# Patient Record
Sex: FEMALE | Race: WHITE | Employment: OTHER | URBAN - NONMETROPOLITAN AREA
[De-identification: names, ages, dates, MRNs, and addresses within clinical notes are randomized per-mention and may not be internally consistent; named-entity substitution may affect disease eponyms.]

---

## 2017-02-01 ENCOUNTER — OFFICE VISIT (OUTPATIENT)
Dept: FAMILY MEDICINE CLINIC | Age: 82
End: 2017-02-01

## 2017-02-01 VITALS
HEIGHT: 66 IN | HEART RATE: 90 BPM | OXYGEN SATURATION: 97 % | DIASTOLIC BLOOD PRESSURE: 80 MMHG | SYSTOLIC BLOOD PRESSURE: 122 MMHG

## 2017-02-01 DIAGNOSIS — N39.0 URINARY TRACT INFECTION WITHOUT HEMATURIA, SITE UNSPECIFIED: Primary | ICD-10-CM

## 2017-02-01 PROCEDURE — G8399 PT W/DXA RESULTS DOCUMENT: HCPCS | Performed by: NURSE PRACTITIONER

## 2017-02-01 PROCEDURE — 4040F PNEUMOC VAC/ADMIN/RCVD: CPT | Performed by: NURSE PRACTITIONER

## 2017-02-01 PROCEDURE — G8427 DOCREV CUR MEDS BY ELIG CLIN: HCPCS | Performed by: NURSE PRACTITIONER

## 2017-02-01 PROCEDURE — 1123F ACP DISCUSS/DSCN MKR DOCD: CPT | Performed by: NURSE PRACTITIONER

## 2017-02-01 PROCEDURE — 1090F PRES/ABSN URINE INCON ASSESS: CPT | Performed by: NURSE PRACTITIONER

## 2017-02-01 PROCEDURE — G8484 FLU IMMUNIZE NO ADMIN: HCPCS | Performed by: NURSE PRACTITIONER

## 2017-02-01 PROCEDURE — 1036F TOBACCO NON-USER: CPT | Performed by: NURSE PRACTITIONER

## 2017-02-01 PROCEDURE — 99213 OFFICE O/P EST LOW 20 MIN: CPT | Performed by: NURSE PRACTITIONER

## 2017-02-01 PROCEDURE — G8420 CALC BMI NORM PARAMETERS: HCPCS | Performed by: NURSE PRACTITIONER

## 2017-02-01 ASSESSMENT — ENCOUNTER SYMPTOMS
ALLERGIC/IMMUNOLOGIC NEGATIVE: 1
SHORTNESS OF BREATH: 0
SINUS PRESSURE: 0
CHEST TIGHTNESS: 0
RESPIRATORY NEGATIVE: 1
NAUSEA: 0
TROUBLE SWALLOWING: 0
VOMITING: 0
ABDOMINAL PAIN: 0
EYES NEGATIVE: 1
DIARRHEA: 0
CONSTIPATION: 0
GASTROINTESTINAL NEGATIVE: 1
COUGH: 0

## 2017-02-13 ENCOUNTER — HOSPITAL ENCOUNTER (OUTPATIENT)
Age: 82
Setting detail: SPECIMEN
Discharge: HOME | End: 2017-02-13
Payer: MEDICARE

## 2017-02-13 ENCOUNTER — OFFICE VISIT (OUTPATIENT)
Dept: FAMILY MEDICINE CLINIC | Age: 82
End: 2017-02-13

## 2017-02-13 VITALS
DIASTOLIC BLOOD PRESSURE: 78 MMHG | OXYGEN SATURATION: 99 % | HEART RATE: 80 BPM | WEIGHT: 179 LBS | SYSTOLIC BLOOD PRESSURE: 112 MMHG | HEIGHT: 65 IN | BODY MASS INDEX: 29.82 KG/M2

## 2017-02-13 DIAGNOSIS — F51.04 CHRONIC INSOMNIA: ICD-10-CM

## 2017-02-13 DIAGNOSIS — T82.110D PACEMAKER LEAD MALFUNCTION, SUBSEQUENT ENCOUNTER: ICD-10-CM

## 2017-02-13 DIAGNOSIS — N39.0 URINARY TRACT INFECTION WITHOUT HEMATURIA, SITE UNSPECIFIED: Primary | ICD-10-CM

## 2017-02-13 DIAGNOSIS — Z09 FOLLOW-UP EXAM, 3-6 MONTHS SINCE PREVIOUS EXAM: ICD-10-CM

## 2017-02-13 DIAGNOSIS — E03.9 ACQUIRED HYPOTHYROIDISM: Primary | ICD-10-CM

## 2017-02-13 DIAGNOSIS — Z00.00 ROUTINE HEALTH MAINTENANCE: ICD-10-CM

## 2017-02-13 DIAGNOSIS — K21.9 GASTROESOPHAGEAL REFLUX DISEASE WITHOUT ESOPHAGITIS: ICD-10-CM

## 2017-02-13 DIAGNOSIS — E03.9 UNSPECIFIED HYPOTHYROIDISM: ICD-10-CM

## 2017-02-13 DIAGNOSIS — E78.2 MIXED HYPERLIPIDEMIA: ICD-10-CM

## 2017-02-13 DIAGNOSIS — I10 ESSENTIAL HYPERTENSION: ICD-10-CM

## 2017-02-13 DIAGNOSIS — C73 THYROID CANCER (HCC): ICD-10-CM

## 2017-02-13 LAB
-: NORMAL
AMORPHOUS: NORMAL
BACTERIA: NORMAL
CASTS UA: NORMAL /LPF (ref 0–2)
CRYSTALS, UA: NORMAL /HPF
EPITHELIAL CELLS UA: NORMAL /HPF (ref 0–5)
MUCUS: NORMAL
OTHER OBSERVATIONS UA: NORMAL
RBC UA: NORMAL /HPF (ref 0–4)
RENAL EPITHELIAL, UA: NORMAL /HPF
TRICHOMONAS: NORMAL
WBC UA: NORMAL /HPF (ref 0–4)
YEAST: NORMAL

## 2017-02-13 PROCEDURE — 99213 OFFICE O/P EST LOW 20 MIN: CPT | Performed by: NURSE PRACTITIONER

## 2017-02-13 PROCEDURE — G8420 CALC BMI NORM PARAMETERS: HCPCS | Performed by: NURSE PRACTITIONER

## 2017-02-13 PROCEDURE — 4040F PNEUMOC VAC/ADMIN/RCVD: CPT | Performed by: NURSE PRACTITIONER

## 2017-02-13 PROCEDURE — 1090F PRES/ABSN URINE INCON ASSESS: CPT | Performed by: NURSE PRACTITIONER

## 2017-02-13 PROCEDURE — 1123F ACP DISCUSS/DSCN MKR DOCD: CPT | Performed by: NURSE PRACTITIONER

## 2017-02-13 PROCEDURE — 1036F TOBACCO NON-USER: CPT | Performed by: NURSE PRACTITIONER

## 2017-02-13 PROCEDURE — G8484 FLU IMMUNIZE NO ADMIN: HCPCS | Performed by: NURSE PRACTITIONER

## 2017-02-13 PROCEDURE — G8427 DOCREV CUR MEDS BY ELIG CLIN: HCPCS | Performed by: NURSE PRACTITIONER

## 2017-02-13 PROCEDURE — G8399 PT W/DXA RESULTS DOCUMENT: HCPCS | Performed by: NURSE PRACTITIONER

## 2017-02-13 RX ORDER — LEVOTHYROXINE SODIUM 0.1 MG/1
100 TABLET ORAL DAILY
Qty: 30 TABLET | Refills: 3 | Status: SHIPPED | OUTPATIENT
Start: 2017-02-13 | End: 2017-07-10 | Stop reason: SDUPTHER

## 2017-02-13 RX ORDER — ZOLPIDEM TARTRATE 12.5 MG/1
12.5 TABLET, FILM COATED, EXTENDED RELEASE ORAL NIGHTLY PRN
Qty: 30 TABLET | Refills: 0 | Status: SHIPPED | OUTPATIENT
Start: 2017-02-13 | End: 2017-07-25 | Stop reason: SDUPTHER

## 2017-02-13 RX ORDER — RANITIDINE 150 MG/1
150 TABLET ORAL 2 TIMES DAILY
Qty: 60 TABLET | Refills: 3 | Status: SHIPPED | OUTPATIENT
Start: 2017-02-13 | End: 2017-06-09 | Stop reason: SDUPTHER

## 2017-02-13 ASSESSMENT — ENCOUNTER SYMPTOMS
RESPIRATORY NEGATIVE: 1
DIARRHEA: 0
NAUSEA: 0
TROUBLE SWALLOWING: 0
CHEST TIGHTNESS: 0
SHORTNESS OF BREATH: 0
VOMITING: 0
ALLERGIC/IMMUNOLOGIC NEGATIVE: 1
EYES NEGATIVE: 1
GASTROINTESTINAL NEGATIVE: 1
ABDOMINAL PAIN: 0
SINUS PRESSURE: 0
COUGH: 0
CONSTIPATION: 0

## 2017-02-15 DIAGNOSIS — N39.0 URINARY TRACT INFECTION WITHOUT HEMATURIA, SITE UNSPECIFIED: Primary | ICD-10-CM

## 2017-02-15 LAB — VITAMIN D 1,25-DIHYDROXY: 54.8 PG/ML (ref 19.9–79.3)

## 2017-02-15 RX ORDER — SULFAMETHOXAZOLE AND TRIMETHOPRIM 800; 160 MG/1; MG/1
1 TABLET ORAL 2 TIMES DAILY
Qty: 20 TABLET | Refills: 0 | Status: SHIPPED | OUTPATIENT
Start: 2017-02-15 | End: 2017-02-23 | Stop reason: ALTCHOICE

## 2017-02-23 ENCOUNTER — OFFICE VISIT (OUTPATIENT)
Dept: ONCOLOGY | Age: 82
End: 2017-02-23

## 2017-02-23 ENCOUNTER — HOSPITAL ENCOUNTER (OUTPATIENT)
Age: 82
Setting detail: SPECIMEN
Discharge: HOME | End: 2017-02-23
Payer: MEDICARE

## 2017-02-23 VITALS
HEART RATE: 86 BPM | WEIGHT: 177 LBS | DIASTOLIC BLOOD PRESSURE: 72 MMHG | TEMPERATURE: 97.6 F | BODY MASS INDEX: 28.45 KG/M2 | SYSTOLIC BLOOD PRESSURE: 120 MMHG | HEIGHT: 66 IN

## 2017-02-23 DIAGNOSIS — M81.0 OSTEOPOROSIS: Primary | ICD-10-CM

## 2017-02-23 DIAGNOSIS — D45 POLYCYTHEMIA RUBRA VERA (HCC): Primary | ICD-10-CM

## 2017-02-23 PROCEDURE — 1123F ACP DISCUSS/DSCN MKR DOCD: CPT | Performed by: INTERNAL MEDICINE

## 2017-02-23 PROCEDURE — 4040F PNEUMOC VAC/ADMIN/RCVD: CPT | Performed by: INTERNAL MEDICINE

## 2017-02-23 PROCEDURE — G8420 CALC BMI NORM PARAMETERS: HCPCS | Performed by: INTERNAL MEDICINE

## 2017-02-23 PROCEDURE — 99214 OFFICE O/P EST MOD 30 MIN: CPT | Performed by: INTERNAL MEDICINE

## 2017-02-23 PROCEDURE — G8484 FLU IMMUNIZE NO ADMIN: HCPCS | Performed by: INTERNAL MEDICINE

## 2017-02-23 PROCEDURE — 1036F TOBACCO NON-USER: CPT | Performed by: INTERNAL MEDICINE

## 2017-02-23 PROCEDURE — G8399 PT W/DXA RESULTS DOCUMENT: HCPCS | Performed by: INTERNAL MEDICINE

## 2017-02-23 PROCEDURE — G8427 DOCREV CUR MEDS BY ELIG CLIN: HCPCS | Performed by: INTERNAL MEDICINE

## 2017-02-23 PROCEDURE — 1090F PRES/ABSN URINE INCON ASSESS: CPT | Performed by: INTERNAL MEDICINE

## 2017-02-28 LAB — V617F MUTATION, QNT: 0 %

## 2017-03-07 DIAGNOSIS — M81.0 OSTEOPOROSIS: Primary | ICD-10-CM

## 2017-04-04 DIAGNOSIS — F01.50 VASCULAR DEMENTIA WITHOUT BEHAVIORAL DISTURBANCE (HCC): Primary | ICD-10-CM

## 2017-04-04 RX ORDER — DONEPEZIL HYDROCHLORIDE 10 MG/1
TABLET, FILM COATED ORAL
Qty: 90 TABLET | Refills: 0 | Status: SHIPPED | OUTPATIENT
Start: 2017-04-04 | End: 2017-08-21 | Stop reason: SDUPTHER

## 2017-04-21 DIAGNOSIS — E78.2 MIXED HYPERLIPIDEMIA: Primary | ICD-10-CM

## 2017-04-21 RX ORDER — PRAVASTATIN SODIUM 40 MG
TABLET ORAL
Qty: 90 TABLET | Refills: 1 | Status: SHIPPED | OUTPATIENT
Start: 2017-04-21 | End: 2017-10-12 | Stop reason: SDUPTHER

## 2017-04-27 ENCOUNTER — PROCEDURE VISIT (OUTPATIENT)
Dept: CARDIOLOGY | Age: 82
End: 2017-04-27
Payer: MEDICARE

## 2017-04-27 ENCOUNTER — OFFICE VISIT (OUTPATIENT)
Dept: CARDIOLOGY | Age: 82
End: 2017-04-27
Payer: MEDICARE

## 2017-04-27 VITALS
BODY MASS INDEX: 28.12 KG/M2 | HEIGHT: 66 IN | SYSTOLIC BLOOD PRESSURE: 160 MMHG | WEIGHT: 175 LBS | HEART RATE: 86 BPM | DIASTOLIC BLOOD PRESSURE: 80 MMHG

## 2017-04-27 DIAGNOSIS — Z95.0 CARDIAC PACEMAKER IN SITU: Primary | ICD-10-CM

## 2017-04-27 DIAGNOSIS — I49.5 SSS (SICK SINUS SYNDROME) (HCC): ICD-10-CM

## 2017-04-27 DIAGNOSIS — R00.1 BRADYCARDIA: ICD-10-CM

## 2017-04-27 DIAGNOSIS — I10 ESSENTIAL HYPERTENSION: Primary | ICD-10-CM

## 2017-04-27 PROCEDURE — 99213 OFFICE O/P EST LOW 20 MIN: CPT | Performed by: INTERNAL MEDICINE

## 2017-04-27 PROCEDURE — 1090F PRES/ABSN URINE INCON ASSESS: CPT | Performed by: INTERNAL MEDICINE

## 2017-04-27 PROCEDURE — G8427 DOCREV CUR MEDS BY ELIG CLIN: HCPCS | Performed by: INTERNAL MEDICINE

## 2017-04-27 PROCEDURE — 1036F TOBACCO NON-USER: CPT | Performed by: INTERNAL MEDICINE

## 2017-04-27 PROCEDURE — G8420 CALC BMI NORM PARAMETERS: HCPCS | Performed by: INTERNAL MEDICINE

## 2017-04-27 PROCEDURE — 1123F ACP DISCUSS/DSCN MKR DOCD: CPT | Performed by: INTERNAL MEDICINE

## 2017-04-27 PROCEDURE — 4040F PNEUMOC VAC/ADMIN/RCVD: CPT | Performed by: INTERNAL MEDICINE

## 2017-04-27 PROCEDURE — 93288 INTERROG EVL PM/LDLS PM IP: CPT | Performed by: INTERNAL MEDICINE

## 2017-04-27 PROCEDURE — G8399 PT W/DXA RESULTS DOCUMENT: HCPCS | Performed by: INTERNAL MEDICINE

## 2017-04-27 RX ORDER — LOSARTAN POTASSIUM 50 MG/1
TABLET ORAL
Qty: 30 TABLET | Refills: 3 | Status: SHIPPED | OUTPATIENT
Start: 2017-04-27 | End: 2017-07-10 | Stop reason: SDUPTHER

## 2017-04-27 ASSESSMENT — ENCOUNTER SYMPTOMS
SHORTNESS OF BREATH: 1
VOMITING: 0
ABDOMINAL DISTENTION: 0
CHEST TIGHTNESS: 0
NAUSEA: 0
BACK PAIN: 0

## 2017-04-28 ENCOUNTER — TELEPHONE (OUTPATIENT)
Dept: CARDIOLOGY | Age: 82
End: 2017-04-28

## 2017-05-15 ENCOUNTER — OFFICE VISIT (OUTPATIENT)
Dept: FAMILY MEDICINE CLINIC | Age: 82
End: 2017-05-15
Payer: MEDICARE

## 2017-05-15 VITALS
SYSTOLIC BLOOD PRESSURE: 124 MMHG | HEIGHT: 66 IN | OXYGEN SATURATION: 97 % | HEART RATE: 76 BPM | BODY MASS INDEX: 28.45 KG/M2 | WEIGHT: 177 LBS | DIASTOLIC BLOOD PRESSURE: 70 MMHG

## 2017-05-15 DIAGNOSIS — I10 ESSENTIAL HYPERTENSION: ICD-10-CM

## 2017-05-15 DIAGNOSIS — E78.2 MIXED HYPERLIPIDEMIA: Primary | ICD-10-CM

## 2017-05-15 DIAGNOSIS — F51.04 CHRONIC INSOMNIA: ICD-10-CM

## 2017-05-15 DIAGNOSIS — K21.9 GASTROESOPHAGEAL REFLUX DISEASE WITHOUT ESOPHAGITIS: ICD-10-CM

## 2017-05-15 DIAGNOSIS — Z09 FOLLOW-UP EXAM, 3-6 MONTHS SINCE PREVIOUS EXAM: ICD-10-CM

## 2017-05-15 DIAGNOSIS — E03.9 UNSPECIFIED HYPOTHYROIDISM: ICD-10-CM

## 2017-05-15 PROCEDURE — 1123F ACP DISCUSS/DSCN MKR DOCD: CPT | Performed by: NURSE PRACTITIONER

## 2017-05-15 PROCEDURE — 1090F PRES/ABSN URINE INCON ASSESS: CPT | Performed by: NURSE PRACTITIONER

## 2017-05-15 PROCEDURE — 99214 OFFICE O/P EST MOD 30 MIN: CPT | Performed by: NURSE PRACTITIONER

## 2017-05-15 PROCEDURE — G8399 PT W/DXA RESULTS DOCUMENT: HCPCS | Performed by: NURSE PRACTITIONER

## 2017-05-15 PROCEDURE — 4040F PNEUMOC VAC/ADMIN/RCVD: CPT | Performed by: NURSE PRACTITIONER

## 2017-05-15 PROCEDURE — G8420 CALC BMI NORM PARAMETERS: HCPCS | Performed by: NURSE PRACTITIONER

## 2017-05-15 PROCEDURE — 1036F TOBACCO NON-USER: CPT | Performed by: NURSE PRACTITIONER

## 2017-05-15 PROCEDURE — G8427 DOCREV CUR MEDS BY ELIG CLIN: HCPCS | Performed by: NURSE PRACTITIONER

## 2017-05-15 RX ORDER — ZOLPIDEM TARTRATE 10 MG/1
10 TABLET ORAL NIGHTLY PRN
Qty: 30 TABLET | Refills: 0 | Status: SHIPPED | OUTPATIENT
Start: 2017-05-15 | End: 2017-07-10 | Stop reason: SDUPTHER

## 2017-05-15 ASSESSMENT — ENCOUNTER SYMPTOMS
RESPIRATORY NEGATIVE: 1
COUGH: 0
SINUS PRESSURE: 0
NAUSEA: 0
VOMITING: 0
ALLERGIC/IMMUNOLOGIC NEGATIVE: 1
TROUBLE SWALLOWING: 0
ABDOMINAL PAIN: 0
SHORTNESS OF BREATH: 0
EYES NEGATIVE: 1
CHEST TIGHTNESS: 0
GASTROINTESTINAL NEGATIVE: 1
DIARRHEA: 0
CONSTIPATION: 0

## 2017-06-09 DIAGNOSIS — M15.9 PRIMARY OSTEOARTHRITIS INVOLVING MULTIPLE JOINTS: ICD-10-CM

## 2017-06-09 DIAGNOSIS — K21.9 GASTROESOPHAGEAL REFLUX DISEASE WITHOUT ESOPHAGITIS: ICD-10-CM

## 2017-06-09 RX ORDER — RANITIDINE 150 MG/1
150 TABLET ORAL 2 TIMES DAILY
Qty: 60 TABLET | Refills: 3 | Status: SHIPPED | OUTPATIENT
Start: 2017-06-09 | End: 2017-10-16 | Stop reason: SDUPTHER

## 2017-06-29 ENCOUNTER — OFFICE VISIT (OUTPATIENT)
Dept: ONCOLOGY | Age: 82
End: 2017-06-29
Payer: MEDICARE

## 2017-06-29 VITALS
WEIGHT: 177.6 LBS | BODY MASS INDEX: 28.54 KG/M2 | SYSTOLIC BLOOD PRESSURE: 118 MMHG | HEART RATE: 84 BPM | TEMPERATURE: 97.7 F | DIASTOLIC BLOOD PRESSURE: 68 MMHG | HEIGHT: 66 IN

## 2017-06-29 DIAGNOSIS — D45 POLYCYTHEMIA RUBRA VERA (HCC): Primary | ICD-10-CM

## 2017-06-29 DIAGNOSIS — D75.1 POLYCYTHEMIA: Primary | ICD-10-CM

## 2017-06-29 PROCEDURE — 1036F TOBACCO NON-USER: CPT | Performed by: INTERNAL MEDICINE

## 2017-06-29 PROCEDURE — G8427 DOCREV CUR MEDS BY ELIG CLIN: HCPCS | Performed by: INTERNAL MEDICINE

## 2017-06-29 PROCEDURE — 99214 OFFICE O/P EST MOD 30 MIN: CPT | Performed by: INTERNAL MEDICINE

## 2017-06-29 PROCEDURE — G8399 PT W/DXA RESULTS DOCUMENT: HCPCS | Performed by: INTERNAL MEDICINE

## 2017-06-29 PROCEDURE — G8419 CALC BMI OUT NRM PARAM NOF/U: HCPCS | Performed by: INTERNAL MEDICINE

## 2017-06-29 PROCEDURE — 4040F PNEUMOC VAC/ADMIN/RCVD: CPT | Performed by: INTERNAL MEDICINE

## 2017-06-29 PROCEDURE — 1123F ACP DISCUSS/DSCN MKR DOCD: CPT | Performed by: INTERNAL MEDICINE

## 2017-06-29 PROCEDURE — 1090F PRES/ABSN URINE INCON ASSESS: CPT | Performed by: INTERNAL MEDICINE

## 2017-07-10 DIAGNOSIS — E03.9 ACQUIRED HYPOTHYROIDISM: ICD-10-CM

## 2017-07-10 DIAGNOSIS — F51.04 CHRONIC INSOMNIA: ICD-10-CM

## 2017-07-10 DIAGNOSIS — E03.9 UNSPECIFIED HYPOTHYROIDISM: ICD-10-CM

## 2017-07-10 RX ORDER — LEVOTHYROXINE SODIUM 0.1 MG/1
100 TABLET ORAL DAILY
Qty: 30 TABLET | Refills: 3 | Status: SHIPPED | OUTPATIENT
Start: 2017-07-10 | End: 2017-10-16 | Stop reason: SDUPTHER

## 2017-07-10 RX ORDER — LOSARTAN POTASSIUM 50 MG/1
TABLET ORAL
Qty: 30 TABLET | Refills: 3 | Status: SHIPPED | OUTPATIENT
Start: 2017-07-10 | End: 2017-10-16 | Stop reason: SDUPTHER

## 2017-07-10 RX ORDER — ZOLPIDEM TARTRATE 10 MG/1
10 TABLET ORAL NIGHTLY PRN
Qty: 30 TABLET | Refills: 0 | Status: SHIPPED | OUTPATIENT
Start: 2017-07-10 | End: 2019-01-18 | Stop reason: ALTCHOICE

## 2017-07-25 ENCOUNTER — HOSPITAL ENCOUNTER (EMERGENCY)
Age: 82
Discharge: HOME | End: 2017-07-25
Attending: EMERGENCY MEDICINE
Payer: MEDICARE

## 2017-07-25 ENCOUNTER — APPOINTMENT (OUTPATIENT)
Dept: GENERAL RADIOLOGY | Age: 82
End: 2017-07-25
Payer: MEDICARE

## 2017-07-25 ENCOUNTER — TELEPHONE (OUTPATIENT)
Dept: CARDIOLOGY | Age: 82
End: 2017-07-25

## 2017-07-25 ENCOUNTER — APPOINTMENT (OUTPATIENT)
Dept: CT IMAGING | Age: 82
End: 2017-07-25
Payer: MEDICARE

## 2017-07-25 VITALS
HEART RATE: 66 BPM | OXYGEN SATURATION: 100 % | BODY MASS INDEX: 28.61 KG/M2 | SYSTOLIC BLOOD PRESSURE: 153 MMHG | RESPIRATION RATE: 18 BRPM | WEIGHT: 178 LBS | TEMPERATURE: 97.3 F | HEIGHT: 66 IN | DIASTOLIC BLOOD PRESSURE: 68 MMHG

## 2017-07-25 DIAGNOSIS — R07.9 CHEST PAIN, UNSPECIFIED TYPE: Primary | ICD-10-CM

## 2017-07-25 DIAGNOSIS — R10.9 ABDOMINAL PAIN, UNSPECIFIED LOCATION: ICD-10-CM

## 2017-07-25 LAB
-: NORMAL
ABSOLUTE EOS #: 0.2 K/UL (ref 0–0.4)
ABSOLUTE LYMPH #: 0.8 K/UL (ref 1–4.8)
ABSOLUTE MONO #: 0.5 K/UL (ref 0.1–1.2)
ALBUMIN SERPL-MCNC: 3.8 G/DL (ref 3.5–5.2)
ALBUMIN/GLOBULIN RATIO: 1.5 (ref 1–2.5)
ALP BLD-CCNC: 73 U/L (ref 35–104)
ALT SERPL-CCNC: 9 U/L (ref 5–33)
AMORPHOUS: NORMAL
AMYLASE: 52 U/L (ref 28–100)
ANION GAP SERPL CALCULATED.3IONS-SCNC: 11 MMOL/L (ref 9–17)
AST SERPL-CCNC: 14 U/L
BACTERIA: NORMAL
BASOPHILS # BLD: 2 %
BASOPHILS ABSOLUTE: 0.1 K/UL (ref 0–0.2)
BILIRUB SERPL-MCNC: 0.41 MG/DL (ref 0.3–1.2)
BILIRUBIN DIRECT: 0.11 MG/DL
BILIRUBIN URINE: NEGATIVE
BILIRUBIN, INDIRECT: 0.3 MG/DL (ref 0–1)
BNP INTERPRETATION: NORMAL
BUN BLDV-MCNC: 15 MG/DL (ref 8–23)
BUN/CREAT BLD: 21 (ref 9–20)
CALCIUM SERPL-MCNC: 8.3 MG/DL (ref 8.6–10.4)
CASTS UA: NORMAL /LPF (ref 0–2)
CHLORIDE BLD-SCNC: 105 MMOL/L (ref 98–107)
CO2: 27 MMOL/L (ref 20–31)
COLOR: ABNORMAL
COMMENT UA: ABNORMAL
CREAT SERPL-MCNC: 0.72 MG/DL (ref 0.5–0.9)
CRYSTALS, UA: NORMAL /HPF
D DIMER: 203 NG/ML
DIFFERENTIAL TYPE: ABNORMAL
EKG ATRIAL RATE: 81 BPM
EKG P-R INTERVAL: 188 MS
EKG Q-T INTERVAL: 390 MS
EKG QRS DURATION: 76 MS
EKG QTC CALCULATION (BAZETT): 453 MS
EKG R AXIS: 0 DEGREES
EKG T AXIS: 28 DEGREES
EKG VENTRICULAR RATE: 81 BPM
EOSINOPHILS RELATIVE PERCENT: 5 %
EPITHELIAL CELLS UA: NORMAL /HPF (ref 0–5)
GFR AFRICAN AMERICAN: >60 ML/MIN
GFR NON-AFRICAN AMERICAN: >60 ML/MIN
GFR SERPL CREATININE-BSD FRML MDRD: ABNORMAL ML/MIN/{1.73_M2}
GFR SERPL CREATININE-BSD FRML MDRD: ABNORMAL ML/MIN/{1.73_M2}
GLOBULIN: 2.5 G/DL (ref 1.5–3.8)
GLUCOSE BLD-MCNC: 111 MG/DL (ref 70–99)
GLUCOSE URINE: NEGATIVE
HCT VFR BLD CALC: 39.5 % (ref 36–46)
HEMOGLOBIN: 12.8 G/DL (ref 12–16)
INR BLD: 1
KETONES, URINE: NEGATIVE
LACTIC ACID: 1.4 MMOL/L (ref 0.5–2.2)
LEUKOCYTE ESTERASE, URINE: NEGATIVE
LIPASE: 44 U/L (ref 13–60)
LYMPHOCYTES # BLD: 20 %
MCH RBC QN AUTO: 25.9 PG (ref 26–34)
MCHC RBC AUTO-ENTMCNC: 32.3 G/DL (ref 31–37)
MCV RBC AUTO: 80.2 FL (ref 80–100)
MONOCYTES # BLD: 13 %
MUCUS: NORMAL
NITRITE, URINE: NEGATIVE
OTHER OBSERVATIONS UA: NORMAL
PARTIAL THROMBOPLASTIN TIME: 25.3 SEC (ref 27–35)
PDW BLD-RTO: 15.2 % (ref 11–14.5)
PH UA: 5 (ref 5–6)
PLATELET # BLD: 205 K/UL (ref 140–450)
PLATELET ESTIMATE: ABNORMAL
PMV BLD AUTO: 8.3 FL (ref 6–12)
POTASSIUM SERPL-SCNC: 4.8 MMOL/L (ref 3.7–5.3)
PRO-BNP: 74 PG/ML
PROTEIN UA: NEGATIVE
PROTHROMBIN TIME: 10.4 SEC (ref 9.4–11.3)
RBC # BLD: 4.93 M/UL (ref 4–5.2)
RBC # BLD: ABNORMAL 10*6/UL
RBC UA: NORMAL /HPF (ref 0–4)
RENAL EPITHELIAL, UA: NORMAL /HPF
SEG NEUTROPHILS: 60 %
SEGMENTED NEUTROPHILS ABSOLUTE COUNT: 2.5 K/UL (ref 1.8–7.7)
SODIUM BLD-SCNC: 143 MMOL/L (ref 135–144)
SPECIFIC GRAVITY UA: 1 (ref 1.01–1.02)
TOTAL PROTEIN: 6.3 G/DL (ref 6.4–8.3)
TRICHOMONAS: NORMAL
TROPONIN INTERP: NORMAL
TROPONIN T: <0.03 NG/ML
TURBIDITY: ABNORMAL
URINE HGB: NEGATIVE
UROBILINOGEN, URINE: NORMAL
WBC # BLD: 4.1 K/UL (ref 3.5–11)
WBC # BLD: ABNORMAL 10*3/UL
WBC UA: NORMAL /HPF (ref 0–4)
YEAST: NORMAL

## 2017-07-25 PROCEDURE — 36415 COLL VENOUS BLD VENIPUNCTURE: CPT

## 2017-07-25 PROCEDURE — 6360000004 HC RX CONTRAST MEDICATION: Performed by: EMERGENCY MEDICINE

## 2017-07-25 PROCEDURE — 85610 PROTHROMBIN TIME: CPT

## 2017-07-25 PROCEDURE — 85730 THROMBOPLASTIN TIME PARTIAL: CPT

## 2017-07-25 PROCEDURE — 71010 XR CHEST PORTABLE: CPT

## 2017-07-25 PROCEDURE — 80048 BASIC METABOLIC PNL TOTAL CA: CPT

## 2017-07-25 PROCEDURE — 74177 CT ABD & PELVIS W/CONTRAST: CPT

## 2017-07-25 PROCEDURE — 85025 COMPLETE CBC W/AUTO DIFF WBC: CPT

## 2017-07-25 PROCEDURE — 83880 ASSAY OF NATRIURETIC PEPTIDE: CPT

## 2017-07-25 PROCEDURE — 82150 ASSAY OF AMYLASE: CPT

## 2017-07-25 PROCEDURE — 74177 CT ABD & PELVIS W/CONTRAST: CPT | Performed by: RADIOLOGY

## 2017-07-25 PROCEDURE — 99285 EMERGENCY DEPT VISIT HI MDM: CPT

## 2017-07-25 PROCEDURE — 93005 ELECTROCARDIOGRAM TRACING: CPT

## 2017-07-25 PROCEDURE — 84484 ASSAY OF TROPONIN QUANT: CPT

## 2017-07-25 PROCEDURE — 80076 HEPATIC FUNCTION PANEL: CPT

## 2017-07-25 PROCEDURE — 83690 ASSAY OF LIPASE: CPT

## 2017-07-25 PROCEDURE — 83605 ASSAY OF LACTIC ACID: CPT

## 2017-07-25 PROCEDURE — 85379 FIBRIN DEGRADATION QUANT: CPT

## 2017-07-25 PROCEDURE — 71010 XR CHEST PORTABLE: CPT | Performed by: RADIOLOGY

## 2017-07-25 PROCEDURE — 6370000000 HC RX 637 (ALT 250 FOR IP): Performed by: EMERGENCY MEDICINE

## 2017-07-25 PROCEDURE — 81001 URINALYSIS AUTO W/SCOPE: CPT

## 2017-07-25 RX ORDER — ASPIRIN 81 MG/1
324 TABLET, CHEWABLE ORAL ONCE
Status: COMPLETED | OUTPATIENT
Start: 2017-07-25 | End: 2017-07-25

## 2017-07-25 RX ADMIN — IOHEXOL 130 ML: 350 INJECTION, SOLUTION INTRAVENOUS at 09:56

## 2017-07-25 RX ADMIN — ASPIRIN 81 MG 324 MG: 81 TABLET ORAL at 08:58

## 2017-07-26 ENCOUNTER — NURSE ONLY (OUTPATIENT)
Dept: LAB | Age: 82
End: 2017-07-26
Payer: MEDICARE

## 2017-07-26 ENCOUNTER — OFFICE VISIT (OUTPATIENT)
Dept: FAMILY MEDICINE CLINIC | Age: 82
End: 2017-07-26
Payer: MEDICARE

## 2017-07-26 VITALS
TEMPERATURE: 97.1 F | BODY MASS INDEX: 28.51 KG/M2 | RESPIRATION RATE: 12 BRPM | OXYGEN SATURATION: 98 % | HEART RATE: 92 BPM | WEIGHT: 177.4 LBS | DIASTOLIC BLOOD PRESSURE: 64 MMHG | HEIGHT: 66 IN | SYSTOLIC BLOOD PRESSURE: 124 MMHG

## 2017-07-26 DIAGNOSIS — R07.9 CHEST PAIN OF UNCERTAIN ETIOLOGY: ICD-10-CM

## 2017-07-26 DIAGNOSIS — Z23 NEED FOR PNEUMOCOCCAL VACCINATION: ICD-10-CM

## 2017-07-26 DIAGNOSIS — Z23 NEED FOR PNEUMOCOCCAL VACCINATION: Primary | ICD-10-CM

## 2017-07-26 DIAGNOSIS — R10.9 ABDOMINAL PAIN, UNSPECIFIED LOCATION: ICD-10-CM

## 2017-07-26 PROCEDURE — 1090F PRES/ABSN URINE INCON ASSESS: CPT | Performed by: NURSE PRACTITIONER

## 2017-07-26 PROCEDURE — 1036F TOBACCO NON-USER: CPT | Performed by: NURSE PRACTITIONER

## 2017-07-26 PROCEDURE — 1123F ACP DISCUSS/DSCN MKR DOCD: CPT | Performed by: NURSE PRACTITIONER

## 2017-07-26 PROCEDURE — 99214 OFFICE O/P EST MOD 30 MIN: CPT | Performed by: NURSE PRACTITIONER

## 2017-07-26 PROCEDURE — G8399 PT W/DXA RESULTS DOCUMENT: HCPCS | Performed by: NURSE PRACTITIONER

## 2017-07-26 PROCEDURE — G0009 ADMIN PNEUMOCOCCAL VACCINE: HCPCS | Performed by: NURSE PRACTITIONER

## 2017-07-26 PROCEDURE — 90670 PCV13 VACCINE IM: CPT | Performed by: NURSE PRACTITIONER

## 2017-07-26 PROCEDURE — G8427 DOCREV CUR MEDS BY ELIG CLIN: HCPCS | Performed by: NURSE PRACTITIONER

## 2017-07-26 PROCEDURE — 4040F PNEUMOC VAC/ADMIN/RCVD: CPT | Performed by: NURSE PRACTITIONER

## 2017-07-26 PROCEDURE — G8419 CALC BMI OUT NRM PARAM NOF/U: HCPCS | Performed by: NURSE PRACTITIONER

## 2017-07-26 ASSESSMENT — ENCOUNTER SYMPTOMS
NAUSEA: 0
TROUBLE SWALLOWING: 0
SINUS PRESSURE: 0
ALLERGIC/IMMUNOLOGIC NEGATIVE: 1
ABDOMINAL PAIN: 0
VOMITING: 0
EYES NEGATIVE: 1
SHORTNESS OF BREATH: 0
CONSTIPATION: 0
DIARRHEA: 0
COUGH: 0
RESPIRATORY NEGATIVE: 1
CHEST TIGHTNESS: 0
GASTROINTESTINAL NEGATIVE: 1

## 2017-07-27 ENCOUNTER — TELEPHONE (OUTPATIENT)
Dept: ONCOLOGY | Age: 82
End: 2017-07-27

## 2017-08-21 DIAGNOSIS — F01.50 VASCULAR DEMENTIA WITHOUT BEHAVIORAL DISTURBANCE (HCC): ICD-10-CM

## 2017-08-21 RX ORDER — DONEPEZIL HYDROCHLORIDE 10 MG/1
TABLET, FILM COATED ORAL
Qty: 90 TABLET | Refills: 0 | Status: SHIPPED | OUTPATIENT
Start: 2017-08-21 | End: 2018-04-13 | Stop reason: SDUPTHER

## 2017-10-12 DIAGNOSIS — E78.2 MIXED HYPERLIPIDEMIA: ICD-10-CM

## 2017-10-12 RX ORDER — PRAVASTATIN SODIUM 40 MG
TABLET ORAL
Qty: 90 TABLET | Refills: 1 | Status: SHIPPED | OUTPATIENT
Start: 2017-10-12 | End: 2018-04-13 | Stop reason: SDUPTHER

## 2017-10-12 NOTE — TELEPHONE ENCOUNTER
Last Appt: 5/15/2017    Next Appt: NS last two appts to Est.   message left to reschedule-    Med verified in Epic

## 2017-10-13 ENCOUNTER — TELEPHONE (OUTPATIENT)
Dept: FAMILY MEDICINE CLINIC | Age: 82
End: 2017-10-13

## 2017-10-13 NOTE — TELEPHONE ENCOUNTER
A Huong Becker called back for patient, she states she was called for some reason for patient but does not know why. Peggy rCow continued to state she wasn't sure why she was down for an emergency contact, for her to schedule an appointment for patient would be weird because she didn't even know she was down as a contact. Please just try to call patient again.

## 2017-10-16 ENCOUNTER — OFFICE VISIT (OUTPATIENT)
Dept: FAMILY MEDICINE CLINIC | Age: 82
End: 2017-10-16
Payer: MEDICARE

## 2017-10-16 VITALS
HEART RATE: 88 BPM | DIASTOLIC BLOOD PRESSURE: 70 MMHG | SYSTOLIC BLOOD PRESSURE: 120 MMHG | BODY MASS INDEX: 28.61 KG/M2 | WEIGHT: 178 LBS | HEIGHT: 66 IN | OXYGEN SATURATION: 96 % | TEMPERATURE: 97 F

## 2017-10-16 DIAGNOSIS — E03.9 ACQUIRED HYPOTHYROIDISM: ICD-10-CM

## 2017-10-16 DIAGNOSIS — G30.8 ALZHEIMER'S DISEASE OF OTHER ONSET WITHOUT BEHAVIORAL DISTURBANCE: ICD-10-CM

## 2017-10-16 DIAGNOSIS — J30.9 ALLERGIC RHINITIS, UNSPECIFIED CHRONICITY, UNSPECIFIED SEASONALITY, UNSPECIFIED TRIGGER: ICD-10-CM

## 2017-10-16 DIAGNOSIS — Z00.00 ROUTINE MEDICAL EXAM: Primary | ICD-10-CM

## 2017-10-16 DIAGNOSIS — F51.04 CHRONIC INSOMNIA: ICD-10-CM

## 2017-10-16 DIAGNOSIS — F02.80 ALZHEIMER'S DISEASE OF OTHER ONSET WITHOUT BEHAVIORAL DISTURBANCE: ICD-10-CM

## 2017-10-16 DIAGNOSIS — K21.9 GASTROESOPHAGEAL REFLUX DISEASE WITHOUT ESOPHAGITIS: ICD-10-CM

## 2017-10-16 DIAGNOSIS — E03.9 HYPOTHYROIDISM, UNSPECIFIED TYPE: ICD-10-CM

## 2017-10-16 DIAGNOSIS — I10 ESSENTIAL HYPERTENSION: ICD-10-CM

## 2017-10-16 DIAGNOSIS — E78.2 MIXED HYPERLIPIDEMIA: ICD-10-CM

## 2017-10-16 DIAGNOSIS — J01.40 ACUTE PANSINUSITIS, RECURRENCE NOT SPECIFIED: ICD-10-CM

## 2017-10-16 PROCEDURE — 99213 OFFICE O/P EST LOW 20 MIN: CPT | Performed by: NURSE PRACTITIONER

## 2017-10-16 RX ORDER — LORATADINE 10 MG/1
10 TABLET ORAL DAILY
Qty: 30 TABLET | Refills: 11 | Status: SHIPPED | OUTPATIENT
Start: 2017-10-16

## 2017-10-16 RX ORDER — LEVOTHYROXINE SODIUM 0.1 MG/1
100 TABLET ORAL DAILY
Qty: 30 TABLET | Refills: 5 | Status: SHIPPED | OUTPATIENT
Start: 2017-10-16 | End: 2018-05-08 | Stop reason: SDUPTHER

## 2017-10-16 RX ORDER — RANITIDINE 150 MG/1
150 TABLET ORAL 2 TIMES DAILY
Qty: 60 TABLET | Refills: 5 | Status: SHIPPED | OUTPATIENT
Start: 2017-10-16 | End: 2018-07-14 | Stop reason: SDUPTHER

## 2017-10-16 RX ORDER — AZITHROMYCIN 250 MG/1
TABLET, FILM COATED ORAL
Qty: 1 PACKET | Refills: 0 | Status: SHIPPED | OUTPATIENT
Start: 2017-10-16 | End: 2017-10-26

## 2017-10-16 RX ORDER — ZOLPIDEM TARTRATE 10 MG/1
10 TABLET ORAL NIGHTLY PRN
Qty: 30 TABLET | Refills: 0 | Status: CANCELLED | OUTPATIENT
Start: 2017-10-16

## 2017-10-16 RX ORDER — LOSARTAN POTASSIUM 50 MG/1
TABLET ORAL
Qty: 30 TABLET | Refills: 5 | Status: SHIPPED | OUTPATIENT
Start: 2017-10-16 | End: 2018-05-21 | Stop reason: SDUPTHER

## 2017-10-16 ASSESSMENT — PATIENT HEALTH QUESTIONNAIRE - PHQ9
SUM OF ALL RESPONSES TO PHQ9 QUESTIONS 1 & 2: 0
SUM OF ALL RESPONSES TO PHQ QUESTIONS 1-9: 0
1. LITTLE INTEREST OR PLEASURE IN DOING THINGS: 0
2. FEELING DOWN, DEPRESSED OR HOPELESS: 0

## 2017-10-16 ASSESSMENT — ENCOUNTER SYMPTOMS
CONSTIPATION: 0
CHEST TIGHTNESS: 0
DIARRHEA: 0
TROUBLE SWALLOWING: 0
ALLERGIC/IMMUNOLOGIC NEGATIVE: 1
COUGH: 1
EYES NEGATIVE: 1
NAUSEA: 0
SINUS PRESSURE: 1
VOMITING: 0
RHINORRHEA: 1
SHORTNESS OF BREATH: 0
ABDOMINAL PAIN: 0

## 2017-10-16 NOTE — PROGRESS NOTES
(250 mg) on days 2 through 5. 1 packet 0    pravastatin (PRAVACHOL) 40 MG tablet take 1 tablet by mouth once daily 90 tablet 1    donepezil (ARICEPT) 10 MG tablet take 1 tablet by mouth once daily 90 tablet 0    zolpidem (AMBIEN) 10 MG tablet Take 1 tablet by mouth nightly as needed for Sleep 30 tablet 0    Handicap Placard MISC by Does not apply route 1 each 0    aspirin (ASPIRIN CHILDRENS) 81 MG chewable tablet Take 1 tablet by mouth daily. 30 tablet 3    zoledronic acid (RECLAST) 5 MG/100ML SOLN Infuse 5 mg intravenously once. Once a year       Current Facility-Administered Medications   Medication Dose Route Frequency Provider Last Rate Last Dose    denosumab (PROLIA) SC injection 60 mg  60 mg Subcutaneous Q6 Months Tanya Klein MD           Review of Systems   Constitutional: Negative for activity change, appetite change and fever. HENT: Positive for hearing loss, postnasal drip, rhinorrhea and sinus pressure. Negative for congestion, ear pain and trouble swallowing. Eyes: Negative. Respiratory: Positive for cough (mild). Negative for chest tightness and shortness of breath. Cardiovascular: Negative for chest pain and palpitations. Gastrointestinal: Negative for abdominal pain, constipation, diarrhea, nausea and vomiting. Endocrine: Negative. Genitourinary: Negative for difficulty urinating and dysuria. Musculoskeletal: Negative. Skin: Negative. Allergic/Immunologic: Negative. Neurological: Negative for dizziness, light-headedness and headaches. Hematological: Negative. Psychiatric/Behavioral: Negative. Objective:      /70   Pulse 88   Temp 97 °F (36.1 °C) (Tympanic)   Ht 5' 6\" (1.676 m)   Wt 178 lb (80.7 kg)   SpO2 96%   BMI 28.73 kg/m²     Physical Exam   Constitutional: She is oriented to person, place, and time. She appears well-developed and well-nourished. HENT:   Head: Normocephalic and atraumatic.    Right Ear: Hearing, tympanic membrane and external ear normal.   Left Ear: Hearing, tympanic membrane and external ear normal.   Nose: Nose normal.   Mouth/Throat: Uvula is midline, oropharynx is clear and moist and mucous membranes are normal.   Eyes: Conjunctivae and EOM are normal. Pupils are equal, round, and reactive to light. Neck: Normal range of motion. Neck supple. Cardiovascular: Normal rate, regular rhythm, normal heart sounds and intact distal pulses. Pulmonary/Chest: Effort normal and breath sounds normal.   Abdominal: Soft. Bowel sounds are normal.   Musculoskeletal: Normal range of motion. Neurological: She is alert and oriented to person, place, and time. Skin: Skin is warm and dry. Psychiatric: She has a normal mood and affect. Her behavior is normal. Judgment and thought content normal.         Assessment & Plan:      1. Routine medical exam      2. Alzheimer's disease of other onset without behavioral disturbance-Chronic stable on medication      3. Gastroesophageal reflux disease without esophagitis-Chronic stable on medication    - ranitidine (ZANTAC) 150 MG tablet; Take 1 tablet by mouth 2 times daily  Dispense: 60 tablet; Refill: 5    4. Hypothyroidism, unspecified type-Chronic stable on medication    - levothyroxine (LEVOTHROID) 100 MCG tablet; Take 1 tablet by mouth daily  Dispense: 30 tablet; Refill: 5  - T4, Free; Future  - TSH; Future    5. Acquired hypothyroidism-Chronic stable on medication    - levothyroxine (LEVOTHROID) 100 MCG tablet; Take 1 tablet by mouth daily  Dispense: 30 tablet; Refill: 5  - T4, Free; Future  - TSH; Future    6. Chronic insomnia-Chronic stable on medication      7. Mixed hyperlipidemia-Chronic stable on medication    - Lipid Panel; Future    8. Essential hypertension-Chronic stable on medication    - losartan (COZAAR) 50 MG tablet; take 1 tablet by mouth once daily  Dispense: 30 tablet; Refill: 5  - Lipid Panel; Future    9.  Acute pansinusitis, recurrence not specified    -

## 2017-11-13 ENCOUNTER — HOSPITAL ENCOUNTER (OUTPATIENT)
Dept: LAB | Age: 82
Setting detail: SPECIMEN
Discharge: HOME | End: 2017-11-13
Payer: MEDICARE

## 2017-11-13 ENCOUNTER — OFFICE VISIT (OUTPATIENT)
Dept: ONCOLOGY | Age: 82
End: 2017-11-13
Payer: MEDICARE

## 2017-11-13 VITALS
HEIGHT: 66 IN | HEART RATE: 88 BPM | TEMPERATURE: 96.9 F | SYSTOLIC BLOOD PRESSURE: 138 MMHG | DIASTOLIC BLOOD PRESSURE: 62 MMHG | WEIGHT: 170 LBS | BODY MASS INDEX: 27.32 KG/M2

## 2017-11-13 DIAGNOSIS — D75.1 POLYCYTHEMIA: ICD-10-CM

## 2017-11-13 DIAGNOSIS — D75.1 POLYCYTHEMIA: Primary | ICD-10-CM

## 2017-11-13 LAB
ABSOLUTE EOS #: 0.31 K/UL (ref 0–0.4)
ABSOLUTE IMMATURE GRANULOCYTE: ABNORMAL K/UL (ref 0–0.3)
ABSOLUTE LYMPH #: 1.39 K/UL (ref 1–4.8)
ABSOLUTE MONO #: 0.85 K/UL (ref 0.1–1.2)
BASOPHILS # BLD: 1 % (ref 0–1)
BASOPHILS ABSOLUTE: 0.08 K/UL (ref 0–0.2)
DIFFERENTIAL TYPE: ABNORMAL
EOSINOPHILS RELATIVE PERCENT: 4 % (ref 1–7)
HCT VFR BLD CALC: 44.7 % (ref 36–46)
HEMOGLOBIN: 14.6 G/DL (ref 12–16)
IMMATURE GRANULOCYTES: ABNORMAL %
LYMPHOCYTES # BLD: 18 % (ref 16–46)
MCH RBC QN AUTO: 24.6 PG (ref 26–34)
MCHC RBC AUTO-ENTMCNC: 32.7 G/DL (ref 31–37)
MCV RBC AUTO: 75.5 FL (ref 80–100)
MONOCYTES # BLD: 11 % (ref 4–11)
MORPHOLOGY: ABNORMAL
PDW BLD-RTO: 16.7 % (ref 11–14.5)
PLATELET # BLD: 222 K/UL (ref 140–450)
PLATELET ESTIMATE: ABNORMAL
PMV BLD AUTO: 8.9 FL (ref 6–12)
RBC # BLD: 5.93 M/UL (ref 4–5.2)
RBC # BLD: ABNORMAL 10*6/UL
SEG NEUTROPHILS: 66 % (ref 43–77)
SEGMENTED NEUTROPHILS ABSOLUTE COUNT: 5.07 K/UL (ref 1.8–7.7)
WBC # BLD: 7.7 K/UL (ref 3.5–11)
WBC # BLD: ABNORMAL 10*3/UL

## 2017-11-13 PROCEDURE — 1123F ACP DISCUSS/DSCN MKR DOCD: CPT | Performed by: INTERNAL MEDICINE

## 2017-11-13 PROCEDURE — G8399 PT W/DXA RESULTS DOCUMENT: HCPCS | Performed by: INTERNAL MEDICINE

## 2017-11-13 PROCEDURE — 36415 COLL VENOUS BLD VENIPUNCTURE: CPT

## 2017-11-13 PROCEDURE — G8427 DOCREV CUR MEDS BY ELIG CLIN: HCPCS | Performed by: INTERNAL MEDICINE

## 2017-11-13 PROCEDURE — 1036F TOBACCO NON-USER: CPT | Performed by: INTERNAL MEDICINE

## 2017-11-13 PROCEDURE — G8484 FLU IMMUNIZE NO ADMIN: HCPCS | Performed by: INTERNAL MEDICINE

## 2017-11-13 PROCEDURE — G8417 CALC BMI ABV UP PARAM F/U: HCPCS | Performed by: INTERNAL MEDICINE

## 2017-11-13 PROCEDURE — 1090F PRES/ABSN URINE INCON ASSESS: CPT | Performed by: INTERNAL MEDICINE

## 2017-11-13 PROCEDURE — 4040F PNEUMOC VAC/ADMIN/RCVD: CPT | Performed by: INTERNAL MEDICINE

## 2017-11-13 PROCEDURE — 85025 COMPLETE CBC W/AUTO DIFF WBC: CPT

## 2017-11-13 PROCEDURE — 99214 OFFICE O/P EST MOD 30 MIN: CPT | Performed by: INTERNAL MEDICINE

## 2017-11-13 NOTE — PROGRESS NOTES
Patient ID: Livier Rosen, 1934, [id number], 80 y.o. 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 Brandon Perez until recently for polycythemia. She was diagnosed with polycythemia in September 2010. Her GÓMEZ 2 mutation mutation was negative. She had a bone marrow biopsy done in September 2010 which showed normal cellular marrow with no erythroid hyperplasia. No evidence of malignancy or dysplasia noted. She was treated with intermittent phlebotomies since September 2010. Interval history:  Patient is returning for follow-up visit. She denied drenching night sweats, unintentional weight loss or fever chills. No headaches or dizziness or visual symptoms. During this visit patient's allergy, social, medical, surgical history and medications were reviewed and updated.     Past Medical History:   Diagnosis Date    Mcdaniel esophagus     CAD (coronary artery disease)     nonobstructive    Chronic insomnia     Dementia     Depression with anxiety     GERD (gastroesophageal reflux disease)     Hiatal hernia     Hyperlipidemia     Hypertension     Hypothyroidism s/p surgical removal of thyroid for thyroid carcinoma 7/29/2015    IBS (irritable bowel syndrome)     Impaired glucose tolerance test     Mycosis fungoides (HCC)     Osteoarthritis     Osteoporosis     Polycythemia rubra vera (HCC)     sees oncology    PUD (peptic ulcer disease)     Thyroid cancer ([facility] [address] about 5 years ago    thyroid /following with oncology    TIA (transient ischemic attack) 7/23/2014    Confusion--word searching    Vitamin D deficiency        Past Surgical History:   Procedure Laterality Date    BONE MARROW BIOPSY  September 2010    polycythemia vera    BREAST BIOPSY Right     benign    CARDIAC CATHETERIZATION  2008    nonobstructive CAD    CHOLECYSTECTOMY  1995    COLONOSCOPY  2002    COLONOSCOPY  2/29/2016    colon polyp, diverticulosis - Dr Ester Garrido    after miscarriage    ENDOSCOPY, COLON, DIAGNOSTIC  2002    normal  external hemorrhoids    EYE SURGERY Right 11/07/06    Cataract extraction with lens implant    LOBECTOMY, THYROID Left     thyroid cancer    PACEMAKER INSERTION  6/2013    [facility] SKIN BIOPSY  2003    mycosis fungoides    TAE AND BSO      TAE/BSO at age 54 with anterior repair for benign reasons.  TONSILLECTOMY AND ADENOIDECTOMY      age 27   [address] UPPER GASTROINTESTINAL ENDOSCOPY  2002    UPPER GASTROINTESTINAL ENDOSCOPY  08/28/2003    UPPER GASTROINTESTINAL ENDOSCOPY  05/12/2003    UPPER GASTROINTESTINAL ENDOSCOPY  03/20/2001    UPPER GASTROINTESTINAL ENDOSCOPY  02/29/2016    Barretts tissue, body polyp - Dr Rohith Ventura       No Known Allergies    Current Outpatient Prescriptions   Medication Sig Dispense Refill    ranitidine (ZANTAC) 150 MG tablet Take 1 tablet by mouth 2 times daily 60 tablet 5    losartan (COZAAR) 50 MG tablet take 1 tablet by mouth once daily 30 tablet 5    levothyroxine (LEVOTHROID) 100 MCG tablet Take 1 tablet by mouth daily 30 tablet 5    loratadine (CLARITIN) 10 MG tablet Take 1 tablet by mouth daily 30 tablet 11    pravastatin (PRAVACHOL) 40 MG tablet take 1 tablet by mouth once daily 90 tablet 1    donepezil (ARICEPT) 10 MG tablet take 1 tablet by mouth once daily 90 tablet 0    zolpidem (AMBIEN) 10 MG tablet Take 1 tablet by mouth nightly as needed for Sleep 30 tablet 0    Handicap Placard MISC by Does not apply route 1 each 0    aspirin (ASPIRIN CHILDRENS) 81 MG chewable tablet Take 1 tablet by mouth daily. 30 tablet 3    zoledronic acid (RECLAST) 5 MG/100ML SOLN Infuse 5 mg intravenously once.  Once a year       Current

## 2018-04-13 DIAGNOSIS — F01.50 VASCULAR DEMENTIA WITHOUT BEHAVIORAL DISTURBANCE (HCC): ICD-10-CM

## 2018-04-13 DIAGNOSIS — E78.2 MIXED HYPERLIPIDEMIA: ICD-10-CM

## 2018-04-13 RX ORDER — PRAVASTATIN SODIUM 40 MG
TABLET ORAL
Qty: 90 TABLET | Refills: 1 | Status: SHIPPED | OUTPATIENT
Start: 2018-04-13 | End: 2018-10-02 | Stop reason: SDUPTHER

## 2018-04-13 RX ORDER — DONEPEZIL HYDROCHLORIDE 10 MG/1
TABLET, FILM COATED ORAL
Qty: 90 TABLET | Refills: 0 | Status: SHIPPED | OUTPATIENT
Start: 2018-04-13 | End: 2019-01-18 | Stop reason: ALTCHOICE

## 2018-05-08 DIAGNOSIS — E03.9 ACQUIRED HYPOTHYROIDISM: ICD-10-CM

## 2018-05-08 DIAGNOSIS — E03.9 HYPOTHYROIDISM, UNSPECIFIED TYPE: ICD-10-CM

## 2018-05-10 ENCOUNTER — OFFICE VISIT (OUTPATIENT)
Dept: FAMILY MEDICINE CLINIC | Age: 83
End: 2018-05-10
Payer: MEDICARE

## 2018-05-10 VITALS
DIASTOLIC BLOOD PRESSURE: 64 MMHG | TEMPERATURE: 97.8 F | HEART RATE: 93 BPM | HEIGHT: 66 IN | WEIGHT: 163.6 LBS | BODY MASS INDEX: 26.29 KG/M2 | RESPIRATION RATE: 12 BRPM | SYSTOLIC BLOOD PRESSURE: 110 MMHG | OXYGEN SATURATION: 98 %

## 2018-05-10 DIAGNOSIS — G30.8 ALZHEIMER'S DISEASE OF OTHER ONSET WITHOUT BEHAVIORAL DISTURBANCE: Primary | ICD-10-CM

## 2018-05-10 DIAGNOSIS — I49.5 SSS (SICK SINUS SYNDROME) (HCC): ICD-10-CM

## 2018-05-10 DIAGNOSIS — D45 POLYCYTHEMIA RUBRA VERA (HCC): ICD-10-CM

## 2018-05-10 DIAGNOSIS — Z23 NEED FOR SHINGLES VACCINE: ICD-10-CM

## 2018-05-10 DIAGNOSIS — E03.9 ACQUIRED HYPOTHYROIDISM: ICD-10-CM

## 2018-05-10 DIAGNOSIS — I10 ESSENTIAL HYPERTENSION: ICD-10-CM

## 2018-05-10 DIAGNOSIS — F02.80 ALZHEIMER'S DISEASE OF OTHER ONSET WITHOUT BEHAVIORAL DISTURBANCE: Primary | ICD-10-CM

## 2018-05-10 DIAGNOSIS — Z09 FOLLOW-UP EXAM, 3-6 MONTHS SINCE PREVIOUS EXAM: ICD-10-CM

## 2018-05-10 DIAGNOSIS — E78.2 MIXED HYPERLIPIDEMIA: ICD-10-CM

## 2018-05-10 PROCEDURE — G8417 CALC BMI ABV UP PARAM F/U: HCPCS | Performed by: NURSE PRACTITIONER

## 2018-05-10 PROCEDURE — 1036F TOBACCO NON-USER: CPT | Performed by: NURSE PRACTITIONER

## 2018-05-10 PROCEDURE — 1090F PRES/ABSN URINE INCON ASSESS: CPT | Performed by: NURSE PRACTITIONER

## 2018-05-10 PROCEDURE — G8399 PT W/DXA RESULTS DOCUMENT: HCPCS | Performed by: NURSE PRACTITIONER

## 2018-05-10 PROCEDURE — 99215 OFFICE O/P EST HI 40 MIN: CPT | Performed by: NURSE PRACTITIONER

## 2018-05-10 PROCEDURE — 1123F ACP DISCUSS/DSCN MKR DOCD: CPT | Performed by: NURSE PRACTITIONER

## 2018-05-10 PROCEDURE — G8427 DOCREV CUR MEDS BY ELIG CLIN: HCPCS | Performed by: NURSE PRACTITIONER

## 2018-05-10 PROCEDURE — 4040F PNEUMOC VAC/ADMIN/RCVD: CPT | Performed by: NURSE PRACTITIONER

## 2018-05-10 ASSESSMENT — ENCOUNTER SYMPTOMS
DIARRHEA: 0
CONSTIPATION: 0
ALLERGIC/IMMUNOLOGIC NEGATIVE: 1
EYES NEGATIVE: 1
TROUBLE SWALLOWING: 0
COUGH: 0
SHORTNESS OF BREATH: 0
NAUSEA: 0
CHEST TIGHTNESS: 0
ABDOMINAL PAIN: 0
VOMITING: 0
RESPIRATORY NEGATIVE: 1
SINUS PRESSURE: 0
GASTROINTESTINAL NEGATIVE: 1

## 2018-05-14 ENCOUNTER — OFFICE VISIT (OUTPATIENT)
Dept: ONCOLOGY | Age: 83
End: 2018-05-14
Payer: MEDICARE

## 2018-05-14 VITALS
DIASTOLIC BLOOD PRESSURE: 84 MMHG | HEIGHT: 66 IN | WEIGHT: 162.4 LBS | RESPIRATION RATE: 16 BRPM | BODY MASS INDEX: 26.1 KG/M2 | SYSTOLIC BLOOD PRESSURE: 122 MMHG | TEMPERATURE: 97.4 F | HEART RATE: 76 BPM

## 2018-05-14 DIAGNOSIS — D75.1 POLYCYTHEMIA: Primary | ICD-10-CM

## 2018-05-14 PROCEDURE — 99214 OFFICE O/P EST MOD 30 MIN: CPT | Performed by: INTERNAL MEDICINE

## 2018-05-14 PROCEDURE — 1123F ACP DISCUSS/DSCN MKR DOCD: CPT | Performed by: INTERNAL MEDICINE

## 2018-05-14 PROCEDURE — 4040F PNEUMOC VAC/ADMIN/RCVD: CPT | Performed by: INTERNAL MEDICINE

## 2018-05-14 PROCEDURE — G8399 PT W/DXA RESULTS DOCUMENT: HCPCS | Performed by: INTERNAL MEDICINE

## 2018-05-14 PROCEDURE — 1036F TOBACCO NON-USER: CPT | Performed by: INTERNAL MEDICINE

## 2018-05-14 PROCEDURE — G8417 CALC BMI ABV UP PARAM F/U: HCPCS | Performed by: INTERNAL MEDICINE

## 2018-05-14 PROCEDURE — 1090F PRES/ABSN URINE INCON ASSESS: CPT | Performed by: INTERNAL MEDICINE

## 2018-05-14 PROCEDURE — G8427 DOCREV CUR MEDS BY ELIG CLIN: HCPCS | Performed by: INTERNAL MEDICINE

## 2018-05-15 ENCOUNTER — HOSPITAL ENCOUNTER (OUTPATIENT)
Dept: LAB | Age: 83
Setting detail: SPECIMEN
Discharge: HOME | End: 2018-05-15
Payer: MEDICARE

## 2018-05-15 DIAGNOSIS — E78.2 MIXED HYPERLIPIDEMIA: ICD-10-CM

## 2018-05-15 DIAGNOSIS — D75.1 POLYCYTHEMIA: ICD-10-CM

## 2018-05-15 DIAGNOSIS — E03.9 ACQUIRED HYPOTHYROIDISM: ICD-10-CM

## 2018-05-15 DIAGNOSIS — I10 ESSENTIAL HYPERTENSION: ICD-10-CM

## 2018-05-15 DIAGNOSIS — E03.9 HYPOTHYROIDISM, UNSPECIFIED TYPE: ICD-10-CM

## 2018-05-15 DIAGNOSIS — E03.9 ACQUIRED HYPOTHYROIDISM: Primary | ICD-10-CM

## 2018-05-15 LAB
ABSOLUTE EOS #: 0.2 K/UL (ref 0–0.4)
ABSOLUTE IMMATURE GRANULOCYTE: ABNORMAL K/UL (ref 0–0.3)
ABSOLUTE LYMPH #: 1.1 K/UL (ref 1–4.8)
ABSOLUTE MONO #: 0.5 K/UL (ref 0.1–1.2)
BASOPHILS # BLD: 2 % (ref 0–1)
BASOPHILS ABSOLUTE: 0.1 K/UL (ref 0–0.2)
CHOLESTEROL/HDL RATIO: 4
CHOLESTEROL: 232 MG/DL
DIFFERENTIAL TYPE: ABNORMAL
EOSINOPHILS RELATIVE PERCENT: 4 % (ref 1–7)
HCT VFR BLD CALC: 48.4 % (ref 36–46)
HDLC SERPL-MCNC: 58 MG/DL
HEMOGLOBIN: 15.7 G/DL (ref 12–16)
IMMATURE GRANULOCYTES: ABNORMAL %
LDL CHOLESTEROL: 150 MG/DL (ref 0–130)
LYMPHOCYTES # BLD: 24 % (ref 16–46)
MCH RBC QN AUTO: 26.4 PG (ref 26–34)
MCHC RBC AUTO-ENTMCNC: 32.4 G/DL (ref 31–37)
MCV RBC AUTO: 81.6 FL (ref 80–100)
MONOCYTES # BLD: 11 % (ref 4–11)
NRBC AUTOMATED: ABNORMAL PER 100 WBC
PDW BLD-RTO: 16.6 % (ref 11–14.5)
PLATELET # BLD: 221 K/UL (ref 140–450)
PLATELET ESTIMATE: ABNORMAL
PMV BLD AUTO: 8.8 FL (ref 6–12)
RBC # BLD: 5.94 M/UL (ref 4–5.2)
RBC # BLD: ABNORMAL 10*6/UL
SEG NEUTROPHILS: 59 % (ref 43–77)
SEGMENTED NEUTROPHILS ABSOLUTE COUNT: 2.7 K/UL (ref 1.8–7.7)
THYROXINE, FREE: 0.72 NG/DL (ref 0.93–1.7)
TRIGL SERPL-MCNC: 119 MG/DL
TSH SERPL DL<=0.05 MIU/L-ACNC: 45.41 MIU/L (ref 0.3–5)
VLDLC SERPL CALC-MCNC: ABNORMAL MG/DL (ref 1–30)
WBC # BLD: 4.6 K/UL (ref 3.5–11)
WBC # BLD: ABNORMAL 10*3/UL

## 2018-05-15 PROCEDURE — 36415 COLL VENOUS BLD VENIPUNCTURE: CPT

## 2018-05-15 PROCEDURE — 84443 ASSAY THYROID STIM HORMONE: CPT

## 2018-05-15 PROCEDURE — 85025 COMPLETE CBC W/AUTO DIFF WBC: CPT

## 2018-05-15 PROCEDURE — 84439 ASSAY OF FREE THYROXINE: CPT

## 2018-05-15 PROCEDURE — 80061 LIPID PANEL: CPT

## 2018-05-15 RX ORDER — LEVOTHYROXINE SODIUM 0.1 MG/1
TABLET ORAL
Qty: 30 TABLET | Refills: 5 | Status: SHIPPED | OUTPATIENT
Start: 2018-05-15 | End: 2018-07-19 | Stop reason: DRUGHIGH

## 2018-05-21 DIAGNOSIS — I10 ESSENTIAL HYPERTENSION: ICD-10-CM

## 2018-05-21 RX ORDER — LOSARTAN POTASSIUM 50 MG/1
TABLET ORAL
Qty: 30 TABLET | Refills: 5 | Status: SHIPPED | OUTPATIENT
Start: 2018-05-21

## 2018-06-05 ENCOUNTER — TELEPHONE (OUTPATIENT)
Dept: CARDIOLOGY | Age: 83
End: 2018-06-05

## 2018-06-28 ENCOUNTER — TELEPHONE (OUTPATIENT)
Dept: CARDIOLOGY | Age: 83
End: 2018-06-28

## 2018-06-28 NOTE — TELEPHONE ENCOUNTER
Attempted to call patient to rescheduled pacer appointment and Appointment with Dr Arminda Krueger.  Will try later

## 2018-07-12 DIAGNOSIS — E03.9 ACQUIRED HYPOTHYROIDISM: ICD-10-CM

## 2018-07-12 DIAGNOSIS — C73 THYROID CANCER (HCC): Primary | ICD-10-CM

## 2018-07-14 DIAGNOSIS — K21.9 GASTROESOPHAGEAL REFLUX DISEASE WITHOUT ESOPHAGITIS: ICD-10-CM

## 2018-07-16 RX ORDER — RANITIDINE 150 MG/1
TABLET ORAL
Qty: 60 TABLET | Refills: 5 | Status: ON HOLD | OUTPATIENT
Start: 2018-07-16 | End: 2019-08-21 | Stop reason: HOSPADM

## 2018-07-19 ENCOUNTER — OFFICE VISIT (OUTPATIENT)
Dept: FAMILY MEDICINE CLINIC | Age: 83
End: 2018-07-19
Payer: MEDICARE

## 2018-07-19 ENCOUNTER — HOSPITAL ENCOUNTER (OUTPATIENT)
Dept: LAB | Age: 83
Setting detail: SPECIMEN
Discharge: HOME | End: 2018-07-19
Payer: MEDICARE

## 2018-07-19 VITALS
WEIGHT: 163 LBS | RESPIRATION RATE: 16 BRPM | DIASTOLIC BLOOD PRESSURE: 70 MMHG | BODY MASS INDEX: 26.2 KG/M2 | SYSTOLIC BLOOD PRESSURE: 120 MMHG | OXYGEN SATURATION: 97 % | TEMPERATURE: 97.3 F | HEIGHT: 66 IN | HEART RATE: 80 BPM

## 2018-07-19 DIAGNOSIS — Z23 NEED FOR SHINGLES VACCINE: ICD-10-CM

## 2018-07-19 DIAGNOSIS — I10 ESSENTIAL HYPERTENSION: Primary | ICD-10-CM

## 2018-07-19 DIAGNOSIS — E03.9 ACQUIRED HYPOTHYROIDISM: ICD-10-CM

## 2018-07-19 DIAGNOSIS — E78.2 MIXED HYPERLIPIDEMIA: ICD-10-CM

## 2018-07-19 DIAGNOSIS — C73 THYROID CANCER (HCC): ICD-10-CM

## 2018-07-19 DIAGNOSIS — E03.9 ACQUIRED HYPOTHYROIDISM: Primary | ICD-10-CM

## 2018-07-19 LAB
THYROXINE, FREE: 0.86 NG/DL (ref 0.93–1.7)
TSH SERPL DL<=0.05 MIU/L-ACNC: 62.4 MIU/L (ref 0.3–5)

## 2018-07-19 PROCEDURE — 36415 COLL VENOUS BLD VENIPUNCTURE: CPT

## 2018-07-19 PROCEDURE — 1090F PRES/ABSN URINE INCON ASSESS: CPT | Performed by: NURSE PRACTITIONER

## 2018-07-19 PROCEDURE — 1123F ACP DISCUSS/DSCN MKR DOCD: CPT | Performed by: NURSE PRACTITIONER

## 2018-07-19 PROCEDURE — 99214 OFFICE O/P EST MOD 30 MIN: CPT | Performed by: NURSE PRACTITIONER

## 2018-07-19 PROCEDURE — G8510 SCR DEP NEG, NO PLAN REQD: HCPCS | Performed by: NURSE PRACTITIONER

## 2018-07-19 PROCEDURE — G8427 DOCREV CUR MEDS BY ELIG CLIN: HCPCS | Performed by: NURSE PRACTITIONER

## 2018-07-19 PROCEDURE — 1101F PT FALLS ASSESS-DOCD LE1/YR: CPT | Performed by: NURSE PRACTITIONER

## 2018-07-19 PROCEDURE — 84439 ASSAY OF FREE THYROXINE: CPT

## 2018-07-19 PROCEDURE — 84443 ASSAY THYROID STIM HORMONE: CPT

## 2018-07-19 PROCEDURE — G8417 CALC BMI ABV UP PARAM F/U: HCPCS | Performed by: NURSE PRACTITIONER

## 2018-07-19 PROCEDURE — 3288F FALL RISK ASSESSMENT DOCD: CPT | Performed by: NURSE PRACTITIONER

## 2018-07-19 PROCEDURE — 1036F TOBACCO NON-USER: CPT | Performed by: NURSE PRACTITIONER

## 2018-07-19 PROCEDURE — G8399 PT W/DXA RESULTS DOCUMENT: HCPCS | Performed by: NURSE PRACTITIONER

## 2018-07-19 PROCEDURE — 4040F PNEUMOC VAC/ADMIN/RCVD: CPT | Performed by: NURSE PRACTITIONER

## 2018-07-19 RX ORDER — LEVOTHYROXINE SODIUM 0.12 MG/1
125 TABLET ORAL DAILY
Qty: 30 TABLET | Refills: 5 | Status: SHIPPED | OUTPATIENT
Start: 2018-07-19

## 2018-07-19 ASSESSMENT — ENCOUNTER SYMPTOMS
SHORTNESS OF BREATH: 0
EYES NEGATIVE: 1
COUGH: 0
CHEST TIGHTNESS: 0
TROUBLE SWALLOWING: 0
CONSTIPATION: 0
VOMITING: 0
DIARRHEA: 0
ABDOMINAL PAIN: 0
NAUSEA: 0
ALLERGIC/IMMUNOLOGIC NEGATIVE: 1
RESPIRATORY NEGATIVE: 1
SINUS PRESSURE: 0
GASTROINTESTINAL NEGATIVE: 1

## 2018-07-19 ASSESSMENT — PATIENT HEALTH QUESTIONNAIRE - PHQ9
1. LITTLE INTEREST OR PLEASURE IN DOING THINGS: 0
2. FEELING DOWN, DEPRESSED OR HOPELESS: 0
SUM OF ALL RESPONSES TO PHQ QUESTIONS 1-9: 0
SUM OF ALL RESPONSES TO PHQ9 QUESTIONS 1 & 2: 0

## 2018-07-19 NOTE — PROGRESS NOTES
by mouth daily 30 tablet 11    zolpidem (AMBIEN) 10 MG tablet Take 1 tablet by mouth nightly as needed for Sleep 30 tablet 0    Handicap Placard MISC by Does not apply route 1 each 0    aspirin (ASPIRIN CHILDRENS) 81 MG chewable tablet Take 1 tablet by mouth daily. 30 tablet 3     Current Facility-Administered Medications   Medication Dose Route Frequency Provider Last Rate Last Dose    denosumab (PROLIA) SC injection 60 mg  60 mg Subcutaneous Q6 Months Sushant Murphy MD           Review of Systems   Constitutional: Negative for activity change, appetite change and fever. HENT: Negative. Negative for congestion, ear pain, sinus pressure and trouble swallowing. Eyes: Negative. Respiratory: Negative. Negative for cough, chest tightness and shortness of breath. Cardiovascular: Negative. Negative for chest pain and palpitations. Gastrointestinal: Negative. Negative for abdominal pain, constipation, diarrhea, nausea and vomiting. Endocrine: Negative. Genitourinary: Negative for difficulty urinating and dysuria. Musculoskeletal: Negative. Skin: Negative. Allergic/Immunologic: Negative. Neurological: Negative for dizziness, light-headedness and headaches. Hematological: Negative. Psychiatric/Behavioral: Negative. Objective:      /70 (Site: Right Arm, Position: Sitting, Cuff Size: Medium Adult)   Pulse 80   Temp 97.3 °F (36.3 °C) (Tympanic)   Resp 16   Ht 5' 5.98\" (1.676 m)   Wt 163 lb (73.9 kg)   SpO2 97%   Breastfeeding? No   BMI 26.32 kg/m²     Physical Exam   Constitutional: She is oriented to person, place, and time. She appears well-developed and well-nourished. HENT:   Head: Normocephalic and atraumatic. Right Ear: External ear normal.   Left Ear: External ear normal.   Nose: Nose normal.   Mouth/Throat: Oropharynx is clear and moist.   Eyes: Conjunctivae and EOM are normal. Pupils are equal, round, and reactive to light.    Neck: Normal range of motion. Neck supple. Cardiovascular: Normal rate, regular rhythm and normal heart sounds. Pulmonary/Chest: Effort normal and breath sounds normal.   Abdominal: Soft. Bowel sounds are normal.   Musculoskeletal: Normal range of motion. Neurological: She is alert and oriented to person, place, and time. Skin: Skin is warm and dry. Psychiatric: She has a normal mood and affect. Her behavior is normal. Judgment and thought content normal.     Joe received counseling on the following healthy behaviors: nutrition  Reviewed prior labs and health maintenance  Continue current medications, diet and exercise. Discussed use, benefit, and side effects of prescribed medications. Barriers to medication compliance addressed. Patient given educational materials - see patient instructions  Was a self-tracking handout given in paper form or via [employer]? No:     Requested Prescriptions     Signed Prescriptions Disp Refills    zoster recombinant adjuvanted vaccine (SHINGRIX) 50 MCG SUSR injection 0.5 mL 1     Sig: Inject 0.5 mLs into the muscle every 6 months for 2 doses       All patient questions answered. Patient voiced understanding. Quality Measures    Body mass index is 26.32 kg/m². Elevated. Weight control planned discussed Healthy diet and regular exercise. BP: 120/70. Blood pressure is normal. Treatment plan consists of No treatment change needed. Fall Risk 7/19/2018 10/16/2017 8/12/2016 7/29/2015 7/31/2014   2 or more falls in past year? no no no no no   Fall with injury in past year? no no no no no     The patient does not have a history of falls. I did not - not indicated , complete a risk assessment for falls.  A plan of care for falls No Treatment plan indicated    Lab Results   Component Value Date    LDLCHOLESTEROL 150 (H) 05/15/2018    (goal LDL reduction with dx if diabetes is 50% LDL reduction)    PHQ Scores 7/19/2018 10/16/2017 8/12/2016 7/29/2015 3/20/2014   PHQ2 Score 0 0 0 0 0   PHQ9 Score 0 0 0 0 0     Interpretation of Total Score Depression Severity: 1-4 = Minimal depression, 5-9 = Mild depression, 10-14 = Moderate depression, 15-19 = Moderately severe depression, 20-27 = Severe depression      Assessment & Plan:      1. Essential hypertension-chronic stable on medication      2. Mixed hyperlipidemia-chronic stable on medication      3. Need for shingles vaccine    - zoster recombinant adjuvanted vaccine (SHINGRIX) 50 MCG SUSR injection; Inject 0.5 mLs into the muscle every 6 months for 2 doses  Dispense: 0.5 mL; Refill: 1    4. Acquired hypothyroidism-unstable  will adjust medication if needed after she get her labs drawn. Follow up in 6 months for her annual medicare wellness visit.      DAISY Yap CNP  7/19/2018 8:42 AM

## 2018-10-02 DIAGNOSIS — E78.2 MIXED HYPERLIPIDEMIA: ICD-10-CM

## 2018-10-02 RX ORDER — PRAVASTATIN SODIUM 40 MG
TABLET ORAL
Qty: 90 TABLET | Refills: 1 | Status: SHIPPED | OUTPATIENT
Start: 2018-10-02

## 2018-11-26 DIAGNOSIS — F01.50 VASCULAR DEMENTIA WITHOUT BEHAVIORAL DISTURBANCE (HCC): ICD-10-CM

## 2018-11-27 RX ORDER — DONEPEZIL HYDROCHLORIDE 10 MG/1
TABLET, FILM COATED ORAL
Qty: 90 TABLET | Refills: 1 | OUTPATIENT
Start: 2018-11-27

## 2018-12-31 ENCOUNTER — TELEPHONE (OUTPATIENT)
Dept: INTERNAL MEDICINE | Age: 83
End: 2018-12-31

## 2019-01-18 ENCOUNTER — OFFICE VISIT (OUTPATIENT)
Dept: FAMILY MEDICINE CLINIC | Age: 84
End: 2019-01-18
Payer: MEDICARE

## 2019-01-18 ENCOUNTER — HOSPITAL ENCOUNTER (OUTPATIENT)
Dept: LAB | Age: 84
Discharge: HOME | End: 2019-01-18
Payer: MEDICARE

## 2019-01-18 VITALS
SYSTOLIC BLOOD PRESSURE: 110 MMHG | HEART RATE: 84 BPM | BODY MASS INDEX: 25.26 KG/M2 | WEIGHT: 157.2 LBS | DIASTOLIC BLOOD PRESSURE: 64 MMHG | HEIGHT: 66 IN | TEMPERATURE: 97.5 F | RESPIRATION RATE: 12 BRPM | OXYGEN SATURATION: 96 %

## 2019-01-18 DIAGNOSIS — I49.5 SSS (SICK SINUS SYNDROME) (HCC): ICD-10-CM

## 2019-01-18 DIAGNOSIS — E03.9 ACQUIRED HYPOTHYROIDISM: ICD-10-CM

## 2019-01-18 DIAGNOSIS — G30.8 ALZHEIMER'S DISEASE OF OTHER ONSET WITHOUT BEHAVIORAL DISTURBANCE: ICD-10-CM

## 2019-01-18 DIAGNOSIS — Z00.00 ROUTINE GENERAL MEDICAL EXAMINATION AT A HEALTH CARE FACILITY: Primary | ICD-10-CM

## 2019-01-18 DIAGNOSIS — Z23 NEED FOR SHINGLES VACCINE: ICD-10-CM

## 2019-01-18 DIAGNOSIS — Z00.00 ENCOUNTER FOR MEDICARE ANNUAL WELLNESS EXAM: ICD-10-CM

## 2019-01-18 DIAGNOSIS — R41.89 COGNITIVE IMPAIRMENT: ICD-10-CM

## 2019-01-18 DIAGNOSIS — F02.80 ALZHEIMER'S DISEASE OF OTHER ONSET WITHOUT BEHAVIORAL DISTURBANCE: ICD-10-CM

## 2019-01-18 DIAGNOSIS — F03.90 DEMENTIA WITHOUT BEHAVIORAL DISTURBANCE, UNSPECIFIED DEMENTIA TYPE: ICD-10-CM

## 2019-01-18 LAB
ABSOLUTE EOS #: 0.2 K/UL (ref 0–0.4)
ABSOLUTE IMMATURE GRANULOCYTE: ABNORMAL K/UL (ref 0–0.3)
ABSOLUTE LYMPH #: 1 K/UL (ref 1–4.8)
ABSOLUTE MONO #: 0.6 K/UL (ref 0.1–1.2)
ALBUMIN SERPL-MCNC: 4 G/DL (ref 3.5–5.2)
ALBUMIN/GLOBULIN RATIO: 1.6 (ref 1–2.5)
ALP BLD-CCNC: 149 U/L (ref 35–104)
ALT SERPL-CCNC: 13 U/L (ref 5–33)
ANION GAP SERPL CALCULATED.3IONS-SCNC: 13 MMOL/L (ref 9–17)
AST SERPL-CCNC: 15 U/L
BASOPHILS # BLD: 1 % (ref 0–1)
BASOPHILS ABSOLUTE: 0.1 K/UL (ref 0–0.2)
BILIRUB SERPL-MCNC: 0.47 MG/DL (ref 0.3–1.2)
BUN BLDV-MCNC: 12 MG/DL (ref 8–23)
BUN/CREAT BLD: 16 (ref 9–20)
CALCIUM SERPL-MCNC: 9.1 MG/DL (ref 8.6–10.4)
CHLORIDE BLD-SCNC: 103 MMOL/L (ref 98–107)
CHOLESTEROL/HDL RATIO: 2.5
CHOLESTEROL: 146 MG/DL
CO2: 28 MMOL/L (ref 20–31)
CREAT SERPL-MCNC: 0.76 MG/DL (ref 0.5–0.9)
DIFFERENTIAL TYPE: ABNORMAL
EOSINOPHILS RELATIVE PERCENT: 4 % (ref 1–7)
GFR AFRICAN AMERICAN: >60 ML/MIN
GFR NON-AFRICAN AMERICAN: >60 ML/MIN
GFR SERPL CREATININE-BSD FRML MDRD: ABNORMAL ML/MIN/{1.73_M2}
GFR SERPL CREATININE-BSD FRML MDRD: ABNORMAL ML/MIN/{1.73_M2}
GLUCOSE BLD-MCNC: 115 MG/DL (ref 70–99)
HCT VFR BLD CALC: 51 % (ref 36–46)
HDLC SERPL-MCNC: 59 MG/DL
HEMOGLOBIN: 16.5 G/DL (ref 12–16)
IMMATURE GRANULOCYTES: ABNORMAL %
LDL CHOLESTEROL: 66 MG/DL (ref 0–130)
LYMPHOCYTES # BLD: 18 % (ref 16–46)
MCH RBC QN AUTO: 27.4 PG (ref 26–34)
MCHC RBC AUTO-ENTMCNC: 32.4 G/DL (ref 31–37)
MCV RBC AUTO: 84.6 FL (ref 80–100)
MONOCYTES # BLD: 11 % (ref 4–11)
NRBC AUTOMATED: ABNORMAL PER 100 WBC
PDW BLD-RTO: 15.4 % (ref 11–14.5)
PLATELET # BLD: 221 K/UL (ref 140–450)
PLATELET ESTIMATE: ABNORMAL
PMV BLD AUTO: 8.4 FL (ref 6–12)
POTASSIUM SERPL-SCNC: 4.2 MMOL/L (ref 3.7–5.3)
RBC # BLD: 6.03 M/UL (ref 4–5.2)
RBC # BLD: ABNORMAL 10*6/UL
SEG NEUTROPHILS: 66 % (ref 43–77)
SEGMENTED NEUTROPHILS ABSOLUTE COUNT: 3.6 K/UL (ref 1.8–7.7)
SODIUM BLD-SCNC: 144 MMOL/L (ref 135–144)
THYROXINE, FREE: 1.67 NG/DL (ref 0.93–1.7)
TOTAL PROTEIN: 6.5 G/DL (ref 6.4–8.3)
TRIGL SERPL-MCNC: 103 MG/DL
TSH SERPL DL<=0.05 MIU/L-ACNC: 1.37 MIU/L (ref 0.3–5)
VLDLC SERPL CALC-MCNC: NORMAL MG/DL (ref 1–30)
WBC # BLD: 5.4 K/UL (ref 3.5–11)
WBC # BLD: ABNORMAL 10*3/UL

## 2019-01-18 PROCEDURE — 80061 LIPID PANEL: CPT

## 2019-01-18 PROCEDURE — 36415 COLL VENOUS BLD VENIPUNCTURE: CPT

## 2019-01-18 PROCEDURE — 85025 COMPLETE CBC W/AUTO DIFF WBC: CPT

## 2019-01-18 PROCEDURE — 80053 COMPREHEN METABOLIC PANEL: CPT

## 2019-01-18 PROCEDURE — 84443 ASSAY THYROID STIM HORMONE: CPT

## 2019-01-18 PROCEDURE — 4040F PNEUMOC VAC/ADMIN/RCVD: CPT | Performed by: NURSE PRACTITIONER

## 2019-01-18 PROCEDURE — G8484 FLU IMMUNIZE NO ADMIN: HCPCS | Performed by: NURSE PRACTITIONER

## 2019-01-18 PROCEDURE — G0438 PPPS, INITIAL VISIT: HCPCS | Performed by: NURSE PRACTITIONER

## 2019-01-18 PROCEDURE — 84439 ASSAY OF FREE THYROXINE: CPT

## 2019-01-18 ASSESSMENT — LIFESTYLE VARIABLES: HOW OFTEN DO YOU HAVE A DRINK CONTAINING ALCOHOL: 0

## 2019-01-18 ASSESSMENT — PATIENT HEALTH QUESTIONNAIRE - PHQ9
SUM OF ALL RESPONSES TO PHQ QUESTIONS 1-9: 0
SUM OF ALL RESPONSES TO PHQ QUESTIONS 1-9: 0

## 2019-01-18 ASSESSMENT — ANXIETY QUESTIONNAIRES: GAD7 TOTAL SCORE: 0

## 2019-01-21 ENCOUNTER — CARE COORDINATION (OUTPATIENT)
Dept: CARE COORDINATION | Age: 84
End: 2019-01-21

## 2019-01-29 ENCOUNTER — CARE COORDINATION (OUTPATIENT)
Dept: CARE COORDINATION | Age: 84
End: 2019-01-29

## 2019-08-18 ENCOUNTER — APPOINTMENT (OUTPATIENT)
Dept: GENERAL RADIOLOGY | Age: 84
DRG: 470 | End: 2019-08-18
Payer: MEDICARE

## 2019-08-18 ENCOUNTER — APPOINTMENT (OUTPATIENT)
Dept: CT IMAGING | Age: 84
DRG: 470 | End: 2019-08-18
Payer: MEDICARE

## 2019-08-18 ENCOUNTER — HOSPITAL ENCOUNTER (INPATIENT)
Age: 84
LOS: 3 days | Discharge: NURSING, REHAB OR LONG-TERM CARE | DRG: 470 | End: 2019-08-21
Attending: EMERGENCY MEDICINE | Admitting: INTERNAL MEDICINE
Payer: MEDICARE

## 2019-08-18 DIAGNOSIS — S72.001A CLOSED RIGHT HIP FRACTURE, INITIAL ENCOUNTER (HCC): Primary | ICD-10-CM

## 2019-08-18 DIAGNOSIS — S32.591A CLOSED FRACTURE OF RAMUS OF RIGHT PUBIS, INITIAL ENCOUNTER (HCC): ICD-10-CM

## 2019-08-18 LAB
ABSOLUTE BANDS #: 0.17 K/UL
ABSOLUTE EOS #: 0.13 K/UL (ref 0–0.4)
ABSOLUTE IMMATURE GRANULOCYTE: ABNORMAL K/UL (ref 0–0.3)
ABSOLUTE LYMPH #: 1.08 K/UL (ref 1–4.8)
ABSOLUTE MONO #: 0.3 K/UL (ref 0.1–1.2)
ANION GAP SERPL CALCULATED.3IONS-SCNC: 12 MMOL/L (ref 9–17)
BANDS: 4 %
BASOPHILS # BLD: 1 % (ref 0–1)
BASOPHILS ABSOLUTE: 0.04 K/UL (ref 0–0.2)
BUN BLDV-MCNC: 9 MG/DL (ref 8–23)
BUN/CREAT BLD: 8 (ref 9–20)
CALCIUM SERPL-MCNC: 9 MG/DL (ref 8.6–10.4)
CHLORIDE BLD-SCNC: 102 MMOL/L (ref 98–107)
CO2: 30 MMOL/L (ref 20–31)
CREAT SERPL-MCNC: 1.15 MG/DL (ref 0.5–0.9)
DIFFERENTIAL TYPE: ABNORMAL
EOSINOPHILS RELATIVE PERCENT: 3 % (ref 1–7)
GFR AFRICAN AMERICAN: 54 ML/MIN
GFR NON-AFRICAN AMERICAN: 45 ML/MIN
GFR SERPL CREATININE-BSD FRML MDRD: ABNORMAL ML/MIN/{1.73_M2}
GFR SERPL CREATININE-BSD FRML MDRD: ABNORMAL ML/MIN/{1.73_M2}
GLUCOSE BLD-MCNC: 206 MG/DL (ref 70–99)
HCT VFR BLD CALC: 49.2 % (ref 36–46)
HEMOGLOBIN: 16.2 G/DL (ref 12–16)
IMMATURE GRANULOCYTES: ABNORMAL %
LACTIC ACID, SEPSIS WHOLE BLOOD: ABNORMAL MMOL/L (ref 0.5–1.9)
LACTIC ACID, SEPSIS: 2.9 MMOL/L (ref 0.5–1.9)
LYMPHOCYTES # BLD: 25 % (ref 16–46)
MCH RBC QN AUTO: 28.7 PG (ref 26–34)
MCHC RBC AUTO-ENTMCNC: 32.9 G/DL (ref 31–37)
MCV RBC AUTO: 87.1 FL (ref 80–100)
MONOCYTES # BLD: 7 % (ref 4–11)
MORPHOLOGY: ABNORMAL
MORPHOLOGY: ABNORMAL
MYELOCYTES ABSOLUTE COUNT: 0.09 K/UL
MYELOCYTES: 2 %
NRBC AUTOMATED: ABNORMAL PER 100 WBC
PDW BLD-RTO: 17.2 % (ref 11–14.5)
PLATELET # BLD: 172 K/UL (ref 140–450)
PLATELET ESTIMATE: ABNORMAL
PMV BLD AUTO: 8.9 FL (ref 6–12)
POTASSIUM SERPL-SCNC: 4.1 MMOL/L (ref 3.7–5.3)
RBC # BLD: 5.65 M/UL (ref 4–5.2)
RBC # BLD: ABNORMAL 10*6/UL
SEG NEUTROPHILS: 58 % (ref 43–77)
SEGMENTED NEUTROPHILS ABSOLUTE COUNT: 2.49 K/UL (ref 1.8–7.7)
SODIUM BLD-SCNC: 144 MMOL/L (ref 135–144)
TSH SERPL DL<=0.05 MIU/L-ACNC: 187.4 MIU/L (ref 0.3–5)
WBC # BLD: 4.3 K/UL (ref 3.5–11)
WBC # BLD: ABNORMAL 10*3/UL

## 2019-08-18 PROCEDURE — 96375 TX/PRO/DX INJ NEW DRUG ADDON: CPT

## 2019-08-18 PROCEDURE — 73700 CT LOWER EXTREMITY W/O DYE: CPT

## 2019-08-18 PROCEDURE — 83605 ASSAY OF LACTIC ACID: CPT

## 2019-08-18 PROCEDURE — 94761 N-INVAS EAR/PLS OXIMETRY MLT: CPT

## 2019-08-18 PROCEDURE — 73502 X-RAY EXAM HIP UNI 2-3 VIEWS: CPT

## 2019-08-18 PROCEDURE — 80048 BASIC METABOLIC PNL TOTAL CA: CPT

## 2019-08-18 PROCEDURE — 2060000000 HC ICU INTERMEDIATE R&B

## 2019-08-18 PROCEDURE — 99222 1ST HOSP IP/OBS MODERATE 55: CPT | Performed by: INTERNAL MEDICINE

## 2019-08-18 PROCEDURE — 6360000002 HC RX W HCPCS: Performed by: EMERGENCY MEDICINE

## 2019-08-18 PROCEDURE — 99285 EMERGENCY DEPT VISIT HI MDM: CPT

## 2019-08-18 PROCEDURE — 87040 BLOOD CULTURE FOR BACTERIA: CPT

## 2019-08-18 PROCEDURE — 96376 TX/PRO/DX INJ SAME DRUG ADON: CPT

## 2019-08-18 PROCEDURE — 2580000003 HC RX 258: Performed by: NURSE PRACTITIONER

## 2019-08-18 PROCEDURE — 93005 ELECTROCARDIOGRAM TRACING: CPT | Performed by: INTERNAL MEDICINE

## 2019-08-18 PROCEDURE — 85025 COMPLETE CBC W/AUTO DIFF WBC: CPT

## 2019-08-18 PROCEDURE — 2700000000 HC OXYGEN THERAPY PER DAY

## 2019-08-18 PROCEDURE — 6370000000 HC RX 637 (ALT 250 FOR IP): Performed by: INTERNAL MEDICINE

## 2019-08-18 PROCEDURE — 84443 ASSAY THYROID STIM HORMONE: CPT

## 2019-08-18 PROCEDURE — 96374 THER/PROPH/DIAG INJ IV PUSH: CPT

## 2019-08-18 PROCEDURE — 2580000003 HC RX 258: Performed by: INTERNAL MEDICINE

## 2019-08-18 PROCEDURE — 36415 COLL VENOUS BLD VENIPUNCTURE: CPT

## 2019-08-18 PROCEDURE — 6370000000 HC RX 637 (ALT 250 FOR IP): Performed by: NURSE PRACTITIONER

## 2019-08-18 PROCEDURE — 71045 X-RAY EXAM CHEST 1 VIEW: CPT

## 2019-08-18 PROCEDURE — 6360000002 HC RX W HCPCS: Performed by: INTERNAL MEDICINE

## 2019-08-18 PROCEDURE — 6370000000 HC RX 637 (ALT 250 FOR IP)

## 2019-08-18 RX ORDER — SODIUM CHLORIDE 0.9 % (FLUSH) 0.9 %
10 SYRINGE (ML) INJECTION EVERY 12 HOURS SCHEDULED
Status: DISCONTINUED | OUTPATIENT
Start: 2019-08-18 | End: 2019-08-21 | Stop reason: HOSPADM

## 2019-08-18 RX ORDER — LOSARTAN POTASSIUM 50 MG/1
50 TABLET ORAL DAILY
Status: DISCONTINUED | OUTPATIENT
Start: 2019-08-18 | End: 2019-08-21 | Stop reason: HOSPADM

## 2019-08-18 RX ORDER — ONDANSETRON 2 MG/ML
4 INJECTION INTRAMUSCULAR; INTRAVENOUS ONCE
Status: COMPLETED | OUTPATIENT
Start: 2019-08-18 | End: 2019-08-18

## 2019-08-18 RX ORDER — OXYCODONE HYDROCHLORIDE AND ACETAMINOPHEN 5; 325 MG/1; MG/1
2 TABLET ORAL EVERY 4 HOURS PRN
Status: DISCONTINUED | OUTPATIENT
Start: 2019-08-18 | End: 2019-08-21 | Stop reason: HOSPADM

## 2019-08-18 RX ORDER — 0.9 % SODIUM CHLORIDE 0.9 %
500 INTRAVENOUS SOLUTION INTRAVENOUS ONCE
Status: COMPLETED | OUTPATIENT
Start: 2019-08-18 | End: 2019-08-19

## 2019-08-18 RX ORDER — OXYCODONE HYDROCHLORIDE AND ACETAMINOPHEN 5; 325 MG/1; MG/1
1 TABLET ORAL EVERY 4 HOURS PRN
Status: DISCONTINUED | OUTPATIENT
Start: 2019-08-18 | End: 2019-08-21 | Stop reason: HOSPADM

## 2019-08-18 RX ORDER — SODIUM CHLORIDE 9 MG/ML
INJECTION, SOLUTION INTRAVENOUS CONTINUOUS
Status: DISCONTINUED | OUTPATIENT
Start: 2019-08-18 | End: 2019-08-20 | Stop reason: CLARIF

## 2019-08-18 RX ORDER — SODIUM CHLORIDE 0.9 % (FLUSH) 0.9 %
10 SYRINGE (ML) INJECTION PRN
Status: DISCONTINUED | OUTPATIENT
Start: 2019-08-18 | End: 2019-08-21 | Stop reason: HOSPADM

## 2019-08-18 RX ORDER — FAMOTIDINE 20 MG/1
40 TABLET, FILM COATED ORAL DAILY
Status: DISCONTINUED | OUTPATIENT
Start: 2019-08-18 | End: 2019-08-21 | Stop reason: HOSPADM

## 2019-08-18 RX ORDER — ACETAMINOPHEN 325 MG/1
650 TABLET ORAL EVERY 4 HOURS PRN
Status: DISCONTINUED | OUTPATIENT
Start: 2019-08-18 | End: 2019-08-21 | Stop reason: HOSPADM

## 2019-08-18 RX ORDER — METAXALONE 800 MG/1
800 TABLET ORAL 3 TIMES DAILY PRN
Status: DISCONTINUED | OUTPATIENT
Start: 2019-08-18 | End: 2019-08-21 | Stop reason: HOSPADM

## 2019-08-18 RX ORDER — PRAVASTATIN SODIUM 20 MG
40 TABLET ORAL NIGHTLY
Status: DISCONTINUED | OUTPATIENT
Start: 2019-08-18 | End: 2019-08-21 | Stop reason: HOSPADM

## 2019-08-18 RX ORDER — ONDANSETRON 2 MG/ML
4 INJECTION INTRAMUSCULAR; INTRAVENOUS EVERY 6 HOURS PRN
Status: DISCONTINUED | OUTPATIENT
Start: 2019-08-18 | End: 2019-08-21 | Stop reason: HOSPADM

## 2019-08-18 RX ORDER — CETIRIZINE HYDROCHLORIDE 5 MG/1
5 TABLET ORAL DAILY
Status: DISCONTINUED | OUTPATIENT
Start: 2019-08-18 | End: 2019-08-21 | Stop reason: HOSPADM

## 2019-08-18 RX ORDER — HYDROCODONE BITARTRATE AND ACETAMINOPHEN 5; 325 MG/1; MG/1
TABLET ORAL
Status: COMPLETED
Start: 2019-08-18 | End: 2019-08-18

## 2019-08-18 RX ADMIN — ENOXAPARIN SODIUM 40 MG: 40 INJECTION SUBCUTANEOUS at 15:25

## 2019-08-18 RX ADMIN — OXYCODONE HYDROCHLORIDE AND ACETAMINOPHEN 2 TABLET: 5; 325 TABLET ORAL at 22:00

## 2019-08-18 RX ADMIN — HYDROMORPHONE HYDROCHLORIDE 0.5 MG: 1 INJECTION, SOLUTION INTRAMUSCULAR; INTRAVENOUS; SUBCUTANEOUS at 11:31

## 2019-08-18 RX ADMIN — HYDROCODONE BITARTRATE AND ACETAMINOPHEN 1 TABLET: 5; 325 TABLET ORAL at 13:17

## 2019-08-18 RX ADMIN — PRAVASTATIN SODIUM 40 MG: 20 TABLET ORAL at 20:02

## 2019-08-18 RX ADMIN — SODIUM CHLORIDE, PRESERVATIVE FREE 10 ML: 5 INJECTION INTRAVENOUS at 21:45

## 2019-08-18 RX ADMIN — LEVOTHYROXINE SODIUM 125 MCG: 25 TABLET ORAL at 15:05

## 2019-08-18 RX ADMIN — Medication 10 ML: at 20:03

## 2019-08-18 RX ADMIN — METAXALONE 800 MG: 800 TABLET ORAL at 15:25

## 2019-08-18 RX ADMIN — HYDROMORPHONE HYDROCHLORIDE 0.5 MG: 1 INJECTION, SOLUTION INTRAMUSCULAR; INTRAVENOUS; SUBCUTANEOUS at 10:33

## 2019-08-18 RX ADMIN — ONDANSETRON 4 MG: 2 INJECTION INTRAMUSCULAR; INTRAVENOUS at 10:33

## 2019-08-18 RX ADMIN — SODIUM CHLORIDE 500 ML: 9 INJECTION, SOLUTION INTRAVENOUS at 22:40

## 2019-08-18 ASSESSMENT — PAIN SCALES - GENERAL
PAINLEVEL_OUTOF10: 0
PAINLEVEL_OUTOF10: 7
PAINLEVEL_OUTOF10: 8
PAINLEVEL_OUTOF10: 8
PAINLEVEL_OUTOF10: 5
PAINLEVEL_OUTOF10: 0
PAINLEVEL_OUTOF10: 1
PAINLEVEL_OUTOF10: 8
PAINLEVEL_OUTOF10: 8
PAINLEVEL_OUTOF10: 3

## 2019-08-18 ASSESSMENT — PAIN DESCRIPTION - FREQUENCY
FREQUENCY: CONTINUOUS

## 2019-08-18 ASSESSMENT — PAIN DESCRIPTION - PAIN TYPE
TYPE: ACUTE PAIN

## 2019-08-18 ASSESSMENT — PAIN DESCRIPTION - ORIENTATION
ORIENTATION: RIGHT

## 2019-08-18 ASSESSMENT — PAIN DESCRIPTION - LOCATION
LOCATION: HIP

## 2019-08-18 NOTE — ED PROVIDER NOTES
[facility] ED  [address]  [place] [address] [place]  Phone: [phone]    Pt Name: Felipe Montana  MRN: [id number]  [place] 1934  Date of evaluation: 8/18/2019      CHIEF COMPLAINT       Chief Complaint   Patient presents with    Fall     R hip pain         HISTORY OF PRESENT ILLNESS     Felipe Montana is a 80 y.o. female who presents fell just prior to admission and has right hip pain. Brought in by ambulance. Her pain is an 8 out of a 10 upon admission. His systolic blood pressure is 165 but other vital signs are normal.  She has no chest or abdomen complaints. There is no head or neck trauma. She has no other extremity injury. Relatives are here with her. REVIEW OF SYSTEMS         REVIEW OF SYSTEMS    Constitutional:  Denies fever, chills, or weakness   Eyes:  Denies vision changes  HEENT:  Denies sore throat or nasal congestion  Respiratory:  Denies cough or shortness of breath   Cardiovascular:  Denies chest pain  GI:  Denies abdominal pain, nausea, vomiting, or diarrhea   Musculoskeletal: As above  Skin:  No rash on exposed surfaces   Neurologic:  Normal baseline mentation. No new deficits. Lymphatic:   No nodes or infection  Psychiatric:  No psychosis. Alert and interacting normally. Other ROS negative except as noted above. PAST MEDICAL HISTORY    has a past medical history of Mcdaniel esophagus, CAD (coronary artery disease), Chronic insomnia, Dementia, Depression with anxiety, GERD (gastroesophageal reflux disease), Hiatal hernia, Hyperlipidemia, Hypertension, Hypothyroidism s/p surgical removal of thyroid for thyroid carcinoma, IBS (irritable bowel syndrome), Impaired glucose tolerance test, Mycosis fungoides ([address] Osteoarthritis, Osteoporosis, Polycythemia rubra vera ([address] PUD (peptic ulcer disease), Thyroid cancer ([address] TIA (transient ischemic attack), and Vitamin D deficiency.     SURGICAL HISTORY      has a past surgical history that includes Tubal ligation; portions of this note were completed with a voice recognition program.  Efforts were made to edit the dictations but occasionally words are mis-transcribed.)    Carrie Guzman,, DO  Attending Emergency Physician       [address] [place] [address], DO  08/18/19 [facility],   08/18/19 [facility]

## 2019-08-19 LAB
ABO/RH: NORMAL
ALBUMIN SERPL-MCNC: 3.5 G/DL (ref 3.5–5.2)
ANION GAP SERPL CALCULATED.3IONS-SCNC: 12 MMOL/L (ref 9–17)
ANTIBODY SCREEN: NEGATIVE
ARM BAND NUMBER: NORMAL
BUN BLDV-MCNC: 11 MG/DL (ref 8–23)
BUN/CREAT BLD: 10 (ref 9–20)
CALCIUM SERPL-MCNC: 7.8 MG/DL (ref 8.6–10.4)
CHLORIDE BLD-SCNC: 101 MMOL/L (ref 98–107)
CO2: 26 MMOL/L (ref 20–31)
CREAT SERPL-MCNC: 1.12 MG/DL (ref 0.5–0.9)
EKG ATRIAL RATE: 67 BPM
EKG P AXIS: 53 DEGREES
EKG P-R INTERVAL: 182 MS
EKG Q-T INTERVAL: 466 MS
EKG QRS DURATION: 88 MS
EKG QTC CALCULATION (BAZETT): 492 MS
EKG R AXIS: 0 DEGREES
EKG T AXIS: -133 DEGREES
EKG VENTRICULAR RATE: 67 BPM
EXPIRATION DATE: NORMAL
GFR AFRICAN AMERICAN: 56 ML/MIN
GFR NON-AFRICAN AMERICAN: 46 ML/MIN
GFR SERPL CREATININE-BSD FRML MDRD: ABNORMAL ML/MIN/{1.73_M2}
GFR SERPL CREATININE-BSD FRML MDRD: ABNORMAL ML/MIN/{1.73_M2}
GLUCOSE BLD-MCNC: 107 MG/DL (ref 70–99)
HCT VFR BLD CALC: 42.2 % (ref 36–46)
HEMOGLOBIN: 13.9 G/DL (ref 12–16)
LACTIC ACID, SEPSIS WHOLE BLOOD: ABNORMAL MMOL/L (ref 0.5–1.9)
LACTIC ACID, SEPSIS WHOLE BLOOD: ABNORMAL MMOL/L (ref 0.5–1.9)
LACTIC ACID, SEPSIS WHOLE BLOOD: NORMAL MMOL/L (ref 0.5–1.9)
LACTIC ACID, SEPSIS: 1.7 MMOL/L (ref 0.5–1.9)
LACTIC ACID, SEPSIS: 2 MMOL/L (ref 0.5–1.9)
LACTIC ACID, SEPSIS: 2.7 MMOL/L (ref 0.5–1.9)
LV EF: 60 %
LVEF MODALITY: NORMAL
MCH RBC QN AUTO: 28.6 PG (ref 26–34)
MCHC RBC AUTO-ENTMCNC: 33 G/DL (ref 31–37)
MCV RBC AUTO: 86.5 FL (ref 80–100)
NRBC AUTOMATED: ABNORMAL PER 100 WBC
PDW BLD-RTO: 16.8 % (ref 11–14.5)
PLATELET # BLD: 155 K/UL (ref 140–450)
PMV BLD AUTO: 8.6 FL (ref 6–12)
POTASSIUM SERPL-SCNC: 3.7 MMOL/L (ref 3.7–5.3)
RBC # BLD: 4.88 M/UL (ref 4–5.2)
SODIUM BLD-SCNC: 139 MMOL/L (ref 135–144)
WBC # BLD: 9 K/UL (ref 3.5–11)

## 2019-08-19 PROCEDURE — 51798 US URINE CAPACITY MEASURE: CPT

## 2019-08-19 PROCEDURE — 85027 COMPLETE CBC AUTOMATED: CPT

## 2019-08-19 PROCEDURE — 83605 ASSAY OF LACTIC ACID: CPT

## 2019-08-19 PROCEDURE — 86900 BLOOD TYPING SEROLOGIC ABO: CPT

## 2019-08-19 PROCEDURE — 2700000000 HC OXYGEN THERAPY PER DAY

## 2019-08-19 PROCEDURE — 93306 TTE W/DOPPLER COMPLETE: CPT

## 2019-08-19 PROCEDURE — 6360000002 HC RX W HCPCS

## 2019-08-19 PROCEDURE — 99222 1ST HOSP IP/OBS MODERATE 55: CPT | Performed by: INTERNAL MEDICINE

## 2019-08-19 PROCEDURE — 6360000002 HC RX W HCPCS: Performed by: INTERNAL MEDICINE

## 2019-08-19 PROCEDURE — 2580000003 HC RX 258: Performed by: INTERNAL MEDICINE

## 2019-08-19 PROCEDURE — 80048 BASIC METABOLIC PNL TOTAL CA: CPT

## 2019-08-19 PROCEDURE — 86850 RBC ANTIBODY SCREEN: CPT

## 2019-08-19 PROCEDURE — 6370000000 HC RX 637 (ALT 250 FOR IP): Performed by: NURSE PRACTITIONER

## 2019-08-19 PROCEDURE — 82040 ASSAY OF SERUM ALBUMIN: CPT

## 2019-08-19 PROCEDURE — 99232 SBSQ HOSP IP/OBS MODERATE 35: CPT | Performed by: INTERNAL MEDICINE

## 2019-08-19 PROCEDURE — 2060000000 HC ICU INTERMEDIATE R&B

## 2019-08-19 PROCEDURE — 94761 N-INVAS EAR/PLS OXIMETRY MLT: CPT

## 2019-08-19 PROCEDURE — 6370000000 HC RX 637 (ALT 250 FOR IP): Performed by: INTERNAL MEDICINE

## 2019-08-19 PROCEDURE — 2500000003 HC RX 250 WO HCPCS: Performed by: INTERNAL MEDICINE

## 2019-08-19 PROCEDURE — 86901 BLOOD TYPING SEROLOGIC RH(D): CPT

## 2019-08-19 PROCEDURE — 94760 N-INVAS EAR/PLS OXIMETRY 1: CPT

## 2019-08-19 PROCEDURE — 2580000003 HC RX 258: Performed by: NURSE PRACTITIONER

## 2019-08-19 PROCEDURE — 6360000002 HC RX W HCPCS: Performed by: NURSE PRACTITIONER

## 2019-08-19 PROCEDURE — 36415 COLL VENOUS BLD VENIPUNCTURE: CPT

## 2019-08-19 RX ORDER — 0.9 % SODIUM CHLORIDE 0.9 %
1000 INTRAVENOUS SOLUTION INTRAVENOUS ONCE
Status: COMPLETED | OUTPATIENT
Start: 2019-08-19 | End: 2019-08-19

## 2019-08-19 RX ORDER — 0.9 % SODIUM CHLORIDE 0.9 %
500 INTRAVENOUS SOLUTION INTRAVENOUS ONCE
Status: COMPLETED | OUTPATIENT
Start: 2019-08-19 | End: 2019-08-19

## 2019-08-19 RX ORDER — MORPHINE SULFATE 2 MG/ML
1 INJECTION, SOLUTION INTRAMUSCULAR; INTRAVENOUS ONCE
Status: COMPLETED | OUTPATIENT
Start: 2019-08-19 | End: 2019-08-19

## 2019-08-19 RX ORDER — LANOLIN ALCOHOL/MO/W.PET/CERES
3 CREAM (GRAM) TOPICAL NIGHTLY PRN
Status: DISCONTINUED | OUTPATIENT
Start: 2019-08-19 | End: 2019-08-21 | Stop reason: HOSPADM

## 2019-08-19 RX ADMIN — METAXALONE 800 MG: 800 TABLET ORAL at 13:45

## 2019-08-19 RX ADMIN — SODIUM CHLORIDE: 9 INJECTION, SOLUTION INTRAVENOUS at 16:00

## 2019-08-19 RX ADMIN — SODIUM CHLORIDE 500 ML: 9 INJECTION, SOLUTION INTRAVENOUS at 10:56

## 2019-08-19 RX ADMIN — PRAVASTATIN SODIUM 40 MG: 20 TABLET ORAL at 20:34

## 2019-08-19 RX ADMIN — SODIUM CHLORIDE 500 ML: 0.9 INJECTION, SOLUTION INTRAVENOUS at 01:16

## 2019-08-19 RX ADMIN — HYDROMORPHONE HYDROCHLORIDE 1 MG: 1 INJECTION, SOLUTION INTRAMUSCULAR; INTRAVENOUS; SUBCUTANEOUS at 10:53

## 2019-08-19 RX ADMIN — CALCIUM CHLORIDE 1 G: 100 INJECTION INTRAVENOUS; INTRAVENTRICULAR at 17:31

## 2019-08-19 RX ADMIN — SODIUM CHLORIDE, PRESERVATIVE FREE 10 ML: 5 INJECTION INTRAVENOUS at 20:38

## 2019-08-19 RX ADMIN — SODIUM CHLORIDE 1000 ML: 9 INJECTION, SOLUTION INTRAVENOUS at 18:17

## 2019-08-19 RX ADMIN — OXYCODONE HYDROCHLORIDE AND ACETAMINOPHEN 2 TABLET: 5; 325 TABLET ORAL at 08:25

## 2019-08-19 RX ADMIN — SODIUM CHLORIDE: 9 INJECTION, SOLUTION INTRAVENOUS at 01:16

## 2019-08-19 RX ADMIN — OXYCODONE HYDROCHLORIDE AND ACETAMINOPHEN 2 TABLET: 5; 325 TABLET ORAL at 13:44

## 2019-08-19 RX ADMIN — METAXALONE 800 MG: 800 TABLET ORAL at 05:30

## 2019-08-19 RX ADMIN — ENOXAPARIN SODIUM 40 MG: 40 INJECTION SUBCUTANEOUS at 09:44

## 2019-08-19 RX ADMIN — OXYCODONE HYDROCHLORIDE AND ACETAMINOPHEN 2 TABLET: 5; 325 TABLET ORAL at 20:33

## 2019-08-19 RX ADMIN — Medication 1 MG: at 10:53

## 2019-08-19 RX ADMIN — MORPHINE SULFATE 1 MG: 2 INJECTION, SOLUTION INTRAMUSCULAR; INTRAVENOUS at 05:51

## 2019-08-19 RX ADMIN — SODIUM CHLORIDE 1000 ML: 9 INJECTION, SOLUTION INTRAVENOUS at 15:00

## 2019-08-19 ASSESSMENT — PAIN SCALES - GENERAL
PAINLEVEL_OUTOF10: 0
PAINLEVEL_OUTOF10: 4
PAINLEVEL_OUTOF10: 0
PAINLEVEL_OUTOF10: 9
PAINLEVEL_OUTOF10: 0
PAINLEVEL_OUTOF10: 0
PAINLEVEL_OUTOF10: 8
PAINLEVEL_OUTOF10: 4

## 2019-08-19 NOTE — FLOWSHEET NOTE
Assessment: Patient is well supported by family. Daughter is present. She is connected to her Anabaptist and her  has visited and will continue to give spiritual care. Intervention: Engaged in conversation. Patient expressed appreciation for visit and 's offer of continued prayer. Plan: Chaplains remain available for spiritual and emotional support.      08/19/19 0948   Encounter Summary   Services provided to: Patient and family together   Referral/Consult From: [facility] Family members   Place of [address] Completed   Continue Visiting   (8/19/19)   Complexity of Encounter Low   Length of Encounter 15 minutes   Routine   Type Initial   Assessment Approachable   Intervention Sustaining presence/ Ministry of presence   Outcome Expressed gratitude;Engaged in conversation

## 2019-08-19 NOTE — H&P
[address]                 [address] [place] [id number]                              HISTORY AND PHYSICAL    PATIENT NAME: Ike Herrera                        :        1934  MED REC NO:   [id number]                             ROOM:       0201  ACCOUNT NO:   [de-identified]                           ADMIT DATE: 2019  PROVIDER:     Rogeiro Humphries    ADMISSION DIAGNOSIS:  Right hip fracture. HISTORY OF PRESENT ILLNESS:  This is a patient of [facility]  _____ [facility]. She has been doing well. She lives alone. She  has some mild cognitive impairment. She had a pelvic fracture a year or  so ago. She was on her way to [employer] with friend who is here with her  today during my interview and on the way in, she misstepped on some  stairs, fell and broke the right hip. She actually is doing well. I  had the privilege of knowing the patient in a year's past.  I saw her   for a number of years before he  of lung cancer about 15  years ago and I saw her son before he was placed in a skilled nursing  facility permanently some years ago and she seems to be doing very well. She states she has friends who come and check on her in the home and she  feels she is quite independent, but she does not do a lot of exercise. She normally would not walk more than a few steps and normally would not  walk a couple of blocks. She really does not get a lot of exercise, but  she is not having chest pain or shortness of breath and she is not  having dizziness or palpitations. She feels that she is reasonably  steady when she walks actually. Her friend takes her to [employer] every  week and usually she does really well, but because of the rain, they  changed their routine a little bit and they think that might have been  what happened with this fall.   The patient is not having abdominal pain  or nausea and vomiting or change in urination or bowel

## 2019-08-19 NOTE — PROGRESS NOTES
superior-inferior pubic rami---osteopenia                         diffusely--mild-to-moderate sigmoid colonic diverticulosis--                         atherosclerotic calcification AA--small midline fat-containing                         periumbilical hernia     ASCVD             Pacemaker interrogation---8.20.2019---pending              2D ECHO---8.19.2019---mild LVH--NLVSF--NRVSF--JACI but opens                            well--RVSP ~ 49 mm Hg--mild pulmonary hypertension---                           Grade II moderate DD---LVEF ~ 60%              2D ECHO---7.24.2014--mild LVH--NLVSF--MAC--JACI--mild AR-                            TR--RVSP ~ 40 mm Hg--Grade I DD---LVEF ~ 68             MI ruled out---2014              Cardiac catheterization---2008---non-obstructive disease   SSS---sick sinus syndrome              PACEMAKER---revision--2014             PACEMAKER---2013  PVD              SPK--QL---4603---4-35% ANAY--LICA  Hypertension  Hyperlipidemia  Impaired glucose tolerance  Hypothyroidism   GERD--HH    Polycythemia rubra vera  Cerebrovascular disease           TIA--214  Dementia  Depression----anxiety---chronic insomnia   Hypocalcemia   PMH:  Mcdaniels esophagus, mycosis fungoides, OA, osteoporosis, peptic ulcer              disease, thyroid carcinoma, irritable bowel syndrome, dizziness--chronic,              fatigue   PSH:   bone marrow biopsy--2000, EGD--2003 x 2---2002--2001, colonoscopy--             2002, cholecystectomy--1995, D&C--uterus---1960, right breast biopsy,              left thyroid lobectomy, TAE-BSO, T&A, BTL--tubal ligation     Allergies:  NKDA         Plan:  1. Orthopedics  2. Cardiology--consultation for clearance  3.  2D ECHO  4.  Planned repair right hip fracture----8.20.2019  5.   See orders     Electronically signed by Celestine Curry on 8/19/2019 at 3:51 PM    Hospitalist

## 2019-08-20 ENCOUNTER — ANESTHESIA EVENT (OUTPATIENT)
Dept: OPERATING ROOM | Age: 84
DRG: 470 | End: 2019-08-20
Payer: MEDICARE

## 2019-08-20 ENCOUNTER — ANESTHESIA (OUTPATIENT)
Dept: OPERATING ROOM | Age: 84
DRG: 470 | End: 2019-08-20
Payer: MEDICARE

## 2019-08-20 VITALS
RESPIRATION RATE: 13 BRPM | TEMPERATURE: 93.2 F | DIASTOLIC BLOOD PRESSURE: 53 MMHG | SYSTOLIC BLOOD PRESSURE: 111 MMHG | OXYGEN SATURATION: 89 %

## 2019-08-20 LAB
ABSOLUTE EOS #: 0.21 K/UL (ref 0–0.4)
ABSOLUTE IMMATURE GRANULOCYTE: ABNORMAL K/UL (ref 0–0.3)
ABSOLUTE LYMPH #: 0.78 K/UL (ref 1–4.8)
ABSOLUTE MONO #: 0.47 K/UL (ref 0.1–1.2)
ANION GAP SERPL CALCULATED.3IONS-SCNC: 9 MMOL/L (ref 9–17)
BASOPHILS # BLD: 1 % (ref 0–1)
BASOPHILS ABSOLUTE: 0.05 K/UL (ref 0–0.2)
BUN BLDV-MCNC: 10 MG/DL (ref 8–23)
BUN/CREAT BLD: 12 (ref 9–20)
CALCIUM SERPL-MCNC: 7.8 MG/DL (ref 8.6–10.4)
CHLORIDE BLD-SCNC: 108 MMOL/L (ref 98–107)
CO2: 24 MMOL/L (ref 20–31)
CREAT SERPL-MCNC: 0.81 MG/DL (ref 0.5–0.9)
DIFFERENTIAL TYPE: ABNORMAL
EOSINOPHILS RELATIVE PERCENT: 4 % (ref 1–7)
GFR AFRICAN AMERICAN: >60 ML/MIN
GFR NON-AFRICAN AMERICAN: >60 ML/MIN
GFR SERPL CREATININE-BSD FRML MDRD: ABNORMAL ML/MIN/{1.73_M2}
GFR SERPL CREATININE-BSD FRML MDRD: ABNORMAL ML/MIN/{1.73_M2}
GLUCOSE BLD-MCNC: 81 MG/DL (ref 70–99)
HCT VFR BLD CALC: 39 % (ref 36–46)
HEMOGLOBIN: 13 G/DL (ref 12–16)
IMMATURE GRANULOCYTES: ABNORMAL %
LYMPHOCYTES # BLD: 15 % (ref 16–46)
MCH RBC QN AUTO: 29.1 PG (ref 26–34)
MCHC RBC AUTO-ENTMCNC: 33.2 G/DL (ref 31–37)
MCV RBC AUTO: 87.5 FL (ref 80–100)
MONOCYTES # BLD: 9 % (ref 4–11)
MORPHOLOGY: ABNORMAL
MORPHOLOGY: ABNORMAL
NRBC AUTOMATED: ABNORMAL PER 100 WBC
PDW BLD-RTO: 17 % (ref 11–14.5)
PLATELET # BLD: 126 K/UL (ref 140–450)
PLATELET ESTIMATE: ABNORMAL
PMV BLD AUTO: 8.7 FL (ref 6–12)
POTASSIUM SERPL-SCNC: 3.5 MMOL/L (ref 3.7–5.3)
RBC # BLD: 4.46 M/UL (ref 4–5.2)
RBC # BLD: ABNORMAL 10*6/UL
SEG NEUTROPHILS: 71 % (ref 43–77)
SEGMENTED NEUTROPHILS ABSOLUTE COUNT: 3.69 K/UL (ref 1.8–7.7)
SODIUM BLD-SCNC: 141 MMOL/L (ref 135–144)
WBC # BLD: 5.2 K/UL (ref 3.5–11)
WBC # BLD: ABNORMAL 10*3/UL

## 2019-08-20 PROCEDURE — 2580000003 HC RX 258: Performed by: NURSE ANESTHETIST, CERTIFIED REGISTERED

## 2019-08-20 PROCEDURE — 0SR90J9 REPLACEMENT OF RIGHT HIP JOINT WITH SYNTHETIC SUBSTITUTE, CEMENTED, OPEN APPROACH: ICD-10-PCS | Performed by: ORTHOPAEDIC SURGERY

## 2019-08-20 PROCEDURE — 3700000001 HC ADD 15 MINUTES (ANESTHESIA): Performed by: ORTHOPAEDIC SURGERY

## 2019-08-20 PROCEDURE — 3700000000 HC ANESTHESIA ATTENDED CARE: Performed by: ORTHOPAEDIC SURGERY

## 2019-08-20 PROCEDURE — 94761 N-INVAS EAR/PLS OXIMETRY MLT: CPT

## 2019-08-20 PROCEDURE — 99232 SBSQ HOSP IP/OBS MODERATE 35: CPT | Performed by: INTERNAL MEDICINE

## 2019-08-20 PROCEDURE — 2580000003 HC RX 258: Performed by: INTERNAL MEDICINE

## 2019-08-20 PROCEDURE — 6360000002 HC RX W HCPCS: Performed by: ORTHOPAEDIC SURGERY

## 2019-08-20 PROCEDURE — 6370000000 HC RX 637 (ALT 250 FOR IP): Performed by: NURSE PRACTITIONER

## 2019-08-20 PROCEDURE — 7100000001 HC PACU RECOVERY - ADDTL 15 MIN: Performed by: ORTHOPAEDIC SURGERY

## 2019-08-20 PROCEDURE — 6360000002 HC RX W HCPCS: Performed by: PHYSICIAN ASSISTANT

## 2019-08-20 PROCEDURE — 94664 DEMO&/EVAL PT USE INHALER: CPT

## 2019-08-20 PROCEDURE — 6370000000 HC RX 637 (ALT 250 FOR IP): Performed by: INTERNAL MEDICINE

## 2019-08-20 PROCEDURE — 2580000003 HC RX 258: Performed by: NURSE PRACTITIONER

## 2019-08-20 PROCEDURE — 99222 1ST HOSP IP/OBS MODERATE 55: CPT | Performed by: PHYSICIAN ASSISTANT

## 2019-08-20 PROCEDURE — 3600000014 HC SURGERY LEVEL 4 ADDTL 15MIN: Performed by: ORTHOPAEDIC SURGERY

## 2019-08-20 PROCEDURE — 36415 COLL VENOUS BLD VENIPUNCTURE: CPT

## 2019-08-20 PROCEDURE — 2580000003 HC RX 258: Performed by: ORTHOPAEDIC SURGERY

## 2019-08-20 PROCEDURE — 27236 TREAT THIGH FRACTURE: CPT | Performed by: ORTHOPAEDIC SURGERY

## 2019-08-20 PROCEDURE — 27236 TREAT THIGH FRACTURE: CPT | Performed by: PHYSICIAN ASSISTANT

## 2019-08-20 PROCEDURE — 2500000003 HC RX 250 WO HCPCS: Performed by: NURSE ANESTHETIST, CERTIFIED REGISTERED

## 2019-08-20 PROCEDURE — 7100000000 HC PACU RECOVERY - FIRST 15 MIN: Performed by: ORTHOPAEDIC SURGERY

## 2019-08-20 PROCEDURE — C1776 JOINT DEVICE (IMPLANTABLE): HCPCS | Performed by: ORTHOPAEDIC SURGERY

## 2019-08-20 PROCEDURE — 85025 COMPLETE CBC W/AUTO DIFF WBC: CPT

## 2019-08-20 PROCEDURE — 2060000000 HC ICU INTERMEDIATE R&B

## 2019-08-20 PROCEDURE — 2700000000 HC OXYGEN THERAPY PER DAY

## 2019-08-20 PROCEDURE — 2500000003 HC RX 250 WO HCPCS: Performed by: ORTHOPAEDIC SURGERY

## 2019-08-20 PROCEDURE — 6360000002 HC RX W HCPCS: Performed by: NURSE PRACTITIONER

## 2019-08-20 PROCEDURE — 2709999900 HC NON-CHARGEABLE SUPPLY: Performed by: ORTHOPAEDIC SURGERY

## 2019-08-20 PROCEDURE — 3600000004 HC SURGERY LEVEL 4 BASE: Performed by: ORTHOPAEDIC SURGERY

## 2019-08-20 PROCEDURE — 94150 VITAL CAPACITY TEST: CPT

## 2019-08-20 PROCEDURE — 6360000002 HC RX W HCPCS: Performed by: INTERNAL MEDICINE

## 2019-08-20 PROCEDURE — 80048 BASIC METABOLIC PNL TOTAL CA: CPT

## 2019-08-20 PROCEDURE — 2500000003 HC RX 250 WO HCPCS: Performed by: INTERNAL MEDICINE

## 2019-08-20 PROCEDURE — 6360000002 HC RX W HCPCS: Performed by: NURSE ANESTHETIST, CERTIFIED REGISTERED

## 2019-08-20 DEVICE — HEAD UPLR DIA45MM HIP BALL MOD CATHCART SELF CNTR: Type: IMPLANTABLE DEVICE | Site: HIP | Status: FUNCTIONAL

## 2019-08-20 DEVICE — SPACER FEM +0MM OFFSET 12/14 TAPR HIP MOD CATHCART UPLR: Type: IMPLANTABLE DEVICE | Site: HIP | Status: FUNCTIONAL

## 2019-08-20 DEVICE — COMPONENT TOT HIP CAPPED BPLR MTL HD SELF CNTR: Type: IMPLANTABLE DEVICE | Site: HIP | Status: FUNCTIONAL

## 2019-08-20 DEVICE — STEM FEM SZ 6 L150MM 130DEG STD OFFSET CALCAR HIP BILAT TI: Type: IMPLANTABLE DEVICE | Site: HIP | Status: FUNCTIONAL

## 2019-08-20 RX ORDER — TRANEXAMIC ACID 100 MG/ML
INJECTION, SOLUTION INTRAVENOUS PRN
Status: DISCONTINUED | OUTPATIENT
Start: 2019-08-20 | End: 2019-08-20 | Stop reason: SDUPTHER

## 2019-08-20 RX ORDER — MORPHINE SULFATE 2 MG/ML
2 INJECTION, SOLUTION INTRAMUSCULAR; INTRAVENOUS
Status: DISCONTINUED | OUTPATIENT
Start: 2019-08-20 | End: 2019-08-21 | Stop reason: HOSPADM

## 2019-08-20 RX ORDER — GLYCOPYRROLATE 1 MG/5 ML
SYRINGE (ML) INTRAVENOUS PRN
Status: DISCONTINUED | OUTPATIENT
Start: 2019-08-20 | End: 2019-08-20 | Stop reason: SDUPTHER

## 2019-08-20 RX ORDER — MORPHINE SULFATE 4 MG/ML
4 INJECTION, SOLUTION INTRAMUSCULAR; INTRAVENOUS
Status: DISCONTINUED | OUTPATIENT
Start: 2019-08-20 | End: 2019-08-21 | Stop reason: HOSPADM

## 2019-08-20 RX ORDER — ALBUTEROL SULFATE 2.5 MG/3ML
2.5 SOLUTION RESPIRATORY (INHALATION)
Status: DISCONTINUED | OUTPATIENT
Start: 2019-08-20 | End: 2019-08-21 | Stop reason: HOSPADM

## 2019-08-20 RX ORDER — SODIUM CHLORIDE FOR INHALATION 0.9 %
3 VIAL, NEBULIZER (ML) INHALATION
Status: DISCONTINUED | OUTPATIENT
Start: 2019-08-20 | End: 2019-08-21 | Stop reason: HOSPADM

## 2019-08-20 RX ORDER — SODIUM CHLORIDE, SODIUM LACTATE, POTASSIUM CHLORIDE, CALCIUM CHLORIDE 600; 310; 30; 20 MG/100ML; MG/100ML; MG/100ML; MG/100ML
INJECTION, SOLUTION INTRAVENOUS CONTINUOUS PRN
Status: DISCONTINUED | OUTPATIENT
Start: 2019-08-20 | End: 2019-08-20 | Stop reason: SDUPTHER

## 2019-08-20 RX ORDER — ROCURONIUM BROMIDE 10 MG/ML
INJECTION, SOLUTION INTRAVENOUS PRN
Status: DISCONTINUED | OUTPATIENT
Start: 2019-08-20 | End: 2019-08-20 | Stop reason: SDUPTHER

## 2019-08-20 RX ORDER — KETOROLAC TROMETHAMINE 30 MG/ML
15 INJECTION, SOLUTION INTRAMUSCULAR; INTRAVENOUS EVERY 6 HOURS
Status: DISCONTINUED | OUTPATIENT
Start: 2019-08-20 | End: 2019-08-21 | Stop reason: HOSPADM

## 2019-08-20 RX ORDER — NEOSTIGMINE METHYLSULFATE 1 MG/ML
INJECTION, SOLUTION INTRAVENOUS PRN
Status: DISCONTINUED | OUTPATIENT
Start: 2019-08-20 | End: 2019-08-20 | Stop reason: SDUPTHER

## 2019-08-20 RX ORDER — DOCUSATE SODIUM 100 MG/1
100 CAPSULE, LIQUID FILLED ORAL DAILY
Status: DISCONTINUED | OUTPATIENT
Start: 2019-08-20 | End: 2019-08-21 | Stop reason: HOSPADM

## 2019-08-20 RX ORDER — LIDOCAINE HYDROCHLORIDE 20 MG/ML
INJECTION, SOLUTION EPIDURAL; INFILTRATION; INTRACAUDAL; PERINEURAL PRN
Status: DISCONTINUED | OUTPATIENT
Start: 2019-08-20 | End: 2019-08-20 | Stop reason: SDUPTHER

## 2019-08-20 RX ORDER — HYDROCODONE BITARTRATE AND ACETAMINOPHEN 5; 325 MG/1; MG/1
1-2 TABLET ORAL
Qty: 50 TABLET | Refills: 0 | Status: SHIPPED | OUTPATIENT
Start: 2019-08-20 | End: 2019-08-27

## 2019-08-20 RX ORDER — DEXAMETHASONE SODIUM PHOSPHATE 10 MG/ML
INJECTION INTRAMUSCULAR; INTRAVENOUS PRN
Status: DISCONTINUED | OUTPATIENT
Start: 2019-08-20 | End: 2019-08-20 | Stop reason: SDUPTHER

## 2019-08-20 RX ORDER — ALBUTEROL SULFATE 2.5 MG/3ML
2.5 SOLUTION RESPIRATORY (INHALATION)
Status: DISCONTINUED | OUTPATIENT
Start: 2019-08-20 | End: 2019-08-20

## 2019-08-20 RX ORDER — FENTANYL CITRATE 50 UG/ML
INJECTION, SOLUTION INTRAMUSCULAR; INTRAVENOUS PRN
Status: DISCONTINUED | OUTPATIENT
Start: 2019-08-20 | End: 2019-08-20 | Stop reason: SDUPTHER

## 2019-08-20 RX ORDER — ONDANSETRON 2 MG/ML
INJECTION INTRAMUSCULAR; INTRAVENOUS PRN
Status: DISCONTINUED | OUTPATIENT
Start: 2019-08-20 | End: 2019-08-20 | Stop reason: SDUPTHER

## 2019-08-20 RX ORDER — SODIUM CHLORIDE AND POTASSIUM CHLORIDE .9; .15 G/100ML; G/100ML
SOLUTION INTRAVENOUS CONTINUOUS
Status: DISCONTINUED | OUTPATIENT
Start: 2019-08-20 | End: 2019-08-21 | Stop reason: HOSPADM

## 2019-08-20 RX ORDER — ACETAMINOPHEN 10 MG/ML
INJECTION, SOLUTION INTRAVENOUS PRN
Status: DISCONTINUED | OUTPATIENT
Start: 2019-08-20 | End: 2019-08-20 | Stop reason: SDUPTHER

## 2019-08-20 RX ORDER — PROPOFOL 10 MG/ML
INJECTION, EMULSION INTRAVENOUS PRN
Status: DISCONTINUED | OUTPATIENT
Start: 2019-08-20 | End: 2019-08-20 | Stop reason: SDUPTHER

## 2019-08-20 RX ORDER — TRANEXAMIC ACID 100 MG/ML
INJECTION, SOLUTION INTRAVENOUS PRN
Status: DISCONTINUED | OUTPATIENT
Start: 2019-08-20 | End: 2019-08-20 | Stop reason: ALTCHOICE

## 2019-08-20 RX ORDER — LACTULOSE 10 G/15ML
20 SOLUTION ORAL 3 TIMES DAILY
Status: DISCONTINUED | OUTPATIENT
Start: 2019-08-20 | End: 2019-08-21 | Stop reason: HOSPADM

## 2019-08-20 RX ADMIN — HYDROMORPHONE HYDROCHLORIDE 0.5 MG: 1 INJECTION, SOLUTION INTRAMUSCULAR; INTRAVENOUS; SUBCUTANEOUS at 07:46

## 2019-08-20 RX ADMIN — PHENYLEPHRINE HYDROCHLORIDE 300 MCG: 10 INJECTION INTRAVENOUS at 13:20

## 2019-08-20 RX ADMIN — DOCUSATE SODIUM 100 MG: 100 CAPSULE, LIQUID FILLED ORAL at 20:23

## 2019-08-20 RX ADMIN — PRAVASTATIN SODIUM 40 MG: 20 TABLET ORAL at 20:07

## 2019-08-20 RX ADMIN — LOSARTAN POTASSIUM 50 MG: 50 TABLET ORAL at 07:43

## 2019-08-20 RX ADMIN — TRANEXAMIC ACID 1000 MG: 1 INJECTION, SOLUTION INTRAVENOUS at 13:04

## 2019-08-20 RX ADMIN — POTASSIUM CHLORIDE AND SODIUM CHLORIDE: 900; 150 INJECTION, SOLUTION INTRAVENOUS at 08:45

## 2019-08-20 RX ADMIN — OXYCODONE HYDROCHLORIDE AND ACETAMINOPHEN 1 TABLET: 5; 325 TABLET ORAL at 00:31

## 2019-08-20 RX ADMIN — KETOROLAC TROMETHAMINE 15 MG: 30 INJECTION, SOLUTION INTRAMUSCULAR at 20:07

## 2019-08-20 RX ADMIN — PROPOFOL 100 MG: 10 INJECTION, EMULSION INTRAVENOUS at 12:39

## 2019-08-20 RX ADMIN — SODIUM CHLORIDE, PRESERVATIVE FREE 10 ML: 5 INJECTION INTRAVENOUS at 20:22

## 2019-08-20 RX ADMIN — LACTULOSE 20 G: 10 SOLUTION ORAL at 16:47

## 2019-08-20 RX ADMIN — Medication 2 G: at 20:21

## 2019-08-20 RX ADMIN — CALCIUM CHLORIDE 1 G: 100 INJECTION INTRAVENOUS; INTRAVENTRICULAR at 08:45

## 2019-08-20 RX ADMIN — Medication 3 MG: at 13:22

## 2019-08-20 RX ADMIN — POTASSIUM CHLORIDE AND SODIUM CHLORIDE: 900; 150 INJECTION, SOLUTION INTRAVENOUS at 22:49

## 2019-08-20 RX ADMIN — SODIUM CHLORIDE: 9 INJECTION, SOLUTION INTRAVENOUS at 06:23

## 2019-08-20 RX ADMIN — Medication 0.6 MG: at 13:22

## 2019-08-20 RX ADMIN — OXYCODONE HYDROCHLORIDE AND ACETAMINOPHEN 2 TABLET: 5; 325 TABLET ORAL at 08:46

## 2019-08-20 RX ADMIN — SODIUM CHLORIDE, POTASSIUM CHLORIDE, SODIUM LACTATE AND CALCIUM CHLORIDE: 600; 310; 30; 20 INJECTION, SOLUTION INTRAVENOUS at 12:39

## 2019-08-20 RX ADMIN — DEXAMETHASONE SODIUM PHOSPHATE 10 MG: 10 INJECTION INTRAMUSCULAR; INTRAVENOUS at 12:46

## 2019-08-20 RX ADMIN — FENTANYL CITRATE 50 MCG: 50 INJECTION, SOLUTION INTRAMUSCULAR; INTRAVENOUS at 12:39

## 2019-08-20 RX ADMIN — Medication 2 G: at 12:57

## 2019-08-20 RX ADMIN — ONDANSETRON 4 MG: 2 INJECTION INTRAMUSCULAR; INTRAVENOUS at 13:14

## 2019-08-20 RX ADMIN — LACTULOSE 20 G: 10 SOLUTION ORAL at 20:07

## 2019-08-20 RX ADMIN — Medication 0.4 MG: at 13:38

## 2019-08-20 RX ADMIN — ROCURONIUM BROMIDE 30 MG: 10 INJECTION, SOLUTION INTRAVENOUS at 12:39

## 2019-08-20 RX ADMIN — LIDOCAINE HYDROCHLORIDE 80 MG: 20 INJECTION, SOLUTION EPIDURAL; INFILTRATION; INTRACAUDAL; PERINEURAL at 12:39

## 2019-08-20 RX ADMIN — MELATONIN 3 MG: 3 TAB ORAL at 00:06

## 2019-08-20 RX ADMIN — Medication 2 MG: at 13:38

## 2019-08-20 RX ADMIN — PHENYLEPHRINE HYDROCHLORIDE 200 MCG: 10 INJECTION INTRAVENOUS at 13:17

## 2019-08-20 RX ADMIN — ACETAMINOPHEN 1000 MG: 10 INJECTION, SOLUTION INTRAVENOUS at 13:02

## 2019-08-20 RX ADMIN — SODIUM CHLORIDE, POTASSIUM CHLORIDE, SODIUM LACTATE AND CALCIUM CHLORIDE: 600; 310; 30; 20 INJECTION, SOLUTION INTRAVENOUS at 13:24

## 2019-08-20 RX ADMIN — FENTANYL CITRATE 50 MCG: 50 INJECTION, SOLUTION INTRAMUSCULAR; INTRAVENOUS at 12:41

## 2019-08-20 ASSESSMENT — PULMONARY FUNCTION TESTS
PIF_VALUE: 33
PIF_VALUE: 2
PIF_VALUE: 1
PIF_VALUE: 23
PIF_VALUE: 25
PIF_VALUE: 8
PIF_VALUE: 6
PIF_VALUE: 25
PIF_VALUE: 22
PIF_VALUE: 13
PIF_VALUE: 1
PIF_VALUE: 21
PIF_VALUE: 8
PIF_VALUE: 25
PIF_VALUE: 23
PIF_VALUE: 24
PIF_VALUE: 26
PIF_VALUE: 25
PIF_VALUE: 22
PIF_VALUE: 22
PIF_VALUE: 25
PIF_VALUE: 5
PIF_VALUE: 26
PIF_VALUE: 6
PIF_VALUE: 20
PIF_VALUE: 23
PIF_VALUE: 23
PIF_VALUE: 8
PIF_VALUE: 28
PIF_VALUE: 25
PIF_VALUE: 2
PIF_VALUE: 24
PIF_VALUE: 19
PIF_VALUE: 15
PIF_VALUE: 23
PIF_VALUE: 26
PIF_VALUE: 6
PIF_VALUE: 6
PIF_VALUE: 27
PIF_VALUE: 28
PIF_VALUE: 24
PIF_VALUE: 25
PIF_VALUE: 8
PIF_VALUE: 20
PIF_VALUE: 25
PIF_VALUE: 24
PIF_VALUE: 4
PIF_VALUE: 24
PIF_VALUE: 15
PIF_VALUE: 22
PIF_VALUE: 26
PIF_VALUE: 25
PIF_VALUE: 24
PIF_VALUE: 21
PIF_VALUE: 30
PIF_VALUE: 21
PIF_VALUE: 3
PIF_VALUE: 16
PIF_VALUE: 25
PIF_VALUE: 11
PIF_VALUE: 8
PIF_VALUE: 1
PIF_VALUE: 6
PIF_VALUE: 26
PIF_VALUE: 1
PIF_VALUE: 25
PIF_VALUE: 24
PIF_VALUE: 24

## 2019-08-20 ASSESSMENT — PAIN SCALES - GENERAL
PAINLEVEL_OUTOF10: 8
PAINLEVEL_OUTOF10: 1
PAINLEVEL_OUTOF10: 0
PAINLEVEL_OUTOF10: 9
PAINLEVEL_OUTOF10: 2
PAINLEVEL_OUTOF10: 0
PAINLEVEL_OUTOF10: 4

## 2019-08-20 ASSESSMENT — PAIN SCALES - WONG BAKER
WONGBAKER_NUMERICALRESPONSE: 2
WONGBAKER_NUMERICALRESPONSE: 2
WONGBAKER_NUMERICALRESPONSE: 0

## 2019-08-20 ASSESSMENT — PAIN DESCRIPTION - ORIENTATION: ORIENTATION: RIGHT

## 2019-08-20 ASSESSMENT — PAIN DESCRIPTION - FREQUENCY: FREQUENCY: INTERMITTENT

## 2019-08-20 ASSESSMENT — PAIN DESCRIPTION - DESCRIPTORS: DESCRIPTORS: SHARP

## 2019-08-20 ASSESSMENT — PAIN DESCRIPTION - LOCATION: LOCATION: HIP

## 2019-08-20 ASSESSMENT — PAIN DESCRIPTION - PAIN TYPE: TYPE: ACUTE PAIN

## 2019-08-20 NOTE — OP NOTE
stable throughout all range of motion. Limb lengths appear to be appropriate. Wounds were thoroughly irrigated. Repaired the abductor and capsule back with #5 Ethibond through bone tunnels. The capsule with injected with Duromorph, Toradol, marcaine with epi, and tranexamic acid. The iliotibial band was repaired with #2 Nellie Cellar. Skin was repaired with 0 Quill, 2-0 Monoderm, Prineo and dry dressings. The patient was then awakened and returned to the recovery room in fair condition.     POSTOPERATIVE PLAN: Weightbearing as tolerated, total hip arthroplasty protocol. First postop visit will be a clinical exam, no x-rays in 8 weeks.     The physician assistants assisted throughout the procedure with positioning, draping, retraction, wound closure, dressing and splint application.     Gissell Emery MD

## 2019-08-20 NOTE — PROGRESS NOTES
level  1  · Hyperinflation assessment at level 1  · Secretion Management assessment at level  1  ·   · [x]    Bronchodilator Assessment  BRONCHODILATOR ASSESSMENT SCORE  Score 0 1 2 3 4 5   Breath Sounds   []  Patient Baseline [x]  No Wheeze good aeration []  Faint, scattered wheezing, good aeration []  Expiratory Wheezing and or moderately diminished []  Insp/Exp wheeze and/or very diminished []  Insp/Exp and/ or marked distress   Respiratory Rate   []  Patient Baseline [x]  Less than 20 []  Less than 20 []  20-25 []  Greater than 25 []  Greater than 25   Peak flow % of Pred or PB []  NA   []  Greater than 90%  []  81-90% []  71-80% [x]  Less than or equal to 70%  or unable to perform []  Unable due to Respiratory Distress   Dyspnea re []  Patient Baseline [x]  No SOB []  No SOB []  SOB on exertion []  SOB min activity []  At rest/acute   e FEV% Predicted       []  NA []  Above 69%  []  Unable []  Above 60-69%  []  Unable []  Above 50-59%  []  Unable [x]  Above 35-49%  []  Unable []  Less than 35%  []  Unable                 [x]  Hyperinflation Assessment  Score 1 2 3   CXR and Breath Sounds   [x]  Clear []  No atelectasis  Basilar aeration []  Atelectasis or absent basilar breath sounds   Incentive Spirometry Volume  (Per IBW)   []  Greater than or equal to 15ml/Kg [x]  less than 15ml/Kg []  less than 15ml/Kg   Surgery within last 2 weeks [x]  None or general   []  Abdominal or thoracic surgery  []  Abdominal or thoracic   Chronic Pulmonary Historyre [x]  No []  Yes []  Yes     []  Secretion Management Assessment  Score 1 2 3   Bilateral Breath Sounds   [x]  Occasional Rhonchi []  Scattered Rhonchi []  Course Rhonchi and/or poor aeration   Sputum    [x]  Small amount of thin secretions []  Moderate amount of viscous secretions []  Copius, Viscious Yellow/ Secretions   CXR as reported by physician []  clear  [x]  Unavailable []  Infiltrates and/or consolidation  []  Unavailable []  Mucus Plugging and or lobar

## 2019-08-20 NOTE — CONSULTS
[facility]  In Patient Cardiology Consult             Date:   8/19/2019  Patient name: Todd Lantigua  Date of admission:  8/18/2019 10:05 AM  MRN:   [id number]  YOB: 1934    Reason for Admission:  Fall    CHIEF COMPLAINT:  Fall     History Obtained From:  Patient and chart    HISTORY OF PRESENT ILLNESS:    This is an 80year old female who presented to the ER after a fall. She had misstepped on way to [employer]. Came in to ER, found to have hip fracture. We have been consulted for Pre op risk. Denies any cp, sob, orthopnea, pnd, le edema. Has history SSS s/p PPM.   Has not been seen in the office for few years now. Past Medical History:   has a past medical history of Mcdaniel esophagus, CAD (coronary artery disease), Chronic insomnia, Dementia, Depression with anxiety, GERD (gastroesophageal reflux disease), Hiatal hernia, Hyperlipidemia, Hypertension, Hypothyroidism s/p surgical removal of thyroid for thyroid carcinoma, IBS (irritable bowel syndrome), Impaired glucose tolerance test, Mycosis fungoides ([facility] [address] Osteoarthritis, Osteoporosis, Polycythemia rubra vera ([facility] [address] PUD (peptic ulcer disease), Thyroid cancer ([facility] [address] TIA (transient ischemic attack), and Vitamin D deficiency. Past Surgical History:   has a past surgical history that includes Tubal ligation; Lobectomy, Thyroid (Left); babita and bso (cervix removed); Tonsillectomy and adenoidectomy; Cholecystectomy (1995); Breast biopsy (Right); Dilation and curettage of uterus (1960); Upper gastrointestinal endoscopy (2002); skin biopsy (2003); Cardiac catheterization (2008); bone marrow biopsy (September 2010); Pacemaker insertion (6/2013); Endoscopy, colon, diagnostic (2002); Upper gastrointestinal endoscopy (08/28/2003); Upper gastrointestinal endoscopy (05/12/2003); Upper gastrointestinal endoscopy (03/20/2001); eye surgery (Right, 11/07/06); Upper gastrointestinal endoscopy (02/29/2016);  Colonoscopy (2002); and
performed without the administration of intravenous contrast.  Multiplanar reformatted images are provided for review. Dose modulation, iterative reconstruction, and/or weight based adjustment of the mA/kV was utilized to reduce the radiation dose to as low as reasonably achievable. COMPARISON: Plain radiographs of the right hip from 08/18/2019 HISTORY ORDERING SYSTEM PROVIDED HISTORY: Right hip fracture rule out underlying lesion Patient is an 59-year-old female with right hip fracture; rule out underlying lesion. FINDINGS: Bones: There is an acute impacted angulated fracture involving the right femoral neck with mild superolateral displacement of the proximal right femoral diaphyseal fracture component in relation to the femoral head. Femoral head remains properly located within the right acetabulum. There is severe anterior apex angulation on image 72, series 2 and image 48, series 301. Axial images demonstrate mild bone resorption without discrete underlying lesion. Lucency on the plain film likely accounted for by the anterior apex angulation on image 72, series 2. Acute slightly displaced fractures involving the junction of the right superior pubic ramus, right inferior pubic ramus and right pubic body. Mild degenerative changes in the lower lumbar/lumbosacral spine. Underlying diffuse osteopenia. Soft Tissue: Atherosclerotic calcification of the abdominal aorta and iliac branch vasculature. Mild-to-moderate sigmoid diverticulosis. Mild bruising in the subcutaneous fat along the lateral aspect of the right hip/thigh on image 72, series 502. Pelvic phleboliths. Urinary bladder is collapsed. Small midline fat containing periumbilical hernia on image 7, series 4. Joint:  Mild narrowing and osteophyte spurring of the right hip joint space. Right femoral head remains within the right acetabulum.      1. Acute impacted angulated fracture of the right femoral neck with mild superolateral displacement of the

## 2019-08-20 NOTE — PROGRESS NOTES
Hospitalist Progress Note    Patient:  Omid Kwok     YOB: 1934    MRN: [id number]   Admit date: 8/18/2019     Acct: [de-identified]     PCP: DAISY Obando - CNP    CC--Interval History:   POD 0 open treatment right  femoral neck fracture--cemented prosthesis---8.20.2019---Janes    Pacemaker interrogation---functional pacemaker    K = 3.5---potassium replacement    Calcium = 8.2 corrected ---> calcium replacement    See note below      All other ROS negative except noted in HPI    Diet:  Diet NPO, After Midnight    Medications:  Scheduled Meds:   [MAR Hold] ceFAZolin  2 g Intravenous On Call to [address] [place] [address] [MAR Hold] HYDROmorphone  1 mg Intravenous Once    [MAR Hold] famotidine  40 mg Oral Daily    [MAR Hold] levothyroxine  125 mcg Oral Daily    [MAR Hold] cetirizine  5 mg Oral Daily    [MAR Hold] losartan  50 mg Oral Daily    [MAR Hold] pravastatin  40 mg Oral Nightly    [MAR Hold] sodium chloride flush  10 mL Intravenous 2 times per day    [MAR Hold] enoxaparin  40 mg Subcutaneous Daily    [MAR Hold] sodium chloride flush  10 mL Intravenous 2 times per day     Continuous Infusions:   [MAR Hold] 0.9% NaCl with KCl 20 mEq 100 mL/hr at 08/20/19 0845    [MAR Hold] sodium chloride 100 mL/hr at 08/20/19 0623     PRN Meds:[MAR Hold] HYDROmorphone, [MAR Hold] melatonin, [MAR Hold] sodium chloride flush, [MAR Hold] magnesium hydroxide, [MAR Hold] ondansetron, [MAR Hold] metaxalone, [MAR Hold] sodium chloride flush, [MAR Hold] oxyCODONE-acetaminophen **OR** [MAR Hold] oxyCODONE-acetaminophen, [MAR Hold] acetaminophen    Objective:  Labs:  CBC with Differential:    Lab Results   Component Value Date    WBC 5.2 08/20/2019    RBC 4.46 08/20/2019    HGB 13.0 08/20/2019    HCT 39.0 08/20/2019     08/20/2019    MCV 87.5 08/20/2019    MCH 29.1 08/20/2019    MCHC 33.2 08/20/2019    RDW 17.0 08/20/2019    LYMPHOPCT 15 08/20/2019    MONOPCT 9 08/20/2019    MYELOPCT 2 08/18/2019    BASOPCT 1 hip-pelvis---8.19.2019--acute impacted angulated fracture                         right femoral neck--mild superolateral displacement proximal                         right femoral diaphyseal fracture component--severe anterior                         apex angulation---slight displaced fracture junction of the right                         pubic body and right superior-inferior pubic rami---osteopenia                         diffusely--mild-to-moderate sigmoid colonic diverticulosis--                         atherosclerotic calcification AA--small midline fat-containing                         periumbilical hernia     ASCVD             Pacemaker interrogation---8.20.2019---pending              2D ECHO---8.19.2019---mild LVH--NLVSF--NRVSF--JACI but opens                            well--RVSP ~ 49 mm Hg--mild pulmonary hypertension---                           Grade II moderate DD---LVEF ~ 60%              2D ECHO---7.24.2014--mild LVH--NLVSF--MAC--JACI--mild AR-                            TR--RVSP ~ 40 mm Hg--Grade I DD---LVEF ~ 68             MI ruled out---2014              Cardiac catheterization---2008---non-obstructive disease   SSS---sick sinus syndrome              PACEMAKER---revision--2014             PACEMAKER---2013  PVD              XVN--BK---6295---4-30% ANAY--LICA  Hypertension  Hyperlipidemia  Impaired glucose tolerance  Hypothyroidism   GERD--HH    Polycythemia rubra vera  Cerebrovascular disease           TIA--214  Dementia  Depression----anxiety---chronic insomnia   Hypocalcemia   PMH:  Mcdaniels esophagus, mycosis fungoides, OA, osteoporosis, peptic ulcer              disease, thyroid carcinoma, irritable bowel syndrome, dizziness--chronic,              fatigue   PSH:   bone marrow biopsy--2000, EGD--2003 x 2---2002--2001, colonoscopy--             2002, cholecystectomy--1995, D&C--uterus---1960, right breast biopsy,              left thyroid lobectomy, TAE-BSO, T&A, BTL--tubal ligation

## 2019-08-20 NOTE — ANESTHESIA PRE PROCEDURE
Department of Anesthesiology  Preprocedure Note       Name:  Brooke Oneill   Age:  80 y.o.  :  1934                                          MRN:  [id number]         Date:  2019      Surgeon: Celso Cockayne):  David Doherty MD    Procedure: Right Total Hip ARTHROPLASTY (Right )    Medications prior to admission:   Prior to Admission medications    Medication Sig Start Date End Date Taking? Authorizing Provider   pravastatin (PRAVACHOL) 40 MG tablet take 1 tablet by mouth once daily 10/2/18  Yes DAISY Correa CNP   levothyroxine (SYNTHROID) 125 MCG tablet Take 1 tablet by mouth Daily 18  Yes DAISY Correa CNP   losartan (COZAAR) 50 MG tablet take 1 tablet by mouth once daily 18  Yes DAISY Correa CNP   aspirin (ASPIRIN CHILDRENS) 81 MG chewable tablet Take 1 tablet by mouth daily.  14  Yes Ashley Lavelle   zoster recombinant adjuvanted vaccine [facility]) 50 MCG SUSR injection Inject 0.5 mLs into the muscle every 6 months for 2 doses 18   DAISY Correa CNP   ranitidine (ZANTAC) 150 MG tablet take 1 tablet by mouth twice a day 18   DAISY Lund CNP   loratadine (CLARITIN) 10 MG tablet Take 1 tablet by mouth daily 10/16/17   DAISY Lund CNP   Handicap Placard MISC by Does not apply route 16   DAISY Lund CNP       Current medications:    Current Facility-Administered Medications   Medication Dose Route Frequency Provider Last Rate Last Dose    HYDROmorphone (DILAUDID) injection 1 mg  1 mg Intravenous Once DAISY Heaton CNP        HYDROmorphone (DILAUDID) injection 0.5 mg  0.5 mg Intravenous Q1H PRN DAISY Heaton CNP        melatonin tablet 3 mg  3 mg Oral Nightly PRN DAISY Qiu CNP   3 mg at 19 0006    famotidine (PEPCID) tablet 40 mg  40 mg Oral Daily Dimitri Chance MD        levothyroxine (SYNTHROID) tablet 125 mcg  125 mcg Oral Daily Anastasia Mallory MD   125 mcg at 08/18/19 1505    cetirizine (ZYRTEC) tablet 5 mg  5 mg Oral Daily Anastasia Mallory MD        losartan (COZAAR) tablet 50 mg  50 mg Oral Daily Anastasia Mallory MD        pravastatin (PRAVACHOL) tablet 40 mg  40 mg Oral Nightly Anastasia Mallory MD   40 mg at 08/19/19 2034    sodium chloride flush 0.9 % injection 10 mL  10 mL Intravenous 2 times per day Anastasia Mallory MD   10 mL at 08/18/19 2003    sodium chloride flush 0.9 % injection 10 mL  10 mL Intravenous PRN Anastasia Mallory MD        magnesium hydroxide (MILK OF MAGNESIA) 400 MG/5ML suspension 30 mL  30 mL Oral Daily PRN Anastasia Mallory MD        [facility]dansetron [facility]) injection 4 mg  4 mg Intravenous Q6H PRN Anastasia Mallory MD        enoxaparin (LOVENOX) injection 40 mg  40 mg Subcutaneous Daily Anastasia Mallory MD   40 mg at 08/19/19 0944    metaxalone (SKELAXIN) tablet 800 mg  800 mg Oral TID PRN Anastasia Mallory MD   800 mg at 08/19/19 1345    sodium chloride flush 0.9 % injection 10 mL  10 mL Intravenous 2 times per day Ellwood Juanjose, APRN - CNP   10 mL at 08/19/19 2038    sodium chloride flush 0.9 % injection 10 mL  10 mL Intravenous PRN Trisha Juanjose, APRN - CNP        oxyCODONE-acetaminophen (PERCOCET) 5-325 MG per tablet 1 tablet  1 tablet Oral Q4H PRN Ellwood Juanjose, APRN - CNP   1 tablet at 08/20/19 0031    Or    oxyCODONE-acetaminophen (PERCOCET) 5-325 MG per tablet 2 tablet  2 tablet Oral Q4H PRN Ellwood Juanjose, APRN - CNP   2 tablet at 08/19/19 2033    acetaminophen (TYLENOL) tablet 650 mg  650 mg Oral Q4H PRN Ellwood Juanjose, APRN - CNP        0.9 % sodium chloride infusion   Intravenous Continuous Ellwood Juanjose, APRN -  mL/hr at 08/20/19 5931         Allergies:  No Known Allergies    Problem List:    Patient Active Problem List   Diagnosis Code    Osteoarthritis M19.90    Hyperlipidemia E78.5    Hypertension I10    Mcdaniel esophagus K22.70    GERD  EYE SURGERY Right 11/07/06    Cataract extraction with lens implant    LOBECTOMY, THYROID Left     thyroid cancer    PACEMAKER INSERTION  6/2013    [facility], THE SKIN BIOPSY  2003    mycosis fungoides    TAE AND BSO      TAE/BSO at age 54 with anterior repair for benign reasons.  TONSILLECTOMY AND ADENOIDECTOMY      age 27    TUBAL LIGATION      UPPER GASTROINTESTINAL ENDOSCOPY  2002    UPPER GASTROINTESTINAL ENDOSCOPY  08/28/2003    UPPER GASTROINTESTINAL ENDOSCOPY  05/12/2003    UPPER GASTROINTESTINAL ENDOSCOPY  03/20/2001    UPPER GASTROINTESTINAL ENDOSCOPY  02/29/2016    Barretts tissue, body polyp - Dr Samantha Winters       Social History:    Social History     Tobacco Use    Smoking status: Never Smoker    Smokeless tobacco: Never Used   Substance Use Topics    Alcohol use: No     Alcohol/week: 0.0 standard drinks                                Counseling given: Not Answered      Vital Signs (Current):   Vitals:    08/20/19 0306 08/20/19 0400 08/20/19 0600 08/20/19 0624   BP: 125/60   137/62   Pulse: 67   60   Resp:    14   Temp: 36.6 °C (97.9 °F)   36.5 °C (97.7 °F)   TempSrc: Oral   Tympanic   SpO2: 95% 97%  95%   Weight:  180 lb (81.6 kg) 180 lb (81.6 kg)    Height:                                                  BP Readings from Last 3 Encounters:   08/20/19 137/62   01/18/19 110/64   07/19/18 120/70       NPO Status: Time of last liquid consumption: 0031                                                 Date of last liquid consumption: 08/20/19                        Date of last solid food consumption: 08/19/19    BMI:   Wt Readings from Last 3 Encounters:   08/20/19 180 lb (81.6 kg)   01/18/19 157 lb 3.2 oz (71.3 kg)   07/19/18 163 lb (73.9 kg)     Body mass index is 29.05 kg/m².     CBC:   Lab Results   Component Value Date    WBC 5.2 08/20/2019    RBC 4.46 08/20/2019    HGB 13.0 08/20/2019    HCT 39.0 08/20/2019    MCV 87.5 08/20/2019    RDW 17.0 08/20/2019     08/20/2019

## 2019-08-21 ENCOUNTER — TELEPHONE (OUTPATIENT)
Dept: INTERNAL MEDICINE | Age: 84
End: 2019-08-21

## 2019-08-21 VITALS
BODY MASS INDEX: 28.93 KG/M2 | SYSTOLIC BLOOD PRESSURE: 167 MMHG | OXYGEN SATURATION: 95 % | HEIGHT: 66 IN | RESPIRATION RATE: 102 BRPM | TEMPERATURE: 98.6 F | DIASTOLIC BLOOD PRESSURE: 79 MMHG | WEIGHT: 180 LBS | HEART RATE: 62 BPM

## 2019-08-21 LAB
ABSOLUTE EOS #: 0 K/UL (ref 0–0.4)
ABSOLUTE IMMATURE GRANULOCYTE: ABNORMAL K/UL (ref 0–0.3)
ABSOLUTE LYMPH #: 0.7 K/UL (ref 1–4.8)
ABSOLUTE MONO #: 0.9 K/UL (ref 0.1–1.2)
ANION GAP SERPL CALCULATED.3IONS-SCNC: 11 MMOL/L (ref 9–17)
BASOPHILS # BLD: 0 % (ref 0–1)
BASOPHILS ABSOLUTE: 0 K/UL (ref 0–0.2)
BUN BLDV-MCNC: 11 MG/DL (ref 8–23)
BUN/CREAT BLD: 13 (ref 9–20)
CALCIUM SERPL-MCNC: 7.8 MG/DL (ref 8.6–10.4)
CHLORIDE BLD-SCNC: 107 MMOL/L (ref 98–107)
CO2: 20 MMOL/L (ref 20–31)
CREAT SERPL-MCNC: 0.86 MG/DL (ref 0.5–0.9)
DIFFERENTIAL TYPE: ABNORMAL
EOSINOPHILS RELATIVE PERCENT: 0 % (ref 1–7)
GFR AFRICAN AMERICAN: >60 ML/MIN
GFR NON-AFRICAN AMERICAN: >60 ML/MIN
GFR SERPL CREATININE-BSD FRML MDRD: ABNORMAL ML/MIN/{1.73_M2}
GFR SERPL CREATININE-BSD FRML MDRD: ABNORMAL ML/MIN/{1.73_M2}
GLUCOSE BLD-MCNC: 127 MG/DL (ref 70–99)
HCT VFR BLD CALC: 39.3 % (ref 36–46)
HEMOGLOBIN: 12.7 G/DL (ref 12–16)
IMMATURE GRANULOCYTES: ABNORMAL %
LYMPHOCYTES # BLD: 7 % (ref 16–46)
MCH RBC QN AUTO: 28.5 PG (ref 26–34)
MCHC RBC AUTO-ENTMCNC: 32.2 G/DL (ref 31–37)
MCV RBC AUTO: 88.6 FL (ref 80–100)
MONOCYTES # BLD: 9 % (ref 4–11)
NRBC AUTOMATED: ABNORMAL PER 100 WBC
PDW BLD-RTO: 17.7 % (ref 11–14.5)
PLATELET # BLD: 150 K/UL (ref 140–450)
PLATELET ESTIMATE: ABNORMAL
PMV BLD AUTO: 9.1 FL (ref 6–12)
POTASSIUM SERPL-SCNC: 4.6 MMOL/L (ref 3.7–5.3)
RBC # BLD: 4.44 M/UL (ref 4–5.2)
RBC # BLD: ABNORMAL 10*6/UL
SEG NEUTROPHILS: 84 % (ref 43–77)
SEGMENTED NEUTROPHILS ABSOLUTE COUNT: 8.3 K/UL (ref 1.8–7.7)
SODIUM BLD-SCNC: 138 MMOL/L (ref 135–144)
WBC # BLD: 9.9 K/UL (ref 3.5–11)
WBC # BLD: ABNORMAL 10*3/UL

## 2019-08-21 PROCEDURE — 99232 SBSQ HOSP IP/OBS MODERATE 35: CPT | Performed by: INTERNAL MEDICINE

## 2019-08-21 PROCEDURE — 6370000000 HC RX 637 (ALT 250 FOR IP): Performed by: INTERNAL MEDICINE

## 2019-08-21 PROCEDURE — 97161 PT EVAL LOW COMPLEX 20 MIN: CPT

## 2019-08-21 PROCEDURE — 2580000003 HC RX 258: Performed by: NURSE PRACTITIONER

## 2019-08-21 PROCEDURE — 36415 COLL VENOUS BLD VENIPUNCTURE: CPT

## 2019-08-21 PROCEDURE — 2700000000 HC OXYGEN THERAPY PER DAY

## 2019-08-21 PROCEDURE — 6370000000 HC RX 637 (ALT 250 FOR IP): Performed by: NURSE PRACTITIONER

## 2019-08-21 PROCEDURE — 6360000002 HC RX W HCPCS: Performed by: NURSE PRACTITIONER

## 2019-08-21 PROCEDURE — 85025 COMPLETE CBC W/AUTO DIFF WBC: CPT

## 2019-08-21 PROCEDURE — 80048 BASIC METABOLIC PNL TOTAL CA: CPT

## 2019-08-21 PROCEDURE — 97165 OT EVAL LOW COMPLEX 30 MIN: CPT | Performed by: OCCUPATIONAL THERAPIST

## 2019-08-21 PROCEDURE — 2500000003 HC RX 250 WO HCPCS: Performed by: INTERNAL MEDICINE

## 2019-08-21 PROCEDURE — 2580000003 HC RX 258: Performed by: INTERNAL MEDICINE

## 2019-08-21 PROCEDURE — 94760 N-INVAS EAR/PLS OXIMETRY 1: CPT

## 2019-08-21 RX ORDER — PSEUDOEPHEDRINE HCL 30 MG
100 TABLET ORAL DAILY
Qty: 30 CAPSULE | Refills: 0
Start: 2019-08-22

## 2019-08-21 RX ORDER — LACTULOSE 10 G/15ML
20 SOLUTION ORAL 3 TIMES DAILY
Qty: 1 BOTTLE | Refills: 0
Start: 2019-08-21 | End: 2019-09-06 | Stop reason: ALTCHOICE

## 2019-08-21 RX ADMIN — LACTULOSE 20 G: 10 SOLUTION ORAL at 09:10

## 2019-08-21 RX ADMIN — CALCIUM CHLORIDE 1 G: 100 INJECTION INTRAVENOUS; INTRAVENTRICULAR at 09:09

## 2019-08-21 RX ADMIN — FAMOTIDINE 40 MG: 20 TABLET ORAL at 08:06

## 2019-08-21 RX ADMIN — LOSARTAN POTASSIUM 50 MG: 50 TABLET ORAL at 08:06

## 2019-08-21 RX ADMIN — POTASSIUM CHLORIDE AND SODIUM CHLORIDE: 900; 150 INJECTION, SOLUTION INTRAVENOUS at 09:11

## 2019-08-21 RX ADMIN — OXYCODONE HYDROCHLORIDE AND ACETAMINOPHEN 2 TABLET: 5; 325 TABLET ORAL at 08:08

## 2019-08-21 RX ADMIN — ENOXAPARIN SODIUM 40 MG: 40 INJECTION SUBCUTANEOUS at 08:06

## 2019-08-21 RX ADMIN — OXYCODONE HYDROCHLORIDE AND ACETAMINOPHEN 2 TABLET: 5; 325 TABLET ORAL at 11:40

## 2019-08-21 RX ADMIN — DOCUSATE SODIUM 100 MG: 100 CAPSULE, LIQUID FILLED ORAL at 08:06

## 2019-08-21 RX ADMIN — LEVOTHYROXINE SODIUM 125 MCG: 25 TABLET ORAL at 06:43

## 2019-08-21 RX ADMIN — SODIUM CHLORIDE, PRESERVATIVE FREE 10 ML: 5 INJECTION INTRAVENOUS at 09:10

## 2019-08-21 RX ADMIN — KETOROLAC TROMETHAMINE 15 MG: 30 INJECTION, SOLUTION INTRAMUSCULAR at 08:08

## 2019-08-21 RX ADMIN — Medication 2 G: at 05:07

## 2019-08-21 RX ADMIN — KETOROLAC TROMETHAMINE 15 MG: 30 INJECTION, SOLUTION INTRAMUSCULAR at 02:02

## 2019-08-21 RX ADMIN — CETIRIZINE HYDROCHLORIDE 5 MG: 5 TABLET ORAL at 08:06

## 2019-08-21 ASSESSMENT — PAIN SCALES - WONG BAKER
WONGBAKER_NUMERICALRESPONSE: 0

## 2019-08-21 ASSESSMENT — PAIN SCALES - GENERAL
PAINLEVEL_OUTOF10: 0
PAINLEVEL_OUTOF10: 2
PAINLEVEL_OUTOF10: 9
PAINLEVEL_OUTOF10: 2
PAINLEVEL_OUTOF10: 8

## 2019-08-21 NOTE — FLOWSHEET NOTE
Patient is transferring to long term care today.      08/21/19 1218   Encounter Summary   Services provided to: Patient   Referral/Consult From: Rounding   Continue Visiting   (8/21/19)   Complexity of Encounter Low   Length of Encounter 15 minutes   Routine   Type Follow up   Assessment Approachable   Intervention Sustaining presence/ Ministry of presence   Outcome Expressed gratitude;Engaged in conversation

## 2019-08-21 NOTE — PROGRESS NOTES
Orthopaedic Progress Note      SUBJECTIVE   Ms. Betina De Guzman is post op day # 1     Patient notes no new concerns  Would like to go to [facility]      OBJECTIVE      Physical    VITALS:  BP (!) 167/79   Pulse 62   Temp 98.6 °F (37 °C) (Tympanic)   Resp (!) 102   Ht 5' 6\" (1.676 m)   Wt 180 lb (81.6 kg)   SpO2 96%   BMI 29.05 kg/m²   I/O last 3 completed shifts: In: 1906 [P.O.:150;  I.V.:1756]  Out: 1050 [Urine:750; Blood:300]      Right hip with a dry dressing noted, n/v intact to the right foot      Data  CBC:   Lab Results   Component Value Date    WBC 9.9 08/21/2019    HGB 12.7 08/21/2019     08/21/2019     BMP:    Lab Results   Component Value Date     08/21/2019    K 4.6 08/21/2019     08/21/2019    CO2 20 08/21/2019    BUN 11 08/21/2019    CREATININE 0.86 08/21/2019    CALCIUM 7.8 08/21/2019    GLUCOSE 127 08/21/2019     Uric Acid:  No components found for: URIC  PT/INR:    Lab Results   Component Value Date    PROTIME 10.4 07/25/2017    INR 1.0 07/25/2017     Troponin:    Lab Results   Component Value Date    TROPONINI NOT REPORTED 07/24/2014     Urine Culture:  No components found for: CURINE      Current Inpatient Medications    Current Facility-Administered Medications: calcium chloride 1 g in sodium chloride 0.9 % 100 mL IVPB, 1 g, Intravenous, Once  0.9% NaCl with KCl 20 mEq infusion, , Intravenous, Continuous  ketorolac (TORADOL) injection 15 mg, 15 mg, Intravenous, Q6H  morphine (PF) injection 2 mg, 2 mg, Intravenous, Q2H PRN **OR** morphine injection 4 mg, 4 mg, Intravenous, Q2H PRN  docusate sodium (COLACE) capsule 100 mg, 100 mg, Oral, Daily  magnesium hydroxide (MILK OF MAGNESIA) 400 MG/5ML suspension 30 mL, 30 mL, Oral, Daily PRN  albuterol (PROVENTIL) nebulizer solution 2.5 mg, 2.5 mg, Nebulization, As Directed RT PRN  sodium chloride nebulizer 0.9 % solution 3 mL, 3 mL, Nebulization, As Directed RT PRN  lactulose (CHRONULAC) 10 GM/15ML solution 20 g, 20 g, Oral, TID  melatonin tablet

## 2019-08-21 NOTE — PROGRESS NOTES
gastrointestinal endoscopy (05/12/2003); Upper gastrointestinal endoscopy (03/20/2001); eye surgery (Right, 11/07/06); Upper gastrointestinal endoscopy (02/29/2016); Colonoscopy (2002); and Colonoscopy (2/29/2016).     Treatment Diagnosis: closed R hip fracture      Restrictions  Restrictions/Precautions  Restrictions/Precautions: Weight Bearing  Lower Extremity Weight Bearing Restrictions  Right Lower Extremity Weight Bearing: Weight Bearing As Tolerated  Position Activity Restriction  Hip Precautions: No hip flexion > 90 degrees, No hip internal rotation, No hip external rotation, No ABduction    Subjective   General  Chart Reviewed: Yes  Patient assessed for rehabilitation services?: Yes  Family / Caregiver Present: Yes  Referring Practitioner: Mary Jane Krishnamurthy  Diagnosis: closed R hip fracture  Subjective  Subjective: Patient rec'd in chair, pleasant and cooperative 80 yr old female with recent fall  Patient Currently in Pain: Denies  Pain Assessment  Pain Level: 2  Omer-Baker Pain Rating: No hurt  Vital Signs  Patient Currently in Pain: Denies  Oxygen Therapy  SpO2: 95 %  O2 Device: None (Room air)  Social/Functional History  Social/Functional History  Lives With: Alone  Type of Home: House  Home Layout: Multi-level, Able to Live on Main level with bedroom/bathroom  Home Access: Stairs to enter with rails  Entrance Stairs - Number of Steps: 7  Bathroom Shower/Tub: Tub/Shower unit  Bathroom Toilet: Bedside commode  Bathroom Equipment: Grab bars in shower  Home Equipment: 4 wheeled walker  Receives Help From: Friend(s)  ADL Assistance: Independent  Homemaking Assistance: Needs assistance  Homemaking Responsibilities: Yes(cleaning person for housekeeping and laundry, friend assists with grocery shopping)  Meal Prep Responsibility: Secondary  Laundry Responsibility: No  Cleaning Responsibility: No  Shopping Responsibility: Secondary  Ambulation Assistance: Independent  Transfer Assistance: Independent  Active :

## 2019-08-22 NOTE — DISCHARGE SUMMARY
Hyperlipidemia. 8.  Impaired fasting glucose tolerance. 9.  Hypothyroidism. 10.  GERD. 11.  Polycythemia rubra vera, controlled. 12.  Cerebrovascular disease, TIA, 2014, dementia. 13.  Depression, anxiety, chronic insomnia. 14.  Hypocalcemia for which replacement calcium was given. Other medical problems set forth in the progress note of 08/21/2019,  incorporated for reference herein. HISTORY OF PRESENT ILLNESS AND HOSPITAL COURSE:  The patient is an  80-year-old white female. The patient fell, no loss of consciousness,  fracture of right femoral neck. The patient underwent surgery, open  treatment, cemented prosthesis, 08/20/2019, Dr. Marzena Chang. The patient  tolerated the procedure well, no known complications. The patient was  able to be discharged on 08/21/2019, admitted at [address] [place] [address], for continued physical and occupational therapies. The patient was seen by Cardiology for clearance. Pacemaker  interrogation demonstrated normal-functioning pacemaker. She had  previously for sick sinus syndrome. This is her second pacemaker,  having been revised in 2014. Hypertension, blood pressure 167/79. Impaired fasting glucose, blood glucose level was 127, adequate range  following surgery. The patient has had calcium at 8.2, with additional  calcium chloride was given this hospitalization including the date of  discharge. Other laboratory data around the time of discharge, white cell count  9.9, hemoglobin 12.7, hematocrit 39.3 and platelets 553,521. Sodium  138, potassium 4.6, chloride 107, CO2 of 20, BUN 11, creatinine 0.86,  glucose 127, calcium was 8.2 and her GFR greater than 60. DISCHARGE INSTRUCTIONS:  FOLLOWUP:  Discharged to [address] [place] [address] on 08/21/2019. DIET:  Regular. ACTIVITY:  Physical and occupational therapies continued from the  hospital setting, with restrictions per Dr. Marzena Chang, Orthopedics.     CONDITION ON DISCHARGE:

## 2019-08-25 LAB
CULTURE: NORMAL
CULTURE: NORMAL
Lab: NORMAL
Lab: NORMAL
SPECIMEN DESCRIPTION: NORMAL
SPECIMEN DESCRIPTION: NORMAL

## 2019-08-28 ENCOUNTER — OFFICE VISIT (OUTPATIENT)
Dept: CARDIOLOGY | Age: 84
End: 2019-08-28
Payer: MEDICARE

## 2019-08-28 ENCOUNTER — TELEPHONE (OUTPATIENT)
Dept: INTERNAL MEDICINE | Age: 84
End: 2019-08-28

## 2019-08-28 VITALS
HEIGHT: 66 IN | WEIGHT: 180 LBS | HEART RATE: 88 BPM | SYSTOLIC BLOOD PRESSURE: 120 MMHG | BODY MASS INDEX: 28.93 KG/M2 | DIASTOLIC BLOOD PRESSURE: 60 MMHG

## 2019-08-28 DIAGNOSIS — I49.5 SSS (SICK SINUS SYNDROME) (HCC): ICD-10-CM

## 2019-08-28 DIAGNOSIS — I10 ESSENTIAL HYPERTENSION: ICD-10-CM

## 2019-08-28 DIAGNOSIS — E78.5 HYPERLIPIDEMIA, UNSPECIFIED HYPERLIPIDEMIA TYPE: ICD-10-CM

## 2019-08-28 DIAGNOSIS — I25.10 CORONARY ARTERY DISEASE INVOLVING NATIVE CORONARY ARTERY OF NATIVE HEART WITHOUT ANGINA PECTORIS: Primary | ICD-10-CM

## 2019-08-28 PROCEDURE — G8419 CALC BMI OUT NRM PARAM NOF/U: HCPCS | Performed by: INTERNAL MEDICINE

## 2019-08-28 PROCEDURE — 1036F TOBACCO NON-USER: CPT | Performed by: INTERNAL MEDICINE

## 2019-08-28 PROCEDURE — G8598 ASA/ANTIPLAT THER USED: HCPCS | Performed by: INTERNAL MEDICINE

## 2019-08-28 PROCEDURE — G8427 DOCREV CUR MEDS BY ELIG CLIN: HCPCS | Performed by: INTERNAL MEDICINE

## 2019-08-28 PROCEDURE — 1111F DSCHRG MED/CURRENT MED MERGE: CPT | Performed by: INTERNAL MEDICINE

## 2019-08-28 PROCEDURE — 1123F ACP DISCUSS/DSCN MKR DOCD: CPT | Performed by: INTERNAL MEDICINE

## 2019-08-28 PROCEDURE — G8399 PT W/DXA RESULTS DOCUMENT: HCPCS | Performed by: INTERNAL MEDICINE

## 2019-08-28 PROCEDURE — 99214 OFFICE O/P EST MOD 30 MIN: CPT | Performed by: INTERNAL MEDICINE

## 2019-08-28 PROCEDURE — 4040F PNEUMOC VAC/ADMIN/RCVD: CPT | Performed by: INTERNAL MEDICINE

## 2019-08-28 PROCEDURE — 1090F PRES/ABSN URINE INCON ASSESS: CPT | Performed by: INTERNAL MEDICINE

## 2019-08-28 NOTE — PROGRESS NOTES
Today's Date: 8/28/2019  Patient's Name: Sahka Davis  Patient's age: 80 y.o., 1934    Subjective:  Shaka Davis  is here for follow up. She had a mechanical fall causing right hip fracture. Cardiology saw her in the hospital on 8/18/19 for pre op clearance. She is s/p right hip surgery. No chest pain, no dyspnea, no PND, no syncope or pre-syncope, no orthopnea. She is at nursing home for rehab. Past Medical History:   has a past medical history of Mcdaniel esophagus, CAD (coronary artery disease), Chronic insomnia, Dementia, Depression with anxiety, GERD (gastroesophageal reflux disease), Hiatal hernia, Hyperlipidemia, Hypertension, Hypothyroidism s/p surgical removal of thyroid for thyroid carcinoma, IBS (irritable bowel syndrome), Impaired glucose tolerance test, Mycosis fungoides ([facility] Osteoarthritis, Osteoporosis, Polycythemia rubra vera ([facility] PUD (peptic ulcer disease), Thyroid cancer ([facility] TIA (transient ischemic attack), and Vitamin D deficiency. Past Surgical History:   has a past surgical history that includes Tubal ligation; Lobectomy, Thyroid (Left); babita and bso (cervix removed); Tonsillectomy and adenoidectomy; Cholecystectomy (1995); Breast biopsy (Right); Dilation and curettage of uterus (1960); Upper gastrointestinal endoscopy (2002); skin biopsy (2003); Cardiac catheterization (2008); bone marrow biopsy (September 2010); Pacemaker insertion (6/2013); Endoscopy, colon, diagnostic (2002); Upper gastrointestinal endoscopy (08/28/2003); Upper gastrointestinal endoscopy (05/12/2003); Upper gastrointestinal endoscopy (03/20/2001); eye surgery (Right, 11/07/06); Upper gastrointestinal endoscopy (02/29/2016); Colonoscopy (2002); Colonoscopy (2/29/2016); and HEMIARTHROPLASTY HIP (Right, 8/20/2019). Home Medications:  Prior to Admission medications    Medication Sig Start Date End Date Taking?  Authorizing Provider   docusate sodium (COLACE, DULCOLAX) 100 MG CAPS Take 100 mg by mouth daily 8/22/19   Adrian Nicholson   lactulose [facility]) 10 GM/15ML solution Take 30 mLs by mouth 3 times daily Take till pt has BM 8/21/19   Adrian Nicholson   enoxaparin (LOVENOX) 40 MG/0.4ML injection Inject 0.4 mLs into the skin daily 8/20/19   DAISY Eason CNP   pravastatin (PRAVACHOL) 40 MG tablet take 1 tablet by mouth once daily 10/2/18   DAISY Kimbrough CNP   zoster recombinant adjuvanted vaccine [facility]) 50 MCG SUSR injection Inject 0.5 mLs into the muscle every 6 months for 2 doses 7/19/18   DAISY Correa CNP   levothyroxine (SYNTHROID) 125 MCG tablet Take 1 tablet by mouth Daily 7/19/18   DAISY Correa CNP   losartan (COZAAR) 50 MG tablet take 1 tablet by mouth once daily 5/21/18   DAISY Correa CNP   loratadine (CLARITIN) 10 MG tablet Take 1 tablet by mouth daily 10/16/17   DAISY Kimbrough CNP   Handicap Placard MISC by Does not apply route 9/2/16   DAISY Correa CNP   aspirin (ASPIRIN CHILDRENS) 81 MG chewable tablet Take 1 tablet by mouth daily. 7/24/14   Adrian Nicholson       Allergies:  Patient has no known allergies. Social History:   reports that she has never smoked. She has never used smokeless tobacco. She reports that she does not drink alcohol or use drugs. Family History: family history includes Cancer (age of onset: 47) in her mother; Depression in her son; Drug Abuse in her son; Emphysema in her sister; Mental Illness in her son; Other in her brother and son; Stroke (age of onset: 76) in her father; Sudden Death (age of onset: 78) in her sister. No h/o sudden cardiac death. No for premature CAD    REVIEW OF SYSTEMS:    · Constitutional: there has been no unanticipated weight loss. There's been No change in energy level, No change in activity level. · Eyes: No visual changes or diplopia. No scleral icterus. · ENT: No Headaches, hearing loss or vertigo.  No mouth sores or sore

## 2019-09-06 ENCOUNTER — OFFICE VISIT (OUTPATIENT)
Dept: INTERNAL MEDICINE | Age: 84
End: 2019-09-06
Payer: MEDICARE

## 2019-09-06 VITALS — SYSTOLIC BLOOD PRESSURE: 110 MMHG | RESPIRATION RATE: 16 BRPM | DIASTOLIC BLOOD PRESSURE: 60 MMHG | HEART RATE: 78 BPM

## 2019-09-06 DIAGNOSIS — Z95.0 CARDIAC PACEMAKER IN SITU: ICD-10-CM

## 2019-09-06 DIAGNOSIS — R73.01 ELEVATED FASTING GLUCOSE: ICD-10-CM

## 2019-09-06 DIAGNOSIS — K58.9 IRRITABLE BOWEL SYNDROME, UNSPECIFIED TYPE: ICD-10-CM

## 2019-09-06 DIAGNOSIS — F02.818 EARLY ONSET ALZHEIMER'S DISEASE WITH BEHAVIORAL DISTURBANCE (HCC): ICD-10-CM

## 2019-09-06 DIAGNOSIS — F41.9 ANXIETY: ICD-10-CM

## 2019-09-06 DIAGNOSIS — C73 THYROID CANCER (HCC): ICD-10-CM

## 2019-09-06 DIAGNOSIS — G30.0 EARLY ONSET ALZHEIMER'S DISEASE WITH BEHAVIORAL DISTURBANCE (HCC): ICD-10-CM

## 2019-09-06 DIAGNOSIS — K44.9 HIATAL HERNIA: ICD-10-CM

## 2019-09-06 DIAGNOSIS — K22.70 BARRETT'S ESOPHAGUS WITHOUT DYSPLASIA: ICD-10-CM

## 2019-09-06 DIAGNOSIS — M80.00XD AGE-RELATED OSTEOPOROSIS WITH CURRENT PATHOLOGICAL FRACTURE WITH ROUTINE HEALING: ICD-10-CM

## 2019-09-06 DIAGNOSIS — I49.5 SSS (SICK SINUS SYNDROME) (HCC): ICD-10-CM

## 2019-09-06 DIAGNOSIS — E66.3 OVERWEIGHT: ICD-10-CM

## 2019-09-06 DIAGNOSIS — I25.10 CORONARY ARTERY DISEASE INVOLVING NATIVE CORONARY ARTERY OF NATIVE HEART, ANGINA PRESENCE UNSPECIFIED: ICD-10-CM

## 2019-09-06 DIAGNOSIS — I10 ESSENTIAL HYPERTENSION: ICD-10-CM

## 2019-09-06 DIAGNOSIS — Z91.81 AT HIGH RISK FOR FALLS: ICD-10-CM

## 2019-09-06 DIAGNOSIS — E78.2 MIXED HYPERLIPIDEMIA: ICD-10-CM

## 2019-09-06 DIAGNOSIS — S72.001D CLOSED RIGHT HIP FRACTURE, WITH ROUTINE HEALING, SUBSEQUENT ENCOUNTER: Primary | ICD-10-CM

## 2019-09-06 DIAGNOSIS — E89.0 POSTOPERATIVE HYPOTHYROIDISM: ICD-10-CM

## 2019-09-06 DIAGNOSIS — D45 POLYCYTHEMIA RUBRA VERA (HCC): ICD-10-CM

## 2019-09-06 DIAGNOSIS — F32.5 MAJOR DEPRESSIVE DISORDER WITH SINGLE EPISODE, IN REMISSION (HCC): ICD-10-CM

## 2019-09-06 DIAGNOSIS — K21.9 GASTROESOPHAGEAL REFLUX DISEASE WITHOUT ESOPHAGITIS: ICD-10-CM

## 2019-09-06 PROCEDURE — 1036F TOBACCO NON-USER: CPT | Performed by: INTERNAL MEDICINE

## 2019-09-06 PROCEDURE — G8598 ASA/ANTIPLAT THER USED: HCPCS | Performed by: INTERNAL MEDICINE

## 2019-09-06 PROCEDURE — 1111F DSCHRG MED/CURRENT MED MERGE: CPT | Performed by: INTERNAL MEDICINE

## 2019-09-06 PROCEDURE — G8427 DOCREV CUR MEDS BY ELIG CLIN: HCPCS | Performed by: INTERNAL MEDICINE

## 2019-09-06 PROCEDURE — G8399 PT W/DXA RESULTS DOCUMENT: HCPCS | Performed by: INTERNAL MEDICINE

## 2019-09-06 PROCEDURE — 1123F ACP DISCUSS/DSCN MKR DOCD: CPT | Performed by: INTERNAL MEDICINE

## 2019-09-06 PROCEDURE — 1090F PRES/ABSN URINE INCON ASSESS: CPT | Performed by: INTERNAL MEDICINE

## 2019-09-06 PROCEDURE — 99214 OFFICE O/P EST MOD 30 MIN: CPT | Performed by: INTERNAL MEDICINE

## 2019-09-06 PROCEDURE — 4040F PNEUMOC VAC/ADMIN/RCVD: CPT | Performed by: INTERNAL MEDICINE

## 2019-09-06 PROCEDURE — G8419 CALC BMI OUT NRM PARAM NOF/U: HCPCS | Performed by: INTERNAL MEDICINE

## 2019-09-06 RX ORDER — ACETAMINOPHEN 500 MG
500 TABLET ORAL EVERY 6 HOURS PRN
COMMUNITY

## 2019-09-06 RX ORDER — HYDROCODONE BITARTRATE AND ACETAMINOPHEN 5; 325 MG/1; MG/1
1 TABLET ORAL EVERY 4 HOURS PRN
Status: ON HOLD | COMMUNITY
End: 2020-08-16 | Stop reason: HOSPADM

## 2019-09-06 RX ORDER — M-VIT,TX,IRON,MINS/CALC/FOLIC 27MG-0.4MG
1 TABLET ORAL DAILY
COMMUNITY

## 2019-09-06 NOTE — PROGRESS NOTES
with the date, the season, and the day of the week, was unable to spell \"WORLD\" backwards, was unable to remember 3 words, could not repeat the phrase, \"no ifs, ands, or buts,\" and could not properly copy the intersecting pentagons. - She could not properly draw a clock or place the hands correctly. - Based on statements of her niece and the condition of her house she exhibits abnormal thought processes, poor judgment, and poor comprehension/concentration.  - We will fill out the guardianship papers    3. Elevated fasting glucose, stable  - She will continue to watch her diet and exercise to keep her elevated fasting glucose under control.   - We will continue to monitor for the development of diabetes. 4. Essential hypertension, controlled  - She will continue to take her antihypertensive medication     5. Mixed hyperlipidemia, controlled  - She will continue to take pravatstatin    6. Thyroid cancer ([facility] stable  7. Postoperative hypothyroidism, stable  - She will continue to take Synthroid    8. Coronary artery disease involving native coronary artery of native heart, angina presence unspecified, stable  9. SSS (sick sinus syndrome) ([facility] stable  10. Cardiac pacemaker in situ  - She will continue to see the cardiologists here    11. Gastroesophageal reflux disease without esophagitis, stable  12. Hiatal hernia, stable  13. 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 Irritable bowel syndrome, unspecified type, stable  - We will continue to monitor     15. Polycythemia rubra vera ([facility] stable  - We will get her back in with Dr. Leandra Warren    16. Age-related osteoporosis with current pathological fracture with routine healing, worse  - She is dealing with the hip fracture    17. Major depressive disorder with single episode, in remission ([facility] stable  18. Anxiety, stable  - We will continue to monitor     19.  Overweight, stable  - We discussed weight loss  - She will continue to watch her diet and exercise     20. At high risk for falls  - On the basis of positive falls risk screening, assessment and plan is as follows: has been admitted to skilled nursing facility for permanent stay secondary to Alzheimer's disease and hip fracture. She is seeing orthopedics as well. .       Orders were placed on the order sheet from [address]      Requested Prescriptions      No prescriptions requested or ordered in this encounter       Patient's hospital record reviewed and medication list reconciled. Patient's electronic medical record reviewed and updated with hospital information. We reviewed all hospital progress notes, radiology reports, and laboratory reports. All questions answered. Total time spent with patient was 60 minutes, more than 50% of which was spent educating her on her disease processes. We will be following her out at the nursing home.         Electronically signed by Arlette Villar DO on 9/6/2019 at 2:54 PM  Internal Medicine

## 2019-09-17 PROCEDURE — 99305 1ST NF CARE MODERATE MDM 35: CPT | Performed by: INTERNAL MEDICINE

## 2019-09-23 ENCOUNTER — OFFICE VISIT (OUTPATIENT)
Dept: ONCOLOGY | Age: 84
End: 2019-09-23
Payer: MEDICARE

## 2019-09-23 VITALS
BODY MASS INDEX: 28.93 KG/M2 | DIASTOLIC BLOOD PRESSURE: 70 MMHG | SYSTOLIC BLOOD PRESSURE: 120 MMHG | OXYGEN SATURATION: 97 % | HEART RATE: 68 BPM | WEIGHT: 180 LBS | RESPIRATION RATE: 16 BRPM | HEIGHT: 66 IN

## 2019-09-23 DIAGNOSIS — D45 POLYCYTHEMIA RUBRA VERA (HCC): Primary | ICD-10-CM

## 2019-09-23 PROCEDURE — G8419 CALC BMI OUT NRM PARAM NOF/U: HCPCS | Performed by: INTERNAL MEDICINE

## 2019-09-23 PROCEDURE — G8598 ASA/ANTIPLAT THER USED: HCPCS | Performed by: INTERNAL MEDICINE

## 2019-09-23 PROCEDURE — G8399 PT W/DXA RESULTS DOCUMENT: HCPCS | Performed by: INTERNAL MEDICINE

## 2019-09-23 PROCEDURE — G8427 DOCREV CUR MEDS BY ELIG CLIN: HCPCS | Performed by: INTERNAL MEDICINE

## 2019-09-23 PROCEDURE — 1123F ACP DISCUSS/DSCN MKR DOCD: CPT | Performed by: INTERNAL MEDICINE

## 2019-09-23 PROCEDURE — 1036F TOBACCO NON-USER: CPT | Performed by: INTERNAL MEDICINE

## 2019-09-23 PROCEDURE — 4040F PNEUMOC VAC/ADMIN/RCVD: CPT | Performed by: INTERNAL MEDICINE

## 2019-09-23 PROCEDURE — 1090F PRES/ABSN URINE INCON ASSESS: CPT | Performed by: INTERNAL MEDICINE

## 2019-09-23 PROCEDURE — 99214 OFFICE O/P EST MOD 30 MIN: CPT | Performed by: INTERNAL MEDICINE

## 2019-09-23 RX ORDER — DONEPEZIL HYDROCHLORIDE 5 MG/1
5 TABLET, FILM COATED ORAL NIGHTLY
COMMUNITY

## 2019-09-23 RX ORDER — PHENOL 1.4 %
10 AEROSOL, SPRAY (ML) MUCOUS MEMBRANE NIGHTLY PRN
COMMUNITY
End: 2021-01-28

## 2019-09-23 NOTE — PROGRESS NOTES
hepatosplenomegaly   Chest - clear to auscultation, no wheezes, rales or rhonchi, symmetric air entry   Heart - normal rate, regular rhythm, normal S1, S2, no murmurs, rubs, clicks or gallops   Abdomen - soft, nontender, nondistended, no masses or organomegaly   Neurological - alert, oriented, normal speech, no focal findings or movement disorder noted   Musculoskeletal - no joint tenderness, deformity or swelling   Extremities - peripheral pulses normal, no pedal edema, no clubbing or cyanosis   Skin - normal coloration and turgor, no rashes, no suspicious skin lesions noted ,      LABORATORY DATA:     Lab Results   Component Value Date    WBC 9.9 08/21/2019    HGB 12.7 08/21/2019    HCT 39.3 08/21/2019    MCV 88.6 08/21/2019     08/21/2019    LYMPHOPCT 7 (L) 08/21/2019    RBC 4.44 08/21/2019    MCH 28.5 08/21/2019    MCHC 32.2 08/21/2019    RDW 17.7 (H) 08/21/2019    MONOPCT 9 08/21/2019    BASOPCT 0 08/21/2019    NEUTROABS 8.30 (H) 08/21/2019    LYMPHSABS 0.70 (L) 08/21/2019    MONOSABS 0.90 08/21/2019    EOSABS 0.00 08/21/2019    BASOSABS 0.00 08/21/2019         Chemistry        Component Value Date/Time     08/21/2019 0501    K 4.6 08/21/2019 0501     08/21/2019 0501    CO2 20 08/21/2019 0501    BUN 11 08/21/2019 0501    CREATININE 0.86 08/21/2019 0501        Component Value Date/Time    CALCIUM 7.8 (L) 08/21/2019 0501    ALKPHOS 149 (H) 01/18/2019 1034    AST 15 01/18/2019 1034    ALT 13 01/18/2019 1034    BILITOT 0.47 01/18/2019 1034        PATHOLOGY DATA:   V617F Mutation, Qnt   JAK2 Gene Mutation Quant   Collected: 02/23/17 0933   Resulting lab: [facility]   Reference range: %   Value: 0.0   Comment: (NOTE)   There is no evidence of the JAK2 V617F point mutation by real-time   PCR analysis. IMAGING DATA:    REviewed  ASSESSMENT:    This is a 70-year-old female with history of secondary polycythemia, GÓMEZ 2 mutation negative and negative bone marrow biopsy.   Patient has been treated with

## 2019-09-24 DIAGNOSIS — M79.661 RIGHT CALF PAIN: Primary | ICD-10-CM

## 2019-09-24 DIAGNOSIS — M79.89 RIGHT LEG SWELLING: ICD-10-CM

## 2019-09-25 ENCOUNTER — OUTSIDE SERVICES (OUTPATIENT)
Dept: INTERNAL MEDICINE | Age: 84
End: 2019-09-25
Payer: MEDICARE

## 2019-09-25 DIAGNOSIS — F41.9 ANXIETY: ICD-10-CM

## 2019-09-25 DIAGNOSIS — K58.9 IRRITABLE BOWEL SYNDROME, UNSPECIFIED TYPE: ICD-10-CM

## 2019-09-25 DIAGNOSIS — K22.70 BARRETT'S ESOPHAGUS WITHOUT DYSPLASIA: ICD-10-CM

## 2019-09-25 DIAGNOSIS — R73.01 ELEVATED FASTING GLUCOSE: ICD-10-CM

## 2019-09-25 DIAGNOSIS — F32.5 MAJOR DEPRESSIVE DISORDER WITH SINGLE EPISODE, IN REMISSION (HCC): ICD-10-CM

## 2019-09-25 DIAGNOSIS — F02.818 EARLY ONSET ALZHEIMER'S DISEASE WITH BEHAVIORAL DISTURBANCE (HCC): ICD-10-CM

## 2019-09-25 DIAGNOSIS — M80.00XD AGE-RELATED OSTEOPOROSIS WITH CURRENT PATHOLOGICAL FRACTURE WITH ROUTINE HEALING: ICD-10-CM

## 2019-09-25 DIAGNOSIS — I49.5 SSS (SICK SINUS SYNDROME) (HCC): ICD-10-CM

## 2019-09-25 DIAGNOSIS — K21.9 GASTROESOPHAGEAL REFLUX DISEASE WITHOUT ESOPHAGITIS: ICD-10-CM

## 2019-09-25 DIAGNOSIS — Z95.0 CARDIAC PACEMAKER IN SITU: ICD-10-CM

## 2019-09-25 DIAGNOSIS — C73 THYROID CANCER (HCC): ICD-10-CM

## 2019-09-25 DIAGNOSIS — D45 POLYCYTHEMIA RUBRA VERA (HCC): ICD-10-CM

## 2019-09-25 DIAGNOSIS — K44.9 HIATAL HERNIA: ICD-10-CM

## 2019-09-25 DIAGNOSIS — G30.0 EARLY ONSET ALZHEIMER'S DISEASE WITH BEHAVIORAL DISTURBANCE (HCC): ICD-10-CM

## 2019-09-25 DIAGNOSIS — E66.3 OVERWEIGHT: ICD-10-CM

## 2019-09-25 DIAGNOSIS — E78.2 MIXED HYPERLIPIDEMIA: ICD-10-CM

## 2019-09-25 DIAGNOSIS — I10 ESSENTIAL HYPERTENSION: ICD-10-CM

## 2019-09-25 DIAGNOSIS — E89.0 POSTOPERATIVE HYPOTHYROIDISM: ICD-10-CM

## 2019-09-25 DIAGNOSIS — I25.10 CORONARY ARTERY DISEASE INVOLVING NATIVE CORONARY ARTERY OF NATIVE HEART, ANGINA PRESENCE UNSPECIFIED: ICD-10-CM

## 2019-09-25 DIAGNOSIS — S72.001D CLOSED RIGHT HIP FRACTURE, WITH ROUTINE HEALING, SUBSEQUENT ENCOUNTER: Primary | ICD-10-CM

## 2019-09-26 ENCOUNTER — PROCEDURE VISIT (OUTPATIENT)
Dept: CARDIOLOGY | Age: 84
End: 2019-09-26
Payer: MEDICARE

## 2019-09-26 DIAGNOSIS — Z95.0 CARDIAC PACEMAKER: ICD-10-CM

## 2019-09-26 DIAGNOSIS — I49.5 SSS (SICK SINUS SYNDROME) (HCC): Primary | ICD-10-CM

## 2019-09-26 PROCEDURE — 93288 INTERROG EVL PM/LDLS PM IP: CPT | Performed by: INTERNAL MEDICINE

## 2019-09-27 ENCOUNTER — TELEPHONE (OUTPATIENT)
Dept: INTERNAL MEDICINE | Age: 84
End: 2019-09-27

## 2019-10-03 ENCOUNTER — TELEPHONE (OUTPATIENT)
Dept: INTERNAL MEDICINE | Age: 84
End: 2019-10-03

## 2019-10-09 ENCOUNTER — TELEPHONE (OUTPATIENT)
Dept: INTERNAL MEDICINE | Age: 84
End: 2019-10-09

## 2019-10-15 ENCOUNTER — OFFICE VISIT (OUTPATIENT)
Dept: ORTHOPEDIC SURGERY | Age: 84
End: 2019-10-15

## 2019-10-15 VITALS — BODY MASS INDEX: 28.93 KG/M2 | WEIGHT: 180 LBS | HEIGHT: 66 IN

## 2019-10-15 DIAGNOSIS — S72.001D CLOSED FRACTURE OF NECK OF RIGHT FEMUR WITH ROUTINE HEALING, SUBSEQUENT ENCOUNTER: Primary | ICD-10-CM

## 2019-10-15 PROCEDURE — 99024 POSTOP FOLLOW-UP VISIT: CPT | Performed by: NURSE PRACTITIONER

## 2019-10-20 PROCEDURE — 99308 SBSQ NF CARE LOW MDM 20: CPT | Performed by: INTERNAL MEDICINE

## 2019-10-24 ENCOUNTER — OUTSIDE SERVICES (OUTPATIENT)
Dept: INTERNAL MEDICINE | Age: 84
End: 2019-10-24
Payer: MEDICARE

## 2019-10-24 DIAGNOSIS — J40 BRONCHITIS: Primary | ICD-10-CM

## 2019-10-24 PROCEDURE — 99308 SBSQ NF CARE LOW MDM 20: CPT | Performed by: NURSE PRACTITIONER

## 2019-10-24 ASSESSMENT — ENCOUNTER SYMPTOMS
WHEEZING: 1
SHORTNESS OF BREATH: 1
COUGH: 1

## 2019-10-27 ENCOUNTER — OUTSIDE SERVICES (OUTPATIENT)
Dept: INTERNAL MEDICINE | Age: 84
End: 2019-10-27
Payer: MEDICARE

## 2019-10-27 DIAGNOSIS — M79.674 GREAT TOE PAIN, RIGHT: Primary | ICD-10-CM

## 2019-10-27 PROCEDURE — 99307 SBSQ NF CARE SF MDM 10: CPT | Performed by: NURSE PRACTITIONER

## 2019-11-04 ENCOUNTER — TELEPHONE (OUTPATIENT)
Dept: INTERNAL MEDICINE | Age: 84
End: 2019-11-04

## 2019-11-11 ENCOUNTER — CARE COORDINATION (OUTPATIENT)
Dept: CASE MANAGEMENT | Age: 84
End: 2019-11-11

## 2019-11-11 ENCOUNTER — TELEPHONE (OUTPATIENT)
Dept: INTERNAL MEDICINE | Age: 84
End: 2019-11-11

## 2019-11-14 ENCOUNTER — TELEPHONE (OUTPATIENT)
Dept: INTERNAL MEDICINE | Age: 84
End: 2019-11-14

## 2019-11-17 ENCOUNTER — OUTSIDE SERVICES (OUTPATIENT)
Dept: INTERNAL MEDICINE | Age: 84
End: 2019-11-17
Payer: MEDICARE

## 2019-11-17 DIAGNOSIS — R73.01 ELEVATED FASTING GLUCOSE: ICD-10-CM

## 2019-11-17 DIAGNOSIS — C73 THYROID CANCER (HCC): ICD-10-CM

## 2019-11-17 DIAGNOSIS — G30.0 EARLY ONSET ALZHEIMER'S DISEASE WITH BEHAVIORAL DISTURBANCE (HCC): ICD-10-CM

## 2019-11-17 DIAGNOSIS — F02.818 EARLY ONSET ALZHEIMER'S DISEASE WITH BEHAVIORAL DISTURBANCE (HCC): ICD-10-CM

## 2019-11-17 DIAGNOSIS — K21.9 GASTROESOPHAGEAL REFLUX DISEASE WITHOUT ESOPHAGITIS: ICD-10-CM

## 2019-11-17 DIAGNOSIS — Z95.0 CARDIAC PACEMAKER IN SITU: ICD-10-CM

## 2019-11-17 DIAGNOSIS — I10 ESSENTIAL HYPERTENSION: ICD-10-CM

## 2019-11-17 DIAGNOSIS — I49.5 SSS (SICK SINUS SYNDROME) (HCC): ICD-10-CM

## 2019-11-17 DIAGNOSIS — E66.3 OVERWEIGHT: ICD-10-CM

## 2019-11-17 DIAGNOSIS — S72.001D CLOSED RIGHT HIP FRACTURE, WITH ROUTINE HEALING, SUBSEQUENT ENCOUNTER: ICD-10-CM

## 2019-11-17 DIAGNOSIS — E89.0 POSTOPERATIVE HYPOTHYROIDISM: ICD-10-CM

## 2019-11-17 DIAGNOSIS — M80.00XD AGE-RELATED OSTEOPOROSIS WITH CURRENT PATHOLOGICAL FRACTURE WITH ROUTINE HEALING: ICD-10-CM

## 2019-11-17 DIAGNOSIS — I25.10 CORONARY ARTERY DISEASE INVOLVING NATIVE CORONARY ARTERY OF NATIVE HEART, ANGINA PRESENCE UNSPECIFIED: ICD-10-CM

## 2019-11-17 DIAGNOSIS — D45 POLYCYTHEMIA RUBRA VERA (HCC): ICD-10-CM

## 2019-11-17 DIAGNOSIS — E78.2 MIXED HYPERLIPIDEMIA: ICD-10-CM

## 2019-11-17 DIAGNOSIS — K22.70 BARRETT'S ESOPHAGUS WITHOUT DYSPLASIA: ICD-10-CM

## 2019-11-17 DIAGNOSIS — K58.9 IRRITABLE BOWEL SYNDROME, UNSPECIFIED TYPE: ICD-10-CM

## 2019-11-17 DIAGNOSIS — F32.5 MAJOR DEPRESSIVE DISORDER WITH SINGLE EPISODE, IN REMISSION (HCC): ICD-10-CM

## 2019-11-17 DIAGNOSIS — K44.9 HIATAL HERNIA: ICD-10-CM

## 2019-11-17 DIAGNOSIS — F51.01 PRIMARY INSOMNIA: Primary | ICD-10-CM

## 2019-11-17 DIAGNOSIS — F41.9 ANXIETY: ICD-10-CM

## 2019-11-19 PROCEDURE — 99307 SBSQ NF CARE SF MDM 10: CPT | Performed by: INTERNAL MEDICINE

## 2019-11-25 ENCOUNTER — OUTSIDE SERVICES (OUTPATIENT)
Dept: INTERNAL MEDICINE | Age: 84
End: 2019-11-25
Payer: MEDICARE

## 2019-11-25 DIAGNOSIS — C73 THYROID CANCER (HCC): ICD-10-CM

## 2019-11-25 DIAGNOSIS — F32.5 MAJOR DEPRESSIVE DISORDER WITH SINGLE EPISODE, IN REMISSION (HCC): ICD-10-CM

## 2019-11-25 DIAGNOSIS — S72.001D CLOSED RIGHT HIP FRACTURE, WITH ROUTINE HEALING, SUBSEQUENT ENCOUNTER: Primary | ICD-10-CM

## 2019-11-25 DIAGNOSIS — F41.9 ANXIETY: ICD-10-CM

## 2019-11-25 DIAGNOSIS — D45 POLYCYTHEMIA RUBRA VERA (HCC): ICD-10-CM

## 2019-11-25 DIAGNOSIS — K58.9 IRRITABLE BOWEL SYNDROME, UNSPECIFIED TYPE: ICD-10-CM

## 2019-11-25 DIAGNOSIS — M80.00XD AGE-RELATED OSTEOPOROSIS WITH CURRENT PATHOLOGICAL FRACTURE WITH ROUTINE HEALING: ICD-10-CM

## 2019-11-25 DIAGNOSIS — K44.9 HIATAL HERNIA: ICD-10-CM

## 2019-11-25 DIAGNOSIS — I10 ESSENTIAL HYPERTENSION: ICD-10-CM

## 2019-11-25 DIAGNOSIS — E89.0 POSTOPERATIVE HYPOTHYROIDISM: ICD-10-CM

## 2019-11-25 DIAGNOSIS — K21.9 GASTROESOPHAGEAL REFLUX DISEASE WITHOUT ESOPHAGITIS: ICD-10-CM

## 2019-11-25 DIAGNOSIS — R73.01 ELEVATED FASTING GLUCOSE: ICD-10-CM

## 2019-11-25 DIAGNOSIS — I25.10 CORONARY ARTERY DISEASE INVOLVING NATIVE CORONARY ARTERY OF NATIVE HEART, ANGINA PRESENCE UNSPECIFIED: ICD-10-CM

## 2019-11-25 DIAGNOSIS — Z95.0 CARDIAC PACEMAKER IN SITU: ICD-10-CM

## 2019-11-25 DIAGNOSIS — I49.5 SSS (SICK SINUS SYNDROME) (HCC): ICD-10-CM

## 2019-11-25 DIAGNOSIS — E66.3 OVERWEIGHT: ICD-10-CM

## 2019-11-25 DIAGNOSIS — E78.2 MIXED HYPERLIPIDEMIA: ICD-10-CM

## 2019-11-25 DIAGNOSIS — F02.818 EARLY ONSET ALZHEIMER'S DISEASE WITH BEHAVIORAL DISTURBANCE (HCC): ICD-10-CM

## 2019-11-25 DIAGNOSIS — G30.0 EARLY ONSET ALZHEIMER'S DISEASE WITH BEHAVIORAL DISTURBANCE (HCC): ICD-10-CM

## 2019-11-25 DIAGNOSIS — K22.70 BARRETT'S ESOPHAGUS WITHOUT DYSPLASIA: ICD-10-CM

## 2019-12-05 ENCOUNTER — TELEPHONE (OUTPATIENT)
Dept: INTERNAL MEDICINE | Age: 84
End: 2019-12-05

## 2019-12-17 PROCEDURE — 99307 SBSQ NF CARE SF MDM 10: CPT | Performed by: INTERNAL MEDICINE

## 2019-12-28 ENCOUNTER — OUTSIDE SERVICES (OUTPATIENT)
Dept: INTERNAL MEDICINE | Age: 84
End: 2019-12-28
Payer: MEDICARE

## 2019-12-28 DIAGNOSIS — K21.9 GASTROESOPHAGEAL REFLUX DISEASE WITHOUT ESOPHAGITIS: ICD-10-CM

## 2019-12-28 DIAGNOSIS — E89.0 POSTOPERATIVE HYPOTHYROIDISM: ICD-10-CM

## 2019-12-28 DIAGNOSIS — S72.001D CLOSED RIGHT HIP FRACTURE, WITH ROUTINE HEALING, SUBSEQUENT ENCOUNTER: Primary | ICD-10-CM

## 2019-12-28 DIAGNOSIS — E66.3 OVERWEIGHT: ICD-10-CM

## 2019-12-28 DIAGNOSIS — F02.818 EARLY ONSET ALZHEIMER'S DISEASE WITH BEHAVIORAL DISTURBANCE (HCC): ICD-10-CM

## 2019-12-28 DIAGNOSIS — F32.5 MAJOR DEPRESSIVE DISORDER WITH SINGLE EPISODE, IN REMISSION (HCC): ICD-10-CM

## 2019-12-28 DIAGNOSIS — E78.2 MIXED HYPERLIPIDEMIA: ICD-10-CM

## 2019-12-28 DIAGNOSIS — R73.01 ELEVATED FASTING GLUCOSE: ICD-10-CM

## 2019-12-28 DIAGNOSIS — I10 ESSENTIAL HYPERTENSION: ICD-10-CM

## 2019-12-28 DIAGNOSIS — I25.10 CORONARY ARTERY DISEASE INVOLVING NATIVE CORONARY ARTERY OF NATIVE HEART, ANGINA PRESENCE UNSPECIFIED: ICD-10-CM

## 2019-12-28 DIAGNOSIS — D45 POLYCYTHEMIA RUBRA VERA (HCC): ICD-10-CM

## 2019-12-28 DIAGNOSIS — M80.00XD AGE-RELATED OSTEOPOROSIS WITH CURRENT PATHOLOGICAL FRACTURE WITH ROUTINE HEALING: ICD-10-CM

## 2019-12-28 DIAGNOSIS — F41.9 ANXIETY: ICD-10-CM

## 2019-12-28 DIAGNOSIS — K22.70 BARRETT'S ESOPHAGUS WITHOUT DYSPLASIA: ICD-10-CM

## 2019-12-28 DIAGNOSIS — G30.0 EARLY ONSET ALZHEIMER'S DISEASE WITH BEHAVIORAL DISTURBANCE (HCC): ICD-10-CM

## 2019-12-28 DIAGNOSIS — K58.9 IRRITABLE BOWEL SYNDROME, UNSPECIFIED TYPE: ICD-10-CM

## 2019-12-28 DIAGNOSIS — C73 THYROID CANCER (HCC): ICD-10-CM

## 2019-12-28 DIAGNOSIS — I49.5 SSS (SICK SINUS SYNDROME) (HCC): ICD-10-CM

## 2019-12-28 DIAGNOSIS — Z95.0 CARDIAC PACEMAKER IN SITU: ICD-10-CM

## 2019-12-28 DIAGNOSIS — K44.9 HIATAL HERNIA: ICD-10-CM

## 2020-01-21 PROCEDURE — 99307 SBSQ NF CARE SF MDM 10: CPT | Performed by: INTERNAL MEDICINE

## 2020-01-28 ENCOUNTER — OUTSIDE SERVICES (OUTPATIENT)
Dept: INTERNAL MEDICINE | Age: 85
End: 2020-01-28
Payer: MEDICARE

## 2020-01-28 NOTE — PROGRESS NOTES
Confusion--word searching    Vitamin D deficiency        PAST SURGICAL HISTORY:   Past Surgical History:   Procedure Laterality Date    BONE MARROW BIOPSY  September 2010    polycythemia vera    BREAST BIOPSY Right     benign    CARDIAC CATHETERIZATION  2008    nonobstructive CAD    CHOLECYSTECTOMY  1995    COLONOSCOPY  2002    COLONOSCOPY  2/29/2016    colon polyp, diverticulosis - Dr Jennie Carlin    after miscarriage    ENDOSCOPY, COLON, DIAGNOSTIC  2002    normal  external hemorrhoids    EYE SURGERY Right 11/07/06    Cataract extraction with lens implant    HEMIARTHROPLASTY HIP Right 8/20/2019    RIGHT HIP HEMIARTHROPLASTY performed by Al Marrero MD at [address], THYROID Left     thyroid cancer    PACEMAKER INSERTION  6/2013    [facility] SKIN BIOPSY  2003    mycosis fungoides    TAE AND BSO      TAE/BSO at age 54 with anterior repair for benign reasons.  TONSILLECTOMY AND ADENOIDECTOMY      age 27   [facility] TUBAL LIGATION      UPPER GASTROINTESTINAL ENDOSCOPY  2002    UPPER GASTROINTESTINAL ENDOSCOPY  08/28/2003    UPPER GASTROINTESTINAL ENDOSCOPY  05/12/2003    UPPER GASTROINTESTINAL ENDOSCOPY  03/20/2001    UPPER GASTROINTESTINAL ENDOSCOPY  02/29/2016    Barretts tissue, body polyp - Dr Debra Chaney       SOCIAL HISTORY:     Tobacco:   Social History     Tobacco Use   Smoking Status Never Smoker   Smokeless Tobacco Never Used   Tobacco Comment    never smoker     Alcohol:   Social History     Substance and Sexual Activity   Alcohol Use No    Alcohol/week: 0.0 standard drinks     Drugs:   Social History     Substance and Sexual Activity   Drug Use No       FAMILY HISTORY: family history includes Cancer (age of onset: 47) in her mother; Depression in her son; Drug Abuse in her son; Emphysema in her sister; Mental Illness in her son;  Other in her brother and son; Stroke (age of onset: 76) in her father; Sudden Death (age of onset: 78) in her sister. REVIEW OF SYSTEMS:     Please see history of chief complaint above; otherwise no new problems with respect to General, HEENT, Cardiovascular, Respiratory, Gastrointestinal, Genitourinary, Endocrinologic, Musculoskeletal, or Neuropsychiatric complaints. PHYSICAL EXAMINATION:    Vitals: Temp: 98.4 deg F. Pulse: 64. Resp: 16. BP: 146/74. General: A 80 y.o.  female. Alert and oriented to person and place and questionably oriented to time. She does not appear to be in any acute distress. Skin: Skin color, texture, turgor normal. No rashes or lesions. HEENT/Neck: Essentially unremarkable  Lungs: Normal - CTA without rales, rhonchi, or wheezing. Heart: regular rate and rhythm, S1, S2 normal, no murmur, click, rub or gallop No S3 or S4. Abdomen: Non-obese soft, non-distended, non-tender, normal active bowel sounds, no masses palpated and no hepatosplenomegaly  Extremities: No clubbing, cyanosis, or edema in any of the extremities. Neurologic: cranial nerves II-XII are grossly intact    ASSESSMENT:     Diagnosis Orders   1. Early onset Alzheimer's disease with behavioral disturbance (HCC)     2. Elevated fasting glucose     3. Essential hypertension     4. Mixed hyperlipidemia     5. Thyroid cancer ([address]     6. Postoperative hypothyroidism     7. Coronary artery disease involving native coronary artery of native heart, angina presence unspecified     8. SSS (sick sinus syndrome) (HCC)     9. Cardiac pacemaker in situ     10. Gastroesophageal reflux disease without esophagitis     11. Hiatal hernia     12. 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 Irritable bowel syndrome, unspecified type     14. Polycythemia rubra vera ([address]     15. Age-related osteoporosis with current pathological fracture with routine healing     16. Major depressive disorder with single episode, in remission ([address]     17. Anxiety     18. Overweight           PLAN:    1. Continue current treatment  2.  Nursing home record reviewed and updates summarized and entered into electronic record  3. See nursing home orders and MAR.       Electronically signed by Kenny Ziegler DO on 1/28/2020 at 2:15 AM  Internal Medicine

## 2020-02-14 ENCOUNTER — TELEPHONE (OUTPATIENT)
Dept: INTERNAL MEDICINE | Age: 85
End: 2020-02-14

## 2020-02-14 NOTE — TELEPHONE ENCOUNTER
Received call from everett Fraire)- requesting a script for Handicap Placard #1. Mail to: Yolanda Fraire              [address] [facility] [address]

## 2020-02-18 PROCEDURE — 99308 SBSQ NF CARE LOW MDM 20: CPT | Performed by: INTERNAL MEDICINE

## 2020-02-24 ENCOUNTER — OUTSIDE SERVICES (OUTPATIENT)
Dept: INTERNAL MEDICINE | Age: 85
End: 2020-02-24
Payer: MEDICARE

## 2020-02-24 NOTE — PROGRESS NOTES
DR. Mitali Lundberg - [facility] VISIT    DATE OF SERVICE: 2/18/20    NURSING HOME: [facility]    CHIEF COMPLAINT/HISTORY OF CHIEF COMPLAINT: This patient is being seen for ongoing evaluation and management of her Alzheimer's disease, elevated fasting glucose, hypertension, hyperlipidemia, history of thyroid cancer with postoperative hypothyroidism, coronary artery disease, sick sinus syndrome, gastroesophageal reflux disease, Mcdaniel's esophagus, irritable bowel syndrome, polycythemia, depression, and being overweight. Several residents in the facility were diagnosed with influenza today. There are no other complaints. ALLERGIES: No Known Allergies    MEDICATIONS: As noted on the [facility] MAR, referenced and incorporated herein. PAST MEDICAL HISTORY:   Past Medical History:   Diagnosis Date    Age-related osteoporosis with current pathological fracture with routine healing     Anxiety 9/6/2019    Mcdaniel esophagus     CAD (coronary artery disease)     nonobstructive    Cardiac pacemaker in situ 4/24/2014    Put in for severe bradycardia.  Dual chamber     Chronic insomnia     Closed right hip fracture, with routine healing, subsequent encounter 8/18/2019    Early onset Alzheimer's disease with behavioral disturbance ([address] 9/6/2019    Elevated fasting glucose 9/6/2019    GERD (gastroesophageal reflux disease)     Hiatal hernia     Hyperlipidemia     Hypertension     Hypothyroidism s/p surgical removal of thyroid for thyroid carcinoma 7/29/2015    IBS (irritable bowel syndrome)     Major depressive disorder with single episode, in remission ([address] 9/6/2019    Mycosis fungoides ([address]     Osteoarthritis     Osteoporosis     Polycythemia rubra vera ([address]     sees oncology    PUD (peptic ulcer disease)     RV Pacemaker lead malfunction - Revision 3/24/14-Dr. Cris Herrmann 3/24/2014    SSS (sick sinus syndrome) ([address] 4/24/2014    Thyroid cancer ([address] about 5 years ago    thyroid /following onset: 76) in her father; Sudden Death (age of onset: 78) in her sister. REVIEW OF SYSTEMS:     Please see history of chief complaint above; otherwise no new problems with respect to General, HEENT, Cardiovascular, Respiratory, Gastrointestinal, Genitourinary, Endocrinologic, Musculoskeletal, or Neuropsychiatric complaints. PHYSICAL EXAMINATION:    Vitals: Temp: 98.4 deg F. Pulse: 64. Resp: 16. BP: 146/74. (same as last time). General: A 80 y.o.  female. Alert and oriented to person and place and questionably oriented to time. She does not appear to be in any acute distress. Skin: Skin color, texture, turgor normal. No rashes or lesions. HEENT/Neck: Essentially unremarkable  Lungs: Normal - CTA without rales, rhonchi, or wheezing. Heart: regular rate and rhythm, S1, S2 normal, no murmur, click, rub or gallop No S3 or S4. Abdomen: Non-obese soft, non-distended, non-tender, normal active bowel sounds, no masses palpated and no hepatosplenomegaly  Extremities: No clubbing, cyanosis, or edema in any of the extremities. Neurologic: cranial nerves II-XII are grossly intact    ASSESSMENT:     Diagnosis Orders   1. Early onset Alzheimer's disease with behavioral disturbance (HCC)     2. Elevated fasting glucose     3. Essential hypertension     4. Mixed hyperlipidemia     5. Thyroid cancer ([address]     6. Postoperative hypothyroidism     7. Coronary artery disease involving native coronary artery of native heart, angina presence unspecified     8. SSS (sick sinus syndrome) (HCC)     9. Cardiac pacemaker in situ     10. Gastroesophageal reflux disease without esophagitis     11. Hiatal hernia     12. 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 Irritable bowel syndrome, unspecified type     14. Polycythemia rubra vera ([address]     15. Age-related osteoporosis with current pathological fracture with routine healing     16. Major depressive disorder with single episode, in remission ([address]     17.  Anxiety [No Acute Distress] : no acute distress [Well Nourished] : well nourished [Well Developed] : well developed [Well-Appearing] : well-appearing [Normal Sclera/Conjunctiva] : normal sclera/conjunctiva [PERRL] : pupils equal round and reactive to light [EOMI] : extraocular movements intact [Normal Outer Ear/Nose] : the outer ears and nose were normal in appearance [Normal Oropharynx] : the oropharynx was normal [Normal TMs] : both tympanic membranes were normal [Normal Nasal Mucosa] : the nasal mucosa was normal [No JVD] : no jugular venous distention [Supple] : supple [No Lymphadenopathy] : no lymphadenopathy [Thyroid Normal, No Nodules] : the thyroid was normal and there were no nodules present [No Respiratory Distress] : no respiratory distress  [Clear to Auscultation] : lungs were clear to auscultation bilaterally [No Accessory Muscle Use] : no accessory muscle use [Normal Rate] : normal rate  [Regular Rhythm] : with a regular rhythm [Normal S1, S2] : normal S1 and S2 [No Murmur] : no murmur heard [No Carotid Bruits] : no carotid bruits [No Abdominal Bruit] : a ~M bruit was not heard ~T in the abdomen [No Varicosities] : no varicosities [Pedal Pulses Present] : the pedal pulses are present [No Edema] : there was no peripheral edema [No Extremity Clubbing/Cyanosis] : no extremity clubbing/cyanosis [No Palpable Aorta] : no palpable aorta [Soft] : abdomen soft [Non Tender] : non-tender [Non-distended] : non-distended [No Masses] : no abdominal mass palpated [No HSM] : no HSM [Normal Bowel Sounds] : normal bowel sounds [No CVA Tenderness] : no CVA  tenderness [No Spinal Tenderness] : no spinal tenderness [No Joint Swelling] : no joint swelling [Grossly Normal Strength/Tone] : grossly normal strength/tone [No Rash] : no rash [No Skin Lesions] : no skin lesions [Normal Gait] : normal gait [Coordination Grossly Intact] : coordination grossly intact [No Focal Deficits] : no focal deficits [Deep Tendon Reflexes (DTR)] : deep tendon reflexes were 2+ and symmetric [Speech Grossly Normal] : speech grossly normal [Memory Grossly Normal] : memory grossly normal [Normal Affect] : the affect was normal [Alert and Oriented x3] : oriented to person, place, and time [Normal Mood] : the mood was normal [Normal Insight/Judgement] : insight and judgment were intact [de-identified] : mild congestion noted [de-identified] : defer to GYN [FreeTextEntry1] : defer to GYN [de-identified] : defer to GYN

## 2020-03-17 PROCEDURE — 99307 SBSQ NF CARE SF MDM 10: CPT | Performed by: INTERNAL MEDICINE

## 2020-04-13 ENCOUNTER — OUTSIDE SERVICES (OUTPATIENT)
Dept: INTERNAL MEDICINE | Age: 85
End: 2020-04-13
Payer: MEDICARE

## 2020-04-21 PROCEDURE — 99308 SBSQ NF CARE LOW MDM 20: CPT | Performed by: INTERNAL MEDICINE

## 2020-04-25 ENCOUNTER — OUTSIDE SERVICES (OUTPATIENT)
Dept: INTERNAL MEDICINE | Age: 85
End: 2020-04-25
Payer: MEDICARE

## 2020-04-25 NOTE — PROGRESS NOTES
DR. Bridget Caldwell - [facility] VISIT    DATE OF SERVICE: 4/21/20    NURSING HOME: [facility]    CHIEF COMPLAINT/HISTORY OF CHIEF COMPLAINT: This patient is being seen for ongoing evaluation and management of her Alzheimer's disease, elevated fasting glucose, hypertension, hyperlipidemia, history of thyroid cancer with postoperative hypothyroidism, coronary artery disease, sick sinus syndrome, gastroesophageal reflux disease, Mcdaniel's esophagus, irritable bowel syndrome, polycythemia, depression, and being overweight. The pharmacy would like us to review her medications to see if there are any that are not necessary. There are no other complaints. ALLERGIES: No Known Allergies    MEDICATIONS: As noted on the [facility] MAR, referenced and incorporated herein. PAST MEDICAL HISTORY:   Past Medical History:   Diagnosis Date    Age-related osteoporosis with current pathological fracture with routine healing     Anxiety 9/6/2019    Mcdaniel esophagus     CAD (coronary artery disease)     nonobstructive    Cardiac pacemaker in situ 4/24/2014    Put in for severe bradycardia.  Dual chamber     Chronic insomnia     Closed right hip fracture, with routine healing, subsequent encounter 8/18/2019    Early onset Alzheimer's disease with behavioral disturbance ([address] 9/6/2019    Elevated fasting glucose 9/6/2019    GERD (gastroesophageal reflux disease)     Hiatal hernia     Hyperlipidemia     Hypertension     Hypothyroidism s/p surgical removal of thyroid for thyroid carcinoma 7/29/2015    IBS (irritable bowel syndrome)     Major depressive disorder with single episode, in remission ([address] 9/6/2019    Mycosis fungoides ([address]     Osteoarthritis     Osteoporosis     Polycythemia rubra vera ([address]     sees oncology    PUD (peptic ulcer disease)     RV Pacemaker lead malfunction - Revision 3/24/14-Dr. Nato Jin 3/24/2014    SSS (sick sinus syndrome) ([address] 4/24/2014    Thyroid cancer ([address] about 5

## 2020-05-19 PROCEDURE — 99307 SBSQ NF CARE SF MDM 10: CPT | Performed by: INTERNAL MEDICINE

## 2020-05-25 ENCOUNTER — OUTSIDE SERVICES (OUTPATIENT)
Dept: INTERNAL MEDICINE | Age: 85
End: 2020-05-25
Payer: MEDICARE

## 2020-06-01 LAB
ALBUMIN SERPL-MCNC: 3.3 G/DL
ALP BLD-CCNC: 71 U/L
ALT SERPL-CCNC: 13 U/L
ANION GAP SERPL CALCULATED.3IONS-SCNC: 1.6 MMOL/L
AST SERPL-CCNC: 15 U/L
BILIRUB SERPL-MCNC: 0.4 MG/DL (ref 0.1–1.4)
BUN BLDV-MCNC: 16 MG/DL
CALCIUM SERPL-MCNC: 8.5 MG/DL
CHLORIDE BLD-SCNC: 107 MMOL/L
CHOLESTEROL, TOTAL: 157 MG/DL
CHOLESTEROL/HDL RATIO: 1.4
CO2: 27 MMOL/L
CREAT SERPL-MCNC: 0.7 MG/DL
GFR CALCULATED: 80
GLUCOSE BLD-MCNC: 63 MG/DL
HDLC SERPL-MCNC: 54 MG/DL (ref 35–70)
LDL CHOLESTEROL CALCULATED: 76 MG/DL (ref 0–160)
POTASSIUM SERPL-SCNC: 4 MMOL/L
SODIUM BLD-SCNC: 146 MMOL/L
T4 FREE: 1.01
TOTAL PROTEIN: 5.4
TRIGL SERPL-MCNC: 134 MG/DL
VLDLC SERPL CALC-MCNC: 27 MG/DL

## 2020-06-16 PROCEDURE — 99307 SBSQ NF CARE SF MDM 10: CPT | Performed by: INTERNAL MEDICINE

## 2020-06-23 ENCOUNTER — OUTSIDE SERVICES (OUTPATIENT)
Dept: INTERNAL MEDICINE | Age: 85
End: 2020-06-23
Payer: MEDICARE

## 2020-06-25 ENCOUNTER — OFFICE VISIT (OUTPATIENT)
Dept: CARDIOLOGY | Age: 85
End: 2020-06-25
Payer: MEDICARE

## 2020-06-25 ENCOUNTER — PROCEDURE VISIT (OUTPATIENT)
Dept: CARDIOLOGY | Age: 85
End: 2020-06-25
Payer: MEDICARE

## 2020-06-25 VITALS
DIASTOLIC BLOOD PRESSURE: 67 MMHG | SYSTOLIC BLOOD PRESSURE: 134 MMHG | HEART RATE: 64 BPM | HEIGHT: 66 IN | BODY MASS INDEX: 28.93 KG/M2 | WEIGHT: 180 LBS

## 2020-06-25 PROCEDURE — 99214 OFFICE O/P EST MOD 30 MIN: CPT | Performed by: INTERNAL MEDICINE

## 2020-06-25 PROCEDURE — G8399 PT W/DXA RESULTS DOCUMENT: HCPCS | Performed by: INTERNAL MEDICINE

## 2020-06-25 PROCEDURE — 4040F PNEUMOC VAC/ADMIN/RCVD: CPT | Performed by: INTERNAL MEDICINE

## 2020-06-25 PROCEDURE — 1090F PRES/ABSN URINE INCON ASSESS: CPT | Performed by: INTERNAL MEDICINE

## 2020-06-25 PROCEDURE — 93010 ELECTROCARDIOGRAM REPORT: CPT | Performed by: INTERNAL MEDICINE

## 2020-06-25 PROCEDURE — 1123F ACP DISCUSS/DSCN MKR DOCD: CPT | Performed by: INTERNAL MEDICINE

## 2020-06-25 PROCEDURE — G8427 DOCREV CUR MEDS BY ELIG CLIN: HCPCS | Performed by: INTERNAL MEDICINE

## 2020-06-25 PROCEDURE — 93005 ELECTROCARDIOGRAM TRACING: CPT | Performed by: INTERNAL MEDICINE

## 2020-06-25 PROCEDURE — G8417 CALC BMI ABV UP PARAM F/U: HCPCS | Performed by: INTERNAL MEDICINE

## 2020-06-25 PROCEDURE — 1036F TOBACCO NON-USER: CPT | Performed by: INTERNAL MEDICINE

## 2020-06-25 PROCEDURE — 93288 INTERROG EVL PM/LDLS PM IP: CPT | Performed by: INTERNAL MEDICINE

## 2020-06-25 RX ORDER — MIRTAZAPINE 7.5 MG/1
7.5 TABLET, FILM COATED ORAL NIGHTLY
COMMUNITY
Start: 2020-04-27 | End: 2021-06-25

## 2020-06-25 NOTE — PROGRESS NOTES
onset: 76) in her father; Sudden Death (age of onset: 78) in her sister. No h/o sudden cardiac death. No for premature CAD    REVIEW OF SYSTEMS:    · Constitutional: there has been no unanticipated weight loss. There's been No change in energy level, No change in activity level. · Eyes: No visual changes or diplopia. No scleral icterus. · ENT: No Headaches, hearing loss or vertigo. No mouth sores or sore throat. · Cardiovascular: see above  · Respiratory: see above  · Gastrointestinal: No abdominal pain, appetite loss, blood in stools. · Genitourinary: No dysuria, trouble voiding, or hematuria. · Musculoskeletal:  See above  · Integumentary: No rash or pruritis. · Neurological: No headache or diplopia. No tingling  · Psychiatric: No anxiety, or depression. · Endocrine: No temperature intolerance. · Hematologic/Lymphatic: No abnormal bruising or bleeding, blood clots or swollen lymph nodes. · Allergic/Immunologic: No nasal congestion or hives. PHYSICAL EXAM:      Vitals:    06/25/20 1109   BP: 134/67   Pulse: 64       Constitutional and General Appearance: alert, cooperative, no distress and appears stated age  HEENT: PERRL, no cervical lymphadenopathy. No masses palpable. Normal oral mucosa  Respiratory:  · Normal excursion and expansion without use of accessory muscles  · Resp Auscultation: Good respiratory effort. No for increased work of breathing. On auscultation: clear to auscultation bilaterally  Cardiovascular:  · Heart tones are crisp and normal. regular S1 and S2.  · Jugular venous pulsation Normal  · The carotid upstroke is normal in amplitude and contour without delay or bruit   Abdomen:   · soft  · Bowel sounds present  Extremities:  ·  No edema  Neurological:  · Alert and oriented.       Labs:   Lab Results   Component Value Date    CHOL 157 06/01/2020    TRIG 134 06/01/2020    HDL 54 06/01/2020    LDLCHOLESTEROL 66 01/18/2019    LDLCALC 76 06/01/2020    VLDL 27 06/01/2020 CHOLHDLRATIO 1.4 06/01/2020       Lab Results   Component Value Date     06/01/2020    K 4.0 06/01/2020     06/01/2020    CO2 27 06/01/2020    BUN 16 06/01/2020    CREATININE 0.7 06/01/2020    GLUCOSE 63 06/01/2020    CALCIUM 8.5 06/01/2020    PROT 6.5 01/18/2019    LABALBU 3.3 06/01/2020    BILITOT 0.4 06/01/2020    ALKPHOS 71 06/01/2020    AST 15 06/01/2020    ALT 13 06/01/2020    LABGLOM 80 06/01/2020    GFRAA >60 08/21/2019    GLOB 2.5 07/25/2017     ECG:A paced, V sensed. TTE 8/18/19  Summary  Normal left ventricular diameter. Mild left ventricular hypertrophy. Left ventricular systolic function is normal. Left ventricular ejection  fraction 60 %. Grade II (moderate) left ventricular diastolic dysfunction. Left atrium was not well visualized due to extracardiac structure overlying the chamber. Aortic valve is sclerotic but opens well. Mild aortic insufficiency. Trivial tricuspid regurgitation. Estimated right ventricular systolic pressure is 49 mmHg. Mild pulmonary Hypertension. Assessment and plan:    1. SSS s/p PM insertion. Checked in office today 6/25/20- normal function. Had some atrial high rates- < 0.1% of time. Will monitor. Continue pacer checks every 6 months. .  2. CAD. Coronary angiography in 2008 showed mild CAD. Stress test in 2011 showed no ischemia. Echo on 8/18/19, EF 60%, mild LVH, grade II DD, no significant valvular disease seen. Continue current medications. 3. HTN. Continue current medications. 4. HPL. On Pravachol. LDL 76 on 6/20  5. Right hip surgery for fracture after mechanical fall 8/20/19. The patient is to continue heart healthy diet, weight loss and exercise as tolerated. Patient's medications and side effects were discussed. Medication refills were provided if needed. Follow up appointment timing was discussed. All questions and concerns were addressed to patient's satisfaction. The patient is to follow up in 6 months or sooner if necessary.

## 2020-07-15 ENCOUNTER — APPOINTMENT (OUTPATIENT)
Dept: GENERAL RADIOLOGY | Age: 85
End: 2020-07-15
Payer: MEDICARE

## 2020-07-15 ENCOUNTER — HOSPITAL ENCOUNTER (EMERGENCY)
Age: 85
Discharge: HOME | End: 2020-07-15
Attending: EMERGENCY MEDICINE
Payer: MEDICARE

## 2020-07-15 VITALS
OXYGEN SATURATION: 97 % | DIASTOLIC BLOOD PRESSURE: 78 MMHG | TEMPERATURE: 99 F | RESPIRATION RATE: 14 BRPM | SYSTOLIC BLOOD PRESSURE: 152 MMHG | HEART RATE: 72 BPM

## 2020-07-15 LAB
-: ABNORMAL
ABSOLUTE EOS #: 0.27 K/UL (ref 0–0.44)
ABSOLUTE IMMATURE GRANULOCYTE: 0.03 K/UL (ref 0–0.3)
ABSOLUTE LYMPH #: 1.28 K/UL (ref 1.1–3.7)
ABSOLUTE MONO #: 0.98 K/UL (ref 0.1–1.2)
ALBUMIN SERPL-MCNC: 3.4 G/DL (ref 3.5–5.2)
ALBUMIN/GLOBULIN RATIO: 1.4 (ref 1–2.5)
ALP BLD-CCNC: 92 U/L (ref 35–104)
ALT SERPL-CCNC: 12 U/L (ref 5–33)
AMORPHOUS: ABNORMAL
ANION GAP SERPL CALCULATED.3IONS-SCNC: 12 MMOL/L (ref 9–17)
AST SERPL-CCNC: 17 U/L
BACTERIA: ABNORMAL
BASOPHILS # BLD: 1 % (ref 0–2)
BASOPHILS ABSOLUTE: 0.08 K/UL (ref 0–0.2)
BILIRUB SERPL-MCNC: 0.4 MG/DL (ref 0.3–1.2)
BILIRUBIN URINE: NEGATIVE
BNP INTERPRETATION: NORMAL
BUN BLDV-MCNC: 18 MG/DL (ref 8–23)
BUN/CREAT BLD: 23 (ref 9–20)
CALCIUM SERPL-MCNC: 8.9 MG/DL (ref 8.6–10.4)
CASTS UA: ABNORMAL /LPF (ref 0–2)
CHLORIDE BLD-SCNC: 102 MMOL/L (ref 98–107)
CO2: 24 MMOL/L (ref 20–31)
COLOR: ABNORMAL
COMMENT UA: ABNORMAL
CREAT SERPL-MCNC: 0.78 MG/DL (ref 0.5–0.9)
CRYSTALS, UA: ABNORMAL /HPF
DIFFERENTIAL TYPE: ABNORMAL
EOSINOPHILS RELATIVE PERCENT: 3 % (ref 1–4)
EPITHELIAL CELLS UA: ABNORMAL /HPF (ref 0–5)
GFR AFRICAN AMERICAN: >60 ML/MIN
GFR NON-AFRICAN AMERICAN: >60 ML/MIN
GFR SERPL CREATININE-BSD FRML MDRD: ABNORMAL ML/MIN/{1.73_M2}
GFR SERPL CREATININE-BSD FRML MDRD: ABNORMAL ML/MIN/{1.73_M2}
GLUCOSE BLD-MCNC: 114 MG/DL (ref 70–99)
GLUCOSE URINE: NEGATIVE
HCT VFR BLD CALC: 45.4 % (ref 36.3–47.1)
HEMOGLOBIN: 15 G/DL (ref 11.9–15.1)
IMMATURE GRANULOCYTES: 0 %
KETONES, URINE: NEGATIVE
LACTIC ACID: 1.2 MMOL/L (ref 0.5–2.2)
LEUKOCYTE ESTERASE, URINE: ABNORMAL
LYMPHOCYTES # BLD: 16 % (ref 24–43)
MCH RBC QN AUTO: 27.9 PG (ref 25.2–33.5)
MCHC RBC AUTO-ENTMCNC: 33 G/DL (ref 25.2–33.5)
MCV RBC AUTO: 84.4 FL (ref 82.6–102.9)
MONOCYTES # BLD: 12 % (ref 3–12)
MUCUS: ABNORMAL
NITRITE, URINE: POSITIVE
NRBC AUTOMATED: 0 PER 100 WBC
OTHER OBSERVATIONS UA: ABNORMAL
PDW BLD-RTO: 14 % (ref 11.8–14.4)
PH UA: 5 (ref 5–6)
PLATELET # BLD: 158 K/UL (ref 138–453)
PLATELET ESTIMATE: ABNORMAL
PMV BLD AUTO: 10 FL (ref 8.1–13.5)
POTASSIUM SERPL-SCNC: 4.5 MMOL/L (ref 3.7–5.3)
PRO-BNP: 162 PG/ML
PROTEIN UA: NEGATIVE
RBC # BLD: 5.38 M/UL (ref 3.95–5.11)
RBC # BLD: ABNORMAL 10*6/UL
RBC UA: ABNORMAL /HPF (ref 0–4)
RENAL EPITHELIAL, UA: ABNORMAL /HPF
SARS-COV-2, PCR: NORMAL
SARS-COV-2, RAPID: NOT DETECTED
SARS-COV-2: NORMAL
SEG NEUTROPHILS: 68 % (ref 36–65)
SEGMENTED NEUTROPHILS ABSOLUTE COUNT: 5.47 K/UL (ref 1.5–8.1)
SODIUM BLD-SCNC: 138 MMOL/L (ref 135–144)
SOURCE: NORMAL
SPECIFIC GRAVITY UA: 1.03 (ref 1.01–1.02)
TOTAL PROTEIN: 5.8 G/DL (ref 6.4–8.3)
TRICHOMONAS: ABNORMAL
TROPONIN INTERP: NORMAL
TROPONIN T: NORMAL NG/ML
TROPONIN, HIGH SENSITIVITY: 9 NG/L (ref 0–14)
TURBIDITY: ABNORMAL
URINE HGB: NEGATIVE
UROBILINOGEN, URINE: NORMAL
WBC # BLD: 8.1 K/UL (ref 3.5–11.3)
WBC # BLD: ABNORMAL 10*3/UL
WBC UA: ABNORMAL /HPF (ref 0–4)
YEAST: ABNORMAL

## 2020-07-15 PROCEDURE — 83880 ASSAY OF NATRIURETIC PEPTIDE: CPT

## 2020-07-15 PROCEDURE — 87186 SC STD MICRODIL/AGAR DIL: CPT

## 2020-07-15 PROCEDURE — U0002 COVID-19 LAB TEST NON-CDC: HCPCS

## 2020-07-15 PROCEDURE — 71045 X-RAY EXAM CHEST 1 VIEW: CPT

## 2020-07-15 PROCEDURE — 99284 EMERGENCY DEPT VISIT MOD MDM: CPT

## 2020-07-15 PROCEDURE — 81001 URINALYSIS AUTO W/SCOPE: CPT

## 2020-07-15 PROCEDURE — 87077 CULTURE AEROBIC IDENTIFY: CPT

## 2020-07-15 PROCEDURE — 6370000000 HC RX 637 (ALT 250 FOR IP): Performed by: EMERGENCY MEDICINE

## 2020-07-15 PROCEDURE — 80053 COMPREHEN METABOLIC PANEL: CPT

## 2020-07-15 PROCEDURE — 36415 COLL VENOUS BLD VENIPUNCTURE: CPT

## 2020-07-15 PROCEDURE — 83605 ASSAY OF LACTIC ACID: CPT

## 2020-07-15 PROCEDURE — 84484 ASSAY OF TROPONIN QUANT: CPT

## 2020-07-15 PROCEDURE — 85025 COMPLETE CBC W/AUTO DIFF WBC: CPT

## 2020-07-15 PROCEDURE — 87086 URINE CULTURE/COLONY COUNT: CPT

## 2020-07-15 RX ORDER — CEPHALEXIN 500 MG/1
500 CAPSULE ORAL 2 TIMES DAILY
Qty: 14 CAPSULE | Refills: 0 | Status: SHIPPED | OUTPATIENT
Start: 2020-07-15 | End: 2020-07-22

## 2020-07-15 RX ORDER — CEPHALEXIN 250 MG/1
500 CAPSULE ORAL ONCE
Status: DISCONTINUED | OUTPATIENT
Start: 2020-07-15 | End: 2020-07-16 | Stop reason: HOSPADM

## 2020-07-15 RX ORDER — ACETAMINOPHEN 500 MG
1000 TABLET ORAL ONCE
Status: COMPLETED | OUTPATIENT
Start: 2020-07-15 | End: 2020-07-15

## 2020-07-15 RX ADMIN — ACETAMINOPHEN 1000 MG: 500 TABLET, FILM COATED ORAL at 20:07

## 2020-07-15 ASSESSMENT — PAIN SCALES - GENERAL: PAINLEVEL_OUTOF10: 0

## 2020-07-16 ASSESSMENT — ENCOUNTER SYMPTOMS
NAUSEA: 0
ABDOMINAL PAIN: 0
DIARRHEA: 1
SHORTNESS OF BREATH: 0
VOMITING: 0
COUGH: 1

## 2020-07-17 LAB
CULTURE: ABNORMAL
Lab: ABNORMAL
SPECIMEN DESCRIPTION: ABNORMAL

## 2020-07-19 LAB
EKG ATRIAL RATE: 64 BPM
EKG P AXIS: 49 DEGREES
EKG P-R INTERVAL: 172 MS
EKG Q-T INTERVAL: 540 MS
EKG QRS DURATION: 78 MS
EKG QTC CALCULATION (BAZETT): 557 MS
EKG R AXIS: -2 DEGREES
EKG T AXIS: 66 DEGREES
EKG VENTRICULAR RATE: 64 BPM

## 2020-07-21 PROCEDURE — 99308 SBSQ NF CARE LOW MDM 20: CPT | Performed by: INTERNAL MEDICINE

## 2020-07-29 ENCOUNTER — OUTSIDE SERVICES (OUTPATIENT)
Dept: INTERNAL MEDICINE | Age: 85
End: 2020-07-29
Payer: MEDICARE

## 2020-07-29 NOTE — PROGRESS NOTES
DR. Compa Maya - TELEHEALTH NURSING HOME VISIT     DATE OF SERVICE: 7/21/20    NURSING HOME: [facility]    CHIEF COMPLAINT/HISTORY OF CHIEF COMPLAINT: This patient is being seen via telehealth for ongoing evaluation and management of her Alzheimer's disease, elevated fasting glucose, hypertension, hyperlipidemia, history of thyroid cancer with postoperative hypothyroidism, coronary artery disease, sick sinus syndrome, gastroesophageal reflux disease, Mcdaniel's esophagus, irritable bowel syndrome, polycythemia, depression, and being overweight. She was recently seen in the emergency room for a urinary tract infection and started on Keflex for it. There are no other complaints. ALLERGIES: No Known Allergies    MEDICATIONS: As noted on the [facility] MAR, referenced and incorporated herein. PAST MEDICAL HISTORY:   Past Medical History:   Diagnosis Date    Age-related osteoporosis with current pathological fracture with routine healing     Anxiety 9/6/2019    Mcdaniel esophagus     CAD (coronary artery disease)     nonobstructive    Cardiac pacemaker in situ 4/24/2014    Put in for severe bradycardia.  Dual chamber     Chronic insomnia     Closed right hip fracture, with routine healing, subsequent encounter 8/18/2019    Early onset Alzheimer's disease with behavioral disturbance ([address] 9/6/2019    Elevated fasting glucose 9/6/2019    GERD (gastroesophageal reflux disease)     Hiatal hernia     Hyperlipidemia     Hypertension     Hypothyroidism s/p surgical removal of thyroid for thyroid carcinoma 7/29/2015    IBS (irritable bowel syndrome)     Major depressive disorder with single episode, in remission ([address] 9/6/2019    Mycosis fungoides ([address]     Osteoarthritis     Osteoporosis     Polycythemia rubra vera ([address]     sees oncology    PUD (peptic ulcer disease)     RV Pacemaker lead malfunction - Revision 3/24/14-Dr. ray 3/24/2014    SSS (sick sinus syndrome) ([address] 4/24/2014    Thyroid cancer [facility] about 5 years ago    thyroid /following with oncology    TIA (transient ischemic attack) 7/23/2014    Confusion--word searching    Vitamin D deficiency        PAST SURGICAL HISTORY:   Past Surgical History:   Procedure Laterality Date    BONE MARROW BIOPSY  September 2010    polycythemia vera    BREAST BIOPSY Right     benign    CARDIAC CATHETERIZATION  2008    nonobstructive CAD    CHOLECYSTECTOMY  1995    COLONOSCOPY  2002    COLONOSCOPY  2/29/2016    colon polyp, diverticulosis - Dr Aiden Alvarado    after miscarriage    ENDOSCOPY, COLON, DIAGNOSTIC  2002    normal  external hemorrhoids    EYE SURGERY Right 11/07/06    Cataract extraction with lens implant    HEMIARTHROPLASTY HIP Right 8/20/2019    RIGHT HIP HEMIARTHROPLASTY performed by Cruzito Guerrier MD at [address], THYROID Left     thyroid cancer    PACEMAKER INSERTION  6/2013    [facility] SKIN BIOPSY  2003    mycosis fungoides    TAE AND BSO      TAE/BSO at age 54 with anterior repair for benign reasons.     TONSILLECTOMY AND ADENOIDECTOMY      age 27   Zadie Marietta TUBAL LIGATION      UPPER GASTROINTESTINAL ENDOSCOPY  2002    UPPER GASTROINTESTINAL ENDOSCOPY  08/28/2003    UPPER GASTROINTESTINAL ENDOSCOPY  05/12/2003    UPPER GASTROINTESTINAL ENDOSCOPY  03/20/2001    UPPER GASTROINTESTINAL ENDOSCOPY  02/29/2016    Barretts tissue, body polyp - Dr Mari Kyle       SOCIAL HISTORY:     Tobacco:   Social History     Tobacco Use   Smoking Status Never Smoker   Smokeless Tobacco Never Used   Tobacco Comment    never smoker     Alcohol:   Social History     Substance and Sexual Activity   Alcohol Use No    Alcohol/week: 0.0 standard drinks     Drugs:   Social History     Substance and Sexual Activity   Drug Use No       FAMILY HISTORY: family history includes Cancer (age of onset: 47) in her mother; Depression in her son; Drug Abuse in her son; Emphysema in her sister; Mental Illness in her son; Other in her brother and son; Stroke (age of onset: 76) in her father; Sudden Death (age of onset: 78) in her sister. REVIEW OF SYSTEMS:     Please see history of chief complaint above; otherwise no new problems with respect to General, HEENT, Cardiovascular, Respiratory, Gastrointestinal, Genitourinary, Endocrinologic, Musculoskeletal, or Neuropsychiatric complaints. PHYSICAL EXAMINATION:    Due to this being a telehealth visit, the physical examination was performed by the nurse at the facility. Vitals: Temp: 97.8 deg F. Pulse: 64. Resp: 18. BP: 131/54. General: A 80 y.o.  female. Alert and oriented to person and place and questionably oriented to time. She does not appear to be in any acute distress. Skin: Skin color, texture, turgor normal. No rashes or lesions. HEENT/Neck: Essentially unremarkable  Lungs: Normal - CTA without rales, rhonchi, or wheezing. Heart: regular rate and rhythm, S1, S2 normal, no murmur, click, rub or gallop No S3 or S4. Abdomen: Non-obese soft, non-distended, non-tender, normal active bowel sounds, no masses palpated and no hepatosplenomegaly  Extremities: No clubbing, cyanosis, or edema in any of the extremities. Neurologic: cranial nerves II-XII are grossly intact    ASSESSMENT:     Diagnosis Orders   1. Acute cystitis without hematuria     2. Early onset Alzheimer's disease with behavioral disturbance (HCC)     3. Elevated fasting glucose     4. Essential hypertension     5. Mixed hyperlipidemia     6. Thyroid cancer ([facility]     7. Postoperative hypothyroidism     8. Coronary artery disease involving native coronary artery of native heart, angina presence unspecified     9. SSS (sick sinus syndrome) ([address]     10. Cardiac pacemaker in situ     11. Gastroesophageal reflux disease without esophagitis     12. Hiatal hernia     13. 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 Irritable bowel syndrome, unspecified type     15.  Polycythemia rubra vera ([facility]     16. Age-related osteoporosis with current pathological fracture with routine healing     17. Major depressive disorder with single episode, in remission ([facility]     18. Anxiety     19. Overweight           PLAN:    1. Continue current treatment  2. Nursing home record reviewed and updates summarized and entered into electronic record  3. Reviewed emergency room chart  4. See nursing home orders and MAR. Pursuant to the emergency declaration under the [address] [place] [address], Critical access hospital waiver authority and the [employer] Act, this TelehHealth visit was conducted, with patient's consent, to reduce the patient's risk of exposure to COVID-19 and provide continuity of care for an established patient. Services were provided through a video synchronous discussion virtually (using [email or web address]. me) to substitute for in-person clinic visit. The originating site was the nursing home and the distant site was the provider's home. Patient's identity was verified via name and date of birth.         Electronically signed by Tiki Ambrocio DO on 7/29/2020 at 2:53 AM  Internal Medicine

## 2020-08-05 ENCOUNTER — OUTSIDE SERVICES (OUTPATIENT)
Dept: INTERNAL MEDICINE | Age: 85
End: 2020-08-05
Payer: MEDICARE

## 2020-08-05 PROCEDURE — 99308 SBSQ NF CARE LOW MDM 20: CPT | Performed by: NURSE PRACTITIONER

## 2020-08-05 NOTE — PROGRESS NOTES
08/05/20  Joe Smith  1934    Patient Resident of [facility]     Chief Complaint:    1. Bilateral impacted cerumen    2. Early onset Alzheimer's disease with behavioral disturbance (HCC)    3. Elevated fasting glucose    4. Essential hypertension        HPI:  80-year-old patient with history of Alzheimer's, elevated fasting glucose and hypertension being seen for complaints of bilateral ear pain left greater than right. States she just cannot hear out of her left ear. Has been putting her fingernail into her ear trying to clear it out causing some abraded areas. No drainage. No fevers. No chills. Mood has been fairly stable except she is agitated she cannot hear out of the left ear. Vitals stable. No Known Allergies    Past Medical History:   Diagnosis Date    Age-related osteoporosis with current pathological fracture with routine healing     Anxiety 9/6/2019    Mcdaniel esophagus     CAD (coronary artery disease)     nonobstructive    Cardiac pacemaker in situ 4/24/2014    Put in for severe bradycardia.  Dual chamber     Chronic insomnia     Closed right hip fracture, with routine healing, subsequent encounter 8/18/2019    Early onset Alzheimer's disease with behavioral disturbance ([address] 9/6/2019    Elevated fasting glucose 9/6/2019    GERD (gastroesophageal reflux disease)     Hiatal hernia     Hyperlipidemia     Hypertension     Hypothyroidism s/p surgical removal of thyroid for thyroid carcinoma 7/29/2015    IBS (irritable bowel syndrome)     Major depressive disorder with single episode, in remission ([address] 9/6/2019    Mycosis fungoides ([address]     Osteoarthritis     Osteoporosis     Polycythemia rubra vera ([address]     sees oncology    PUD (peptic ulcer disease)     RV Pacemaker lead malfunction - Revision 3/24/14-Dr. Tanesha Mcnally 3/24/2014    SSS (sick sinus syndrome) ([address] 4/24/2014    Thyroid cancer ([address] about 5 years ago    thyroid /following with oncology    TIA (transient ischemic attack) 7/23/2014    Confusion--word searching    Vitamin D deficiency        Past Surgical History:   Procedure Laterality Date    BONE MARROW BIOPSY  September 2010    polycythemia vera    BREAST BIOPSY Right     benign    CARDIAC CATHETERIZATION  2008    nonobstructive CAD    CHOLECYSTECTOMY  1995    COLONOSCOPY  2002    COLONOSCOPY  2/29/2016    colon polyp, diverticulosis - Dr Jeff Meeks    after miscarriage    ENDOSCOPY, COLON, DIAGNOSTIC  2002    normal  external hemorrhoids    EYE SURGERY Right 11/07/06    Cataract extraction with lens implant    HEMIARTHROPLASTY HIP Right 8/20/2019    RIGHT HIP HEMIARTHROPLASTY performed by Victorina Jauregui MD at [address], THYROID Left     thyroid cancer    PACEMAKER INSERTION  6/2013    [facility] SKIN BIOPSY  2003    mycosis fungoides    TAE AND BSO      TAE/BSO at age 54 with anterior repair for benign reasons.  TONSILLECTOMY AND ADENOIDECTOMY      age 27   [place] TUBAL LIGATION      UPPER GASTROINTESTINAL ENDOSCOPY  2002    UPPER GASTROINTESTINAL ENDOSCOPY  08/28/2003    UPPER GASTROINTESTINAL ENDOSCOPY  05/12/2003    UPPER GASTROINTESTINAL ENDOSCOPY  03/20/2001    UPPER GASTROINTESTINAL ENDOSCOPY  02/29/2016    Barretts tissue, body polyp - Dr Kylah Ricardo       Medications as per [facility] Chart [facility]     Social History     Socioeconomic History    Marital status:       Spouse name: Not on file    Number of children: Not on file    Years of education: Not on file    Highest education level: Not on file   Occupational History    Occupation: retired     Employer: [employer] resource strain: Not on file    Food insecurity     Worry: Not on file     Inability: Not on file   [employer] needs     Medical: Not on file     Non-medical: Not on file   Tobacco Use    Smoking status: Never Smoker    Smokeless tobacco: Never Used   [place] Tobacco comment: never smoker   Substance and Sexual Activity    Alcohol use: No     Alcohol/week: 0.0 standard drinks    Drug use: No    Sexual activity: Not Currently   Lifestyle    Physical activity     Days per week: Not on file     Minutes per session: Not on file    Stress: Not on file   Relationships    Social connections     Talks on phone: Not on file     Gets together: Not on file     Attends Sikh service: Not on file     Active member of club or organization: Not on file     Attends meetings of clubs or organizations: Not on file     Relationship status: Not on file    Intimate partner violence     Fear of current or ex partner: Not on file     Emotionally abused: Not on file     Physically abused: Not on file     Forced sexual activity: Not on file   Other Topics Concern    Not on file   Social History Narrative    Not on file       Review of Systems   HENT:        Decreased hearing bilateral with ear fullness/aching to left ear   All other systems reviewed and are negative. Physical Exam  Vitals signs and nursing note reviewed. Constitutional:       General: She is not in acute distress. Appearance: Normal appearance. She is well-developed. She is not diaphoretic. HENT:      Head: Normocephalic and atraumatic. Right Ear: External ear normal. There is impacted cerumen. Left Ear: External ear normal. There is impacted cerumen. Ears:      Comments: Bilateral ear canals with large amount of gaines cerumen. No erythremia. Left ear canal with abraded areas  Eyes:      General:         Right eye: No discharge. Left eye: No discharge. Neck:      Trachea: No tracheal deviation. Cardiovascular:      Rate and Rhythm: Normal rate and regular rhythm. Pulses: Normal pulses. Heart sounds: Normal heart sounds. No murmur. No friction rub. No gallop. Pulmonary:      Effort: Pulmonary effort is normal. No respiratory distress.       Breath sounds: Normal breath sounds. No stridor. No wheezing, rhonchi or rales. Chest:      Chest wall: No tenderness. Abdominal:      General: Bowel sounds are normal. There is no distension. Palpations: Abdomen is soft. Tenderness: There is no abdominal tenderness. Musculoskeletal:         General: No swelling. Skin:     General: Skin is warm and dry. Capillary Refill: Capillary refill takes less than 2 seconds. Coloration: Skin is not pale. Findings: No rash. Neurological:      General: No focal deficit present. Mental Status: She is alert. Mental status is at baseline. Cranial Nerves: No cranial nerve deficit. Sensory: No sensory deficit. Coordination: Coordination normal.   Psychiatric:         Mood and Affect: Mood normal.         Behavior: Behavior normal.         Vital Signs: Temperature 98.1 °F, blood pressure 139/67, pulse 72, respirations 16, SPO2 97% on room air    Assessment:  1. Bilateral impacted cerumen  Debrox 4 drops twice daily then flushed on the fifth day. 2. Early onset Alzheimer's disease with behavioral disturbance ([address]  Stable    3. Elevated fasting glucose  Stable, continue to work on diet serial lab follow-up    4. Essential hypertension  Mildly elevated today remainders stable. Continue to monitor      Plan:  As noted above. Follow up for routine visit. Call sooner with concerns prior.     Electronically signed by DAISY See CNP on 8/5/2020 at 3:21 PM

## 2020-08-10 ENCOUNTER — OUTSIDE SERVICES (OUTPATIENT)
Dept: INTERNAL MEDICINE | Age: 85
End: 2020-08-10
Payer: MEDICARE

## 2020-08-10 PROCEDURE — 99308 SBSQ NF CARE LOW MDM 20: CPT | Performed by: NURSE PRACTITIONER

## 2020-08-11 ENCOUNTER — HOSPITAL ENCOUNTER (INPATIENT)
Age: 85
LOS: 5 days | Discharge: NURSING, REHAB OR LONG-TERM CARE | DRG: 177 | End: 2020-08-16
Attending: HOSPITALIST | Admitting: HOSPITALIST
Payer: MEDICARE

## 2020-08-11 ENCOUNTER — APPOINTMENT (OUTPATIENT)
Dept: CT IMAGING | Age: 85
End: 2020-08-11
Payer: MEDICARE

## 2020-08-11 ENCOUNTER — HOSPITAL ENCOUNTER (EMERGENCY)
Age: 85
Discharge: ANOTHER HOSPITAL | End: 2020-08-11
Attending: EMERGENCY MEDICINE
Payer: MEDICARE

## 2020-08-11 VITALS
TEMPERATURE: 102 F | HEART RATE: 60 BPM | HEIGHT: 66 IN | DIASTOLIC BLOOD PRESSURE: 53 MMHG | OXYGEN SATURATION: 95 % | SYSTOLIC BLOOD PRESSURE: 111 MMHG | WEIGHT: 170 LBS | RESPIRATION RATE: 24 BRPM | BODY MASS INDEX: 27.32 KG/M2

## 2020-08-11 PROBLEM — U07.1 COVID-19: Status: ACTIVE | Noted: 2020-08-11

## 2020-08-11 LAB
ABSOLUTE EOS #: <0.03 K/UL (ref 0–0.44)
ABSOLUTE IMMATURE GRANULOCYTE: <0.03 K/UL (ref 0–0.3)
ABSOLUTE LYMPH #: 0.6 K/UL (ref 1.1–3.7)
ABSOLUTE MONO #: 0.31 K/UL (ref 0.1–1.2)
ANION GAP SERPL CALCULATED.3IONS-SCNC: 13 MMOL/L (ref 9–17)
BASOPHILS # BLD: 0 % (ref 0–2)
BASOPHILS ABSOLUTE: <0.03 K/UL (ref 0–0.2)
BUN BLDV-MCNC: 21 MG/DL (ref 8–23)
BUN/CREAT BLD: 26 (ref 9–20)
CALCIUM SERPL-MCNC: 7.9 MG/DL (ref 8.6–10.4)
CHLORIDE BLD-SCNC: 105 MMOL/L (ref 98–107)
CO2: 23 MMOL/L (ref 20–31)
CREAT SERPL-MCNC: 0.81 MG/DL (ref 0.5–0.9)
D-DIMER QUANTITATIVE: 1.13 MG/L FEU (ref 0–0.59)
DIFFERENTIAL TYPE: ABNORMAL
EOSINOPHILS RELATIVE PERCENT: 0 % (ref 1–4)
GFR AFRICAN AMERICAN: >60 ML/MIN
GFR NON-AFRICAN AMERICAN: >60 ML/MIN
GFR SERPL CREATININE-BSD FRML MDRD: ABNORMAL ML/MIN/{1.73_M2}
GFR SERPL CREATININE-BSD FRML MDRD: ABNORMAL ML/MIN/{1.73_M2}
GLUCOSE BLD-MCNC: 136 MG/DL (ref 70–99)
HCT VFR BLD CALC: 44.8 % (ref 36.3–47.1)
HEMOGLOBIN: 15.1 G/DL (ref 11.9–15.1)
IMMATURE GRANULOCYTES: 0 %
LACTIC ACID: 1.6 MMOL/L (ref 0.5–2.2)
LYMPHOCYTES # BLD: 15 % (ref 24–43)
MCH RBC QN AUTO: 27.5 PG (ref 25.2–33.5)
MCHC RBC AUTO-ENTMCNC: 33.7 G/DL (ref 25.2–33.5)
MCV RBC AUTO: 81.6 FL (ref 82.6–102.9)
MONOCYTES # BLD: 8 % (ref 3–12)
NRBC AUTOMATED: 0 PER 100 WBC
PDW BLD-RTO: 13.9 % (ref 11.8–14.4)
PLATELET # BLD: 130 K/UL (ref 138–453)
PLATELET ESTIMATE: ABNORMAL
PMV BLD AUTO: 10.6 FL (ref 8.1–13.5)
POTASSIUM SERPL-SCNC: 4.2 MMOL/L (ref 3.7–5.3)
RBC # BLD: 5.49 M/UL (ref 3.95–5.11)
RBC # BLD: ABNORMAL 10*6/UL
SARS-COV-2, PCR: ABNORMAL
SARS-COV-2, RAPID: DETECTED
SARS-COV-2: ABNORMAL
SEG NEUTROPHILS: 77 % (ref 36–65)
SEGMENTED NEUTROPHILS ABSOLUTE COUNT: 3.16 K/UL (ref 1.5–8.1)
SODIUM BLD-SCNC: 141 MMOL/L (ref 135–144)
SOURCE: ABNORMAL
TROPONIN INTERP: ABNORMAL
TROPONIN INTERP: ABNORMAL
TROPONIN T: ABNORMAL NG/ML
TROPONIN T: ABNORMAL NG/ML
TROPONIN, HIGH SENSITIVITY: 16 NG/L (ref 0–14)
TROPONIN, HIGH SENSITIVITY: 18 NG/L (ref 0–14)
WBC # BLD: 4.1 K/UL (ref 3.5–11.3)
WBC # BLD: ABNORMAL 10*3/UL

## 2020-08-11 PROCEDURE — 6360000004 HC RX CONTRAST MEDICATION: Performed by: EMERGENCY MEDICINE

## 2020-08-11 PROCEDURE — 2580000003 HC RX 258: Performed by: EMERGENCY MEDICINE

## 2020-08-11 PROCEDURE — 80048 BASIC METABOLIC PNL TOTAL CA: CPT

## 2020-08-11 PROCEDURE — 99285 EMERGENCY DEPT VISIT HI MDM: CPT

## 2020-08-11 PROCEDURE — 2580000003 HC RX 258: Performed by: NURSE PRACTITIONER

## 2020-08-11 PROCEDURE — 93005 ELECTROCARDIOGRAM TRACING: CPT | Performed by: EMERGENCY MEDICINE

## 2020-08-11 PROCEDURE — 6360000002 HC RX W HCPCS: Performed by: EMERGENCY MEDICINE

## 2020-08-11 PROCEDURE — 36415 COLL VENOUS BLD VENIPUNCTURE: CPT

## 2020-08-11 PROCEDURE — U0002 COVID-19 LAB TEST NON-CDC: HCPCS

## 2020-08-11 PROCEDURE — 87040 BLOOD CULTURE FOR BACTERIA: CPT

## 2020-08-11 PROCEDURE — 85379 FIBRIN DEGRADATION QUANT: CPT

## 2020-08-11 PROCEDURE — 83605 ASSAY OF LACTIC ACID: CPT

## 2020-08-11 PROCEDURE — 1200000000 HC SEMI PRIVATE

## 2020-08-11 PROCEDURE — 96365 THER/PROPH/DIAG IV INF INIT: CPT

## 2020-08-11 PROCEDURE — 71260 CT THORAX DX C+: CPT

## 2020-08-11 PROCEDURE — 84484 ASSAY OF TROPONIN QUANT: CPT

## 2020-08-11 PROCEDURE — 85025 COMPLETE CBC W/AUTO DIFF WBC: CPT

## 2020-08-11 RX ORDER — 0.9 % SODIUM CHLORIDE 0.9 %
1000 INTRAVENOUS SOLUTION INTRAVENOUS ONCE
Status: COMPLETED | OUTPATIENT
Start: 2020-08-11 | End: 2020-08-11

## 2020-08-11 RX ORDER — PRAVASTATIN SODIUM 20 MG
40 TABLET ORAL NIGHTLY
Status: DISCONTINUED | OUTPATIENT
Start: 2020-08-11 | End: 2020-08-16 | Stop reason: HOSPADM

## 2020-08-11 RX ORDER — M-VIT,TX,IRON,MINS/CALC/FOLIC 27MG-0.4MG
1 TABLET ORAL DAILY
Status: DISCONTINUED | OUTPATIENT
Start: 2020-08-12 | End: 2020-08-16 | Stop reason: HOSPADM

## 2020-08-11 RX ORDER — SODIUM CHLORIDE 0.9 % (FLUSH) 0.9 %
10 SYRINGE (ML) INJECTION PRN
Status: DISCONTINUED | OUTPATIENT
Start: 2020-08-11 | End: 2020-08-16 | Stop reason: HOSPADM

## 2020-08-11 RX ORDER — POTASSIUM CHLORIDE 7.45 MG/ML
10 INJECTION INTRAVENOUS PRN
Status: DISCONTINUED | OUTPATIENT
Start: 2020-08-11 | End: 2020-08-16 | Stop reason: HOSPADM

## 2020-08-11 RX ORDER — LOSARTAN POTASSIUM 50 MG/1
50 TABLET ORAL DAILY
Status: DISCONTINUED | OUTPATIENT
Start: 2020-08-12 | End: 2020-08-16 | Stop reason: HOSPADM

## 2020-08-11 RX ORDER — DONEPEZIL HYDROCHLORIDE 10 MG/1
10 TABLET, FILM COATED ORAL NIGHTLY
Status: DISCONTINUED | OUTPATIENT
Start: 2020-08-11 | End: 2020-08-16 | Stop reason: HOSPADM

## 2020-08-11 RX ORDER — ACETAMINOPHEN 650 MG/1
650 SUPPOSITORY RECTAL EVERY 6 HOURS PRN
Status: DISCONTINUED | OUTPATIENT
Start: 2020-08-11 | End: 2020-08-16 | Stop reason: HOSPADM

## 2020-08-11 RX ORDER — DOCUSATE SODIUM 100 MG/1
100 CAPSULE, LIQUID FILLED ORAL DAILY
Status: DISCONTINUED | OUTPATIENT
Start: 2020-08-12 | End: 2020-08-16 | Stop reason: HOSPADM

## 2020-08-11 RX ORDER — MAGNESIUM SULFATE 1 G/100ML
1 INJECTION INTRAVENOUS PRN
Status: DISCONTINUED | OUTPATIENT
Start: 2020-08-11 | End: 2020-08-16 | Stop reason: HOSPADM

## 2020-08-11 RX ORDER — MIRTAZAPINE 15 MG/1
7.5 TABLET, FILM COATED ORAL NIGHTLY
Status: DISCONTINUED | OUTPATIENT
Start: 2020-08-11 | End: 2020-08-16 | Stop reason: HOSPADM

## 2020-08-11 RX ORDER — ACETAMINOPHEN 325 MG/1
650 TABLET ORAL EVERY 6 HOURS PRN
Status: DISCONTINUED | OUTPATIENT
Start: 2020-08-11 | End: 2020-08-16 | Stop reason: HOSPADM

## 2020-08-11 RX ORDER — LEVOTHYROXINE SODIUM 0.12 MG/1
125 TABLET ORAL DAILY
Status: DISCONTINUED | OUTPATIENT
Start: 2020-08-12 | End: 2020-08-16 | Stop reason: HOSPADM

## 2020-08-11 RX ORDER — GUAIFENESIN 100 MG/5ML
200 SYRUP ORAL EVERY 6 HOURS PRN
COMMUNITY

## 2020-08-11 RX ORDER — POTASSIUM CHLORIDE 20 MEQ/1
40 TABLET, EXTENDED RELEASE ORAL PRN
Status: DISCONTINUED | OUTPATIENT
Start: 2020-08-11 | End: 2020-08-16 | Stop reason: HOSPADM

## 2020-08-11 RX ORDER — CETIRIZINE HYDROCHLORIDE 10 MG/1
10 TABLET ORAL DAILY
Status: DISCONTINUED | OUTPATIENT
Start: 2020-08-12 | End: 2020-08-16 | Stop reason: HOSPADM

## 2020-08-11 RX ORDER — SODIUM CHLORIDE 9 MG/ML
INJECTION, SOLUTION INTRAVENOUS CONTINUOUS
Status: DISCONTINUED | OUTPATIENT
Start: 2020-08-11 | End: 2020-08-16

## 2020-08-11 RX ORDER — UREA 10 %
10 LOTION (ML) TOPICAL NIGHTLY PRN
Status: DISCONTINUED | OUTPATIENT
Start: 2020-08-11 | End: 2020-08-16 | Stop reason: HOSPADM

## 2020-08-11 RX ORDER — SODIUM CHLORIDE 0.9 % (FLUSH) 0.9 %
10 SYRINGE (ML) INJECTION EVERY 12 HOURS SCHEDULED
Status: DISCONTINUED | OUTPATIENT
Start: 2020-08-11 | End: 2020-08-16 | Stop reason: HOSPADM

## 2020-08-11 RX ADMIN — SODIUM CHLORIDE, PRESERVATIVE FREE 10 ML: 5 INJECTION INTRAVENOUS at 23:39

## 2020-08-11 RX ADMIN — SODIUM CHLORIDE 1000 ML: 9 INJECTION, SOLUTION INTRAVENOUS at 17:32

## 2020-08-11 RX ADMIN — IOPAMIDOL 100 ML: 755 INJECTION, SOLUTION INTRAVENOUS at 18:12

## 2020-08-11 RX ADMIN — SODIUM CHLORIDE: 9 INJECTION, SOLUTION INTRAVENOUS at 23:45

## 2020-08-11 RX ADMIN — AZITHROMYCIN MONOHYDRATE 500 MG: 500 INJECTION, POWDER, LYOPHILIZED, FOR SOLUTION INTRAVENOUS at 18:47

## 2020-08-11 ASSESSMENT — ENCOUNTER SYMPTOMS: COUGH: 1

## 2020-08-11 ASSESSMENT — PAIN SCALES - GENERAL
PAINLEVEL_OUTOF10: 0

## 2020-08-11 NOTE — ACP (ADVANCE CARE PLANNING)
Advance Care Planning     Advance Care Planning Activator (Inpatient)  Conversation Note      Date of ACP Conversation: 8/11/2020    Conversation Conducted with: Patient with Decision Making Capacity    ACP Activator: Guillermina [address] makes decisions on behalf of the incapacitated patient: Decision Maker is asked to consider and make decisions based on patient values, known preferences, or best interests. Health Care Decision Maker:     Current Designated Health Care Decision Maker:   (If there is a valid [address] named in the [address] [place] [address] Makers\" box in the ACP activity, but it is not visible above, be sure to open that field and then select the health care decision maker relationship (ie \"primary\") in the blank space to the right of the name.) Validate  this information as still accurate & up-to-date; edit [address] field as needed.)    Note: Assess and validate information in current ACP documents, as indicated. If no Decision Maker listed above or available through scanned documents, then:    If no Authorized Decision Maker has previously been identified, then patient chooses [address]:  \"Who would you like to name as your primary health care decision-maker? \"               Name: Osiel Sin       Relationship: niece          Phone number: [phone]  \"Can this person be reached easily? \" Yes  \"Who would you like to name as your back-up decision maker? \"   Name: Doris Waller        Relationship: brother          Phone number: [phone]  \"Can this person be reached easily? \" Yes    Note: If the relationship of these Decision-Makers to the patient does NOT follow your state's Next of Kin hierarchy, recommend that patient complete ACP document that meets state-specific requirements to allow them to act on the patient's behalf when appropriate. Care Preferences    Ventilation:   \"If you were in your present state of health and suddenly became very ill and were unable to breathe on your own, what would your preference be about the use of a ventilator (breathing machine) if it were available to you? \"      Would the patient desire the use of ventilator (breathing machine)?: yes    \"If your health worsens and it becomes clear that your chance of recovery is unlikely, what would your preference be about the use of a ventilator (breathing machine) if it were available to you? \"     Would the patient desire the use of ventilator (breathing machine)?: Yes      Resuscitation  \"CPR works best to restart the heart when there is a sudden event, like a heart attack, in someone who is otherwise healthy. Unfortunately, CPR does not typically restart the heart for people who have serious health conditions or who are very sick. \"    \"In the event your heart stopped as a result of an underlying serious health condition, would you want attempts to be made to restart your heart (answer \"yes\" for attempt to resuscitate) or would you prefer a natural death (answer \"no\" for do not attempt to resuscitate)? \" yes      NOTE: If the patient has a valid advance directive AND now provides care preference(s) that are inconsistent with that prior directive, advise the patient to consider either: creating a new advance directive that complies with state-specific requirements; or, if that is not possible, orally revoking that prior directive in accordance with state-specific requirements, which must be documented in the EHR. [] Yes   [] No   Educated Patient / Melchor Montoya regarding differences between Advance Directives and portable DNR orders.     Length of ACP Conversation in minutes:      Conversation Outcomes:  [] ACP discussion completed  [] Existing advance directive reviewed with patient; no changes to patient's previously recorded wishes  [] New Advance Directive completed  [] Portable Do Not Rescitate prepared for Provider review and signature  []

## 2020-08-11 NOTE — ED PROVIDER NOTES
[facility] ED  [address]  [place] [address]  Phone: [phone]        Pt Name: Danilo Bond  MRN: [id number]  [place] 1934  Date of evaluation: 8/11/20      CHIEF COMPLAINT     Chief Complaint   Patient presents with    Fever    Cough         HISTORY OF PRESENT ILLNESS  (Location/Symptom, Timing/Onset, Context/Setting, Quality, Duration, Modifying Factors, Severity.)    Danilo Bond is a 80 y.o. female who presents with a fever and cough. This 26-year-old female sent from the nursing home to the ER for evaluation of fever and cough the patient has had a cough for the last several days recently started developing a fever that had a T-max of 102 degrees she was given some Tylenol just prior to arrival with partial relief states the cough is occasionally productive of sputum denies any chest pain states she may occasionally have some mild shortness of breath otherwise no abdominal pain no vomiting she has had some occasional loose stools no unusual swelling of the arms or legs nothing she does makes her symptoms better or worse other than the Tylenol      REVIEW OF SYSTEMS    (2-9 systems for level 4, 10 or more for level 5)     Review of Systems   Constitutional: Positive for fever. Respiratory: Positive for cough. All other systems reviewed and are negative.       PAST MEDICAL HISTORY    has a past medical history of Age-related osteoporosis with current pathological fracture with routine healing, Anxiety, Mcdaniel esophagus, CAD (coronary artery disease), Cardiac pacemaker in situ, Chronic insomnia, Closed right hip fracture, with routine healing, subsequent encounter, Early onset Alzheimer's disease with behavioral disturbance ([facility] [address] Elevated fasting glucose, GERD (gastroesophageal reflux disease), Hiatal hernia, Hyperlipidemia, Hypertension, Hypothyroidism s/p surgical removal of thyroid for thyroid carcinoma, IBS (irritable bowel syndrome), Major depressive disorder with single episode, in remission ([facility] [address] Mycosis fungoides ([facility] [address] Osteoarthritis, Osteoporosis, Polycythemia rubra vera ([facility] [address] PUD (peptic ulcer disease), RV Pacemaker lead malfunction - Revision 3/24/14-Dr. ray, SSS (sick sinus syndrome) ([facility] [address] [facility] TIA (transient ischemic attack), and Vitamin D deficiency. SURGICAL HISTORY      has a past surgical history that includes Tubal ligation; Lobectomy, Thyroid (Left); babita and bso (cervix removed); Tonsillectomy and adenoidectomy; Cholecystectomy (1995); Breast biopsy (Right); Dilation and curettage of uterus (1960); Upper gastrointestinal endoscopy (2002); skin biopsy (2003); Cardiac catheterization (2008); bone marrow biopsy (September 2010); Pacemaker insertion (6/2013); Endoscopy, colon, diagnostic (2002); Upper gastrointestinal endoscopy (08/28/2003); Upper gastrointestinal endoscopy (05/12/2003); Upper gastrointestinal endoscopy (03/20/2001); eye surgery (Right, 11/07/06); Upper gastrointestinal endoscopy (02/29/2016); Colonoscopy (2002); Colonoscopy (2/29/2016); and HEMIARTHROPLASTY HIP (Right, 8/20/2019). CURRENTMEDICATIONS       Previous Medications    ACETAMINOPHEN (TYLENOL) 500 MG TABLET    Take 500 mg by mouth every 6 hours as needed for Pain    DOCUSATE SODIUM (COLACE, DULCOLAX) 100 MG CAPS    Take 100 mg by mouth daily    DONEPEZIL (ARICEPT) 5 MG TABLET    Take 10 mg by mouth nightly     GUAIFENESIN (ROBITUSSIN) 100 MG/5ML SYRUP    Take 200 mg by mouth every 6 hours as needed for Cough    HANDICAP PLACARD MISC    by Does not apply route Diagnosis: sick sinus syndrome. Expires: 2/28/2023    HYDROCODONE-ACETAMINOPHEN (NORCO) 5-325 MG PER TABLET    Take 1 tablet by mouth every 4 hours as needed for Pain.     LEVOTHYROXINE (SYNTHROID) 125 MCG TABLET    Take 1 tablet by mouth Daily    LORATADINE (CLARITIN) 10 MG TABLET    Take 1 tablet by mouth daily    LOSARTAN (COZAAR) 50 MG TABLET    take 1 tablet by mouth once daily    MELATONIN 10 MG TABS    Take 10 mg by mouth nightly as needed     MIRTAZAPINE (REMERON) 7.5 MG TABLET    Take 7.5 mg by mouth nightly     MULTIPLE VITAMINS-MINERALS (THERAPEUTIC MULTIVITAMIN-MINERALS) TABLET    Take 1 tablet by mouth daily    PRAVASTATIN (PRAVACHOL) 40 MG TABLET    take 1 tablet by mouth once daily       ALLERGIES     has No Known Allergies. FAMILY HISTORY     She indicated that her mother is . She indicated that her father is . She indicated that two of her three sisters are . She indicated that only one of her two brothers is alive. She indicated that only one of her two sons is alive. family history includes Cancer (age of onset: 47) in her mother; Depression in her son; Drug Abuse in her son; Emphysema in her sister; Mental Illness in her son; Other in her brother and son; Stroke (age of onset: 76) in her father; Sudden Death (age of onset: 78) in her sister. SOCIAL HISTORY      reports that she has never smoked. She has never used smokeless tobacco. She reports that she does not drink alcohol or use drugs. PHYSICAL EXAM    (up to 7 for level 4, 8 or more for level 5)   INITIAL VITALS:  height is 5' 6\" (1.676 m) and weight is 170 lb (77.1 kg). Her temporal temperature is 102 °F (38.9 °C). Her blood pressure is 117/53 (abnormal) and her pulse is 62. Her respiration is 25 and oxygen saturation is 96%. Physical Exam  Vitals signs and nursing note reviewed. Constitutional:       Appearance: Normal appearance. HENT:      Head: Normocephalic and atraumatic. Eyes:      Conjunctiva/sclera: Conjunctivae normal.   Neck:      Musculoskeletal: Normal range of motion and neck supple. No neck rigidity or muscular tenderness. Cardiovascular:      Rate and Rhythm: Normal rate and regular rhythm. Pulses: Normal pulses. Heart sounds: Normal heart sounds.    Pulmonary:      Comments: Diminished breath sounds without focal rales rhonchi or wheezing  Abdominal: administration of intravenous   contrast.  Multiplanar reformatted images are provided for review.  MIP   images are provided for review. Dose modulation, iterative reconstruction,   and/or weight based adjustment of the mA/kV was utilized to reduce the   radiation dose to as low as reasonably achievable. COMPARISON:   None. HISTORY:   ORDERING SYSTEM PROVIDED HISTORY: elevated d-dimer, cough   TECHNOLOGIST PROVIDED HISTORY:   elevated d-dimer, cough   Reason for Exam:  Cough, fever; elevated d-dimer, r/o pe   Acuity: Acute   Type of Exam: Initial     FINDINGS:   Pulmonary Arteries: Pulmonary arteries are adequately opacified for   evaluation.  No evidence of intraluminal filling defect to suggest pulmonary   embolism.  Main pulmonary artery is normal in caliber. Mediastinum: There is a large hiatal hernia.  The heart size is normal.   There is mild mediastinal adenopathy with a left paratracheal lymph node   measuring up to 1.5 cm in short axis. Lungs/pleura: There are peripheral ground-glass infiltrates within both   lungs.  No effusion is identified.  A calcified granuloma is present within   the lingula.  There is no effusion. Upper Abdomen: Limited images of the upper abdomen are unremarkable.      Soft Tissues/Bones: No acute bone or soft tissue abnormality.                        LABS:  Results for orders placed or performed during the hospital encounter of 56/29/63   Basic Metabolic Panel   Result Value Ref Range    Glucose 136 (H) 70 - 99 mg/dL    BUN 21 8 - 23 mg/dL    CREATININE 0.81 0.50 - 0.90 mg/dL    Bun/Cre Ratio 26 (H) 9 - 20    Calcium 7.9 (L) 8.6 - 10.4 mg/dL    Sodium 141 135 - 144 mmol/L    Potassium 4.2 3.7 - 5.3 mmol/L    Chloride 105 98 - 107 mmol/L    CO2 23 20 - 31 mmol/L    Anion Gap 13 9 - 17 mmol/L    GFR Non-African American >60 >60 mL/min    GFR African American >60 >60 mL/min    GFR Comment          GFR Staging NOT REPORTED    CBC Auto Differential   Result Value Ref Range    WBC 4.1 3.5 - 11.3 k/uL    RBC 5.49 (H) 3.95 - 5.11 m/uL    Hemoglobin 15.1 11.9 - 15.1 g/dL    Hematocrit 44.8 36.3 - 47.1 %    MCV 81.6 (L) 82.6 - 102.9 fL    MCH 27.5 25.2 - 33.5 pg    MCHC 33.7 (H) 25.2 - 33.5 g/dL    RDW 13.9 11.8 - 14.4 %    Platelets 774 (L) 066 - 453 k/uL    MPV 10.6 8.1 - 13.5 fL    NRBC Automated 0.0 0.0 per 100 WBC    Differential Type NOT REPORTED     Seg Neutrophils 77 (H) 36 - 65 %    Lymphocytes 15 (L) 24 - 43 %    Monocytes 8 3 - 12 %    Eosinophils % 0 (L) 1 - 4 %    Basophils 0 0 - 2 %    Immature Granulocytes 0 0 %    Segs Absolute 3.16 1.50 - 8.10 k/uL    Absolute Lymph # 0.60 (L) 1.10 - 3.70 k/uL    Absolute Mono # 0.31 0.10 - 1.20 k/uL    Absolute Eos # <0.03 0.00 - 0.44 k/uL    Basophils Absolute <0.03 0.00 - 0.20 k/uL    Absolute Immature Granulocyte <0.03 0.00 - 0.30 k/uL    WBC Morphology NOT REPORTED     RBC Morphology MICROCYTOSIS PRESENT     Platelet Estimate NOT REPORTED    Lactic Acid   Result Value Ref Range    Lactic Acid 1.6 0.5 - 2.2 mmol/L   Troponin   Result Value Ref Range    Troponin, High Sensitivity 18 (H) 0 - 14 ng/L    Troponin T NOT REPORTED <0.03 ng/mL    Troponin Interp NOT REPORTED    D-Dimer, Quantitative   Result Value Ref Range    D-Dimer, Quant 1.13 (H) 0.00 - 0.59 mg/L FEU   COVID-19    Specimen: Other   Result Value Ref Range    SARS-CoV-2          SARS-CoV-2, Rapid DETECTED (A) Not Detected    Source . NASOPHARYNGEAL SWAB     SARS-CoV-2, PCR         EKG 12 Lead   Result Value Ref Range    Ventricular Rate 67 BPM    Atrial Rate 67 BPM    P-R Interval 150 ms    QRS Duration 80 ms    Q-T Interval 450 ms    QTc Calculation (Bazett) 475 ms    P Axis 1 degrees    R Axis -9 degrees    T Axis 13 degrees           EMERGENCY DEPARTMENT COURSE:   Vitals:    Vitals:    08/11/20 1658 08/11/20 1700 08/11/20 1825   BP:  (!) 118/57 (!) 117/53   Pulse: 80 75 62   Resp: 18 18 25   Temp:  102 °F (38.9 °C)    TempSrc: Tympanic Temporal    SpO2: 95% 96%

## 2020-08-11 NOTE — PROGRESS NOTES
thyroid /following with oncology    TIA (transient ischemic attack) 7/23/2014    Confusion--word searching    Vitamin D deficiency        Past Surgical History:   Procedure Laterality Date    BONE MARROW BIOPSY  September 2010    polycythemia vera    BREAST BIOPSY Right     benign    CARDIAC CATHETERIZATION  2008    nonobstructive CAD    CHOLECYSTECTOMY  1995    COLONOSCOPY  2002    COLONOSCOPY  2/29/2016    colon polyp, diverticulosis - Dr Lara Asa    after miscarriage    ENDOSCOPY, COLON, DIAGNOSTIC  2002    normal  external hemorrhoids    EYE SURGERY Right 11/07/06    Cataract extraction with lens implant    HEMIARTHROPLASTY HIP Right 8/20/2019    RIGHT HIP HEMIARTHROPLASTY performed by Aldo Inman MD at [address], THYROID Left     thyroid cancer    PACEMAKER INSERTION  6/2013    [facility] SKIN BIOPSY  2003    mycosis fungoides    TAE AND BSO      TAE/BSO at age 54 with anterior repair for benign reasons.  TONSILLECTOMY AND ADENOIDECTOMY      age 27   [facility] TUBAL LIGATION      UPPER GASTROINTESTINAL ENDOSCOPY  2002    UPPER GASTROINTESTINAL ENDOSCOPY  08/28/2003    UPPER GASTROINTESTINAL ENDOSCOPY  05/12/2003    UPPER GASTROINTESTINAL ENDOSCOPY  03/20/2001    UPPER GASTROINTESTINAL ENDOSCOPY  02/29/2016    Barretts tissue, body polyp - Dr Munoz Guess       Medications as per [facility] Care Chart [facility]     Social History     Socioeconomic History    Marital status:       Spouse name: Not on file    Number of children: Not on file    Years of education: Not on file    Highest education level: Not on file   Occupational History    Occupation: retired     Employer: [address] [place] [address] Financial resource strain: Not on file    Food insecurity     Worry: Not on file     Inability: Not on file    Transportation needs     Medical: Not on file     Non-medical: Not on file   Tobacco Use    Smoking status: Never Smoker    Smokeless tobacco: Never Used    Tobacco comment: never smoker   Substance and Sexual Activity    Alcohol use: No     Alcohol/week: 0.0 standard drinks    Drug use: No    Sexual activity: Not Currently   Lifestyle    Physical activity     Days per week: Not on file     Minutes per session: Not on file    Stress: Not on file   Relationships    Social connections     Talks on phone: Not on file     Gets together: Not on file     Attends [employer] service: Not on file     Active member of club or organization: Not on file     Attends meetings of clubs or organizations: Not on file     Relationship status: Not on file    Intimate partner violence     Fear of current or ex partner: Not on file     Emotionally abused: Not on file     Physically abused: Not on file     Forced sexual activity: Not on file   Other Topics Concern    Not on file   Social History Narrative    Not on file       Review of Systems   Unable to perform ROS: Dementia       Physical Exam  Vitals signs and nursing note reviewed. Constitutional:       General: She is not in acute distress. Appearance: Normal appearance. She is well-developed. She is not diaphoretic. HENT:      Head: Normocephalic and atraumatic. Right Ear: External ear normal.      Left Ear: External ear normal.   Eyes:      General:         Right eye: No discharge. Left eye: No discharge. Neck:      Trachea: No tracheal deviation. Cardiovascular:      Rate and Rhythm: Normal rate and regular rhythm. Pulses: Normal pulses. Heart sounds: Normal heart sounds. No murmur. No friction rub. No gallop. Pulmonary:      Effort: Pulmonary effort is normal. No respiratory distress. Breath sounds: Normal breath sounds. No stridor. No wheezing, rhonchi (To left lower lobe that clears with a cough) or rales. Chest:      Chest wall: No tenderness. Abdominal:      General: Bowel sounds are normal. There is no distension. Palpations: Abdomen is soft. Tenderness: There is no abdominal tenderness. There is no right CVA tenderness or left CVA tenderness. Musculoskeletal:         General: No swelling. Skin:     General: Skin is warm and dry. Capillary Refill: Capillary refill takes less than 2 seconds. Coloration: Skin is not pale. Findings: No rash. Neurological:      General: No focal deficit present. Mental Status: She is alert. Mental status is at baseline. Cranial Nerves: No cranial nerve deficit. Sensory: No sensory deficit. Coordination: Coordination normal.      Gait: Gait normal.      Comments: Pleasantly confused, cooperative with exam   Psychiatric:         Mood and Affect: Mood normal.         Behavior: Behavior normal.         Vital Signs: Temperature 99.3 °F, blood pressure 143/72, pulse 70, respirations 20, SPO2 98% on room air    Assessment:  1. Urinary frequency  2. History of UTI  We will obtain a UA, BMP, CBC. If a recurrent UTI we might need to look into urinary retention    3. Cough  4. Cough with exposure to COVID-19 virus  Stat chest x-ray, COVID test.  Isolate patient with droplet isolate until COVID test resulted. 5. Early onset Alzheimer's disease with behavioral disturbance (HCC)  Stable at present mild increase in behaviors      Plan:  As noted above. Follow up for routine visit. Call sooner with concerns prior.     Electronically signed by Dedrick Landau, APRN - CNP on 8/10/2020 at 10:20 PM

## 2020-08-12 PROBLEM — J12.82 PNEUMONIA DUE TO COVID-19 VIRUS: Status: ACTIVE | Noted: 2020-08-12

## 2020-08-12 PROBLEM — U07.1 PNEUMONIA DUE TO COVID-19 VIRUS: Status: ACTIVE | Noted: 2020-08-12

## 2020-08-12 LAB
EKG ATRIAL RATE: 67 BPM
EKG ATRIAL RATE: 70 BPM
EKG P AXIS: 1 DEGREES
EKG P AXIS: 54 DEGREES
EKG P-R INTERVAL: 150 MS
EKG P-R INTERVAL: 188 MS
EKG Q-T INTERVAL: 446 MS
EKG Q-T INTERVAL: 450 MS
EKG QRS DURATION: 78 MS
EKG QRS DURATION: 80 MS
EKG QTC CALCULATION (BAZETT): 475 MS
EKG QTC CALCULATION (BAZETT): 481 MS
EKG R AXIS: -3 DEGREES
EKG R AXIS: -9 DEGREES
EKG T AXIS: 13 DEGREES
EKG T AXIS: 5 DEGREES
EKG VENTRICULAR RATE: 67 BPM
EKG VENTRICULAR RATE: 70 BPM
TROPONIN INTERP: ABNORMAL
TROPONIN T: ABNORMAL NG/ML
TROPONIN, HIGH SENSITIVITY: 16 NG/L (ref 0–14)

## 2020-08-12 PROCEDURE — 99223 1ST HOSP IP/OBS HIGH 75: CPT | Performed by: INTERNAL MEDICINE

## 2020-08-12 PROCEDURE — 93005 ELECTROCARDIOGRAM TRACING: CPT | Performed by: NURSE PRACTITIONER

## 2020-08-12 PROCEDURE — 97162 PT EVAL MOD COMPLEX 30 MIN: CPT

## 2020-08-12 PROCEDURE — 2580000003 HC RX 258: Performed by: INTERNAL MEDICINE

## 2020-08-12 PROCEDURE — 1200000000 HC SEMI PRIVATE

## 2020-08-12 PROCEDURE — 6370000000 HC RX 637 (ALT 250 FOR IP): Performed by: NURSE PRACTITIONER

## 2020-08-12 PROCEDURE — 97530 THERAPEUTIC ACTIVITIES: CPT

## 2020-08-12 PROCEDURE — 97166 OT EVAL MOD COMPLEX 45 MIN: CPT

## 2020-08-12 PROCEDURE — 6360000002 HC RX W HCPCS: Performed by: INTERNAL MEDICINE

## 2020-08-12 PROCEDURE — 99222 1ST HOSP IP/OBS MODERATE 55: CPT | Performed by: INTERNAL MEDICINE

## 2020-08-12 PROCEDURE — 6360000002 HC RX W HCPCS: Performed by: HOSPITALIST

## 2020-08-12 PROCEDURE — 97535 SELF CARE MNGMENT TRAINING: CPT

## 2020-08-12 PROCEDURE — 2580000003 HC RX 258: Performed by: NURSE PRACTITIONER

## 2020-08-12 PROCEDURE — 84484 ASSAY OF TROPONIN QUANT: CPT

## 2020-08-12 PROCEDURE — 6360000002 HC RX W HCPCS: Performed by: NURSE PRACTITIONER

## 2020-08-12 RX ORDER — BENZONATATE 100 MG/1
100 CAPSULE ORAL 3 TIMES DAILY PRN
Status: DISCONTINUED | OUTPATIENT
Start: 2020-08-12 | End: 2020-08-16 | Stop reason: HOSPADM

## 2020-08-12 RX ORDER — GUAIFENESIN DEXTROMETHORPHAN HYDROBROMIDE ORAL SOLUTION 10; 100 MG/5ML; MG/5ML
10 SOLUTION ORAL EVERY 4 HOURS PRN
Status: DISCONTINUED | OUTPATIENT
Start: 2020-08-12 | End: 2020-08-16 | Stop reason: HOSPADM

## 2020-08-12 RX ORDER — 0.9 % SODIUM CHLORIDE 0.9 %
30 INTRAVENOUS SOLUTION INTRAVENOUS PRN
Status: DISCONTINUED | OUTPATIENT
Start: 2020-08-12 | End: 2020-08-16 | Stop reason: HOSPADM

## 2020-08-12 RX ORDER — KETOROLAC TROMETHAMINE 15 MG/ML
15 INJECTION, SOLUTION INTRAMUSCULAR; INTRAVENOUS EVERY 6 HOURS PRN
Status: DISCONTINUED | OUTPATIENT
Start: 2020-08-12 | End: 2020-08-16 | Stop reason: HOSPADM

## 2020-08-12 RX ADMIN — SODIUM CHLORIDE, PRESERVATIVE FREE 10 ML: 5 INJECTION INTRAVENOUS at 20:18

## 2020-08-12 RX ADMIN — PRAVASTATIN SODIUM 40 MG: 20 TABLET ORAL at 00:44

## 2020-08-12 RX ADMIN — MULTIPLE VITAMINS W/ MINERALS TAB 1 TABLET: TAB at 08:56

## 2020-08-12 RX ADMIN — MIRTAZAPINE 7.5 MG: 15 TABLET, FILM COATED ORAL at 20:18

## 2020-08-12 RX ADMIN — SODIUM CHLORIDE 200 MG: 9 INJECTION, SOLUTION INTRAVENOUS at 20:18

## 2020-08-12 RX ADMIN — MIRTAZAPINE 7.5 MG: 15 TABLET, FILM COATED ORAL at 00:45

## 2020-08-12 RX ADMIN — ACETAMINOPHEN 650 MG: 325 TABLET ORAL at 15:15

## 2020-08-12 RX ADMIN — ENOXAPARIN SODIUM 40 MG: 40 INJECTION SUBCUTANEOUS at 08:56

## 2020-08-12 RX ADMIN — LEVOTHYROXINE SODIUM 125 MCG: 125 TABLET ORAL at 06:28

## 2020-08-12 RX ADMIN — DONEPEZIL HYDROCHLORIDE 10 MG: 10 TABLET, FILM COATED ORAL at 20:18

## 2020-08-12 RX ADMIN — SODIUM CHLORIDE, PRESERVATIVE FREE 10 ML: 5 INJECTION INTRAVENOUS at 08:56

## 2020-08-12 RX ADMIN — KETOROLAC TROMETHAMINE 15 MG: 15 INJECTION, SOLUTION INTRAMUSCULAR; INTRAVENOUS at 17:03

## 2020-08-12 RX ADMIN — AZITHROMYCIN MONOHYDRATE 250 MG: 500 INJECTION, POWDER, LYOPHILIZED, FOR SOLUTION INTRAVENOUS at 18:00

## 2020-08-12 RX ADMIN — LOSARTAN POTASSIUM 50 MG: 50 TABLET, FILM COATED ORAL at 08:56

## 2020-08-12 RX ADMIN — Medication 10 MG: at 00:45

## 2020-08-12 RX ADMIN — CETIRIZINE HYDROCHLORIDE 10 MG: 10 TABLET ORAL at 08:56

## 2020-08-12 RX ADMIN — DONEPEZIL HYDROCHLORIDE 10 MG: 10 TABLET, FILM COATED ORAL at 00:44

## 2020-08-12 RX ADMIN — PRAVASTATIN SODIUM 40 MG: 20 TABLET ORAL at 20:18

## 2020-08-12 ASSESSMENT — PAIN SCALES - GENERAL
PAINLEVEL_OUTOF10: 9
PAINLEVEL_OUTOF10: 5
PAINLEVEL_OUTOF10: 8
PAINLEVEL_OUTOF10: 8

## 2020-08-12 ASSESSMENT — PAIN DESCRIPTION - LOCATION
LOCATION: LEG
LOCATION: HIP
LOCATION: LEG

## 2020-08-12 ASSESSMENT — PAIN DESCRIPTION - DESCRIPTORS
DESCRIPTORS: ACHING;DISCOMFORT;SORE
DESCRIPTORS: ACHING

## 2020-08-12 ASSESSMENT — PAIN DESCRIPTION - PAIN TYPE
TYPE: CHRONIC PAIN;ACUTE PAIN
TYPE: ACUTE PAIN

## 2020-08-12 ASSESSMENT — PAIN DESCRIPTION - ORIENTATION
ORIENTATION: RIGHT

## 2020-08-12 ASSESSMENT — PAIN - FUNCTIONAL ASSESSMENT: PAIN_FUNCTIONAL_ASSESSMENT: PREVENTS OR INTERFERES SOME ACTIVE ACTIVITIES AND ADLS

## 2020-08-12 ASSESSMENT — PAIN DESCRIPTION - FREQUENCY: FREQUENCY: CONTINUOUS

## 2020-08-12 ASSESSMENT — PAIN DESCRIPTION - PROGRESSION: CLINICAL_PROGRESSION: NOT CHANGED

## 2020-08-12 ASSESSMENT — PAIN DESCRIPTION - ONSET: ONSET: ON-GOING

## 2020-08-12 NOTE — CARE COORDINATION
Case Management Initial Discharge Plan  Radha Stuart,             Talked with:everett Ramesh-legal guardian on the phone to discuss discharge plans. Information verified: address, contacts, phone number, , insurance Yes    Emergency Contact/Next of Kin name & number: [phone]    PCP: Ondina Lynn DO      Insurance Provider: Medicare/BCBS    Discharge Planning    Living Arrangements:  Other (Comment)   Support Systems:  Family Members, Other (Comment)    [id number] home    Patient able to perform ADL's:Assisted    Current Services (outpatient & in home) none  DME equipment: walker  DME provider:     Receiving oral anticoagulation therapy? No    If indicated:   Physician managing anticoagulation treatment:   Where does patient obtain lab work for ATC treatment? Potential Assistance Needed:  [facility]    Patient agreeable to home care: No  [place] of choice provided:  n/a    Prior SNF/Rehab Placement and Facility: [facility]  Agreeable to SNF/Rehab: Yes  [place] of choice provided: yes     Evaluation: n/a    Expected Discharge date:  08/15/20    Patient expects to be discharged to:  [facility]  Follow Up Appointment: Best Day/ Time:      Transportation provider: ambulance  Transportation arrangements needed for discharge: Yes    Readmission Risk              Risk of Unplanned Readmission:        18             Does patient have a readmission risk score greater than 14?: Yes  If yes, follow-up appointment must be made within 7 days of discharge.      Goals of Care: breathe easier      Discharge Plan: [facility]-call from [place]-patient can return          Electronically signed by Yamini Cordova RN on 20 at 9:24 AM EDT

## 2020-08-12 NOTE — FLOWSHEET NOTE
Patient has been coughing more since ambulating to and from bathroom. Reviewed coughing with Alex Dumont MD, covering for Dr. Shara Giron, requesting PRN orders for cough syrup and Tessalon Pearls. Orders received and acknowledged.

## 2020-08-12 NOTE — CONSULTS
database accordingly. Past Medical History:     Past Medical History:   Diagnosis Date    Age-related osteoporosis with current pathological fracture with routine healing     Anxiety 9/6/2019    Mcdaniel esophagus     CAD (coronary artery disease)     nonobstructive    Cardiac pacemaker in situ 4/24/2014    Put in for severe bradycardia.  Dual chamber     Chronic insomnia     Closed right hip fracture, with routine healing, subsequent encounter 8/18/2019    Early onset Alzheimer's disease with behavioral disturbance ([address] 9/6/2019    Elevated fasting glucose 9/6/2019    GERD (gastroesophageal reflux disease)     Hiatal hernia     Hyperlipidemia     Hypertension     Hypothyroidism s/p surgical removal of thyroid for thyroid carcinoma 7/29/2015    IBS (irritable bowel syndrome)     Major depressive disorder with single episode, in remission ([address] 9/6/2019    Mycosis fungoides ([address]     Osteoarthritis     Osteoporosis     Polycythemia rubra vera ([address]     sees oncology    PUD (peptic ulcer disease)     RV Pacemaker lead malfunction - Revision 3/24/14-Dr. Sushant Cao 3/24/2014    SSS (sick sinus syndrome) ([address] 4/24/2014    Thyroid cancer ([address] about 5 years ago    thyroid /following with oncology    TIA (transient ischemic attack) 7/23/2014    Confusion--word searching    Vitamin D deficiency      Past Surgical  History:     Past Surgical History:   Procedure Laterality Date    BONE MARROW BIOPSY  September 2010    polycythemia vera    BREAST BIOPSY Right     benign    CARDIAC CATHETERIZATION  2008    nonobstructive CAD    CHOLECYSTECTOMY  1995    COLONOSCOPY  2002    COLONOSCOPY  2/29/2016    colon polyp, diverticulosis - Dr Fernando Peterson    after miscarriage    ENDOSCOPY, COLON, DIAGNOSTIC  2002    normal  external hemorrhoids    EYE SURGERY Right 11/07/06    Cataract extraction with lens implant    HEMIARTHROPLASTY HIP Right 8/20/2019    RIGHT HIP HEMIARTHROPLASTY performed by Slade Kelly MD at [address], THYROID Left     thyroid cancer    PACEMAKER INSERTION  6/2013    [facility] SKIN BIOPSY  2003    mycosis fungoides    TAE AND BSO      TAE/BSO at age 54 with anterior repair for benign reasons.  TONSILLECTOMY AND ADENOIDECTOMY      age [id number]   [address] UPPER GASTROINTESTINAL ENDOSCOPY  2002    UPPER GASTROINTESTINAL ENDOSCOPY  08/28/2003    UPPER GASTROINTESTINAL ENDOSCOPY  05/12/2003    UPPER GASTROINTESTINAL ENDOSCOPY  03/20/2001    UPPER GASTROINTESTINAL ENDOSCOPY  02/29/2016    Barretts tissue, body polyp - Dr Akshat Anguiano     Medications:      [START ON 8/13/2020] cefTRIAXone (ROCEPHIN) IV  1 g Intravenous Q24H    azithromycin  250 mg Intravenous Q24H    docusate sodium  100 mg Oral Daily    donepezil  10 mg Oral Nightly    levothyroxine  125 mcg Oral Daily    cetirizine  10 mg Oral Daily    losartan  50 mg Oral Daily    mirtazapine  7.5 mg Oral Nightly    therapeutic multivitamin-minerals  1 tablet Oral Daily    pravastatin  40 mg Oral Nightly    sodium chloride flush  10 mL Intravenous 2 times per day    enoxaparin  40 mg Subcutaneous Daily     Social History:     Social History     Socioeconomic History    Marital status:       Spouse name: Not on file    Number of children: Not on file    Years of education: Not on file    Highest education level: Not on file   Occupational History    Occupation: retired     Employer: [employer] resource strain: Not on file    Food insecurity     Worry: Not on file     Inability: Not on file   [employer] needs     Medical: Not on file     Non-medical: Not on file   Tobacco Use    Smoking status: Never Smoker    Smokeless tobacco: Never Used    Tobacco comment: never smoker   Substance and Sexual Activity    Alcohol use: No     Alcohol/week: 0.0 standard drinks    Drug use: No    Sexual activity: Not Currently   Lifestyle    Physical activity     Days per week: Not on file     Minutes per session: Not on file    Stress: Not on file   Relationships    Social connections     Talks on phone: Not on file     Gets together: Not on file     Attends Jainism service: Not on file     Active member of club or organization: Not on file     Attends meetings of clubs or organizations: Not on file     Relationship status: Not on file    Intimate partner violence     Fear of current or ex partner: Not on file     Emotionally abused: Not on file     Physically abused: Not on file     Forced sexual activity: Not on file   Other Topics Concern    Not on file   Social History Narrative    Not on file     Family History:     Family History   Problem Relation Age of Onset    Cancer Mother 47        throat    Stroke Father 76         of CVA    Emphysema Sister     Other Brother          in train accident    Drug Abuse Son     Depression Son     Other Son         suicide    Mental Illness Son     Sudden Death Sister 78        smoker      Allergies:   Patient has no known allergies. Review of Systems:   General: No fevers or chills. Eyes: No double vision or blurry vision. ENT: No sore throat or runny nose. Cardiovascular: No chest pain or palpitations. Lung: No shortness of breath or cough. Abdomen: No nausea, vomiting, diarrhea, or abdominal pain. Genitourinary: No increased urinary frequency, or dysuria. Musculoskeletal: No muscle aches or pains. Hematologic: No bleeding or bruising. Neurologic: No headache, weakness, numbness, or tingling.     Physical Examination :   BP (!) 118/51   Pulse 63   Temp 98.4 °F (36.9 °C) (Oral)   Resp 26   Ht 5' 6\" (1.676 m)   Wt 172 lb 3.2 oz (78.1 kg)   SpO2 99%   BMI 27.79 kg/m²     Temperature Range: Temp: 98.4 °F (36.9 °C) Temp  Av °F (37.2 °C)  Min: 98.4 °F (36.9 °C)  Max: 99.7 °F (37.6 °C)  General Appearance: Awake, alert, and in no apparent distress  Head: Normocephalic, without obvious abnormality, atraumatic  Eyes: Pupils equal, round, reactive, to light and accommodation; extraocular movements intact; sclera anicteric; conjunctivae pink  ENT: Oropharynx clear, without erythema, exudate, or thrush. Neck: Supple, without lymphadenopathy. Pulmonary/Chest: rhonchi present-bilaterally  Cardiovascular: Regular rate and rhythm without murmurs, rubs, or gallops. Abdomen: Soft, nontender, nondistended. Extremities: No cyanosis, clubbing, edema, or effusions. Neurologic: No gross sensory or motor deficits. Skin: Warm and dry with no rash. Medical Decision Making:   I have independently reviewed/ordered the following labs:  CBC with Differential:   Recent Labs     08/11/20  1721   WBC 4.1   HGB 15.1   HCT 44.8   *   LYMPHOPCT 15*   MONOPCT 8     BMP:   Recent Labs     08/11/20  1721      K 4.2      CO2 23   BUN 21   CREATININE 0.81     Hepatic Function Panel: No results for input(s): PROT, LABALBU, BILIDIR, IBILI, BILITOT, ALKPHOS, ALT, AST in the last 72 hours. No results found for: CRP  No results found for: SEDRATE    No results for input(s): PROCAL in the last 72 hours. Troponin, High Sensitivity  16High    0 - 14 ng/L  Final  08/12/2020 12:01 AM  [address]      Troponin, High Sensitivity  16High    0 - 14 ng/L  Final  08/11/2020  7:10 PM  [facility]        SARS-CoV-2         Final  08/11/2020  5:38 PM  [facility]    SARS-CoV-2, Rapid  DETECTEDAbnormal    Not Detected  Final  08/11/2020  5:38 PM  [facility]      D-Dimer, Quant  1. 13High    0.00 - 0.59 mg/L FEU  Final  08/11/2020  5:21 PM  [facility]      Lactic Acid  1.6  0.5 - 2.2 mmol/L  Final  08/11/2020  5:20 PM  [facility]        Imaging Studies:   CTA OF THE CHEST 8/11/2020 5:51 pm    FINDINGS:   No PE visualized. Peripheral ground-glass airspace opacities within both lungs.  Commonly reported imaging features of COVID-19 pneumonia are present. Other processes such as influenza pneumonia and organizing pneumonia, as can be seen with drug toxicity and connective tissue disease, can cause a similar imaging pattern. PneTyp Large hiatal hernia. Cultures:   None      Thank you for allowing us to participate in the care of this patient. Please call with questions. Electronically signed by Kenji Garza MD on 8/12/2020 at 5:31 PM      [facility]  Kenji Garza MD  [employer] messaging  OFFICE: [phone]      This note is created with the assistance of a speech recognition program.  While intending to generate a document that actually reflects the content of the visit, the document can still have some errors including those of syntax and sound a like substitutions which may escape proof reading. In such instances, actual meaning can be extrapolated by contextual diversion.

## 2020-08-12 NOTE — CONSULTS
Attestation signed by      Attending Physician Statement:    I have discussed the care of  Frandy Murcia , including pertinent history and exam findings, with the Cardiology fellow/resident. I have seen and examined the patient and the key elements of all parts of the encounter have been performed by me. I agree with the assessment, plan and orders as documented by the fellow/resident, after I modified exam findings and plan of treatments, and the final version is my approved version of the assessment. Additional Comments: For careful stewardship of limited PPE during COVID-19 pandemic my physical exam was deferred. For physical exam, please see today's physical from primary team or ICU team.     Minimally elevated troponin, not NSTEMI (18>16>16)- no trend to suggest ischemia  COVID 19 +  H/o SSS s/p PPM  - no further cardiac workup, tx her COVID, can follow as outpatient. Discussed with nursing. Thank you for allowing me to participate in the care of this patient, please do not hesitate to call if you have any questions. Darian Gates DO, [place] [address] [facility]  [employer]  [phone]       [facility]   Consultation Note               Today's Date: 8/12/2020  Patient Name: Frandy Murcia  Date of admission: 8/11/2020 11:19 PM  Patient's age: 80 y. o., 1934  Admission Dx: KILLR-[id number] [U07.1]    Reason for Consult:  Elevated troponin    Requesting Physician: Mago Wall DO    CHIEF COMPLAINT:  No chief complaint on file. History Obtained From:  patient, electronic medical record    HISTORY OF PRESENT ILLNESS:      The patient is a 80 y.o.  female who was admitted to the hospital for symptoms of cough and wheezing from a nursing home. Patient did not have any active symptoms of chest pain. Troponin (18 and 16) and EKG unremarkable. CTPE was negative for PE but showed lung consolidation. Patient tested positive for COVID.  Currently only on 2 [facility]. Past Medical History:   has a past medical history of Age-related osteoporosis with current pathological fracture with routine healing, Anxiety, Mcdaniel esophagus, CAD (coronary artery disease), Cardiac pacemaker in situ, Chronic insomnia, Closed right hip fracture, with routine healing, subsequent encounter, Early onset Alzheimer's disease with behavioral disturbance ([facility] Elevated fasting glucose, GERD (gastroesophageal reflux disease), Hiatal hernia, Hyperlipidemia, Hypertension, Hypothyroidism s/p surgical removal of thyroid for thyroid carcinoma, IBS (irritable bowel syndrome), Major depressive disorder with single episode, in remission ([facility] Mycosis fungoides ([facility] Osteoarthritis, Osteoporosis, Polycythemia rubra vera ([facility] PUD (peptic ulcer disease), RV Pacemaker lead malfunction - Revision 3/24/14-Dr. ray, SSS (sick sinus syndrome) ([facility] Thyroid cancer ([facility] TIA (transient ischemic attack), and Vitamin D deficiency. Past Surgical History:   has a past surgical history that includes Tubal ligation; Lobectomy, Thyroid (Left); babita and bso (cervix removed); Tonsillectomy and adenoidectomy; Cholecystectomy (1995); Breast biopsy (Right); Dilation and curettage of uterus (1960); Upper gastrointestinal endoscopy (2002); skin biopsy (2003); Cardiac catheterization (2008); bone marrow biopsy (September 2010); Pacemaker insertion (6/2013); Endoscopy, colon, diagnostic (2002); Upper gastrointestinal endoscopy (08/28/2003); Upper gastrointestinal endoscopy (05/12/2003); Upper gastrointestinal endoscopy (03/20/2001); eye surgery (Right, 11/07/06); Upper gastrointestinal endoscopy (02/29/2016); Colonoscopy (2002); Colonoscopy (2/29/2016); and HEMIARTHROPLASTY HIP (Right, 8/20/2019). Home Medications:    Prior to Admission medications    Medication Sig Start Date End Date Taking?  Authorizing Provider   guaiFENesin (ROBITUSSIN) 100 MG/5ML syrup Take 200 mg by mouth every 6 hours as needed for Cough    Historical Provider, MD   mirtazapine (REMERON) 7.5 MG tablet Take 7.5 mg by mouth nightly  4/27/20   Historical Provider, MD   Handicap Placard MISC by Does not apply route Diagnosis: sick sinus syndrome. Expires: 2/28/2023 2/17/20   Rahul Krishna,    donepezil (ARICEPT) 5 MG tablet Take 10 mg by mouth nightly     Historical Provider, MD   Melatonin 10 MG TABS Take 10 mg by mouth nightly as needed     Historical Provider, MD   Multiple Vitamins-Minerals (THERAPEUTIC MULTIVITAMIN-MINERALS) tablet Take 1 tablet by mouth daily    Historical Provider, MD   acetaminophen (TYLENOL) 500 MG tablet Take 500 mg by mouth every 6 hours as needed for Pain    Historical Provider, MD   HYDROcodone-acetaminophen (NORCO) 5-325 MG per tablet Take 1 tablet by mouth every 4 hours as needed for Pain.     Historical Provider, MD   docusate sodium (COLACE, DULCOLAX) 100 MG CAPS Take 100 mg by mouth daily 8/22/19   Ling Hanks   pravastatin (PRAVACHOL) 40 MG tablet take 1 tablet by mouth once daily 10/2/18   DAISY Chatterjee CNP   levothyroxine (SYNTHROID) 125 MCG tablet Take 1 tablet by mouth Daily 7/19/18   DAISY Chatterjee CNP   losartan (COZAAR) 50 MG tablet take 1 tablet by mouth once daily 5/21/18   DAISY Correa CNP   loratadine (CLARITIN) 10 MG tablet Take 1 tablet by mouth daily 10/16/17   DAISY Chatterjee CNP      Current Facility-Administered Medications: [START ON 8/13/2020] cefTRIAXone (ROCEPHIN) 1 g IVPB in 50 mL D5W minibag, 1 g, Intravenous, Q24H  azithromycin (ZITHROMAX) 250 mg in dextrose 5 % 250 mL IVPB, 250 mg, Intravenous, Q24H  docusate sodium (COLACE) capsule 100 mg, 100 mg, Oral, Daily  donepezil (ARICEPT) tablet 10 mg, 10 mg, Oral, Nightly  levothyroxine (SYNTHROID) tablet 125 mcg, 125 mcg, Oral, Daily  cetirizine (ZYRTEC) tablet 10 mg, 10 mg, Oral, Daily  losartan (COZAAR) tablet 50 mg, 50 mg, Oral, Daily  melatonin tablet 10 mg, 10 mg, Oral, Nightly PRN  mirtazapine (REMERON) tablet 7.5 mg, 7.5 mg, Oral, Nightly  therapeutic multivitamin-minerals 1 tablet, 1 tablet, Oral, Daily  pravastatin (PRAVACHOL) tablet 40 mg, 40 mg, Oral, Nightly  sodium chloride flush 0.9 % injection 10 mL, 10 mL, Intravenous, 2 times per day  sodium chloride flush 0.9 % injection 10 mL, 10 mL, Intravenous, PRN  potassium chloride (KLOR-CON M) extended release tablet 40 mEq, 40 mEq, Oral, PRN **OR** potassium bicarb-citric acid (EFFER-K) effervescent tablet 40 mEq, 40 mEq, Oral, PRN **OR** potassium chloride 10 mEq/100 mL IVPB (Peripheral Line), 10 mEq, Intravenous, PRN  magnesium sulfate 1 g in dextrose 5% 100 mL IVPB, 1 g, Intravenous, PRN  acetaminophen (TYLENOL) tablet 650 mg, 650 mg, Oral, Q6H PRN **OR** acetaminophen (TYLENOL) suppository 650 mg, 650 mg, Rectal, Q6H PRN  enoxaparin (LOVENOX) injection 40 mg, 40 mg, Subcutaneous, Daily  0.9 % sodium chloride infusion, , Intravenous, Continuous      Allergies:  Patient has no known allergies. Social History:   reports that she has never smoked. She has never used smokeless tobacco. She reports that she does not drink alcohol or use drugs. Family History: family history includes Cancer (age of onset: 47) in her mother; Depression in her son; Drug Abuse in her son; Emphysema in her sister; Mental Illness in her son; Other in her brother and son; Stroke (age of onset: 76) in her father; Sudden Death (age of onset: 78) in her sister. No h/o sudden cardiac death. No for premature CAD      REVIEW OF SYSTEMS:    · Deferred due to COVID positive. PHYSICAL EXAM:      BP (!) 112/93   Pulse 63   Temp 99.7 °F (37.6 °C) (Oral)   Resp 27   Ht 5' 6\" (1.676 m)   Wt 172 lb 3.2 oz (78.1 kg)   SpO2 94%   BMI 27.79 kg/m²    · Deferred due to active covid infection. Please refer to primary teams notes.       DATA:    Diagnostics:    Labs:     CBC:   Recent Labs     08/11/20  1721   WBC 4.1   HGB 15.1   HCT 44.8 *     BMP:   Recent Labs     08/11/20  1721      K 4.2   CO2 23   BUN 21   CREATININE 0.81   LABGLOM >60   GLUCOSE 136*     BNP: No results for input(s): BNP in the last 72 hours. PT/INR: No results for input(s): PROTIME, INR in the last 72 hours. APTT:No results for input(s): APTT in the last 72 hours. CARDIAC ENZYMES:  Recent Labs     08/11/20  1721 08/11/20 1910 08/12/20  0001   TROPHS 18* 16* 16*     No results for input(s): CKTOTAL, CKMB, CKMBINDEX, TROPONINI in the last 72 hours. Invalid input(s):  [address]  Recent Labs     08/11/20  1721 08/11/20 1910 08/12/20  0001   TROPONINT NOT REPORTED NOT REPORTED NOT REPORTED     FASTING LIPID PANEL:  Lab Results   Component Value Date    HDL 54 06/01/2020    LDLCALC 76 06/01/2020    TRIG 134 06/01/2020     LIVER PROFILE:No results for input(s): AST, ALT, LABALBU in the last 72 hours. EKG: a paced, non specific st changes    ECHO 8/19/19: EF 60%, grade II DD, mild AI, trivial TR, RVSP is 49 mmHg, mild PHTN.      PPM LEAD REVISION 3/24/14: Done by Dr. Stephen Singh for RV lead malfunction.      CATH 9/25/08: Minimal nonobstructive CAD. EF 55%.      STRESS 1/4/08: No ischemia/infarct. EF 52%      IMPRESSION:    1. Cough  2. COVID-19 pneumonia  3. Troponin 18>16>16  4. SSS s/p PPM  5. CAD - non obstructive  6. HTN  7. HLD  8.  Hx of right hip surgery s/p fall 8/2019    Patient Active Problem List   Diagnosis    Osteoarthritis    Hyperlipidemia    Hypertension    Mcdaniel esophagus    GERD (gastroesophageal reflux disease)    PUD (peptic ulcer disease)    Hiatal hernia    Chronic insomnia    IBS (irritable bowel syndrome)    Age-related osteoporosis with current pathological fracture with routine healing    Vitamin D deficiency    Thyroid cancer ([address]    Polycythemia rubra vera ([address]    SSS (sick sinus syndrome) ([address]    Cardiac pacemaker in situ    TIA (transient ischemic attack)    Hypothyroidism    Closed right hip fracture, with routine healing, subsequent encounter    Early onset Alzheimer's disease with behavioral disturbance ([facility]    Major depressive disorder with single episode, in remission ([facility]    Anxiety    Elevated fasting glucose    CAD (coronary artery disease)    COVID-19    Pneumonia due to COVID-19 virus         RECOMMENDATIONS:  1. Essentially negative troponin in setting of covid pneumonia  2. ACS ruled out. Negative EKG. No pulm embolism. 3. Not on AP at home. Continue statin. 4. COVID management per primary team  5. No invasive intervention planned from cardiology stand point. 6. Call with questions. Thank you for allowing us to participate in [facility]'s care. Will follow with you.       Electronically signed on 08/12/20 at 10:25 AM by:    Bakari Llamas MD   Fellow, [address]

## 2020-08-12 NOTE — PROGRESS NOTES
Occupational Therapy   Occupational Therapy Initial Assessment  Date: 2020   Patient Name: Sonali Carson  MRN: [id number]     : 1934    Date of Service: 2020    Discharge Recommendations:  Patient would benefit from continued therapy after discharge     Assessment   Performance deficits / Impairments: Decreased functional mobility ; Decreased cognition;Decreased high-level IADLs;Decreased ADL status; Decreased endurance;Decreased balance;Decreased safe awareness;Decreased strength  Assessment: Patient demonstrates increased pain in the hip and decreased cognition affecting ability to problem solve and follow instructions for mobility tasks. Pt demonstrates decreased standing tolerance and balance, requiring Mod A x2 for bed mobility and Max A x2 for sit to stand transfer. OT services are warranted to address functional deficits and maximize independence with ADL tasks. Prognosis: Good  Decision Making: Medium Complexity  Patient Education: OT role, OT POC, purpose of evaluation, importance of OOB activity, hand placement for transfers, bed mobility sequencing, importance of participation with therapy - fair return  REQUIRES OT FOLLOW UP: Yes  Activity Tolerance  Activity Tolerance: Patient limited by pain  Activity Tolerance: Tx limited by hip pain; RN aware post session  Safety Devices  Safety Devices in place: Yes  Type of devices: All fall risk precautions in place; Left in bed;Call light within reach;Nurse notified;Gait belt;Bed alarm in place  Restraints  Initially in place: No         Patient Diagnosis(es): There were no encounter diagnoses.      has a past medical history of Age-related osteoporosis with current pathological fracture with routine healing, Anxiety, Mcdaniel esophagus, CAD (coronary artery disease), Cardiac pacemaker in situ, Chronic insomnia, Closed right hip fracture, with routine healing, subsequent encounter, Early onset Alzheimer's disease with behavioral disturbance ([address] Aching;Discomfort; Sore  Non-Pharmaceutical Pain Intervention(s): Repositioned; Ambulation/Increased Activity  Vital Signs  Patient Currently in Pain: Yes    Social/Functional History  Social/Functional History  Type of Home: Facility  Home Layout: One level  Home Access: Level entry  Bathroom Shower/Tub: Walk-in shower  Bathroom Toilet: Standard  Bathroom Equipment: Grab bars in shower, Shower chair  Home Equipment: Rolling walker, [employer]  Receives Help From: (Pt reports participating in PT and OT)  ADL Assistance: Independent  Homemaking Assistance: Needs assistance  Homemaking Responsibilities: No  Ambulation Assistance: Independent(with RW)  Transfer Assistance: Independent  Active : No  Occupation: Retired  Type of occupation: [employer]  Leisure & Hobbies: glasses  Additional Comments: Information provided by pt, however pt questionable historian,     Objective   Vision: Within Functional Limits  Hearing: Exceptions to [facility]  Hearing Exceptions: Hard of hearing/hearing concerns    Orientation  Overall Orientation Status: Impaired  Orientation Level: Disoriented to situation;Disoriented to place;Oriented to time;Oriented to person     Balance  Sitting Balance: Contact guard assistance(~8-10 min EOB)  Standing Balance: Maximum assistance(x2)  Standing Balance  Time: ~1-2 min  Activity: EOB  Comment: reporting increased hip pain limiting further tx and participation  ADL  Feeding: Modified independent ;Setup(OT facilitated self-feeding completing with fingers to  fruit and bring to mouth and beverage mgmt)  Grooming: Stand by assistance;Verbal cueing;Setup  UE Bathing: Increased time to complete;Setup;Contact guard assistance;Verbal cueing  LE Bathing: Moderate assistance; Increased time to complete;Setup;Verbal cueing  UE Dressing: Minimal assistance; Increased time to complete;Setup;Verbal cueing  LE Dressing: Maximum assistance;Verbal cueing; Increased time to complete;Setup  Toileting:  Moderate 35.96 (08/12/20 1630)  ADL Inpatient CMS 0-100% Score: 53.32 (08/12/20 1630)  ADL Inpatient CMS G-Code Modifier : CK (08/12/20 1630)    Goals  Short term goals  Time Frame for Short term goals: Patient will, by discharge  Short term goal 1: demo UB ADLs at Mod I <1 cue with set up  Short term goal 2: demo LB ADLs at [facility] using AE PRN <1 cue  Short term goal 3: demo functional transfers/mobility at [facility] using LRD to engage in ADLs safely  Short term goal 4: demo ~8 min of dynamic standing tolerance at [facility] to engage in ADLs safely  Short term goal 5: demo ~15 min of dynamic sitting balance at [facility] to engage in ADLs EOB  Patient Goals   Patient goals : return to [place]       Therapy Time   Individual Concurrent Group Co-treatment   Time In 1532         Time Out 1557         Minutes 25         Timed Code Treatment Minutes: [address], OTR/L

## 2020-08-12 NOTE — PROGRESS NOTES
Physical Therapy    Facility/Department: [facility] 2  Initial Assessment    NAME: Vilma Chaudhary  : 1934  MRN: [id number]    Date of Service: 2020    Discharge Recommendations: Further therapy recommended at discharge. PT Equipment Recommendations  Equipment Needed: No    Assessment   Body structures, Functions, Activity limitations: Decreased functional mobility ; Decreased balance;Decreased strength;Decreased endurance  Assessment: The pt required Mod A x2 for bed mobility and Max A x2 for sit to stand transfers. Per nursing, pt ambulated to restroom with two assist this AM, however with PT she was unable to ambulate despite Max A x2 due to R hip pain. Recommend continued therapy to progress mobility and address deficits. Prognosis: Good  Decision Making: Medium Complexity  PT Education: Goals;PT Role;Plan of Care; Functional Mobility Training;Transfer Training  REQUIRES PT FOLLOW UP: Yes  Activity Tolerance  Activity Tolerance: Patient limited by pain; Patient limited by endurance       Patient Diagnosis(es): There were no encounter diagnoses.      has a past medical history of Age-related osteoporosis with current pathological fracture with routine healing, Anxiety, Mcdaniel esophagus, CAD (coronary artery disease), Cardiac pacemaker in situ, Chronic insomnia, Closed right hip fracture, with routine healing, subsequent encounter, Early onset Alzheimer's disease with behavioral disturbance ([address] Elevated fasting glucose, GERD (gastroesophageal reflux disease), Hiatal hernia, Hyperlipidemia, Hypertension, Hypothyroidism s/p surgical removal of thyroid for thyroid carcinoma, IBS (irritable bowel syndrome), Major depressive disorder with single episode, in remission ([address] Mycosis fungoides ([address] Osteoarthritis, Osteoporosis, Polycythemia rubra vera ([address] PUD (peptic ulcer disease), RV Pacemaker lead malfunction - Revision 3/24/14-Dr. ray, SSS (sick sinus syndrome) ([address] Thyroid cancer ([address] TIA (transient ischemic attack), and Vitamin D deficiency. has a past surgical history that includes Tubal ligation; Lobectomy, Thyroid (Left); babita and bso (cervix removed); Tonsillectomy and adenoidectomy; Cholecystectomy (1995); Breast biopsy (Right); Dilation and curettage of uterus (1960); Upper gastrointestinal endoscopy (2002); skin biopsy (2003); Cardiac catheterization (2008); bone marrow biopsy (September 2010); Pacemaker insertion (6/2013); Endoscopy, colon, diagnostic (2002); Upper gastrointestinal endoscopy (08/28/2003); Upper gastrointestinal endoscopy (05/12/2003); Upper gastrointestinal endoscopy (03/20/2001); eye surgery (Right, 11/07/06); Upper gastrointestinal endoscopy (02/29/2016); Colonoscopy (2002); Colonoscopy (2/29/2016); and HEMIARTHROPLASTY HIP (Right, 8/20/2019). Restrictions  Restrictions/Precautions  Restrictions/Precautions: Fall Risk, Isolation(+COVID)  Required Braces or Orthoses?: No  Position Activity Restriction  Other position/activity restrictions: up with assist  Vision/Hearing  Vision: Within Functional Limits  Hearing: Exceptions to [facility]  Hearing Exceptions: Hard of hearing/hearing concerns     Subjective  General  Patient assessed for rehabilitation services?: Yes  Response To Previous Treatment: Not applicable  Family / Caregiver Present: No  Follows Commands: Within Functional Limits  Subjective  Subjective: RN and pt agreeable to PT. Pt supine in bed upon arrival, pleasant and cooperative throughout. Pain Screening  Patient Currently in Pain: Yes  Pain Assessment  Pain Assessment: 0-10  Pain Level: 8  Pain Type: Chronic pain;Acute pain  Pain Location: Leg  Pain Orientation: Right  Pain Descriptors: Aching;Discomfort; Sore  Non-Pharmaceutical Pain Intervention(s): Repositioned; Ambulation/Increased Activity  Vital Signs  Patient Currently in Pain: Yes       Orientation  Orientation  Overall Orientation Status: Impaired  Orientation Level: Disoriented to place;Oriented to person;Disoriented to time;Disoriented to situation(\"September 2020\")  Social/Functional History  Social/Functional History  Type of Home: Facility  Home Layout: One level  Home Access: Level entry  Bathroom Shower/Tub: Walk-in shower  Bathroom Toilet: Standard  Bathroom Equipment: Grab bars in shower, Shower chair  Home Equipment: Rolling walker, [employer]  Receives Help From: (Pt reports participating in PT and OT)  ADL Assistance: Independent  Homemaking Assistance: Needs assistance  Homemaking Responsibilities: No  Ambulation Assistance: Independent(with RW)  Transfer Assistance: Independent  Active : No  Occupation: Retired  Type of occupation: [employer]  Leisure & Hobbies: glasses  Additional Comments: Information provided by pt, however pt questionable historian,  Cognition   Cognition  Overall Cognitive Status: Exceptions  Arousal/Alertness: Appropriate responses to stimuli  Following Commands: Follows one step commands consistently; Follows multistep commands with repitition; Follows multistep commands with increased time  Attention Span: Difficulty attending to directions  Safety Judgement: Decreased awareness of need for assistance  Problem Solving: Assistance required to generate solutions  Insights: Decreased awareness of deficits  Initiation: Requires cues for some  Sequencing: Requires cues for some    Objective          Joint Mobility  Spine: WFL  ROM RLE: WFL- pain limits hip flexion to ~90 degrees  ROM LLE: WFL  ROM RUE: WFL  ROM LUE: WFL  Strength RLE  Comment: CAREN due to significant pain with ROM- pt reports baseline from prior injury  Strength LLE  Strength LLE: WFL  Strength RUE  Strength RUE: WFL  Strength LUE  Strength LUE: WFL  Tone RLE  RLE Tone: Normotonic  Tone LLE  LLE Tone: Normotonic  Motor Control  Gross Motor?: WFL  Sensation  Overall Sensation Status: WFL  Bed mobility  Supine to Sit: Moderate assistance;2 Person assistance  Sit to Supine:  Moderate assistance;2 Person Treatment Minutes: 8 Minutes       Christiane Cason, PT

## 2020-08-12 NOTE — H&P
[facility]    HISTORY AND PHYSICAL EXAMINATION            Date:   8/12/2020  Patient name:  Radha Stuart  Date of admission:  8/11/2020 11:19 PM  MRN:   [id number]  Account:  [de-identified]  YOB: 1934  PCP:    Ondina Lynn DO  Room:   2026/2026-01  Code Status:    Full Code    Chief Complaint:     Cough and fever    History Obtained From:     patient    History of Present Illness:     Radha Stuart is a 80 y.o. presented with cough and fever from NH. She has PMH hx of Dementia, hypothyroidism, CAD, HLD. Few months back she fractured rt hip and had it repaired and now in NH. Overall was doing well until this cough started few days back and got worse in last 48 hrs. Also had high grade fever in ER. CTA chest for PE is neg, positive for PNA, Mild trop bump which is trending down could be due to Type 2 MI due to underlying condition. At this time she very is feeling better , other than hip pain she doing ok          Past Medical History:     Past Medical History:   Diagnosis Date    Age-related osteoporosis with current pathological fracture with routine healing     Anxiety 9/6/2019    Mcdaniel esophagus     CAD (coronary artery disease)     nonobstructive    Cardiac pacemaker in situ 4/24/2014    Put in for severe bradycardia.  Dual chamber     Chronic insomnia     Closed right hip fracture, with routine healing, subsequent encounter 8/18/2019    Early onset Alzheimer's disease with behavioral disturbance ([address] 9/6/2019    Elevated fasting glucose 9/6/2019    GERD (gastroesophageal reflux disease)     Hiatal hernia     Hyperlipidemia     Hypertension     Hypothyroidism s/p surgical removal of thyroid for thyroid carcinoma 7/29/2015    IBS (irritable bowel syndrome)     Major depressive disorder with single episode, in remission ([address] 9/6/2019    Mycosis fungoides ([address]     Osteoarthritis     Osteoporosis     Polycythemia rubra vera ([facility]     sees oncology    PUD (peptic ulcer disease)     RV Pacemaker lead malfunction - Revision 3/24/14-Dr. Juan Haywood 3/24/2014    SSS (sick sinus syndrome) ([facility] 4/24/2014    Thyroid cancer ([facility] about 5 years ago    thyroid /following with oncology    TIA (transient ischemic attack) 7/23/2014    Confusion--word searching    Vitamin D deficiency         Past Surgical History:     Past Surgical History:   Procedure Laterality Date    BONE MARROW BIOPSY  September 2010    polycythemia vera    BREAST BIOPSY Right     benign    CARDIAC CATHETERIZATION  2008    nonobstructive CAD    CHOLECYSTECTOMY  1995    COLONOSCOPY  2002    COLONOSCOPY  2/29/2016    colon polyp, diverticulosis - Dr Borden Simple    after miscarriage    ENDOSCOPY, COLON, DIAGNOSTIC  2002    normal  external hemorrhoids    EYE SURGERY Right 11/07/06    Cataract extraction with lens implant    HEMIARTHROPLASTY HIP Right 8/20/2019    RIGHT HIP HEMIARTHROPLASTY performed by Abdon Suárez MD at [address], THYROID Left     thyroid cancer    PACEMAKER INSERTION  6/2013    [facility] SKIN BIOPSY  2003    mycosis fungoides    TAE AND BSO      TAE/BSO at age 54 with anterior repair for benign reasons.  TONSILLECTOMY AND ADENOIDECTOMY      age 27   Vladimir Gondola TUBAL LIGATION      UPPER GASTROINTESTINAL ENDOSCOPY  2002    UPPER GASTROINTESTINAL ENDOSCOPY  08/28/2003    UPPER GASTROINTESTINAL ENDOSCOPY  05/12/2003    UPPER GASTROINTESTINAL ENDOSCOPY  03/20/2001    UPPER GASTROINTESTINAL ENDOSCOPY  02/29/2016    Barretts tissue, body polyp - Dr Tenorio Slice        Medications Prior to Admission:     Prior to Admission medications    Medication Sig Start Date End Date Taking?  Authorizing Provider   guaiFENesin (ROBITUSSIN) 100 MG/5ML syrup Take 200 mg by mouth every 6 hours as needed for Cough    Historical Provider, MD   mirtazapine (REMERON) 7.5 MG tablet Take 7.5 mg by mouth nightly  20   Historical Provider, MD   Handicap [facility]acard [facility] by Does not apply route Diagnosis: sick sinus syndrome. Expires: 2023   Rahul Krishna DO   donepezil (ARICEPT) 5 MG tablet Take 10 mg by mouth nightly     Historical Provider, MD   Melatonin 10 MG TABS Take 10 mg by mouth nightly as needed     Historical Provider, MD   Multiple Vitamins-Minerals (THERAPEUTIC MULTIVITAMIN-MINERALS) tablet Take 1 tablet by mouth daily    Historical Provider, MD   acetaminophen (TYLENOL) 500 MG tablet Take 500 mg by mouth every 6 hours as needed for Pain    Historical Provider, MD   HYDROcodone-acetaminophen (NORCO) 5-325 MG per tablet Take 1 tablet by mouth every 4 hours as needed for Pain. Historical Provider, MD   docusate sodium (COLACE, DULCOLAX) 100 MG CAPS Take 100 mg by mouth daily 19   Vicky Ledezma   pravastatin (PRAVACHOL) 40 MG tablet take 1 tablet by mouth once daily 10/2/18   DAISY Barrett CNP   levothyroxine (SYNTHROID) 125 MCG tablet Take 1 tablet by mouth Daily 18   DAISY Correa CNP   losartan (COZAAR) 50 MG tablet take 1 tablet by mouth once daily 18   DAISY Correa CNP   loratadine (CLARITIN) 10 MG tablet Take 1 tablet by mouth daily 10/16/17   DAISY Barrett CNP        Allergies:     Patient has no known allergies. Social History:     Tobacco:    reports that she has never smoked. She has never used smokeless tobacco.  Alcohol:      reports no history of alcohol use. Drug Use:  reports no history of drug use.     Family History:     Family History   Problem Relation Age of Onset    Cancer Mother 47        throat    Stroke Father 76         of CVA    Emphysema Sister     Other Brother          in train accident    Drug Abuse Son     Depression Son     Other Son         suicide    Mental Illness Son     Sudden Death Sister 78        smoker       Review of Systems:     Positive and new focal motor or sensory deficits, normal muscle tone and bulk, no abnormal sensation, normal speech, cranial nerves II through XII grossly intact  Skin: No gross lesions, rashes, bruising or bleeding on exposed skin area  Extremities: peripheral pulses palpable, no pedal edema or calf pain with palpation  Psych: normal affect    Investigations:      Laboratory Testing:  Recent Results (from the past 24 hour(s))   Lactic Acid    Collection Time: 08/11/20  5:20 PM   Result Value Ref Range    Lactic Acid 1.6 0.5 - 2.2 mmol/L   Basic Metabolic Panel    Collection Time: 08/11/20  5:21 PM   Result Value Ref Range    Glucose 136 (H) 70 - 99 mg/dL    BUN 21 8 - 23 mg/dL    CREATININE 0.81 0.50 - 0.90 mg/dL    Bun/Cre Ratio 26 (H) 9 - 20    Calcium 7.9 (L) 8.6 - 10.4 mg/dL    Sodium 141 135 - 144 mmol/L    Potassium 4.2 3.7 - 5.3 mmol/L    Chloride 105 98 - 107 mmol/L    CO2 23 20 - 31 mmol/L    Anion Gap 13 9 - 17 mmol/L    GFR Non-African American >60 >60 mL/min    GFR African American >60 >60 mL/min    GFR Comment          GFR Staging NOT REPORTED    CBC Auto Differential    Collection Time: 08/11/20  5:21 PM   Result Value Ref Range    WBC 4.1 3.5 - 11.3 k/uL    RBC 5.49 (H) 3.95 - 5.11 m/uL    Hemoglobin 15.1 11.9 - 15.1 g/dL    Hematocrit 44.8 36.3 - 47.1 %    MCV 81.6 (L) 82.6 - 102.9 fL    MCH 27.5 25.2 - 33.5 pg    MCHC 33.7 (H) 25.2 - 33.5 g/dL    RDW 13.9 11.8 - 14.4 %    Platelets 148 (L) 564 - 453 k/uL    MPV 10.6 8.1 - 13.5 fL    NRBC Automated 0.0 0.0 per 100 WBC    Differential Type NOT REPORTED     Seg Neutrophils 77 (H) 36 - 65 %    Lymphocytes 15 (L) 24 - 43 %    Monocytes 8 3 - 12 %    Eosinophils % 0 (L) 1 - 4 %    Basophils 0 0 - 2 %    Immature Granulocytes 0 0 %    Segs Absolute 3.16 1.50 - 8.10 k/uL    Absolute Lymph # 0.60 (L) 1.10 - 3.70 k/uL    Absolute Mono # 0.31 0.10 - 1.20 k/uL    Absolute Eos # <0.03 0.00 - 0.44 k/uL    Basophils Absolute <0.03 0.00 - 0.20 k/uL    Absolute Immature Granulocyte <0.03 0.00 - 0.30 k/uL    WBC Morphology NOT REPORTED     RBC Morphology MICROCYTOSIS PRESENT     Platelet Estimate NOT REPORTED    Troponin    Collection Time: 08/11/20  5:21 PM   Result Value Ref Range    Troponin, High Sensitivity 18 (H) 0 - 14 ng/L    Troponin T NOT REPORTED <0.03 ng/mL    Troponin Interp NOT REPORTED    D-Dimer, Quantitative    Collection Time: 08/11/20  5:21 PM   Result Value Ref Range    D-Dimer, Quant 1.13 (H) 0.00 - 0.59 mg/L FEU   EKG 12 Lead    Collection Time: 08/11/20  5:26 PM   Result Value Ref Range    Ventricular Rate 67 BPM    Atrial Rate 67 BPM    P-R Interval 150 ms    QRS Duration 80 ms    Q-T Interval 450 ms    QTc Calculation (Bazett) 475 ms    P Axis 1 degrees    R Axis -9 degrees    T Axis 13 degrees   COVID-19    Collection Time: 08/11/20  5:38 PM    Specimen: Other   Result Value Ref Range    SARS-CoV-2          SARS-CoV-2, Rapid DETECTED (A) Not Detected    Source . NASOPHARYNGEAL SWAB     SARS-CoV-2, PCR         Troponin    Collection Time: 08/11/20  7:10 PM   Result Value Ref Range    Troponin, High Sensitivity 16 (H) 0 - 14 ng/L    Troponin T NOT REPORTED <0.03 ng/mL    Troponin Interp NOT REPORTED    Troponin    Collection Time: 08/12/20 12:01 AM   Result Value Ref Range    Troponin, High Sensitivity 16 (H) 0 - 14 ng/L    Troponin T NOT REPORTED <0.03 ng/mL    Troponin Interp NOT REPORTED        Imaging/Diagnostics:  Ct Chest Pulmonary Embolism W Contrast    Result Date: 8/11/2020  No PE visualized. Peripheral ground-glass airspace opacities within both lungs. Commonly reported imaging features of COVID-19 pneumonia are present. Other processes such as influenza pneumonia and organizing pneumonia, as can be seen with drug toxicity and connective tissue disease, can cause a similar imaging pattern. PneTyp Large hiatal hernia.        Assessment :      Hospital Problems           Last Modified POA    * (Principal) Pneumonia due to COVID-19 virus 8/12/2020 Yes Osteoarthritis 8/12/2020 Yes    Hyperlipidemia 8/12/2020 Yes    Hypertension 8/12/2020 Yes    Mcdaniel esophagus 8/12/2020 Yes    GERD (gastroesophageal reflux disease) 8/12/2020 Yes    Chronic insomnia 8/12/2020 Yes    IBS (irritable bowel syndrome) 8/12/2020 Yes    Age-related osteoporosis with current pathological fracture with routine healing 8/12/2020 Yes    Polycythemia rubra vera ([address] 8/12/2020 Yes    Overview Signed 3/16/2014 12:54 PM by Voncile Merlin, DO     sees oncology         SSS (sick sinus syndrome) ([address] 8/12/2020 Yes    Cardiac pacemaker in situ 8/12/2020 Yes    Overview Addendum 9/22/2014  9:49 AM by Voncile Merlin, DO     Put in for severe bradycardia. Dual chamber         Closed right hip fracture, with routine healing, subsequent encounter 8/12/2020 Yes    Early onset Alzheimer's disease with behavioral disturbance ([address] 8/12/2020 Yes    Major [facility] with single episode[facility] 8/12/2020 Yes    CAD (coronary artery disease) 8/12/2020 Yes    Overview Signed 9/6/2019  2:32 PM by Srinivasan Hilliard DO     nonobstructive         COVID-19 8/11/2020 Yes          Plan:     Patient status inpatient in the Progressive Unit/Step down    1. Oxygen, if sats drops, right now sats are around 95 on RA , breathing treatments   2. Mild trop bump, trending down, no chest pain , possible type 2 MI, demand ischemia,   3. Hip pain, pain control   4. PT/OT consulted   5. DVT ppx with lovenox   6. Monitor labs   7. Home meds   8. Full code status       Consultations:   IP CONSULT TO CARDIOLOGY  IP CONSULT TO INFECTIOUS DISEASES     Patient is admitted as inpatient status because of co-morbidities listed above, severity of signs and symptoms as outlined, requirement for current medical therapies and most importantly because of direct risk to patient if care not provided in a hospital setting. Expected length of stay > 48 hours.     Yane Leiva MD  8/12/2020  12:47 AM    Copy sent to Dr. Samantha Motta, DO

## 2020-08-13 LAB
ABO/RH: NORMAL
ANION GAP SERPL CALCULATED.3IONS-SCNC: 14 MMOL/L (ref 9–17)
BUN BLDV-MCNC: 18 MG/DL (ref 8–23)
BUN/CREAT BLD: ABNORMAL (ref 9–20)
C-REACTIVE PROTEIN: 70.8 MG/L (ref 0–5)
CALCIUM SERPL-MCNC: 7.2 MG/DL (ref 8.6–10.4)
CHLORIDE BLD-SCNC: 108 MMOL/L (ref 98–107)
CO2: 21 MMOL/L (ref 20–31)
CREAT SERPL-MCNC: 0.74 MG/DL (ref 0.5–0.9)
D-DIMER QUANTITATIVE: 0.63 MG/L FEU
EKG ATRIAL RATE: 61 BPM
EKG P-R INTERVAL: 168 MS
EKG Q-T INTERVAL: 444 MS
EKG QRS DURATION: 78 MS
EKG QTC CALCULATION (BAZETT): 446 MS
EKG R AXIS: -23 DEGREES
EKG T AXIS: -30 DEGREES
EKG VENTRICULAR RATE: 61 BPM
FERRITIN: 436 UG/L (ref 13–150)
FIBRINOGEN: 536 MG/DL (ref 140–420)
GFR AFRICAN AMERICAN: >60 ML/MIN
GFR NON-AFRICAN AMERICAN: >60 ML/MIN
GFR SERPL CREATININE-BSD FRML MDRD: ABNORMAL ML/MIN/{1.73_M2}
GFR SERPL CREATININE-BSD FRML MDRD: ABNORMAL ML/MIN/{1.73_M2}
GLUCOSE BLD-MCNC: 92 MG/DL (ref 70–99)
HCT VFR BLD CALC: 43 % (ref 36.3–47.1)
HEMOGLOBIN: 13.9 G/DL (ref 11.9–15.1)
LACTATE DEHYDROGENASE: 389 U/L (ref 135–214)
MCH RBC QN AUTO: 27.5 PG (ref 25.2–33.5)
MCHC RBC AUTO-ENTMCNC: 32.3 G/DL (ref 28.4–34.8)
MCV RBC AUTO: 85.1 FL (ref 82.6–102.9)
NRBC AUTOMATED: 0 PER 100 WBC
PDW BLD-RTO: 14.4 % (ref 11.8–14.4)
PLATELET # BLD: 130 K/UL (ref 138–453)
PMV BLD AUTO: 10.7 FL (ref 8.1–13.5)
POTASSIUM SERPL-SCNC: 4.5 MMOL/L (ref 3.7–5.3)
RBC # BLD: 5.05 M/UL (ref 3.95–5.11)
SODIUM BLD-SCNC: 143 MMOL/L (ref 135–144)
TROPONIN INTERP: ABNORMAL
TROPONIN T: ABNORMAL NG/ML
TROPONIN, HIGH SENSITIVITY: 16 NG/L (ref 0–14)
WBC # BLD: 4.1 K/UL (ref 3.5–11.3)

## 2020-08-13 PROCEDURE — 85379 FIBRIN DEGRADATION QUANT: CPT

## 2020-08-13 PROCEDURE — 1200000000 HC SEMI PRIVATE

## 2020-08-13 PROCEDURE — 80048 BASIC METABOLIC PNL TOTAL CA: CPT

## 2020-08-13 PROCEDURE — 6370000000 HC RX 637 (ALT 250 FOR IP): Performed by: NURSE PRACTITIONER

## 2020-08-13 PROCEDURE — 84484 ASSAY OF TROPONIN QUANT: CPT

## 2020-08-13 PROCEDURE — 86140 C-REACTIVE PROTEIN: CPT

## 2020-08-13 PROCEDURE — 99232 SBSQ HOSP IP/OBS MODERATE 35: CPT | Performed by: HOSPITALIST

## 2020-08-13 PROCEDURE — 85027 COMPLETE CBC AUTOMATED: CPT

## 2020-08-13 PROCEDURE — 6370000000 HC RX 637 (ALT 250 FOR IP): Performed by: HOSPITALIST

## 2020-08-13 PROCEDURE — 99232 SBSQ HOSP IP/OBS MODERATE 35: CPT | Performed by: INTERNAL MEDICINE

## 2020-08-13 PROCEDURE — 2580000003 HC RX 258: Performed by: INTERNAL MEDICINE

## 2020-08-13 PROCEDURE — 86900 BLOOD TYPING SEROLOGIC ABO: CPT

## 2020-08-13 PROCEDURE — 6360000002 HC RX W HCPCS: Performed by: NURSE PRACTITIONER

## 2020-08-13 PROCEDURE — P9017 PLASMA 1 DONOR FRZ W/IN 8 HR: HCPCS

## 2020-08-13 PROCEDURE — 85384 FIBRINOGEN ACTIVITY: CPT

## 2020-08-13 PROCEDURE — 83615 LACTATE (LD) (LDH) ENZYME: CPT

## 2020-08-13 PROCEDURE — 2580000003 HC RX 258: Performed by: NURSE PRACTITIONER

## 2020-08-13 PROCEDURE — 86901 BLOOD TYPING SEROLOGIC RH(D): CPT

## 2020-08-13 PROCEDURE — 36430 TRANSFUSION BLD/BLD COMPNT: CPT

## 2020-08-13 PROCEDURE — 97530 THERAPEUTIC ACTIVITIES: CPT

## 2020-08-13 PROCEDURE — 82728 ASSAY OF FERRITIN: CPT

## 2020-08-13 PROCEDURE — 6360000002 HC RX W HCPCS: Performed by: HOSPITALIST

## 2020-08-13 PROCEDURE — 97110 THERAPEUTIC EXERCISES: CPT

## 2020-08-13 RX ORDER — LOPERAMIDE HYDROCHLORIDE 2 MG/1
2 CAPSULE ORAL 4 TIMES DAILY PRN
Status: DISCONTINUED | OUTPATIENT
Start: 2020-08-13 | End: 2020-08-16 | Stop reason: HOSPADM

## 2020-08-13 RX ORDER — ALBUTEROL SULFATE 90 UG/1
2 AEROSOL, METERED RESPIRATORY (INHALATION) EVERY 6 HOURS PRN
Status: DISCONTINUED | OUTPATIENT
Start: 2020-08-13 | End: 2020-08-16 | Stop reason: HOSPADM

## 2020-08-13 RX ORDER — 0.9 % SODIUM CHLORIDE 0.9 %
20 INTRAVENOUS SOLUTION INTRAVENOUS ONCE
Status: COMPLETED | OUTPATIENT
Start: 2020-08-13 | End: 2020-08-14

## 2020-08-13 RX ADMIN — Medication 10 ML: at 15:52

## 2020-08-13 RX ADMIN — DONEPEZIL HYDROCHLORIDE 10 MG: 10 TABLET, FILM COATED ORAL at 21:09

## 2020-08-13 RX ADMIN — MIRTAZAPINE 7.5 MG: 15 TABLET, FILM COATED ORAL at 21:09

## 2020-08-13 RX ADMIN — ACETAMINOPHEN 650 MG: 325 TABLET ORAL at 19:21

## 2020-08-13 RX ADMIN — SODIUM CHLORIDE: 9 INJECTION, SOLUTION INTRAVENOUS at 15:51

## 2020-08-13 RX ADMIN — SODIUM CHLORIDE 100 MG: 9 INJECTION, SOLUTION INTRAVENOUS at 18:39

## 2020-08-13 RX ADMIN — LEVOTHYROXINE SODIUM 125 MCG: 125 TABLET ORAL at 06:21

## 2020-08-13 RX ADMIN — DOCUSATE SODIUM 100 MG: 100 CAPSULE, LIQUID FILLED ORAL at 08:47

## 2020-08-13 RX ADMIN — PRAVASTATIN SODIUM 40 MG: 20 TABLET ORAL at 21:09

## 2020-08-13 RX ADMIN — LOSARTAN POTASSIUM 50 MG: 50 TABLET, FILM COATED ORAL at 08:47

## 2020-08-13 RX ADMIN — ENOXAPARIN SODIUM 40 MG: 40 INJECTION SUBCUTANEOUS at 08:47

## 2020-08-13 RX ADMIN — ACETAMINOPHEN 650 MG: 325 TABLET ORAL at 05:13

## 2020-08-13 RX ADMIN — Medication 10 ML: at 21:09

## 2020-08-13 RX ADMIN — Medication 10 MG: at 21:09

## 2020-08-13 RX ADMIN — Medication 10 ML: at 08:56

## 2020-08-13 RX ADMIN — SODIUM CHLORIDE: 9 INJECTION, SOLUTION INTRAVENOUS at 02:32

## 2020-08-13 RX ADMIN — Medication 10 ML: at 00:05

## 2020-08-13 RX ADMIN — BENZONATATE 100 MG: 100 CAPSULE ORAL at 08:56

## 2020-08-13 RX ADMIN — BENZONATATE 100 MG: 100 CAPSULE ORAL at 15:52

## 2020-08-13 RX ADMIN — KETOROLAC TROMETHAMINE 15 MG: 15 INJECTION, SOLUTION INTRAMUSCULAR; INTRAVENOUS at 00:23

## 2020-08-13 RX ADMIN — CETIRIZINE HYDROCHLORIDE 10 MG: 10 TABLET ORAL at 08:47

## 2020-08-13 RX ADMIN — MULTIPLE VITAMINS W/ MINERALS TAB 1 TABLET: TAB at 08:47

## 2020-08-13 RX ADMIN — SODIUM CHLORIDE 20 ML: 0.9 INJECTION, SOLUTION INTRAVENOUS at 22:40

## 2020-08-13 ASSESSMENT — PAIN DESCRIPTION - ORIENTATION: ORIENTATION: RIGHT

## 2020-08-13 ASSESSMENT — PAIN SCALES - GENERAL
PAINLEVEL_OUTOF10: 0
PAINLEVEL_OUTOF10: 6
PAINLEVEL_OUTOF10: 6

## 2020-08-13 ASSESSMENT — PAIN DESCRIPTION - LOCATION: LOCATION: HIP

## 2020-08-13 ASSESSMENT — PAIN DESCRIPTION - PROGRESSION: CLINICAL_PROGRESSION: NOT CHANGED

## 2020-08-13 ASSESSMENT — PAIN DESCRIPTION - PAIN TYPE: TYPE: ACUTE PAIN

## 2020-08-13 ASSESSMENT — ENCOUNTER SYMPTOMS
GASTROINTESTINAL NEGATIVE: 1
RESPIRATORY NEGATIVE: 1

## 2020-08-13 ASSESSMENT — PAIN DESCRIPTION - DESCRIPTORS: DESCRIPTORS: ACHING

## 2020-08-13 ASSESSMENT — PAIN DESCRIPTION - FREQUENCY: FREQUENCY: CONTINUOUS

## 2020-08-13 ASSESSMENT — PAIN - FUNCTIONAL ASSESSMENT: PAIN_FUNCTIONAL_ASSESSMENT: PREVENTS OR INTERFERES SOME ACTIVE ACTIVITIES AND ADLS

## 2020-08-13 ASSESSMENT — PAIN DESCRIPTION - ONSET: ONSET: ON-GOING

## 2020-08-13 NOTE — CARE COORDINATION
8/13/20 nHpredict tool uploaded to chart and can be viewed in the media tab     nH Predict Outcome report for [facility], [id number] . Predict is recommending SNF with expected length of stay of 16.1 days, and projected CG hours of 4.25   post stay. She would benefit from daily skilled nursing and therapy to increase strength and functional independence. Its noted she will be returning to [facility].           Nithin Pauilno, Bennie Daniel RN  Centralized Care Coordinator  M: [phone]        [employer] Way  News, insights and analysis from the experts transforming health care  [facility] [employer]

## 2020-08-13 NOTE — PROGRESS NOTES
Anh Beauchamp [facility]    Progress Note    8/13/2020    10:55 AM    Name:   Romayne Quill  MRN:     [id number]     Acct:      [de-identified]   Room:   2026/2026-01  IP Day:  2  Admit Date:  8/11/2020 11:19 PM    PCP:   Geetha Larson DO  Code Status:  Full Code    Subjective:     C/C: No chief complaint on file. Patient Active Problem List   Diagnosis    Osteoarthritis    Hyperlipidemia    Hypertension    Mcdaniel esophagus    GERD (gastroesophageal reflux disease)    PUD (peptic ulcer disease)    Hiatal hernia    Chronic insomnia    IBS (irritable bowel syndrome)    Age-related osteoporosis with current pathological fracture with routine healing    Vitamin D deficiency    [facility]    Polycythemia rubra vera ([facility]    SSS (sick sinus syndrome) ([facility]    Cardiac pacemaker in situ    TIA (transient ischemic attack)    Hypothyroidism    Closed right hip fracture, with routine healing, subsequent encounter    Early onset Alzheimer's disease with behavioral disturbance ([facility]    Major depressive disorder with single episode, [facility]    Anxiety    Elevated fasting glucose    CAD (coronary artery disease)    COVID-19    Pneumonia due to COVID-19 virus     Interval History Status: not changed. Patient was seen and examined at bedside. Remdesivir started by ID. Patient is currently on 2 L nasal cannula. Otherwise, patient is grossly asymptomatic. Denies chest pain or palpitations. No abdominal issues. Patient had diarrhea last night. Brief History:     Per Record:    Romayne Quill is a 80 y.o. presented with cough and fever from NH. She has PMH hx of Dementia, hypothyroidism, CAD, HLD. Few months back she fractured rt hip and had it repaired and now in NH. Overall was doing well until this cough started few days back and got worse in last 48 hrs. Also had high grade fever in ER.  CTA chest for PE is neg, Hyperlipidemia, Hypertension, Hypothyroidism s/p surgical removal of thyroid for thyroid carcinoma, IBS (irritable bowel syndrome), Major depressive disorder with single episode, in remission ([facility] Mycosis fungoides ([facility] Osteoarthritis, Osteoporosis, Polycythemia rubra vera ([facility] PUD (peptic ulcer disease), RV Pacemaker lead malfunction - Revision 3/24/14-Dr. ray, SSS (sick sinus syndrome) ([facility] Thyroid cancer ([facility] TIA (transient ischemic attack), and Vitamin D deficiency. Social History:   reports that she has never smoked. She has never used smokeless tobacco. She reports that she does not drink alcohol or use drugs. Family History:   Family History   Problem Relation Age of Onset    Cancer Mother 47        throat    Stroke Father 76         of CVA    Emphysema Sister     Other Brother          in train accident    Drug Abuse Son     Depression Son     Other Son         suicide    Mental Illness Son     Sudden Death Sister 78        smoker       Vitals:  /72   Pulse 64   Temp 97.3 °F (36.3 °C) (Oral)   Resp 22   Ht 5' 6\" (1.676 m)   Wt 178 lb (80.7 kg)   SpO2 90%   BMI 28.73 kg/m²   Temp (24hrs), Av.3 °F (36.8 °C), Min:97.3 °F (36.3 °C), Max:100 °F (37.8 °C)    No results for input(s): POCGLU in the last 72 hours. I/O (24Hr):     Intake/Output Summary (Last 24 hours) at 2020 1055  Last data filed at 2020 1800  Gross per 24 hour   Intake 1402.77 ml   Output --   Net 1402.77 ml       Labs:  Hematology:  Recent Labs     20  1721 20  0531   WBC 4.1 4.1   RBC 5.49* 5.05   HGB 15.1 13.9   HCT 44.8 43.0   MCV 81.6* 85.1   MCH 27.5 27.5   MCHC 33.7* 32.3   RDW 13.9 14.4   * 130*   MPV 10.6 10.7   CRP  --  70.8*   DDIMER 1.13* 0.63     Chemistry:  Recent Labs     20  1721 20  1910 20  0001 20  0531     --   --  143   K 4.2  --   --  4.5     --   --  108*   CO2 23  --   --  21   GLUCOSE 136*  --   --  92 BUN 21  --   --  18   CREATININE 0.81  --   --  0.74   ANIONGAP 13  --   --  14   LABGLOM >60  --   --  >60   GFRAA >60  --   --  >60   CALCIUM 7.9*  --   --  7.2*   TROPHS 18* 16* 16* 16*     Recent Labs     08/13/20  0531   *     ABG:No results found for: POCPH, PHART, PH, POCPCO2, HWB3QZD, PCO2, POCPO2, PO2ART, PO2, POCHCO3, GAZ7RDY, HCO3, NBEA, PBEA, BEART, BE, THGBART, THB, UCZ2PUN, ZTHT5VBO, A1WULOMP, O2SAT, FIO2  Lab Results   Component Value Date/Time    SPECIAL NOT REPORTED 08/11/2020 05:36 PM     Lab Results   Component Value Date/Time    CULTURE NO GROWTH 15 HOURS 08/11/2020 05:36 PM       Radiology:  Ct Chest Pulmonary Embolism W Contrast    Result Date: 8/11/2020  No PE visualized. Peripheral ground-glass airspace opacities within both lungs. Commonly reported imaging features of COVID-19 pneumonia are present. Other processes such as influenza pneumonia and organizing pneumonia, as can be seen with drug toxicity and connective tissue disease, can cause a similar imaging pattern. PneTyp Large hiatal hernia.        Physical Examination:        General appearance:  alert, cooperative and no distress  Mental Status:  oriented to person, place and time and normal affect  Lungs:  clear to auscultation bilaterally, normal effort  Heart:  regular rate and rhythm, no murmur  Abdomen:  soft, nontender, nondistended, normal bowel sounds, no masses, hepatomegaly, splenomegaly  Extremities:  no edema, redness, tenderness in the calves  Skin:  no gross lesions, rashes, induration    Assessment:        Hospital Problems           Last Modified POA    * (Principal) Pneumonia due to COVID-19 virus 8/12/2020 Yes    Osteoarthritis 8/12/2020 Yes    Hyperlipidemia 8/12/2020 Yes    Hypertension 8/12/2020 Yes    Mcdaniel esophagus 8/12/2020 Yes    GERD (gastroesophageal reflux disease) 8/12/2020 Yes    Chronic insomnia 8/12/2020 Yes    IBS (irritable bowel syndrome) 8/12/2020 Yes    Age-related osteoporosis with current pathological fracture with routine healing 8/12/2020 Yes    Polycythemia rubra vera ([facility] 8/12/2020 Yes    Overview Signed 3/16/2014 12:54 PM by Olivia Lebron DO     sees oncology         SSS (sick sinus syndrome) ([facility] 8/12/2020 Yes    Cardiac pacemaker in situ 8/12/2020 Yes    Overview Addendum 9/22/2014  9:49 AM by Olivia Lebron DO     Put in for severe bradycardia. Dual chamber         Closed right hip fracture, with routine healing, subsequent encounter 8/12/2020 Yes    Early onset Alzheimer's disease with behavioral disturbance ([facility] 8/12/2020 Yes    Major depressive disorder with single episode, in remission ([facility] 8/12/2020 Yes    CAD (coronary artery disease) 8/12/2020 Yes    Overview Signed 9/6/2019  2:32 PM by Rachel Poe DO     nonobstructive         COVID-19 8/11/2020 Yes          Plan:        1. Elevated troponin -appreciate cardiology evaluation and recommendation. ACS ruled out. Patient will follow-up as outpatient. 2. COVID-19 positive -patient is currently on 2 L nasal cannula. Otherwise asymptomatic. Infectious disease started Remdesivir. Patient will complete the course prior to discharge. 3. Hypertension and hyperlipidemia -stable. Continue home medications. 4. Sick sinus syndrome -status post pacemaker. 5. History of coronary artery disease  6. GERD  7.  Polycythemia rubra vera -patient follows as outpatient with oncology    Heydi Hernandez DO  8/13/2020  10:55 AM

## 2020-08-13 NOTE — PROGRESS NOTES
Physical Therapy  Facility/Department: [facility] 2  Daily Treatment Note  NAME: Hu Luke  : 1934  MRN: [id number]    Date of Service: 2020    Discharge Recommendations:    Further therapy recommended at discharge. PT Equipment Recommendations  Other: has RW at home    Assessment   Body structures, Functions, Activity limitations: Decreased functional mobility ; Decreased balance;Decreased strength;Decreased endurance  Assessment: Upon PTA arrival, RN was preparing pt to go to the toilet. The pt required Mod A x2 for bed mobility and Min A x2 for sit to stand transfers, with a RW. Pt ambulated to restroom and back with RW and Min A x2. Recommend continued therapy to progress mobility and address deficits. Prognosis: Good  PT Education: Goals;PT Role;Plan of Care; Functional Mobility Training;Transfer Training;Home Exercise Program  REQUIRES PT FOLLOW UP: Yes  Activity Tolerance  Activity Tolerance: Patient limited by fatigue;Patient limited by endurance     Patient Diagnosis(es): There were no encounter diagnoses.      has a past medical history of Age-related osteoporosis with current pathological fracture with routine healing, Anxiety, Mcdaniel esophagus, CAD (coronary artery disease), Cardiac pacemaker in situ, Chronic insomnia, Closed right hip fracture, with routine healing, subsequent encounter, Early onset Alzheimer's disease with behavioral disturbance ([address] Elevated fasting glucose, GERD (gastroesophageal reflux disease), Hiatal hernia, Hyperlipidemia, Hypertension, Hypothyroidism s/p surgical removal of thyroid for thyroid carcinoma, IBS (irritable bowel syndrome), Major depressive disorder with single episode, in remission ([address] Mycosis fungoides ([address] Osteoarthritis, Osteoporosis, Polycythemia rubra vera ([address] PUD (peptic ulcer disease), RV Pacemaker lead malfunction - Revision 3/24/14-Dr. ray, SSS (sick sinus syndrome) ([address] Thyroid cancer ([address] TIA (transient ischemic attack), and Vitamin D deficiency. has a past surgical history that includes Tubal ligation; Lobectomy, Thyroid (Left); babita and bso (cervix removed); Tonsillectomy and adenoidectomy; Cholecystectomy (1995); Breast biopsy (Right); Dilation and curettage of uterus (1960); Upper gastrointestinal endoscopy (2002); skin biopsy (2003); Cardiac catheterization (2008); bone marrow biopsy (September 2010); Pacemaker insertion (6/2013); Endoscopy, colon, diagnostic (2002); Upper gastrointestinal endoscopy (08/28/2003); Upper gastrointestinal endoscopy (05/12/2003); Upper gastrointestinal endoscopy (03/20/2001); eye surgery (Right, 11/07/06); Upper gastrointestinal endoscopy (02/29/2016); Colonoscopy (2002); Colonoscopy (2/29/2016); and HEMIARTHROPLASTY HIP (Right, 8/20/2019). Restrictions  Restrictions/Precautions  Restrictions/Precautions: Fall Risk, Isolation(+COVID)  Required Braces or Orthoses?: No  Position Activity Restriction  Other position/activity restrictions: up with assist     Subjective   General  Chart Reviewed: Yes  Response To Previous Treatment: Patient with no complaints from previous session. Family / Caregiver Present: No  Subjective  Subjective: RN and pt agreeable to PT. Pt supine in bed upon arrival, pleasant and cooperative throughout. Pain Screening  Patient Currently in Pain: Denies  Vital Signs  Patient Currently in Pain: Denies       Orientation  Orientation  Overall Orientation Status: Within Normal Limits    Objective   Bed mobility  Supine to Sit: Moderate assistance;2 Person assistance  Sit to Supine:  Moderate assistance;2 Person assistance  Transfers  Sit to Stand: Minimal Assistance  Stand to sit: Minimal Assistance;2 Person Assistance  Comment: used RW  Ambulation  Ambulation?: Yes  Ambulation 1  Surface: level tile  Device: Rolling Walker  Assistance: Minimal assistance;2 Person assistance  Gait Deviations: Decreased step length;Decreased step height  Distance: to toilet and back, ~15ft twice  Stairs/Curb  Stairs?: No     Balance  Standing - Static: Fair  Standing - Dynamic: Fair;-  Exercises  Hip Flexion: Supine: BLE x10  Hip Abduction: Supine: BLE x10  Ankle Pumps: Supine: BLE x10       Goals  Short term goals  Time Frame for Short term goals: 14 visits  Short term goal 1: Perform bed mobility with Min A  Short term goal 2: Perform sit to stand transfer with Mod A  Short term goal 3: Ambulate 60ft with RW and Min A  Short term goal 4: Demo Fair- dynamic standing balance to decrease risk of falls    Plan    Plan  Times per week: 3-5x/wk  Current Treatment Recommendations: Strengthening, ROM, Balance Training, Functional Mobility Training, Transfer Training, Gait Training, Endurance Training, Home Exercise Program, Safety Education & Training, Patient/Caregiver Education & Training  Safety Devices  Type of devices: Nurse notified, Call light within reach, Gait belt, Left in bed, Bed alarm in place, All fall risk precautions in place, Patient at risk for falls(RN in room when PTA left)  Restraints  Initially in place: No     Therapy Time   Individual Concurrent Group Co-treatment   Time In 0834         Time Out 0902         Minutes 28         Timed Code Treatment Minutes: Alhaji Mcbride PTA

## 2020-08-13 NOTE — PROGRESS NOTES
[facility]  Progress Note    Joshua Franklin  MRN: [id number]  Date: 8/13/2020    Reason for F/U :   COVID-19 virus infection-pneumonia    Impression :   1. Acute hypoxic respiratory failure on supplemental oxygen  2. COVID-19 virus pneumonia  3. Alzheimer's dementia-early  4. History of coronary artery disease    Recommendations:   · Continue Remdisivir through 8/16/2020 [5 days]  · The patient's niece who is the guardian has consented to the convalescent plasma therapy  · Continue supplemental oxygen  · I will follow her progress and adjust therapy accordingly    Infection Control Recommendations:   Droplet plus precautions    Discharge Planning:   Estimated Length of IV antimicrobials: 8/16/2020  Patient will need Midline Catheter Insertion/ PICC line Insertion: No  Patient will need: Home IV , [place],  SNF,  LTAC: Undetermined  Patient willneed outpatient wound care: No    MedicalDecision making / Summary of Stay:   Joshua Franklin is a 80y.o.-year-old female who was initially admitted on 8/11/2020. Tyler Amin is a nursing home resident with Alzheimer's disease, coronary artery disease, hypertension, hyperlipidemia, thyroid cancer status post thyroidectomy and is on treatment for hypothyroidism, irritable bowel syndrome, depression, and other medical problems. The patient is a poor historian and cannot tell me why she was sent in to the hospital.  According to records the patient was noted to have fever and cough for the last several days and has also has some occasional loose stools. The patient was sent into the hospital for evaluation of the above symptoms. CT imaging of the chest was done that showed peripheral groundglass airspace opacities within both lungs and rapid coronavirus PCR testing was positive. The patient was also noted to have an elevated troponin and was seen by the cardiology service. The patient is not felt to have any primary acute coronary syndrome.   I was asked to evaluate and help with the COVID-19 virus infection. Current evaluation:2020    /72   Pulse 64   Temp 97.3 °F (36.3 °C) (Oral)   Resp 22   Ht 5' 6\" (1.676 m)   Wt 178 lb (80.7 kg)   SpO2 90%   BMI 28.73 kg/m²     Temperature Range: Temp: 97.3 °F (36.3 °C) Temp  Av.3 °F (36.8 °C)  Min: 97.3 °F (36.3 °C)  Max: 100 °F (37.8 °C)  The patient is seen and evaluated at bedside she is awake and alert remains on supplemental oxygen by nasal cannula. Did have a low-grade fever overnight. The patient does not have any specific complaints. Review of Systems   Constitutional: Negative. Respiratory: Negative. Cardiovascular: Negative. Gastrointestinal: Negative. Genitourinary: Negative. Musculoskeletal: Negative. Skin: Negative. Neurological: Negative. Psychiatric/Behavioral: Negative. Physical Examination :     Physical Exam  Constitutional:       Appearance: She is well-developed. HENT:      Head: Normocephalic and atraumatic. Neck:      Musculoskeletal: Normal range of motion and neck supple. Cardiovascular:      Rate and Rhythm: Regular rhythm. Heart sounds: Normal heart sounds. Pulmonary:      Effort: Pulmonary effort is normal.      Breath sounds: Rhonchi present. Abdominal:      General: Bowel sounds are normal.      Palpations: Abdomen is soft. Skin:     General: Skin is warm and dry. Neurological:      Mental Status: She is alert and oriented to person, place, and time.          Laboratory data:   I have independently reviewed the followinglabs:  CBC with Differential:   Recent Labs     20  1721 20  0531   WBC 4.1 4.1   HGB 15.1 13.9   HCT 44.8 43.0   * 130*   LYMPHOPCT 15*  --    MONOPCT 8  --      BMP:   Recent Labs     20  1721 20  0531    143   K 4.2 4.5    108*   CO2 23 21   BUN 21 18   CREATININE 0.81 0.74     Hepatic Function Panel: No results for input(s): PROT, LABALBU, BILIDIR, IBILI, BILITOT, ALKPHOS, ALT, AST in the last 72 hours. No results for input(s): VANCOTROUGH in the last 72 hours. Lab Results   Component Value Date    CRP 70.8 (H) 08/13/2020     No results found for: SEDRATE    No results for input(s): PROCAL in the last 72 hours. Imaging Studies:   CTA OF THE CHEST 8/11/2020 5:51 pm    Impression    No PE visualized.         Peripheral ground-glass airspace opacities within both lungs.         Commonly reported imaging features of COVID-19 pneumonia are present.  Other    processes such as influenza pneumonia and organizing pneumonia, as can be    seen with drug toxicity and connective tissue disease, can cause a similar    imaging pattern. PneTyp         Large hiatal hernia. Cultures:   None    Medications:      remdesivir IVPB  100 mg Intravenous Q24H    docusate sodium  100 mg Oral Daily    donepezil  10 mg Oral Nightly    levothyroxine  125 mcg Oral Daily    cetirizine  10 mg Oral Daily    losartan  50 mg Oral Daily    mirtazapine  7.5 mg Oral Nightly    therapeutic multivitamin-minerals  1 tablet Oral Daily    pravastatin  40 mg Oral Nightly    sodium chloride flush  10 mL Intravenous 2 times per day    enoxaparin  40 mg Subcutaneous Daily           [facility]  Melony Peraza MD  Perfect Serve messaging  OFFICE: [phone]      Electronically signed by Melony Peraza MD on 8/13/2020 at 10:13 AM  Thank you for allowing us to participate in the care of this patient. Please call with questions. This note iscreated with the assistance of a speech recognition program.  While intending to generate a document that actually reflects the content of the visit, the document can still have some errors including those of syntax andsound a like substitutions which may escape proof reading. In such instances, actual meaning can be extrapolated by contextual diversion.

## 2020-08-13 NOTE — RESEARCH
Clinical Research Services      Patient Name: Demian Burton  MRN: [id number]  [place]: 1934  Date of evaluation: 8/13/2020  Time of evaluation: 12:24  Reason for evaluation:  Screening    Asked by Dr. Tom Castillo to evaluate the patient for Expanded Access to [address] [place] [address] Plasma for the Treatment of Patients with COVID-19     Protocol # [id number]     IND# [id number]       NCT# [id number]  :  Linda Morfin MD through the [facility]    [x]  Patient met eligibility criteria  [x] At 09:35, I contacted the subjects LAR Salma Lewis. I discussed the study with her. She is agreeable to review consents and consider enrolling her aunt into the study  The consents were emailed to her at  09:41. She called me back at 10:36 to tell me she had read the study consents and has signed them. She is going to the bank to have them faxed to me. Dr. Tom Castillo update. The Consent fpr the study was signed by Kan Bui at 10:25am.  When I received the fax, the lines for me to sign were cut off. I contacted Karthik Sweet, she has no other way to send them. She is going to mail the originals to me. I signed the consent at 11:15. A copy of the signed consents emailed back to the LAR. [x]  Blood bank notified  [x]   LAR educated on plasma indication and side effects. [x]  LAR verbalizes understanding. [x]  The patient will receive 1 unit of ABO compatible COVID-19 convalescent plasma over 1-2 hours.          Lucía Saldaña, JUANJO      Clinical Research Nurse  For questions page or perfect serve Dr. Carol Smith, Dr. Alejandra Salazar, Dr. Tom Castillo, Dr. Piper Zapata , Dr. Erum Dejesus, or Dr. Jerome Novoa or the research nurse at [phone]

## 2020-08-13 NOTE — CARE COORDINATION
Patient/family seen: No: COVID19+ patient       Informed patient/family of [facility] Medical Bundle Program with potential outreach by either Care Transitions Team or [facility]iHealth Team based on hospital admission and location.        [facility]-A Notification Letter given: Yes mailed to [place] [id number] [facility]         Current discharge plan: return to [facility]

## 2020-08-13 NOTE — PLAN OF CARE
Problem: Airway Clearance - Ineffective  Goal: Achieve or maintain patent airway  8/12/2020 2106 by Chas Carmichael RN  Outcome: Ongoing  8/12/2020 1411 by Angel Luis Monsivais RN  Outcome: Ongoing     Problem: Gas Exchange - Impaired  Goal: Absence of hypoxia  8/12/2020 2106 by Chas Carmichael RN  Outcome: Ongoing  8/12/2020 1411 by Angel Luis Monsivais RN  Outcome: Ongoing  Goal: Promote optimal lung function  8/12/2020 2106 by Chas Carmichael RN  Outcome: Ongoing  8/12/2020 1411 by Angel Luis Monsivais RN  Outcome: Ongoing     Problem: Breathing Pattern - Ineffective  Goal: Ability to achieve and maintain a regular respiratory rate  8/12/2020 2106 by Chas Carmichael RN  Outcome: Ongoing  8/12/2020 1411 by Angel Luis Monsivais RN  Outcome: Ongoing     Problem:  Body Temperature -  Risk of, Imbalanced  Goal: Ability to maintain a body temperature within defined limits  8/12/2020 2106 by Chas Carmichael RN  Outcome: Ongoing  8/12/2020 1411 by Angel Luis Monsivais RN  Outcome: Ongoing  Goal: Will regain or maintain usual level of consciousness  8/12/2020 2106 by Chas Carmichael RN  Outcome: Ongoing  8/12/2020 1411 by Angel Luis Monsivais RN  Outcome: Ongoing  Goal: Complications related to the disease process, condition or treatment will be avoided or minimized  8/12/2020 2106 by Cahs Carmichael RN  Outcome: Ongoing  8/12/2020 1411 by Angel Luis Monsivais RN  Outcome: Ongoing     Problem: Isolation Precautions - Risk of Spread of Infection  Goal: Prevent transmission of infection  8/12/2020 2106 by Chas Carmichael RN  Outcome: Ongoing  8/12/2020 1411 by Angel Luis Monsivais RN  Outcome: Ongoing     Problem: Nutrition Deficits  Goal: Optimize nutrtional status  8/12/2020 2106 by Chas Carmichael RN  Outcome: Ongoing  8/12/2020 1411 by Angel Luis Monsivais RN  Outcome: Ongoing     Problem: Risk for Fluid Volume Deficit  Goal: Maintain normal heart rhythm  8/12/2020 2106 by Chas Carmichael RN  Outcome: Ongoing  8/12/2020 1411 by Angel Luis Monsivais RN  Outcome: Ongoing  Goal: Maintain absence of muscle cramping  8/12/2020 2106 by Lazara Carpio RN  Outcome: Ongoing  8/12/2020 1411 by Flaco Gomez RN  Outcome: Ongoing  Goal: Maintain normal serum potassium, sodium, calcium, phosphorus, and pH  8/12/2020 2106 by Lazara Carpio RN  Outcome: Ongoing  8/12/2020 1411 by Flaco Gomez RN  Outcome: Ongoing     Problem: Loneliness or Risk for Loneliness  Goal: Demonstrate positive use of time alone when socialization is not possible  8/12/2020 2106 by Lazara Carpio RN  Outcome: Ongoing  8/12/2020 1411 by Flaco Gomez RN  Outcome: Ongoing     Problem: Fatigue  Goal: Verbalize increase energy and improved vitality  8/12/2020 2106 by Lazara Carpio RN  Outcome: Ongoing  8/12/2020 1411 by Flaco Gomez RN  Outcome: Ongoing     Problem: Patient Education: Go to Patient Education Activity  Goal: Patient/Family Education  8/12/2020 2106 by Lazara Carpio RN  Outcome: Ongoing  8/12/2020 1411 by Flaco Gomez RN  Outcome: Ongoing     Problem: Skin Integrity:  Goal: Will show no infection signs and symptoms  Description: Will show no infection signs and symptoms  8/12/2020 2106 by Lazara Carpio RN  Outcome: Ongoing  8/12/2020 1411 by Flaco Gomez RN  Outcome: Ongoing  Goal: Absence of new skin breakdown  Description: Absence of new skin breakdown  8/12/2020 2106 by Lazara Carpio RN  Outcome: Ongoing  8/12/2020 1411 by Flaco Gomez RN  Outcome: Ongoing     Problem: Falls - Risk of:  Goal: Will remain free from falls  Description: Will remain free from falls  8/12/2020 2106 by Lazara Carpio RN  Outcome: Ongoing  8/12/2020 1411 by Flaco Gomez RN  Outcome: Ongoing  Goal: Absence of physical injury  Description: Absence of physical injury  8/12/2020 2106 by Lazara Carpio RN  Outcome: Ongoing  8/12/2020 1411 by Flaco Gomez RN  Outcome: Ongoing     Problem: Pain:  Goal: Pain level will decrease  Description: Pain level will decrease  Outcome: Ongoing  Goal: Control of acute pain  Description: Control of acute pain  Outcome: Ongoing  Goal: Control of chronic pain  Description: Control of chronic pain  Outcome: Ongoing

## 2020-08-14 ENCOUNTER — APPOINTMENT (OUTPATIENT)
Dept: GENERAL RADIOLOGY | Age: 85
DRG: 177 | End: 2020-08-14
Attending: HOSPITALIST
Payer: MEDICARE

## 2020-08-14 LAB
ALLEN TEST: POSITIVE
BLD PROD TYP BPU: NORMAL
DISPENSE STATUS BLOOD BANK: NORMAL
FIO2: 3.5
GLUCOSE BLD-MCNC: 75 MG/DL (ref 74–100)
MODE: ABNORMAL
NEGATIVE BASE EXCESS, ART: 3 (ref 0–2)
O2 DEVICE/FLOW/%: ABNORMAL
PATIENT TEMP: ABNORMAL
POC HCO3: 20.9 MMOL/L (ref 21–28)
POC O2 SATURATION: 96 % (ref 94–98)
POC PCO2 TEMP: ABNORMAL MM HG
POC PCO2: 33.3 MM HG (ref 35–48)
POC PH TEMP: ABNORMAL
POC PH: 7.41 (ref 7.35–7.45)
POC PO2 TEMP: ABNORMAL MM HG
POC PO2: 83.2 MM HG (ref 83–108)
POSITIVE BASE EXCESS, ART: ABNORMAL (ref 0–3)
SAMPLE SITE: ABNORMAL
TCO2 (CALC), ART: 22 MMOL/L (ref 22–29)
TRANSFUSION STATUS: NORMAL
TROPONIN INTERP: NORMAL
TROPONIN T: NORMAL NG/ML
TROPONIN, HIGH SENSITIVITY: 13 NG/L (ref 0–14)
UNIT DIVISION: 0
UNIT NUMBER: NORMAL

## 2020-08-14 PROCEDURE — 6360000002 HC RX W HCPCS: Performed by: NURSE PRACTITIONER

## 2020-08-14 PROCEDURE — 1200000000 HC SEMI PRIVATE

## 2020-08-14 PROCEDURE — 97116 GAIT TRAINING THERAPY: CPT

## 2020-08-14 PROCEDURE — 71045 X-RAY EXAM CHEST 1 VIEW: CPT

## 2020-08-14 PROCEDURE — 84484 ASSAY OF TROPONIN QUANT: CPT

## 2020-08-14 PROCEDURE — 97530 THERAPEUTIC ACTIVITIES: CPT

## 2020-08-14 PROCEDURE — 6370000000 HC RX 637 (ALT 250 FOR IP): Performed by: HOSPITALIST

## 2020-08-14 PROCEDURE — 2580000003 HC RX 258: Performed by: INTERNAL MEDICINE

## 2020-08-14 PROCEDURE — 6360000002 HC RX W HCPCS: Performed by: HOSPITALIST

## 2020-08-14 PROCEDURE — 99232 SBSQ HOSP IP/OBS MODERATE 35: CPT | Performed by: INTERNAL MEDICINE

## 2020-08-14 PROCEDURE — 6370000000 HC RX 637 (ALT 250 FOR IP): Performed by: INTERNAL MEDICINE

## 2020-08-14 PROCEDURE — 82947 ASSAY GLUCOSE BLOOD QUANT: CPT

## 2020-08-14 PROCEDURE — 82803 BLOOD GASES ANY COMBINATION: CPT

## 2020-08-14 PROCEDURE — 6370000000 HC RX 637 (ALT 250 FOR IP): Performed by: NURSE PRACTITIONER

## 2020-08-14 PROCEDURE — 36600 WITHDRAWAL OF ARTERIAL BLOOD: CPT

## 2020-08-14 PROCEDURE — 99232 SBSQ HOSP IP/OBS MODERATE 35: CPT | Performed by: HOSPITALIST

## 2020-08-14 PROCEDURE — 97535 SELF CARE MNGMENT TRAINING: CPT

## 2020-08-14 RX ADMIN — LEVOTHYROXINE SODIUM 125 MCG: 125 TABLET ORAL at 10:02

## 2020-08-14 RX ADMIN — KETOROLAC TROMETHAMINE 15 MG: 15 INJECTION, SOLUTION INTRAMUSCULAR; INTRAVENOUS at 10:10

## 2020-08-14 RX ADMIN — CETIRIZINE HYDROCHLORIDE 10 MG: 10 TABLET ORAL at 10:02

## 2020-08-14 RX ADMIN — MULTIPLE VITAMINS W/ MINERALS TAB 1 TABLET: TAB at 10:03

## 2020-08-14 RX ADMIN — ALBUTEROL SULFATE 2 PUFF: 90 AEROSOL, METERED RESPIRATORY (INHALATION) at 05:29

## 2020-08-14 RX ADMIN — LOPERAMIDE HYDROCHLORIDE 2 MG: 2 CAPSULE ORAL at 10:02

## 2020-08-14 RX ADMIN — PRAVASTATIN SODIUM 40 MG: 20 TABLET ORAL at 20:30

## 2020-08-14 RX ADMIN — BENZONATATE 100 MG: 100 CAPSULE ORAL at 10:02

## 2020-08-14 RX ADMIN — Medication 10 ML: at 20:30

## 2020-08-14 RX ADMIN — DONEPEZIL HYDROCHLORIDE 10 MG: 10 TABLET, FILM COATED ORAL at 20:31

## 2020-08-14 RX ADMIN — ALBUTEROL SULFATE 2 PUFF: 90 AEROSOL, METERED RESPIRATORY (INHALATION) at 20:30

## 2020-08-14 RX ADMIN — SODIUM CHLORIDE 100 MG: 9 INJECTION, SOLUTION INTRAVENOUS at 20:31

## 2020-08-14 RX ADMIN — Medication 10 MG: at 20:31

## 2020-08-14 RX ADMIN — BENZONATATE 100 MG: 100 CAPSULE ORAL at 20:31

## 2020-08-14 RX ADMIN — ENOXAPARIN SODIUM 40 MG: 40 INJECTION SUBCUTANEOUS at 10:01

## 2020-08-14 RX ADMIN — LOSARTAN POTASSIUM 50 MG: 50 TABLET, FILM COATED ORAL at 10:02

## 2020-08-14 RX ADMIN — MIRTAZAPINE 7.5 MG: 15 TABLET, FILM COATED ORAL at 20:31

## 2020-08-14 RX ADMIN — Medication 10 ML: at 10:01

## 2020-08-14 ASSESSMENT — PAIN SCALES - GENERAL
PAINLEVEL_OUTOF10: 6
PAINLEVEL_OUTOF10: 6
PAINLEVEL_OUTOF10: 0
PAINLEVEL_OUTOF10: 6

## 2020-08-14 ASSESSMENT — ENCOUNTER SYMPTOMS
GASTROINTESTINAL NEGATIVE: 1
RESPIRATORY NEGATIVE: 1

## 2020-08-14 ASSESSMENT — PAIN DESCRIPTION - FREQUENCY: FREQUENCY: CONTINUOUS

## 2020-08-14 ASSESSMENT — PAIN DESCRIPTION - ORIENTATION
ORIENTATION: RIGHT
ORIENTATION: RIGHT

## 2020-08-14 ASSESSMENT — PAIN DESCRIPTION - LOCATION
LOCATION: HIP
LOCATION: HIP

## 2020-08-14 ASSESSMENT — PAIN DESCRIPTION - PAIN TYPE: TYPE: ACUTE PAIN

## 2020-08-14 ASSESSMENT — PAIN DESCRIPTION - DESCRIPTORS: DESCRIPTORS: SPASM;SHARP

## 2020-08-14 ASSESSMENT — PAIN SCALES - WONG BAKER: WONGBAKER_NUMERICALRESPONSE: 8

## 2020-08-14 NOTE — PLAN OF CARE
Problem: Airway Clearance - Ineffective  Goal: Achieve or maintain patent airway  Outcome: Met This Shift     Problem: Gas Exchange - Impaired  Goal: Absence of hypoxia  Outcome: Met This Shift  Goal: Promote optimal lung function  Outcome: Met This Shift     Problem: Breathing Pattern - Ineffective  Goal: Ability to achieve and maintain a regular respiratory rate  Outcome: Met This Shift     Problem:  Body Temperature -  Risk of, Imbalanced  Goal: Ability to maintain a body temperature within defined limits  Outcome: Met This Shift  Goal: Will regain or maintain usual level of consciousness  Outcome: Met This Shift  Goal: Complications related to the disease process, condition or treatment will be avoided or minimized  Outcome: Met This Shift     Problem: Isolation Precautions - Risk of Spread of Infection  Goal: Prevent transmission of infection  Outcome: Met This Shift     Problem: Nutrition Deficits  Goal: Optimize nutrtional status  Outcome: Met This Shift     Problem: Risk for Fluid Volume Deficit  Goal: Maintain normal heart rhythm  Outcome: Met This Shift  Goal: Maintain absence of muscle cramping  Outcome: Met This Shift  Goal: Maintain normal serum potassium, sodium, calcium, phosphorus, and pH  Outcome: Met This Shift     Problem: Loneliness or Risk for Loneliness  Goal: Demonstrate positive use of time alone when socialization is not possible  Outcome: Met This Shift     Problem: Fatigue  Goal: Verbalize increase energy and improved vitality  Outcome: Met This Shift     Problem: Patient Education: Go to Patient Education Activity  Goal: Patient/Family Education  Outcome: Met This Shift     Problem: Skin Integrity:  Goal: Will show no infection signs and symptoms  Description: Will show no infection signs and symptoms  Outcome: Met This Shift  Goal: Absence of new skin breakdown  Description: Absence of new skin breakdown  Outcome: Met This Shift     Problem: Falls - Risk of:  Goal: Will remain free from

## 2020-08-14 NOTE — CARE COORDINATION
Transitional planning-plan is to go to [facility]. O2 3L per N/C, remdesivir until 8-16    1510 call from Elida at [facility]-wanting regular COVID test done not rapid before coming.

## 2020-08-14 NOTE — PROGRESS NOTES
Writer spoke to everett Morales and gave her an update on the patients status.  Cheko Morales denies any further questions or concerns at this time

## 2020-08-14 NOTE — PROGRESS NOTES
Polycythemia rubra vera ([facility] [address] PUD (peptic ulcer disease), RV Pacemaker lead malfunction - Revision 3/24/14-Dr. ray, SSS (sick sinus syndrome) ([facility] [address] Thyroid cancer ([facility] [address] TIA (transient ischemic attack), and Vitamin D deficiency. has a past surgical history that includes Tubal ligation; Lobectomy, Thyroid (Left); babita and bso (cervix removed); Tonsillectomy and adenoidectomy; Cholecystectomy (1995); Breast biopsy (Right); Dilation and curettage of uterus (1960); Upper gastrointestinal endoscopy (2002); skin biopsy (2003); Cardiac catheterization (2008); bone marrow biopsy (September 2010); Pacemaker insertion (6/2013); Endoscopy, colon, diagnostic (2002); Upper gastrointestinal endoscopy (08/28/2003); Upper gastrointestinal endoscopy (05/12/2003); Upper gastrointestinal endoscopy (03/20/2001); eye surgery (Right, 11/07/06); Upper gastrointestinal endoscopy (02/29/2016); Colonoscopy (2002); Colonoscopy (2/29/2016); and HEMIARTHROPLASTY HIP (Right, 8/20/2019). Restrictions  Restrictions/Precautions  Restrictions/Precautions: Fall Risk, Isolation(COVID +)  Required Braces or Orthoses?: No  Position Activity Restriction  Other position/activity restrictions: up with assist     Subjective   General  Patient assessed for rehabilitation services?: Yes  Family / Caregiver Present: No  General Comment  Comments: RN ok'd patient for OT/PT treatment. Pt pleasant and cooperative throughout.   Pain Assessment  Pain Assessment: 0-10  Pain Level: 6  Vital Signs  Patient Currently in Pain: Yes     Orientation  Orientation  Overall Orientation Status: Impaired  Orientation Level: Disoriented to situation;Disoriented to place;Oriented to time;Oriented to person     Objective    ADL  Grooming: Stand by assistance;Verbal cueing;Setup(to wash face seated in recliner)  UE Bathing: Increased time to complete;Setup;Verbal cueing;Minimal assistance(with back, pt able to complete BUE and chest seated in recliner with encouragement)  LE Bathing: Moderate assistance; Increased time to complete;Setup;Verbal cueing(to wash BLE and feet seated in recliner, limited by SOB)  UE Dressing: Minimal assistance; Increased time to complete;Setup;Verbal cueing(to don/ doff gown)  LE Dressing: Maximum assistance;Verbal cueing; Increased time to complete;Setup(to on/ doff socks seated in recliner)  Toileting: Maximum assistance(to complete pericare following toileting in bathroom, pt has brief and external catheter in place)  Additional Comments: Pt supine in bed on arrival. Pt assisted to complete bed mobility and sit at EOB. Pt reports dizziness with postional change, cues for proper pursed lipped breathing. Pt reports dizziness does not worsen. Pt assisted to complete functional mobility into bathroom with use of RW, cues for hand placement during session. Pt transferred to toilet to complete toileting, assisted with pericare. Pt returned to chair, assisted to complete ADL activities as documented above. Pt remained in chair, call light in reach and RN notified on therapist exit. Balance  Sitting Balance: Contact guard assistance(seated EOB, on toilet)  Standing Balance: Minimal assistance(use of RW)  Standing Balance  Time: 3 min  Activity: sit <> stand transfer, functional mobility to bathroom, chair  Comment: SOB, cues for proper pursed lipped breathing  Functional Mobility  Functional - Mobility Device: Rolling Walker  Activity: To/from bathroom  Assist Level: Minimal assistance  Functional Mobility Comments: VCs for hand placement, cues to slow breathing     Bed mobility  Supine to Sit: Moderate assistance  Sit to Supine: Unable to assess(Pt up in chair on exit)  Scooting:  Moderate assistance    Transfers  Sit to stand: Minimal assistance  Stand to sit: Minimal assistance  Transfer Comments: VCs for hand placement on RW during session     Cognition  Overall Cognitive Status: Exceptions  Arousal/Alertness: Appropriate responses to stimuli  Following Commands: Follows one step commands consistently; Follows multistep commands with repitition; Follows multistep commands with increased time  Attention Span: Difficulty attending to directions  Safety Judgement: Decreased awareness of need for assistance;Decreased awareness of need for safety  Problem Solving: Assistance required to generate solutions  Insights: Decreased awareness of deficits  Initiation: Requires cues for some  Sequencing: Requires cues for some    Perception  Overall Perceptual Status: [facility]     Plan   Plan  Times per week: 4x/wk  Current Treatment Recommendations: Strengthening, Safety Education & Training, Patient/Caregiver Education & Training, Self-Care / ADL, Equipment Evaluation, Education, & procurement, Endurance Training, Functional Mobility Training, Balance Training, Cognitive Reorientation    Goals  Short term goals  Time Frame for Short term goals: Patient will, by discharge  Short term goal 1: demo UB ADLs at Mod I <1 cue with set up  Short term goal 2: demo LB ADLs at [facility] using AE PRN <1 cue  Short term goal 3: demo functional transfers/mobility at [facility] using LRD to engage in ADLs safely  Short term goal 4: demo ~8 min of dynamic standing tolerance at CGA to engage in ADLs safely  Short term goal 5: demo ~15 min of dynamic sitting balance at SBA to engage in ADLs EOB  Patient Goals   Patient goals : return to [place]       Therapy Time   Individual Concurrent Group Co-treatment   Time In 0903         Time Out 0947         Minutes 44         Timed Code Treatment Minutes: 23 Minutes   See above for LOF. RN reports patient is medically stable for therapy treatment this date. Chart reviewed prior to treatment and patient is agreeable for therapy. All lines intact and patient positioned comfortably at end of treatment. All patient needs addressed prior to ending therapy session.       Co-treatment with PT for safe functional mobility   Christina Phillips OTR/L

## 2020-08-14 NOTE — PROGRESS NOTES
for PNA, Mild trop bump which is trending down could be due to Type 2 MI due to underlying condition. At this time she very is feeling better , other than hip pain she doing ok    Review of Systems:     Constitutional:  negative for chills, fevers, sweats  Respiratory:  negative for cough, dyspnea on exertion, shortness of breath, wheezing  Cardiovascular:  negative for chest pain, chest pressure/discomfort, lower extremity edema, palpitations  Gastrointestinal:  negative for abdominal pain, constipation, diarrhea, nausea, vomiting  Neurological:  negative for dizziness, headache    Medications:      Allergies:  No Known Allergies    Current Meds:   Scheduled Meds:    remdesivir IVPB  100 mg Intravenous Q24H    docusate sodium  100 mg Oral Daily    donepezil  10 mg Oral Nightly    levothyroxine  125 mcg Oral Daily    cetirizine  10 mg Oral Daily    losartan  50 mg Oral Daily    mirtazapine  7.5 mg Oral Nightly    therapeutic multivitamin-minerals  1 tablet Oral Daily    pravastatin  40 mg Oral Nightly    sodium chloride flush  10 mL Intravenous 2 times per day    enoxaparin  40 mg Subcutaneous Daily     Continuous Infusions:    sodium chloride 75 mL/hr at 08/13/20 1551     PRN Meds: loperamide, albuterol sulfate HFA, dextromethorphan-guaiFENesin, benzonatate, ketorolac, sodium chloride, melatonin, sodium chloride flush, potassium chloride **OR** potassium alternative oral replacement **OR** potassium chloride, magnesium sulfate, acetaminophen **OR** acetaminophen    Data:     Past Medical History:   has a past medical history of Age-related osteoporosis with current pathological fracture with routine healing, Anxiety, Mcdaniel esophagus, CAD (coronary artery disease), Cardiac pacemaker in situ, Chronic insomnia, Closed right hip fracture, with routine healing, subsequent encounter, Early onset Alzheimer's disease with behavioral disturbance ([facility] [address] Elevated fasting glucose, GERD (gastroesophageal reflux disease), Hiatal hernia, Hyperlipidemia, Hypertension, Hypothyroidism s/p surgical removal of thyroid for thyroid carcinoma, IBS (irritable bowel syndrome), Major depressive disorder with single episode, in remission ([facility] Mycosis fungoides ([facility] Osteoarthritis, Osteoporosis, Patient in clinical research study, Polycythemia rubra vera ([facility] PUD (peptic ulcer disease), RV Pacemaker lead malfunction - Revision 3/24/14-Dr. ray, SSS (sick sinus syndrome) ([facility] Thyroid cancer ([facility] TIA (transient ischemic attack), and Vitamin D deficiency. Social History:   reports that she has never smoked. She has never used smokeless tobacco. She reports that she does not drink alcohol or use drugs. Family History:   Family History   Problem Relation Age of Onset    Cancer Mother 47        throat    Stroke Father 76         of CVA    Emphysema Sister    Christin Javed Other Brother          in train accident    Drug Abuse Son     Depression Son     Other Son         suicide    Mental Illness Son     Sudden Death Sister 78        smoker       Vitals:  BP (!) 143/60   Pulse 61   Temp 98.3 °F (36.8 °C) (Oral)   Resp 19   Ht 5' 6\" (1.676 m)   Wt 178 lb (80.7 kg)   SpO2 98%   BMI 28.73 kg/m²   Temp (24hrs), Av.7 °F (37.1 °C), Min:97.5 °F (36.4 °C), Max:101.5 °F (38.6 °C)    Recent Labs     20  0802   POCGLU 75       I/O (24Hr):     Intake/Output Summary (Last 24 hours) at 2020 1023  Last data filed at 2020 0654  Gross per 24 hour   Intake 3314 ml   Output --   Net 3314 ml       Labs:  Hematology:  Recent Labs     20  1721 20  0531   WBC 4.1 4.1   RBC 5.49* 5.05   HGB 15.1 13.9   HCT 44.8 43.0   MCV 81.6* 85.1   MCH 27.5 27.5   MCHC 33.7* 32.3   RDW 13.9 14.4   * 130*   MPV 10.6 10.7   CRP  --  70.8*   DDIMER 1.13* 0.63     Chemistry:  Recent Labs     20  1721  20  0001 20  0531 20  0823     --   --  143  --    K 4.2  --   --  4.5  --      -- --  108*  --    CO2 23  --   --  21  --    GLUCOSE 136*  --   --  92  --    BUN 21  --   --  18  --    CREATININE 0.81  --   --  0.74  --    ANIONGAP 13  --   --  14  --    LABGLOM >60  --   --  >60  --    GFRAA >60  --   --  >60  --    CALCIUM 7.9*  --   --  7.2*  --    TROPHS 18*   < > 16* 16* 13    < > = values in this interval not displayed. Recent Labs     08/13/20  0531 08/14/20  0802   *  --    POCGLU  --  75     ABG:  Lab Results   Component Value Date    POCPH 7.406 08/14/2020    POCPCO2 33.3 08/14/2020    POCPO2 83.2 08/14/2020    POCHCO3 20.9 08/14/2020    NBEA 3 08/14/2020    PBEA NOT REPORTED 08/14/2020    FZS1UVO 22 08/14/2020    SRBW7YFP 96 08/14/2020    FIO2 3.5 08/14/2020     Lab Results   Component Value Date/Time    SPECIAL NOT REPORTED 08/11/2020 05:36 PM     Lab Results   Component Value Date/Time    CULTURE NO GROWTH 2 DAYS 08/11/2020 05:36 PM       Radiology:  Xr Chest Portable    Result Date: 8/14/2020  Multifocal airspace opacities throughout the lungs bilaterally primarily within the left mid to lower lung. Findings are concerning for multifocal pneumonia, including possible viral/atypical etiologies. Ct Chest Pulmonary Embolism W Contrast    Result Date: 8/11/2020  No PE visualized. Peripheral ground-glass airspace opacities within both lungs. Commonly reported imaging features of COVID-19 pneumonia are present. Other processes such as influenza pneumonia and organizing pneumonia, as can be seen with drug toxicity and connective tissue disease, can cause a similar imaging pattern. PneTyp Large hiatal hernia.        Physical Examination:        General appearance:  alert, cooperative and no distress  Mental Status:  oriented to person, place and time and normal affect  Lungs:  clear to auscultation bilaterally, normal effort  Heart:  regular rate and rhythm, no murmur  Abdomen:  soft, nontender, nondistended, normal bowel sounds, no masses, hepatomegaly, splenomegaly  Extremities:  no edema, redness, tenderness in the calves  Skin:  no gross lesions, rashes, induration    Assessment:        Hospital Problems           Last Modified POA    * (Principal) Pneumonia due to COVID-19 virus 8/12/2020 Yes    Osteoarthritis 8/12/2020 Yes    Hyperlipidemia 8/12/2020 Yes    Hypertension 8/12/2020 Yes    Mcdaniel esophagus 8/12/2020 Yes    GERD (gastroesophageal reflux disease) 8/12/2020 Yes    Chronic insomnia 8/12/2020 Yes    IBS (irritable bowel syndrome) 8/12/2020 Yes    Age-related osteoporosis with current pathological fracture with routine healing 8/12/2020 Yes    Polycythemia rubra vera ([facility] [address] 8/12/2020 Yes    Overview Signed 3/16/2014 12:54 PM by Lenin Olvera DO     sees oncology         SSS (sick sinus syndrome) ([facility] [address] 8/12/2020 Yes    Cardiac pacemaker in situ 8/12/2020 Yes    Overview Addendum 9/22/2014  9:49 AM by Lenin Olvera DO     Put in for severe bradycardia. Dual chamber         Closed right hip fracture, with routine healing, subsequent encounter 8/12/2020 Yes    Early onset Alzheimer's disease with behavioral disturbance ([facility] [address] 8/12/2020 Yes    Major depressive disorder with single episode, in remission ([facility] [address] 8/12/2020 Yes    CAD (coronary artery disease) 8/12/2020 Yes    Overview Signed 9/6/2019  2:32 PM by Jim Ugarte DO     nonobstructive         COVID-19 8/11/2020 Yes          Plan:        1. Elevated troponin -appreciate cardiology evaluation and recommendation. ACS ruled out. Patient will follow-up as outpatient. 2. COVID-19 positive - Infectious disease started Remdesivir. Patient will complete the course prior to discharge. Has increased need for oxygen on exertion. 3. Hypertension and hyperlipidemia -stable. Continue home medications. 4. Sick sinus syndrome -status post pacemaker. 5. History of coronary artery disease  6. GERD  7.  Polycythemia rubra vera -patient follows as outpatient with oncology    [facility] Jesus Manuel Amaro DO  8/14/2020  10:23 AM

## 2020-08-14 NOTE — PLAN OF CARE
Assess patients pain level using 0-10 pain scale  Administer pain medications as ordered and needed  Reassess pain level using 0-10 pain scale  Increase rounding  Will continue to monitor

## 2020-08-14 NOTE — PROGRESS NOTES
Physical Therapy  Facility/Department: [facility] CAR 2  Daily Treatment Note  NAME: Radha Stuart  : 1934  MRN: [id number]    Date of Service: 2020    Discharge Recommendations:  Patient would benefit from continued therapy after discharge   PT Equipment Recommendations  Equipment Needed: (CTA)  Other: has RW at home    Assessment   Body structures, Functions, Activity limitations: Decreased functional mobility ; Decreased balance;Decreased strength;Decreased endurance  Assessment: Pt requiring grossly modA for bed mob and transfers, Carlos Manuel to amb short distance w/RW, Mobility limited by SOB/dizziness. Recommend continued therapy to progress mobility and address deficits. Prognosis: Good  PT Education: PT Role;Plan of Care; Functional Mobility Training;Transfer Training;Home Exercise Program;Gait Training  REQUIRES PT FOLLOW UP: Yes  Activity Tolerance  Activity Tolerance: Patient limited by fatigue;Patient limited by endurance; Other  Activity Tolerance: SOB/dizziness     Patient Diagnosis(es): There were no encounter diagnoses.      has a past medical history of Age-related osteoporosis with current pathological fracture with routine healing, Anxiety, Mcdaniel esophagus, CAD (coronary artery disease), Cardiac pacemaker in situ, Chronic insomnia, Closed right hip fracture, with routine healing, subsequent encounter, Early onset Alzheimer's disease with behavioral disturbance ([address] Elevated fasting glucose, GERD (gastroesophageal reflux disease), Hiatal hernia, Hyperlipidemia, Hypertension, Hypothyroidism s/p surgical removal of thyroid for thyroid carcinoma, IBS (irritable bowel syndrome), Major depressive disorder with single episode, in remission ([address] Mycosis fungoides ([facility] [address] Osteoarthritis, Osteoporosis, Patient in clinical research study, Polycythemia rubra vera ([address] PUD (peptic ulcer disease), RV Pacemaker lead malfunction - Revision 3/24/14-Dr. ray, SSS (sick sinus syndrome) ([address] Thyroid cancer [facility]), TIA (transient ischemic attack), and Vitamin D deficiency. has a past surgical history that includes Tubal ligation; Lobectomy, Thyroid (Left); babita and bso (cervix removed); Tonsillectomy and adenoidectomy; Cholecystectomy (1995); Breast biopsy (Right); Dilation and curettage of uterus (1960); Upper gastrointestinal endoscopy (2002); skin biopsy (2003); Cardiac catheterization (2008); bone marrow biopsy (September 2010); Pacemaker insertion (6/2013); Endoscopy, colon, diagnostic (2002); Upper gastrointestinal endoscopy (08/28/2003); Upper gastrointestinal endoscopy (05/12/2003); Upper gastrointestinal endoscopy (03/20/2001); eye surgery (Right, 11/07/06); Upper gastrointestinal endoscopy (02/29/2016); Colonoscopy (2002); Colonoscopy (2/29/2016); and HEMIARTHROPLASTY HIP (Right, 8/20/2019). Restrictions  Restrictions/Precautions  Restrictions/Precautions: Fall Risk, Isolation(+COVID)  Required Braces or Orthoses?: No  Position Activity Restriction  Other position/activity restrictions: up with assist  Subjective   General  Response To Previous Treatment: Patient with no complaints from previous session. Family / Caregiver Present: No  Subjective  Subjective: RN and pt agreed to PT, pt awake in bed upon arrival and denies pain, Pt on 3.5-4L O2 via nc  Pain Screening  Patient Currently in Pain: Denies  Vital Signs  Patient Currently in Pain: Denies       Orientation  Orientation  Overall Orientation Status: Within Functional Limits       Objective   Bed mobility  Rolling to Right: Moderate assistance  Supine to Sit: Moderate assistance  Sit to Supine: (Pt left up in chair)  Scooting:  Moderate assistance  Transfers  Sit to Stand: Minimal Assistance;2 Person Assistance  Stand to sit: Minimal Assistance;2 Person Assistance  Comment: used RW  Ambulation  Ambulation?: Yes  Ambulation 1  Surface: level tile  Device: Rolling Walker  Assistance: Minimal assistance  Quality of Gait: Flexed posture  Gait Deviations: Decreased step length;Decreased step height;Slow Azalea  Distance: 8ft x 2 to/from restroom  Comments: Ambulation limited d/t SOB and dizziness,O2 sats 85-86% following toileting and ambulation from restroom, Educated pt on PLB  Stairs/Curb  Stairs?: No     Balance  Posture: Fair  Sitting - Static: Fair;+  Sitting - Dynamic: Fair  Standing - Static: Fair;-  Standing - Dynamic: Fair;-  Comments: Pt sat EOB ~12mins SBA, pt c/o dizziness, SBP 125bpm, O2 90% or greater, HR WFL  Exercises  Hip Flexion: BLE x 10  Knee Long Arc Quad: BLE x 10  Comments: Educated pt on ankle pumps and encouraged her to perform them t/o the day                         Goals  Short term goals  Time Frame for Short term goals: 14 visits  Short term goal 1: Perform bed mobility with Min A  Short term goal 2: Perform sit to stand transfer with Mod A  Short term goal 3: Ambulate 60ft with RW and Min A  Short term goal 4: Demo Fair- dynamic standing balance to decrease risk of falls    Plan    Plan  Times per week: 3-5x/wk  Current Treatment Recommendations: Strengthening, ROM, Balance Training, Functional Mobility Training, Transfer Training, Gait Training, Endurance Training, Home Exercise Program, Safety Education & Training, Patient/Caregiver Education & Training  Safety Devices  Type of devices: Nurse notified, Call light within reach, Gait belt, All fall risk precautions in place, Left in chair, Chair alarm in place(OT present upon exiting)  Restraints  Initially in place: No     Therapy Time   Individual Concurrent Group Co-treatment   Time In 0901         Time Out 0932         Minutes 31         Timed Code Treatment Minutes: 23 Minutes   Co-treated with OT for safe mobility        Aishwarya Childers, PTA

## 2020-08-14 NOTE — PROGRESS NOTES
[facility]  Progress Note    Izabel Moreno  MRN: [id number]  Date: 8/14/2020    Reason for F/U :   COVID-19 virus infection-pneumonia    Impression :   1. Acute hypoxic respiratory failure on supplemental oxygen  2. COVID-19 virus pneumonia  3. Alzheimer's dementia-early  4. History of coronary artery disease    Recommendations:   · Continue Remdisivir through 8/16/2020 [5 days]  · The patient did get 1 unit of convalescent plasma   · Continue supplemental oxygen  · I will follow her progress and adjust therapy accordingly    Infection Control Recommendations:   Droplet plus precautions    Discharge Planning:   Estimated Length of IV antimicrobials: 8/16/2020  Patient will need Midline Catheter Insertion/ PICC line Insertion: No  Patient will need: Home IV , [place],  SNF,  LTAC: Undetermined  Patient willneed outpatient wound care: No    MedicalDecision making / Summary of Stay:   Izabel Moreno is a 80y.o.-year-old female who was initially admitted on 8/11/2020. Pacheco Haddad is a nursing home resident with Alzheimer's disease, coronary artery disease, hypertension, hyperlipidemia, thyroid cancer status post thyroidectomy and is on treatment for hypothyroidism, irritable bowel syndrome, depression, and other medical problems. The patient is a poor historian and cannot tell me why she was sent in to the hospital.  According to records the patient was noted to have fever and cough for the last several days and has also has some occasional loose stools. The patient was sent into the hospital for evaluation of the above symptoms. CT imaging of the chest was done that showed peripheral groundglass airspace opacities within both lungs and rapid coronavirus PCR testing was positive. The patient was also noted to have an elevated troponin and was seen by the cardiology service. The patient is not felt to have any primary acute coronary syndrome.   I was asked to evaluate and help with the COVID-19 virus infection. Current evaluation:2020    BP (!) 143/60   Pulse 61   Temp 98.3 °F (36.8 °C) (Oral)   Resp 19   Ht 5' 6\" (1.676 m)   Wt 178 lb (80.7 kg)   SpO2 98%   BMI 28.73 kg/m²     Temperature Range: Temp: 98.3 °F (36.8 °C) Temp  Av.7 °F (37.1 °C)  Min: 97.5 °F (36.4 °C)  Max: 101.5 °F (38.6 °C)  The patient is seen and evaluated at the bedside and she is sitting in the chair awake and alert in no acute distress. She does still have some fevers overnight. She does not report any subjective complaints. No abdominal pain nausea vomiting or diarrhea. She is still on 2 L of oxygen by nasal cannula    Review of Systems   Constitutional: Negative. Respiratory: Negative. Cardiovascular: Negative. Gastrointestinal: Negative. Genitourinary: Negative. Musculoskeletal: Negative. Skin: Negative. Neurological: Negative. Psychiatric/Behavioral: Negative. Physical Examination :     Physical Exam  Constitutional:       Appearance: She is well-developed. HENT:      Head: Normocephalic and atraumatic. Neck:      Musculoskeletal: Normal range of motion and neck supple. Cardiovascular:      Rate and Rhythm: Regular rhythm. Heart sounds: Normal heart sounds. Pulmonary:      Effort: Pulmonary effort is normal.      Breath sounds: Rhonchi present. Abdominal:      General: Bowel sounds are normal.      Palpations: Abdomen is soft. Skin:     General: Skin is warm and dry. Neurological:      Mental Status: She is alert and oriented to person, place, and time.          Laboratory data:   I have independently reviewed the followinglabs:  CBC with Differential:   Recent Labs     20  1721 20  0531   WBC 4.1 4.1   HGB 15.1 13.9   HCT 44.8 43.0   * 130*   LYMPHOPCT 15*  --    MONOPCT 8  --      BMP:   Recent Labs     20  1721 20  0531    143   K 4.2 4.5    108*   CO2 23 21   BUN 21 18   CREATININE 0.81 0.74     Hepatic Function Panel: No results for input(s): PROT, LABALBU, BILIDIR, IBILI, BILITOT, ALKPHOS, ALT, AST in the last 72 hours. No results for input(s): VANCOTROUGH in the last 72 hours. Lab Results   Component Value Date    CRP 70.8 (H) 08/13/2020     No results found for: SEDRATE    No results for input(s): PROCAL in the last 72 hours. Imaging Studies:   CTA OF THE CHEST 8/11/2020 5:51 pm    Impression    No PE visualized.         Peripheral ground-glass airspace opacities within both lungs.         Commonly reported imaging features of COVID-19 pneumonia are present.  Other    processes such as influenza pneumonia and organizing pneumonia, as can be    seen with drug toxicity and connective tissue disease, can cause a similar    imaging pattern. PneTyp         Large hiatal hernia. Cultures:   None    Medications:      remdesivir IVPB  100 mg Intravenous Q24H    docusate sodium  100 mg Oral Daily    donepezil  10 mg Oral Nightly    levothyroxine  125 mcg Oral Daily    cetirizine  10 mg Oral Daily    losartan  50 mg Oral Daily    mirtazapine  7.5 mg Oral Nightly    therapeutic multivitamin-minerals  1 tablet Oral Daily    pravastatin  40 mg Oral Nightly    sodium chloride flush  10 mL Intravenous 2 times per day    enoxaparin  40 mg Subcutaneous Daily           [facility]  Gilbert Ponce MD  Perfect Serve messaging  OFFICE: [phone]      Electronically signed by Gilbert Ponce MD on 8/14/2020 at 1:29 PM  Thank you for allowing us to participate in the care of this patient. Please call with questions. This note iscreated with the assistance of a speech recognition program.  While intending to generate a document that actually reflects the content of the visit, the document can still have some errors including those of syntax andsound a like substitutions which may escape proof reading.   In such instances, actual meaning can be extrapolated by contextual

## 2020-08-14 NOTE — RESEARCH
Clinical Research Services          Patient Name: Romayne Quill  MRN: [id number]  [place]: 1934  Date of evaluation: 8/14/2020  Time of evaluation: 0900  Reason for evaluation:      [x]  Expanded Access to Convalescent Plasma for the Treatment of Patients with COVID-19  Protocol # [id number]     IND# [id number]       NCT# [id number]      [x]  On 08/13/2020 at 2100 the subject received 1 unit of ABO compatible COVID-19 convalescent plasma. [x]  Chart reviewed and after discussion with the subjects RN, no adverse reactions were noted from the convalescent plasma. This was all reviewed with Dr. Yue Shah.   [x]  Subject continues in follow-up     883 Jessica Amaya RN     Clinical Research Nurse  For questions page or perfect serve Dr. Huang Kwok, Dr. Suki Menchaca, Dr. Fabian Macedo, Dr. Sergio Gutierrez, Dr Madeline Santoro, Dr. Dominik Leonard or contact the research nurse at [phone]

## 2020-08-15 LAB
SARS-COV-2, PCR: ABNORMAL
SARS-COV-2, RAPID: ABNORMAL
SARS-COV-2: DETECTED
SOURCE: ABNORMAL

## 2020-08-15 PROCEDURE — U0003 INFECTIOUS AGENT DETECTION BY NUCLEIC ACID (DNA OR RNA); SEVERE ACUTE RESPIRATORY SYNDROME CORONAVIRUS 2 (SARS-COV-2) (CORONAVIRUS DISEASE [COVID-19]), AMPLIFIED PROBE TECHNIQUE, MAKING USE OF HIGH THROUGHPUT TECHNOLOGIES AS DESCRIBED BY CMS-2020-01-R: HCPCS

## 2020-08-15 PROCEDURE — 6370000000 HC RX 637 (ALT 250 FOR IP): Performed by: HOSPITALIST

## 2020-08-15 PROCEDURE — 2580000003 HC RX 258: Performed by: NURSE PRACTITIONER

## 2020-08-15 PROCEDURE — 2700000000 HC OXYGEN THERAPY PER DAY

## 2020-08-15 PROCEDURE — 6370000000 HC RX 637 (ALT 250 FOR IP): Performed by: NURSE PRACTITIONER

## 2020-08-15 PROCEDURE — 6360000002 HC RX W HCPCS: Performed by: NURSE PRACTITIONER

## 2020-08-15 PROCEDURE — 99232 SBSQ HOSP IP/OBS MODERATE 35: CPT | Performed by: INTERNAL MEDICINE

## 2020-08-15 PROCEDURE — 99232 SBSQ HOSP IP/OBS MODERATE 35: CPT | Performed by: HOSPITALIST

## 2020-08-15 PROCEDURE — 2580000003 HC RX 258: Performed by: INTERNAL MEDICINE

## 2020-08-15 PROCEDURE — 1200000000 HC SEMI PRIVATE

## 2020-08-15 PROCEDURE — 94761 N-INVAS EAR/PLS OXIMETRY MLT: CPT

## 2020-08-15 RX ADMIN — MULTIPLE VITAMINS W/ MINERALS TAB 1 TABLET: TAB at 09:26

## 2020-08-15 RX ADMIN — BENZONATATE 100 MG: 100 CAPSULE ORAL at 14:02

## 2020-08-15 RX ADMIN — SODIUM CHLORIDE, PRESERVATIVE FREE 10 ML: 5 INJECTION INTRAVENOUS at 10:50

## 2020-08-15 RX ADMIN — MIRTAZAPINE 7.5 MG: 15 TABLET, FILM COATED ORAL at 21:44

## 2020-08-15 RX ADMIN — Medication 10 ML: at 14:02

## 2020-08-15 RX ADMIN — BENZONATATE 100 MG: 100 CAPSULE ORAL at 09:27

## 2020-08-15 RX ADMIN — Medication 10 ML: at 21:44

## 2020-08-15 RX ADMIN — ENOXAPARIN SODIUM 40 MG: 40 INJECTION SUBCUTANEOUS at 09:27

## 2020-08-15 RX ADMIN — DONEPEZIL HYDROCHLORIDE 10 MG: 10 TABLET, FILM COATED ORAL at 21:44

## 2020-08-15 RX ADMIN — BENZONATATE 100 MG: 100 CAPSULE ORAL at 21:44

## 2020-08-15 RX ADMIN — SODIUM CHLORIDE 100 MG: 9 INJECTION, SOLUTION INTRAVENOUS at 18:04

## 2020-08-15 RX ADMIN — LOSARTAN POTASSIUM 50 MG: 50 TABLET, FILM COATED ORAL at 09:26

## 2020-08-15 RX ADMIN — ALBUTEROL SULFATE 2 PUFF: 90 AEROSOL, METERED RESPIRATORY (INHALATION) at 09:28

## 2020-08-15 RX ADMIN — ALBUTEROL SULFATE 2 PUFF: 90 AEROSOL, METERED RESPIRATORY (INHALATION) at 21:45

## 2020-08-15 RX ADMIN — CETIRIZINE HYDROCHLORIDE 10 MG: 10 TABLET ORAL at 09:26

## 2020-08-15 RX ADMIN — PRAVASTATIN SODIUM 40 MG: 20 TABLET ORAL at 21:44

## 2020-08-15 RX ADMIN — ALBUTEROL SULFATE 2 PUFF: 90 AEROSOL, METERED RESPIRATORY (INHALATION) at 03:26

## 2020-08-15 RX ADMIN — LEVOTHYROXINE SODIUM 125 MCG: 125 TABLET ORAL at 09:26

## 2020-08-15 RX ADMIN — Medication 10 ML: at 09:27

## 2020-08-15 ASSESSMENT — ENCOUNTER SYMPTOMS
GASTROINTESTINAL NEGATIVE: 1
RESPIRATORY NEGATIVE: 1

## 2020-08-15 ASSESSMENT — PAIN SCALES - GENERAL
PAINLEVEL_OUTOF10: 0
PAINLEVEL_OUTOF10: 0

## 2020-08-15 NOTE — FLOWSHEET NOTE
Author offered assistance with AM care with patient refusing. Patient states, \"I just want to rest.\"  Patient is dry and Mahsa Russell will continue to check for incontinence. Dentures were cleaned and provided to client at bedside. Patient is aware to call nurse if she would like some assistance with AM care.

## 2020-08-15 NOTE — CARE COORDINATION
Per previous CM note, SNF is requiring repeat covid test prior to DC.   PS Dr. Bhavana Augustin with regard to this

## 2020-08-15 NOTE — PLAN OF CARE
Problem: Airway Clearance - Ineffective  Goal: Achieve or maintain patent airway  8/15/2020 1049 by Netta Stevenson RN  Outcome: Ongoing  8/15/2020 0634 by Bryant Bautista RN  Outcome: Met This Shift     Problem: Gas Exchange - Impaired  Goal: Absence of hypoxia  8/15/2020 1049 by Netta Stevenson RN  Outcome: Ongoing  8/15/2020 0634 by Bryant Bautista RN  Outcome: Met This Shift  Goal: Promote optimal lung function  8/15/2020 1049 by Netta Stevenson RN  Outcome: Ongoing  8/15/2020 0634 by Bryant Bautista RN  Outcome: Met This Shift     Problem: Breathing Pattern - Ineffective  Goal: Ability to achieve and maintain a regular respiratory rate  8/15/2020 1049 by Netta Stevenson RN  Outcome: Ongoing  8/15/2020 0634 by Bryant Bautista RN  Outcome: Met This Shift     Problem:  Body Temperature -  Risk of, Imbalanced  Goal: Ability to maintain a body temperature within defined limits  8/15/2020 1049 by Netta Stevenson RN  Outcome: Ongoing  8/15/2020 0634 by Bryant Bautista RN  Outcome: Met This Shift  Goal: Will regain or maintain usual level of consciousness  8/15/2020 1049 by Netta Stevenson RN  Outcome: Ongoing  8/15/2020 0634 by Bryant Bautista RN  Outcome: Met This Shift  Goal: Complications related to the disease process, condition or treatment will be avoided or minimized  8/15/2020 1049 by Netta Stevenson RN  Outcome: Ongoing  8/15/2020 0634 by Bryant Bautista RN  Outcome: Met This Shift     Problem: Isolation Precautions - Risk of Spread of Infection  Goal: Prevent transmission of infection  8/15/2020 1049 by Netta Stevenson RN  Outcome: Ongoing  8/15/2020 0634 by Bryant Bautista RN  Outcome: Met This Shift     Problem: Nutrition Deficits  Goal: Optimize nutrtional status  8/15/2020 1049 by Netta Stevenson RN  Outcome: Ongoing  8/15/2020 0634 by Bryant Bautista RN  Outcome: Met This Shift     Problem: Risk for Fluid Volume Deficit  Goal: Maintain normal heart rhythm  8/15/2020 1049 by Netta Stevenson RN  Outcome: Ongoing  8/15/2020 0634 by Janet Martin RN  Outcome: Met This Shift  Goal: Maintain absence of muscle cramping  8/15/2020 1049 by Flaco Gomez RN  Outcome: Ongoing  8/15/2020 0634 by Janet Martin RN  Outcome: Met This Shift  Goal: Maintain normal serum potassium, sodium, calcium, phosphorus, and pH  8/15/2020 1049 by Flaco Gomez RN  Outcome: Ongoing  8/15/2020 0634 by Janet Martin RN  Outcome: Met This Shift     Problem: Loneliness or Risk for Loneliness  Goal: Demonstrate positive use of time alone when socialization is not possible  8/15/2020 1049 by Flaco Gomez RN  Outcome: Ongoing  8/15/2020 0634 by Janet Martin RN  Outcome: Met This Shift     Problem: Fatigue  Goal: Verbalize increase energy and improved vitality  8/15/2020 1049 by Flaco Gomez RN  Outcome: Ongoing  8/15/2020 0634 by Janet Martin RN  Outcome: Met This Shift     Problem: Patient Education: Go to Patient Education Activity  Goal: Patient/Family Education  8/15/2020 1049 by Flaco Gomez RN  Outcome: Ongoing  8/15/2020 0634 by Janet Martin RN  Outcome: Met This Shift     Problem: Skin Integrity:  Goal: Will show no infection signs and symptoms  Description: Will show no infection signs and symptoms  8/15/2020 1049 by Flaco Gomez RN  Outcome: Ongoing  8/15/2020 0634 by Janet Martin RN  Outcome: Met This Shift  Goal: Absence of new skin breakdown  Description: Absence of new skin breakdown  8/15/2020 1049 by Flaco Gomez RN  Outcome: Ongoing  8/15/2020 0634 by Janet Martin RN  Outcome: Met This Shift     Problem: Falls - Risk of:  Goal: Will remain free from falls  Description: Will remain free from falls  8/15/2020 1049 by Flaco Gomez RN  Outcome: Ongoing  8/15/2020 0634 by Janet Martin RN  Outcome: Met This Shift  Goal: Absence of physical injury  Description: Absence of physical injury  8/15/2020 1049 by Flaco Gomez RN  Outcome: Ongoing  8/15/2020 0634 by Janet Martin RN  Outcome:  Met This Shift     Problem: Pain:  Description: Pain management should include both nonpharmacologic and pharmacologic interventions.   Goal: Pain level will decrease  Description: Pain level will decrease  8/15/2020 1049 by Maite Paez RN  Outcome: Ongoing  8/15/2020 0634 by Jennifer Ruiz RN  Outcome: Met This Shift  Goal: Control of acute pain  Description: Control of acute pain  8/15/2020 1049 by Maite Paez RN  Outcome: Ongoing  8/15/2020 0634 by Jennifer Ruiz RN  Outcome: Met This Shift  Goal: Control of chronic pain  Description: Control of chronic pain  8/15/2020 1049 by Maite Paez RN  Outcome: Ongoing  8/15/2020 0634 by Jennifer Ruiz RN  Outcome: Met This Shift

## 2020-08-15 NOTE — PLAN OF CARE
falls  Description: Will remain free from falls  Outcome: Met This Shift  Goal: Absence of physical injury  Description: Absence of physical injury  Outcome: Met This Shift     Problem: Pain:  Goal: Pain level will decrease  Description: Pain level will decrease  Outcome: Met This Shift  Goal: Control of acute pain  Description: Control of acute pain  Outcome: Met This Shift  Goal: Control of chronic pain  Description: Control of chronic pain  Outcome: Met This Shift

## 2020-08-15 NOTE — FLOWSHEET NOTE
Reviewed with Dr. Matthew Rodriguez results of Home O2 Eval with Respiratory's recommendations that patient continue on oxygen. Per Dr. Matthew Rodriguez, Leeanna Gosselin can discharge patient back to [facility] on oxygen after last dosage of IVPB of Remdesivir is completed\". Author will coordinate with Care Coordinator to see what SNIF will require in order to receive the patient back to the [facility] where patient came from.

## 2020-08-15 NOTE — PROGRESS NOTES
Home Oxygen Evaluation    Home Oxygen Evaluation completed. Patient is on 3 liters per minute via NC at rest.  Resting SpO2 = 91%  Resting SpO2 on room air = 87%    SpO2 on room air with exercise = NA  SpO2 on oxygen as above with exercise = NA    Nocturnal Oximetry with patient on room air is recommended is SpO2 is between 89% and 95% (requires additional order).     Madelyn Thomas  10:02 AM

## 2020-08-15 NOTE — PROGRESS NOTES
LABGLOM >60  --    GFRAA >60  --    CALCIUM 7.2*  --    TROPHS 16* 13     Recent Labs     08/13/20  0531 08/14/20  0802   *  --    POCGLU  --  75     ABG:  Lab Results   Component Value Date    POCPH 7.406 08/14/2020    POCPCO2 33.3 08/14/2020    POCPO2 83.2 08/14/2020    POCHCO3 20.9 08/14/2020    NBEA 3 08/14/2020    PBEA NOT REPORTED 08/14/2020    NSV0UXG 22 08/14/2020    KBHS7MXU 96 08/14/2020    FIO2 3.5 08/14/2020     Lab Results   Component Value Date/Time    SPECIAL NOT REPORTED 08/11/2020 05:36 PM     Lab Results   Component Value Date/Time    CULTURE NO GROWTH 3 DAYS 08/11/2020 05:36 PM       Radiology:  Xr Chest Portable    Result Date: 8/14/2020  Multifocal airspace opacities throughout the lungs bilaterally primarily within the left mid to lower lung. Findings are concerning for multifocal pneumonia, including possible viral/atypical etiologies. Ct Chest Pulmonary Embolism W Contrast    Result Date: 8/11/2020  No PE visualized. Peripheral ground-glass airspace opacities within both lungs. Commonly reported imaging features of COVID-19 pneumonia are present. Other processes such as influenza pneumonia and organizing pneumonia, as can be seen with drug toxicity and connective tissue disease, can cause a similar imaging pattern. PneTyp Large hiatal hernia.        Physical Examination:        General appearance:  alert, cooperative and no distress  Mental Status:  oriented to person, place and time and normal affect  Lungs:  clear to auscultation bilaterally, normal effort  Heart:  regular rate and rhythm, no murmur  Abdomen:  soft, nontender, nondistended, normal bowel sounds, no masses, hepatomegaly, splenomegaly  Extremities:  no edema, redness, tenderness in the calves  Skin:  no gross lesions, rashes, induration    Assessment:        Hospital Problems           Last Modified POA    * (Principal) Pneumonia due to COVID-19 virus 8/12/2020 Yes    Osteoarthritis 8/12/2020 Yes Hyperlipidemia 8/12/2020 Yes    Hypertension 8/12/2020 Yes    Mcdaniel esophagus 8/12/2020 Yes    GERD (gastroesophageal reflux disease) 8/12/2020 Yes    Chronic insomnia 8/12/2020 Yes    IBS (irritable bowel syndrome) 8/12/2020 Yes    Age-related osteoporosis with current pathological fracture with routine healing 8/12/2020 Yes    Polycythemia rubra vera ([facility] 8/12/2020 Yes    Overview Signed 3/16/2014 12:54 PM by Virgen Browning DO     sees oncology         SSS (sick sinus syndrome) ([facility] 8/12/2020 Yes    Cardiac pacemaker in situ 8/12/2020 Yes    Overview Addendum 9/22/2014  9:49 AM by Virgen Browning DO     Put in for severe bradycardia. Dual chamber         Closed right hip fracture, with routine healing, subsequent encounter 8/12/2020 Yes    Early onset Alzheimer's disease with behavioral disturbance ([facility] 8/12/2020 Yes    Major depressive disorder with single episode, in remission ([facility] [address] [facility] 8/12/2020 Yes    CAD (coronary artery disease) 8/12/2020 Yes    Overview Signed 9/6/2019  2:32 PM by Rodrigo El DO     nonobstructive         COVID-19 8/11/2020 Yes          Plan:        1. Elevated troponin -appreciate cardiology evaluation and recommendation.  ACS ruled out. Nessa Soni will follow-up as outpatient. 2. COVID-19 positive - Infectious disease started Remdesivir.  Patient will complete the course prior to discharge. Has increased need for oxygen on exertion. Last dose of Remdesivir on 8/16/2020   3. Hypertension and hyperlipidemia -stable.  Continue home medications.    4. Sick sinus syndrome -status post pacemaker.    5. History of coronary artery disease  6. GERD  7.  Polycythemia rubra vera -patient follows as outpatient with oncology    Artie Small DO  8/15/2020  9:11 AM

## 2020-08-15 NOTE — PROGRESS NOTES
help with the COVID-19 virus infection. Current evaluation:8/15/2020    BP (!) 129/50   Pulse 63   Temp 98.2 °F (36.8 °C) (Oral)   Resp 24   Ht 5' 6\" (1.676 m)   Wt 178 lb (80.7 kg)   SpO2 91%   BMI 28.73 kg/m²     Temperature Range: Temp: 98.2 °F (36.8 °C) Temp  Av.7 °F (37.1 °C)  Min: 98.2 °F (36.8 °C)  Max: 99.1 °F (37.3 °C)  The patient is seen and evaluated at bedside she is awake and alert in no acute distress  She continues on supplemental oxygen does not report any new issues or concerns today. No fevers overnight. And continues to require oxygen    Review of Systems   Constitutional: Negative. Respiratory: Negative. Cardiovascular: Negative. Gastrointestinal: Negative. Genitourinary: Negative. Musculoskeletal: Negative. Skin: Negative. Neurological: Negative. Psychiatric/Behavioral: Negative. Physical Examination :     Physical Exam  Constitutional:       Appearance: She is well-developed. HENT:      Head: Normocephalic and atraumatic. Neck:      Musculoskeletal: Normal range of motion and neck supple. Cardiovascular:      Rate and Rhythm: Regular rhythm. Heart sounds: Normal heart sounds. Pulmonary:      Effort: Pulmonary effort is normal.      Breath sounds: Rhonchi present. Abdominal:      General: Bowel sounds are normal.      Palpations: Abdomen is soft. Skin:     General: Skin is warm and dry. Neurological:      Mental Status: She is alert and oriented to person, place, and time. Laboratory data:   I have independently reviewed the followinglabs:  CBC with Differential:   Recent Labs     20  0531   WBC 4.1   HGB 13.9   HCT 43.0   *     BMP:   Recent Labs     20  0531      K 4.5   *   CO2 21   BUN 18   CREATININE 0.74     Hepatic Function Panel: No results for input(s): PROT, LABALBU, BILIDIR, IBILI, BILITOT, ALKPHOS, ALT, AST in the last 72 hours.   No results for input(s): Marliss Star in the last 72 hours. Lab Results   Component Value Date    CRP 70.8 (H) 08/13/2020     No results found for: SEDRATE    No results for input(s): PROCAL in the last 72 hours. Imaging Studies:   CTA OF THE CHEST 8/11/2020 5:51 pm    Impression    No PE visualized.         Peripheral ground-glass airspace opacities within both lungs.         Commonly reported imaging features of COVID-19 pneumonia are present.  Other    processes such as influenza pneumonia and organizing pneumonia, as can be    seen with drug toxicity and connective tissue disease, can cause a similar    imaging pattern. PneTyp         Large hiatal hernia. Cultures:   None    Medications:      remdesivir IVPB  100 mg Intravenous Q24H    docusate sodium  100 mg Oral Daily    donepezil  10 mg Oral Nightly    levothyroxine  125 mcg Oral Daily    cetirizine  10 mg Oral Daily    losartan  50 mg Oral Daily    mirtazapine  7.5 mg Oral Nightly    therapeutic multivitamin-minerals  1 tablet Oral Daily    pravastatin  40 mg Oral Nightly    sodium chloride flush  10 mL Intravenous 2 times per day    enoxaparin  40 mg Subcutaneous Daily           [facility]  Shahnaz Jeff MD  [employer] messaging  OFFICE: [phone]      Electronically signed by Shahnaz Jeff MD on 8/15/2020 at 12:55 PM  Thank you for allowing us to participate in the care of this patient. Please call with questions. This note iscreated with the assistance of a speech recognition program.  While intending to generate a document that actually reflects the content of the visit, the document can still have some errors including those of syntax andsound a like substitutions which may escape proof reading. In such instances, actual meaning can be extrapolated by contextual diversion.

## 2020-08-16 VITALS
TEMPERATURE: 98.1 F | SYSTOLIC BLOOD PRESSURE: 138 MMHG | HEIGHT: 66 IN | OXYGEN SATURATION: 92 % | WEIGHT: 178 LBS | HEART RATE: 62 BPM | DIASTOLIC BLOOD PRESSURE: 62 MMHG | BODY MASS INDEX: 28.61 KG/M2 | RESPIRATION RATE: 24 BRPM

## 2020-08-16 PROCEDURE — 2580000003 HC RX 258: Performed by: NURSE PRACTITIONER

## 2020-08-16 PROCEDURE — 6370000000 HC RX 637 (ALT 250 FOR IP): Performed by: NURSE PRACTITIONER

## 2020-08-16 PROCEDURE — 94761 N-INVAS EAR/PLS OXIMETRY MLT: CPT

## 2020-08-16 PROCEDURE — 6360000002 HC RX W HCPCS: Performed by: NURSE PRACTITIONER

## 2020-08-16 PROCEDURE — 2580000003 HC RX 258: Performed by: INTERNAL MEDICINE

## 2020-08-16 PROCEDURE — 99239 HOSP IP/OBS DSCHRG MGMT >30: CPT | Performed by: HOSPITALIST

## 2020-08-16 RX ORDER — LOPERAMIDE HYDROCHLORIDE 2 MG/1
2 CAPSULE ORAL 4 TIMES DAILY PRN
Qty: 40 CAPSULE | Refills: 0 | Status: SHIPPED | OUTPATIENT
Start: 2020-08-16 | End: 2020-08-26

## 2020-08-16 RX ADMIN — SODIUM CHLORIDE, PRESERVATIVE FREE 10 ML: 5 INJECTION INTRAVENOUS at 09:52

## 2020-08-16 RX ADMIN — ENOXAPARIN SODIUM 40 MG: 40 INJECTION SUBCUTANEOUS at 09:00

## 2020-08-16 RX ADMIN — CETIRIZINE HYDROCHLORIDE 10 MG: 10 TABLET ORAL at 09:51

## 2020-08-16 RX ADMIN — LOSARTAN POTASSIUM 50 MG: 50 TABLET, FILM COATED ORAL at 09:51

## 2020-08-16 RX ADMIN — MULTIPLE VITAMINS W/ MINERALS TAB 1 TABLET: TAB at 09:52

## 2020-08-16 RX ADMIN — LEVOTHYROXINE SODIUM 125 MCG: 125 TABLET ORAL at 09:52

## 2020-08-16 RX ADMIN — SODIUM CHLORIDE 100 MG: 9 INJECTION, SOLUTION INTRAVENOUS at 17:34

## 2020-08-16 RX ADMIN — DOCUSATE SODIUM 100 MG: 100 CAPSULE, LIQUID FILLED ORAL at 09:52

## 2020-08-16 ASSESSMENT — PAIN SCALES - WONG BAKER
WONGBAKER_NUMERICALRESPONSE: 0

## 2020-08-16 ASSESSMENT — PAIN DESCRIPTION - FREQUENCY
FREQUENCY: INTERMITTENT
FREQUENCY: INTERMITTENT

## 2020-08-16 ASSESSMENT — PAIN DESCRIPTION - PROGRESSION
CLINICAL_PROGRESSION: NOT CHANGED

## 2020-08-16 ASSESSMENT — PAIN - FUNCTIONAL ASSESSMENT
PAIN_FUNCTIONAL_ASSESSMENT: PREVENTS OR INTERFERES SOME ACTIVE ACTIVITIES AND ADLS
PAIN_FUNCTIONAL_ASSESSMENT: PREVENTS OR INTERFERES SOME ACTIVE ACTIVITIES AND ADLS

## 2020-08-16 ASSESSMENT — PAIN DESCRIPTION - PAIN TYPE
TYPE: CHRONIC PAIN
TYPE: CHRONIC PAIN

## 2020-08-16 ASSESSMENT — PAIN DESCRIPTION - LOCATION
LOCATION: LEG
LOCATION: GENERALIZED

## 2020-08-16 ASSESSMENT — PAIN DESCRIPTION - ONSET
ONSET: ON-GOING
ONSET: ON-GOING

## 2020-08-16 ASSESSMENT — PAIN SCALES - GENERAL
PAINLEVEL_OUTOF10: 0

## 2020-08-16 ASSESSMENT — PAIN DESCRIPTION - DESCRIPTORS
DESCRIPTORS: DISCOMFORT
DESCRIPTORS: DISCOMFORT

## 2020-08-16 ASSESSMENT — PAIN DESCRIPTION - ORIENTATION: ORIENTATION: LOWER

## 2020-08-16 NOTE — DISCHARGE SUMMARY
[facility]    Discharge Summary     Patient ID: Carlos Cali  :  1934   MRN: [id number]     ACCOUNT:  [de-identified]   Patient's PCP: Moreno Lr DO  Admit Date: 2020   Discharge Date: 2020     Length of Stay: 5  Code Status:  Full Code  Admitting Physician: Vignesh Michelle DO  Discharge Physician: Vignesh Michelle DO     Active Discharge Diagnoses:     Hospital Problem Lists:  Principal Problem:    Pneumonia due to COVID-19 virus  Active Problems:    Osteoarthritis    Hyperlipidemia    Hypertension    Mcdaniel esophagus    GERD (gastroesophageal reflux disease)    Chronic insomnia    IBS (irritable bowel syndrome)    Age-related osteoporosis with current pathological fracture with routine healing    Polycythemia rubra vera (HCC)    SSS (sick sinus syndrome) ([facility] [address]    Cardiac pacemaker in situ    Closed right hip fracture, with routine healing, subsequent encounter    Early onset Alzheimer's disease with behavioral disturbance ([facility] [address]    Major depressive disorder with single episode, in remission ([facility] [address]    CAD (coronary artery disease)    COVID-19  Resolved Problems:    * No resolved hospital problems. *      Admission Condition:  serious     Discharged Condition: stable    Hospital Stay:     Hospital Course:  Carlos Cali is a 80 y.o. female who was admitted for the management of   Pneumonia due to COVID-19 virus , presented to ER with No chief complaint on file. Patient was sent to hospital from a nursing home for evaluation of cough and fever. Patient was tested positive for COVID-19. Patient also had mild elevation of troponin. Troponin elevation was secondary to demand ischemia. Patient's fever subsided without intervention. Patient was maintained with supplemental oxygen between 2 to 3 L/min. Patient remained stable throughout the hospital stay.   Patient was given 1 unit of convalescent plasma and completed a course of Remdesivir. Patient is now stable for discharge to SNF.     Significant therapeutic interventions: As above    Significant Diagnostic Studies:   Labs / Micro:  CBC:   Lab Results   Component Value Date    WBC 4.1 08/13/2020    RBC 5.05 08/13/2020    HGB 13.9 08/13/2020    HCT 43.0 08/13/2020    MCV 85.1 08/13/2020    MCH 27.5 08/13/2020    MCHC 32.3 08/13/2020    RDW 14.4 08/13/2020     08/13/2020     BMP:    Lab Results   Component Value Date    GLUCOSE 92 08/13/2020     08/13/2020    K 4.5 08/13/2020     08/13/2020    CO2 21 08/13/2020    ANIONGAP 14 08/13/2020    BUN 18 08/13/2020    CREATININE 0.74 08/13/2020    BUNCRER NOT REPORTED 08/13/2020    CALCIUM 7.2 08/13/2020    LABGLOM >60 08/13/2020    GFRAA >60 08/13/2020    GFR      08/13/2020    GFR NOT REPORTED 08/13/2020     HFP:    Lab Results   Component Value Date    PROT 5.8 07/15/2020     CMP:    Lab Results   Component Value Date    GLUCOSE 92 08/13/2020     08/13/2020    K 4.5 08/13/2020     08/13/2020    CO2 21 08/13/2020    BUN 18 08/13/2020    CREATININE 0.74 08/13/2020    ANIONGAP 14 08/13/2020    ALKPHOS 92 07/15/2020    ALT 12 07/15/2020    AST 17 07/15/2020    BILITOT 0.40 07/15/2020    LABALBU 3.4 07/15/2020    ALBUMIN 1.4 07/15/2020    LABGLOM >60 08/13/2020    GFRAA >60 08/13/2020    GFR      08/13/2020    GFR NOT REPORTED 08/13/2020    PROT 5.8 07/15/2020    CALCIUM 7.2 08/13/2020     PT/INR:    Lab Results   Component Value Date    PROTIME 10.4 07/25/2017    INR 1.0 07/25/2017     PTT:   Lab Results   Component Value Date    APTT 25.3 07/25/2017     FLP:    Lab Results   Component Value Date    CHOL 157 06/01/2020    TRIG 134 06/01/2020    HDL 54 06/01/2020     U/A:    Lab Results   Component Value Date    COLORU NOT REPORTED 07/15/2020    TURBIDITY NOT REPORTED 07/15/2020    SPECGRAV 1.030 07/15/2020    HGBUR NEGATIVE 07/15/2020    PHUR 5.0 07/15/2020    PROTEINU NEGATIVE 07/15/2020    GLUCOSEU NEGATIVE

## 2020-08-16 NOTE — DISCHARGE INSTR - COC
Continuity of Care Form    Patient Name: Adis Rhoades   :  1934  MRN:  [id number]    Admit date:  2020  Discharge date:  20    Code Status Order: Full Code   Advance Directives:   [facility] Documentation     Date/Time Healthcare Directive Type of Healthcare Directive Copy in [facility] [address] Agent's Name Healthcare Agent's Phone Number    [phone]  Yes, patient has an advance directive for healthcare treatment  Durable power of  for health care  No, copy requested from family  Legal Guardian neice   --  --          Admitting Physician:  Ekta Cullen DO  PCP: Tami Omalley DO    Discharging Nurse: VENKAT NO [facility] Unit/Room#: 3027/3027-01  Discharging Unit Phone Number: [phone]    Emergency Contact:   Extended Emergency Contact Information  Primary Emergency Contact: [facility]  Address: [address] [place] [address] Phone: [phone]  Mobile Phone: [phone]  Relation: [address] [place] [address]  Secondary Emergency Contact: Lamonte Sanchez  [facility] Phone: [phone]  Relation: Brother/Sister    Past Surgical History:  Past Surgical History:   Procedure Laterality Date    BONE MARROW BIOPSY  2010    polycythemia vera    BREAST BIOPSY Right     benign    CARDIAC CATHETERIZATION      nonobstructive CAD    CHOLECYSTECTOMY      COLONOSCOPY      COLONOSCOPY  2016    colon polyp, diverticulosis - Dr Balwinder Thapa    after miscarriage    ENDOSCOPY, COLON, DIAGNOSTIC      normal  external hemorrhoids    EYE SURGERY Right 06    Cataract extraction with lens implant    HEMIARTHROPLASTY HIP Right 2019    RIGHT HIP HEMIARTHROPLASTY performed by Yashira Whyte MD at [address], THYROID Left     thyroid cancer    PACEMAKER INSERTION  2013    [facility], THE SKIN BIOPSY   mycosis fungoides    TAE AND BSO      TAE/BSO at age 54 with anterior repair for benign reasons.     TONSILLECTOMY AND ADENOIDECTOMY      age 27   [employer] TUBAL LIGATION      UPPER GASTROINTESTINAL ENDOSCOPY  2002    UPPER GASTROINTESTINAL ENDOSCOPY  08/28/2003    UPPER GASTROINTESTINAL ENDOSCOPY  05/12/2003    UPPER GASTROINTESTINAL ENDOSCOPY  03/20/2001    UPPER GASTROINTESTINAL ENDOSCOPY  02/29/2016    Barretts tissue, body polyp - Dr Romi Galicia       Immunization History:   Immunization History   Administered Date(s) Administered    DTaP 04/10/1997, 07/28/2009    Pneumococcal Conjugate 13-valent (Lorence Guard) 07/26/2017    Pneumococcal Polysaccharide ([id number]) 11/02/2005    Zoster Live (Zostavax) 06/07/2012       Active Problems:  Patient Active Problem List   Diagnosis Code    Osteoarthritis M19.90    Hyperlipidemia E78.5    Hypertension I10    Mcdaniel esophagus K22.70    GERD (gastroesophageal reflux disease) K21.9    PUD (peptic ulcer disease) K27.9    Hiatal hernia K44.9    Chronic insomnia F51.04    IBS (irritable bowel syndrome) K58.9    Age-related osteoporosis with current pathological fracture with routine healing M80.00XD    Vitamin D deficiency E55.9    Thyroid cancer ([facility] [address] C73    Polycythemia rubra vera ([facility] [address] D45    SSS (sick sinus syndrome) ([address] I49.5    Cardiac pacemaker in situ Z95.0    TIA (transient ischemic attack) G45.9    Hypothyroidism E03.9    Closed right hip fracture, with routine healing, subsequent encounter S72.001D    Early onset Alzheimer's disease with behavioral disturbance (HCC) G30.0, F02.81    Major depressive disorder with single episode, in remission ([address] F32.5    Anxiety F41.9    Elevated fasting glucose R73.01    CAD (coronary artery disease) I25.10    COVID-19 U07.1    Pneumonia due to COVID-19 virus U07.1, J12.89       Isolation/Infection:   Isolation          Droplet Plus        Patient Infection Status     Infection Onset Added Last Indicated Last Indicated By Review Planned Expiration Resolved Resolved By    COVID-19 08/11/20 08/11/20 08/15/20 COVID-19 08/22/20 10/06/20      Resolved    COVID-19 Rule Out 08/11/20 08/11/20 08/11/20 COVID-19 (Ordered)   08/11/20 Rule-Out Test Resulted    COVID-19 Rule Out 07/15/20 07/15/20 07/15/20 COVID-19 (Ordered)   07/15/20 Rule-Out Test Resulted          Nurse Assessment:  Last Vital Signs: /62   Pulse 62   Temp 98.1 °F (36.7 °C) (Oral)   Resp 24   Ht 5' 6\" (1.676 m)   Wt 178 lb (80.7 kg)   SpO2 92%   BMI 28.73 kg/m²     Last documented pain score (0-10 scale): Pain Level: 0  Last Weight:   Wt Readings from Last 1 Encounters:   08/13/20 178 lb (80.7 kg)     Mental Status:  oriented and alert    IV Access:  - None    Nursing Mobility/ADLs:  Walking   Assisted  Transfer  Assisted  Bathing  Assisted  Dressing  Assisted  Toileting  Assisted  Feeding  Independent  Med Admin  Independent  Med Delivery   whole    Wound Care Documentation and Therapy:        Elimination:  Continence:   · Bowel: Yes  · Bladder: Yes  Urinary Catheter: None   Colostomy/Ileostomy/Ileal Conduit: No       Date of Last BM:     Intake/Output Summary (Last 24 hours) at 8/16/2020 1303  Last data filed at 8/16/2020 1211  Gross per 24 hour   Intake 1156.33 ml   Output 300 ml   Net 856.33 ml     I/O last 3 completed shifts: In: 1773.6 [P.O.:480; I.V.:1043.6; IV Piggyback:250]  Out: 700 [Urine:700]    Safety Concerns: At Risk for Falls    Impairments/Disabilities:      None    Nutrition Therapy:  Current Nutrition Therapy:   - Oral Diet:  General    Routes of Feeding: Oral  Liquids: No Restrictions  Daily Fluid Restriction: no  Last Modified Barium Swallow with Video (Video Swallowing Test): not done    Treatments at the Time of Hospital Discharge:   Respiratory Treatments: oxygen  Oxygen Therapy:  is on oxygen at 3 L/min per nasal cannula.   Ventilator:    - No ventilator support    Rehab Therapies: Physical Therapy/ Occupational Weight Bearing Status/Restrictions: No weight bearing restirctions  Other Medical Equipment (for information only, NOT a DME order):  wheelchair  Other Treatments:     Patient's personal belongings (please select all that are sent with patient):  Glasses    RN SIGNATURE:  Electronically signed by Colby Rodriguez RN on 8/16/20 at 2:26 PM EDT    CASE MANAGEMENT/SOCIAL WORK SECTION    Inpatient Status Date: ***    Readmission Risk Assessment Score:  Readmission Risk              Risk of Unplanned Readmission:        13           Discharging to Facility/ Agency   · Name: Ivone Banegas  Address:  [address] [place] [address]         Phone: [phone]       Fax: [phone]        ·   · Phone:  · Fax:    Dialysis Facility (if applicable)   · Name:  · Address:  · Dialysis Schedule:  · Phone:  · Fax:    / signature: Electronically signed by Sendy Gaona RN on 8/16/20 at 1:03 PM EDT    PHYSICIAN SECTION    Prognosis: Fair    Condition at Discharge: Stable    Rehab Potential (if transferring to Rehab): Fair    Recommended Labs or Other Treatments After Discharge: Continue supplemental oxygen, PT and OT    Physician Certification: I certify the above information and transfer of Romayne Quill  is necessary for the continuing treatment of the diagnosis listed and that she requires [facility] for greater 30 days.      Update Admission H&P: No change in H&P    PHYSICIAN SIGNATURE:  Electronically signed by Dayan Ryan DO on 8/16/20 at 1:33 PM EDT

## 2020-08-16 NOTE — PLAN OF CARE
Problem: Airway Clearance - Ineffective  Goal: Achieve or maintain patent airway  Outcome: Met This Shift     Problem: Gas Exchange - Impaired  Goal: Absence of hypoxia  Outcome: Met This Shift  Goal: Promote optimal lung function  Outcome: Met This Shift     Problem: Breathing Pattern - Ineffective  Goal: Ability to achieve and maintain a regular respiratory rate  Outcome: Ongoing     Problem:  Body Temperature -  Risk of, Imbalanced  Goal: Ability to maintain a body temperature within defined limits  Outcome: Met This Shift  Goal: Will regain or maintain usual level of consciousness  Outcome: Met This Shift  Goal: Complications related to the disease process, condition or treatment will be avoided or minimized  Outcome: Met This Shift     Problem: Isolation Precautions - Risk of Spread of Infection  Goal: Prevent transmission of infection  Outcome: Met This Shift     Problem: Nutrition Deficits  Goal: Optimize nutrtional status  Outcome: Not Met This Shift     Problem: Risk for Fluid Volume Deficit  Goal: Maintain normal heart rhythm  Outcome: Met This Shift  Goal: Maintain absence of muscle cramping  Outcome: Met This Shift  Goal: Maintain normal serum potassium, sodium, calcium, phosphorus, and pH  Outcome: Met This Shift     Problem: Loneliness or Risk for Loneliness  Goal: Demonstrate positive use of time alone when socialization is not possible  Outcome: Met This Shift     Problem: Fatigue  Goal: Verbalize increase energy and improved vitality  Outcome: Met This Shift     Problem: Patient Education: Go to Patient Education Activity  Goal: Patient/Family Education  Outcome: Met This Shift     Problem: Skin Integrity:  Goal: Will show no infection signs and symptoms  Description: Will show no infection signs and symptoms  Outcome: Met This Shift  Goal: Absence of new skin breakdown  Description: Absence of new skin breakdown  Outcome: Met This Shift     Problem: Falls - Risk of:  Goal: Will remain free from falls  Description: Will remain free from falls  Outcome: Met This Shift  Goal: Absence of physical injury  Description: Absence of physical injury  Outcome: Met This Shift     Problem: Pain:  Goal: Pain level will decrease  Description: Pain level will decrease  Outcome: Met This Shift  Goal: Control of acute pain  Description: Control of acute pain  Outcome: Met This Shift  Goal: Control of chronic pain  Description: Control of chronic pain  Outcome: Met This Shift

## 2020-08-16 NOTE — CARE COORDINATION
Received call from Dr. Ward George asking if patient can be discharged tonight after receiving last dose of Remdesivir. Spoke with [facility] liaison BODØ, she will check with building and call me back    1300 received call back from BODØ at [facility], they can take patient back today. I will arrange transportation to accommodate last Remdesivir dose. Notified Dr. Ward George via PS    [phone] spoke with Gianna Pinto at [facility], patient scheduled for  at [address] [place] [address] at [facility].   Patient's legal guardian Willa Fuentes notified over the phone    [phone] AVS/JESÚS faxed to Nila Hoyt RN updated and given number for report, she has updated the patient    Discharge Report    [facility] Case Management Department  Written by: Sierra Conley RN    Patient Name: Wilma Dent  Attending Provider: Lahoma Dancer, DO  Admit Date: 2020 11:19 PM  MRN: [id number]  Account: [de-identified]                     : 1934  Discharge Date:  2020        Disposition: SNF [facility] [place] at [address], RN

## 2020-08-16 NOTE — PROGRESS NOTES
Patient continue to have audible wheezing and at times SOB. Spoke to Dr. Balta Smith and he would like day shift to discuss solumedrol with ID. Writer contacted RT for assessment and recommendations. RT recommends acapella device and mucomyst without assessment. Will pass on recommendation to day shift as ID will need to place orders. Writer will continue to monitor patient.

## 2020-08-17 ENCOUNTER — TELEPHONE (OUTPATIENT)
Dept: INTERNAL MEDICINE | Age: 85
End: 2020-08-17

## 2020-08-17 NOTE — TELEPHONE ENCOUNTER
Received fax from [facility], see attached scan

## 2020-08-18 ENCOUNTER — TELEPHONE (OUTPATIENT)
Dept: INTERNAL MEDICINE | Age: 85
End: 2020-08-18

## 2020-08-18 LAB
CULTURE: NORMAL
Lab: NORMAL
SPECIMEN DESCRIPTION: NORMAL

## 2020-08-24 ENCOUNTER — TELEPHONE (OUTPATIENT)
Dept: INTERNAL MEDICINE | Age: 85
End: 2020-08-24

## 2020-08-25 PROCEDURE — 99309 SBSQ NF CARE MODERATE MDM 30: CPT | Performed by: INTERNAL MEDICINE

## 2020-08-31 ENCOUNTER — OUTSIDE SERVICES (OUTPATIENT)
Dept: INTERNAL MEDICINE | Age: 85
End: 2020-08-31
Payer: MEDICARE

## 2020-08-31 NOTE — PROGRESS NOTES
DR. Mendoza Steen - TELEHEALTH NURSING HOME VISIT     DATE OF SERVICE: 8/25/20    NURSING HOME: [facility]    CHIEF COMPLAINT/HISTORY OF CHIEF COMPLAINT: This patient is being seen via telehealth for ongoing evaluation and management of her Alzheimer's disease, elevated fasting glucose, hypertension, hyperlipidemia, history of thyroid cancer with postoperative hypothyroidism, coronary artery disease, sick sinus syndrome, gastroesophageal reflux disease, Mcdaniel's esophagus, irritable bowel syndrome, polycythemia, depression, and being overweight. She was recently hospitalized at [facility] with COVID-19 disease, having contracted during the recent outbreak at the facility. Since getting out of the hospital she has been doing a lot better. She also had a myocardial infarction when she was hospitalized. Family has changed her code status to [facility]. There are no other complaints. ALLERGIES: No Known Allergies    MEDICATIONS: As noted on the [facility] MAR, referenced and incorporated herein. PAST MEDICAL HISTORY:   Past Medical History:   Diagnosis Date    Age-related osteoporosis with current pathological fracture with routine healing     Anxiety 9/6/2019    Mcdaniel esophagus     CAD (coronary artery disease)     nonobstructive    Cardiac pacemaker in situ 4/24/2014    Put in for severe bradycardia.  Dual chamber     Chronic insomnia     Closed right hip fracture, with routine healing, subsequent encounter 8/18/2019    Early onset Alzheimer's disease with behavioral disturbance ([address] 9/6/2019    Elevated fasting glucose 9/6/2019    GERD (gastroesophageal reflux disease)     Hiatal hernia     Hyperlipidemia     Hypertension     Hypothyroidism s/p surgical removal of thyroid for thyroid carcinoma 7/29/2015    IBS (irritable bowel syndrome)     Major depressive disorder with single episode, in remission ([address] 9/6/2019    Mycosis fungoides ([address]     Osteoarthritis     Osteoporosis     Patient in clinical research study 08/13/2020    Enrolled in convalescent plasma for COVID 19 Date of completion 08/16/20    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 Paty Wiggins 3/24/2014    SSS (sick sinus syndrome) ([facility] 4/24/2014    Thyroid cancer ([facility] about 5 years ago    thyroid /following with oncology    TIA (transient ischemic attack) 7/23/2014    Confusion--word searching    Vitamin D deficiency        PAST SURGICAL HISTORY:   Past Surgical History:   Procedure Laterality Date    BONE MARROW BIOPSY  September 2010    polycythemia vera    BREAST BIOPSY Right     benign    CARDIAC CATHETERIZATION  2008    nonobstructive CAD    CHOLECYSTECTOMY  1995    COLONOSCOPY  2002    COLONOSCOPY  2/29/2016    colon polyp, diverticulosis - Dr J Carlos Grant    after miscarriage    ENDOSCOPY, COLON, DIAGNOSTIC  2002    normal  external hemorrhoids    EYE SURGERY Right 11/07/06    Cataract extraction with lens implant    HEMIARTHROPLASTY HIP Right 8/20/2019    RIGHT HIP HEMIARTHROPLASTY performed by Kristine De Leon MD at [address], THYROID Left     thyroid cancer    PACEMAKER INSERTION  6/2013    [facility] SKIN BIOPSY  2003    mycosis fungoides    TAE AND BSO      TAE/BSO at age 54 with anterior repair for benign reasons.     TONSILLECTOMY AND ADENOIDECTOMY      age 27   [facility] Ni TUBAL LIGATION      UPPER GASTROINTESTINAL ENDOSCOPY  2002    UPPER GASTROINTESTINAL ENDOSCOPY  08/28/2003    UPPER GASTROINTESTINAL ENDOSCOPY  05/12/2003    UPPER GASTROINTESTINAL ENDOSCOPY  03/20/2001    UPPER GASTROINTESTINAL ENDOSCOPY  02/29/2016    Barretts tissue, body polyp - Dr Jordan Grace HISTORY:     Tobacco:   Social History     Tobacco Use   Smoking Status Never Smoker   Smokeless Tobacco Never Used   Tobacco Comment    never smoker     Alcohol: Social History     Substance and Sexual Activity   Alcohol Use No    Alcohol/week: 0.0 standard drinks     Drugs:   Social History     Substance and Sexual Activity   Drug Use No       FAMILY HISTORY: family history includes Cancer (age of onset: 47) in her mother; Depression in her son; Drug Abuse in her son; Emphysema in her sister; Mental Illness in her son; Other in her brother and son; Stroke (age of onset: 76) in her father; Sudden Death (age of onset: 78) in her sister. REVIEW OF SYSTEMS:     Please see history of chief complaint above; otherwise no new problems with respect to General, HEENT, Cardiovascular, Respiratory, Gastrointestinal, Genitourinary, Endocrinologic, Musculoskeletal, or Neuropsychiatric complaints. PHYSICAL EXAMINATION:    Due to this being a telehealth visit, the physical examination was performed by the nurse at the facility. Vitals: Temp: 97.8 deg F. Pulse: 60. Resp: 18. BP: 156/67. General: A 80 y.o.  female. Alert and oriented to person and place and questionably oriented to time. She does not appear to be in any acute distress. Skin: Skin color, texture, turgor normal. No rashes or lesions. HEENT/Neck: Essentially unremarkable  Lungs: Normal - CTA without rales, rhonchi, or wheezing. Heart: regular rate and rhythm, S1, S2 normal, no murmur, click, rub or gallop No S3 or S4. Abdomen: Non-obese soft, non-distended, non-tender, normal active bowel sounds, no masses palpated and no hepatosplenomegaly  Extremities: No clubbing, cyanosis, or edema in any of the extremities. Neurologic: cranial nerves II-XII are grossly intact    ASSESSMENT:     Diagnosis Orders   1. Pneumonia due to COVID-19 virus     2. Myocardial infarction, unspecified MI type, unspecified artery ([facility]     3. Early onset Alzheimer's disease with behavioral disturbance ([facility]     4. Elevated fasting glucose     5. Essential hypertension     6. Mixed hyperlipidemia     7.  Thyroid cancer ([facility]

## 2020-10-07 ENCOUNTER — CARE COORDINATION (OUTPATIENT)
Dept: CASE MANAGEMENT | Age: 85
End: 2020-10-07

## 2020-10-20 ENCOUNTER — OUTSIDE SERVICES (OUTPATIENT)
Dept: INTERNAL MEDICINE | Age: 85
End: 2020-10-20
Payer: MEDICARE

## 2020-10-21 NOTE — PROGRESS NOTES
DR. Mary Trevino - TELEHEALTH NURSING HOME VISIT     DATE OF SERVICE: 9/22/20    NURSING HOME: [facility]    CHIEF COMPLAINT/HISTORY OF CHIEF COMPLAINT: This patient is being seen via telehealth for ongoing evaluation and management of her Alzheimer's disease, elevated fasting glucose, hypertension, hyperlipidemia, history of thyroid cancer with postoperative hypothyroidism, coronary artery disease, sick sinus syndrome, gastroesophageal reflux disease, Mcdaniel's esophagus, irritable bowel syndrome, polycythemia, depression, and being overweight. There are no new complaints at this time. ALLERGIES: No Known Allergies    MEDICATIONS: As noted on the [facility] MAR, referenced and incorporated herein. PAST MEDICAL HISTORY:   Past Medical History:   Diagnosis Date    Age-related osteoporosis with current pathological fracture with routine healing     Anxiety 9/6/2019    Mcdaniel esophagus     CAD (coronary artery disease)     nonobstructive    Cardiac pacemaker in situ 4/24/2014    Put in for severe bradycardia.  Dual chamber     Chronic insomnia     Closed right hip fracture, with routine healing, subsequent encounter 8/18/2019    Early onset Alzheimer's disease with behavioral disturbance ([address] 9/6/2019    Elevated fasting glucose 9/6/2019    GERD (gastroesophageal reflux disease)     Hiatal hernia     Hyperlipidemia     Hypertension     Hypothyroidism s/p surgical removal of thyroid for thyroid carcinoma 7/29/2015    IBS (irritable bowel syndrome)     Major depressive disorder with single episode, in remission ([address] 9/6/2019    Mycosis fungoides ([address]     Osteoarthritis     Osteoporosis     Patient in clinical research study 08/13/2020    Enrolled in convalescent plasma for COVID 19 Date of completion 08/16/20    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 ray 3/24/2014    SSS (sick sinus syndrome) ([facility] 4/24/2014    Thyroid cancer ([facility] about 5 years ago    thyroid /following with oncology    TIA (transient ischemic attack) 7/23/2014    Confusion--word searching    Vitamin D deficiency        PAST SURGICAL HISTORY:   Past Surgical History:   Procedure Laterality Date    BONE MARROW BIOPSY  September 2010    polycythemia vera    BREAST BIOPSY Right     benign    CARDIAC CATHETERIZATION  2008    nonobstructive CAD    CHOLECYSTECTOMY  1995    COLONOSCOPY  2002    COLONOSCOPY  2/29/2016    colon polyp, diverticulosis - Dr Inessa Haywood    after miscarriage    ENDOSCOPY, COLON, DIAGNOSTIC  2002    normal  external hemorrhoids    EYE SURGERY Right 11/07/06    Cataract extraction with lens implant    HEMIARTHROPLASTY HIP Right 8/20/2019    RIGHT HIP HEMIARTHROPLASTY performed by Nikki Chin MD at [address], THYROID Left     thyroid cancer    PACEMAKER INSERTION  6/2013    [facility] SKIN BIOPSY  2003    mycosis fungoides    TAE AND BSO      TAE/BSO at age 54 with anterior repair for benign reasons.     TONSILLECTOMY AND ADENOIDECTOMY      age 27   [facility] TUBAL LIGATION      UPPER GASTROINTESTINAL ENDOSCOPY  2002    UPPER GASTROINTESTINAL ENDOSCOPY  08/28/2003    UPPER GASTROINTESTINAL ENDOSCOPY  05/12/2003    UPPER GASTROINTESTINAL ENDOSCOPY  03/20/2001    UPPER GASTROINTESTINAL ENDOSCOPY  02/29/2016    Barretts tissue, body polyp - Dr María Morales       SOCIAL HISTORY:     Tobacco:   Social History     Tobacco Use   Smoking Status Never Smoker   Smokeless Tobacco Never Used   Tobacco Comment    never smoker     Alcohol:   Social History     Substance and Sexual Activity   Alcohol Use No    Alcohol/week: 0.0 standard drinks     Drugs:   Social History     Substance and Sexual Activity   Drug Use No       FAMILY HISTORY: family history includes Cancer (age of onset: 47) in her mother; Depression in her son; Drug Abuse in her son; Emphysema in her sister; Mental Illness in her son; Other in her brother and son; Stroke (age of onset: 76) in her father; Sudden Death (age of onset: 78) in her sister. REVIEW OF SYSTEMS:     Please see history of chief complaint above; otherwise no new problems with respect to General, HEENT, Cardiovascular, Respiratory, Gastrointestinal, Genitourinary, Endocrinologic, Musculoskeletal, or Neuropsychiatric complaints. PHYSICAL EXAMINATION:    Due to this being a telehealth visit, the physical examination was performed by the nurse at the facility. Vitals: Temp: 96.4 deg F. Pulse: 72. Resp: 18. BP: 110/89. General: A 80 y.o.  female. Alert and oriented to person and place and questionably oriented to time. She does not appear to be in any acute distress. Skin: Skin color, texture, turgor normal. No rashes or lesions. HEENT/Neck: Essentially unremarkable  Lungs: Normal - CTA without rales, rhonchi, or wheezing. Heart: regular rate and rhythm, S1, S2 normal, no murmur, click, rub or gallop No S3 or S4. Abdomen: Non-obese soft, non-distended, non-tender, normal active bowel sounds, no masses palpated and no hepatosplenomegaly  Extremities: No clubbing, cyanosis, or edema in any of the extremities. Neurologic: cranial nerves II-XII are grossly intact    ASSESSMENT:     Diagnosis Orders   1. Elevated fasting glucose     2. Essential hypertension     3. Mixed hyperlipidemia     4. Coronary artery disease involving native coronary artery of native heart, angina presence unspecified     5. SSS (sick sinus syndrome) (HCC)     6. Cardiac pacemaker in situ     7. Myocardial infarction, unspecified MI type, unspecified artery ([address]     8. Thyroid cancer ([facility]     9. Postoperative hypothyroidism     10. Gastroesophageal reflux disease without esophagitis     11. Hiatal hernia     12. 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 Irritable bowel syndrome, unspecified type     14.  Polycythemia rubra zhanna ([facility]     15. Age-related osteoporosis with current pathological fracture with routine healing     16. Early onset Alzheimer's disease with behavioral disturbance ([facility]     17. Major depressive disorder with single episode, in remission ([facility]     18. Anxiety     19. Overweight           PLAN:    1. Continue current treatment  2. Nursing home record reviewed and updates summarized and entered into electronic record  3. See nursing home orders and MAR. Pursuant to the emergency declaration under the [address] [place] [address], [facility] waiver authority and the [employer] Act, this TelehHealth visit was conducted, with patient's consent, to reduce the patient's risk of exposure to COVID-19 and provide continuity of care for an established patient. Services were provided through a video synchronous discussion virtually (using [email or web address] me) to substitute for in-person clinic visit. The originating site was the nursing home and the distant site was the provider's home. Patient's identity was verified via name and date of birth.         Electronically signed by Jyoti Stallings DO on 10/20/2020 at 9:42 PM  Internal Medicine

## 2020-10-27 PROCEDURE — 99307 SBSQ NF CARE SF MDM 10: CPT | Performed by: INTERNAL MEDICINE

## 2020-11-24 ENCOUNTER — OUTSIDE SERVICES (OUTPATIENT)
Dept: INTERNAL MEDICINE | Age: 85
End: 2020-11-24
Payer: MEDICARE

## 2020-11-24 NOTE — PROGRESS NOTES
DR. Vania Gonzalez - TELEHEALTH NURSING HOME VISIT     DATE OF SERVICE: 10/27/20    NURSING HOME: [facility]    CHIEF COMPLAINT/HISTORY OF CHIEF COMPLAINT: This patient is being seen via telehealth for ongoing evaluation and management of her Alzheimer's disease, elevated fasting glucose, hypertension, hyperlipidemia, history of thyroid cancer with postoperative hypothyroidism, coronary artery disease, sick sinus syndrome, gastroesophageal reflux disease, Mcdaniel's esophagus, irritable bowel syndrome, polycythemia, depression, and being overweight. There are no new complaints at this time. ALLERGIES: No Known Allergies    MEDICATIONS: As noted on the [facility] MAR, referenced and incorporated herein. PAST MEDICAL HISTORY:   Past Medical History:   Diagnosis Date    Age-related osteoporosis with current pathological fracture with routine healing     Anxiety 9/6/2019    Mcdaniel esophagus     CAD (coronary artery disease)     nonobstructive    Cardiac pacemaker in situ 4/24/2014    Put in for severe bradycardia.  Dual chamber     Chronic insomnia     Closed right hip fracture, with routine healing, subsequent encounter 8/18/2019    Early onset Alzheimer's disease with behavioral disturbance ([address] 9/6/2019    Elevated fasting glucose 9/6/2019    GERD (gastroesophageal reflux disease)     Hiatal hernia     Hyperlipidemia     Hypertension     Hypothyroidism s/p surgical removal of thyroid for thyroid carcinoma 7/29/2015    IBS (irritable bowel syndrome)     Major depressive disorder with single episode, in remission ([address] 9/6/2019    Mycosis fungoides ([address]     Osteoarthritis     Osteoporosis     Patient in clinical research study 08/13/2020    Enrolled in convalescent plasma for COVID 19 Date of completion 08/16/20    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 ray 3/24/2014    SSS (sick sinus syndrome) ([facility] 4/24/2014    Thyroid cancer ([facility] about 5 years ago    thyroid /following with oncology    TIA (transient ischemic attack) 7/23/2014    Confusion--word searching    Vitamin D deficiency        PAST SURGICAL HISTORY:   Past Surgical History:   Procedure Laterality Date    BONE MARROW BIOPSY  September 2010    polycythemia vera    BREAST BIOPSY Right     benign    CARDIAC CATHETERIZATION  2008    nonobstructive CAD    CHOLECYSTECTOMY  1995    COLONOSCOPY  2002    COLONOSCOPY  2/29/2016    colon polyp, diverticulosis - Dr Cher Osorio    after miscarriage    ENDOSCOPY, COLON, DIAGNOSTIC  2002    normal  external hemorrhoids    EYE SURGERY Right 11/07/06    Cataract extraction with lens implant    HEMIARTHROPLASTY HIP Right 8/20/2019    RIGHT HIP HEMIARTHROPLASTY performed by Rubi Klein MD at [address], THYROID Left     thyroid cancer    PACEMAKER INSERTION  6/2013    [facility] SKIN BIOPSY  2003    mycosis fungoides    TAE AND BSO      TAE/BSO at age 54 with anterior repair for benign reasons.     TONSILLECTOMY AND ADENOIDECTOMY      age 27   [facility]      UPPER GASTROINTESTINAL ENDOSCOPY  2002    UPPER GASTROINTESTINAL ENDOSCOPY  08/28/2003    UPPER GASTROINTESTINAL ENDOSCOPY  05/12/2003    UPPER GASTROINTESTINAL ENDOSCOPY  03/20/2001    UPPER GASTROINTESTINAL ENDOSCOPY  02/29/2016    Barretts tissue, body polyp - Dr Miroslava Thornton       SOCIAL HISTORY:     Tobacco:   Social History     Tobacco Use   Smoking Status Never Smoker   Smokeless Tobacco Never Used   Tobacco Comment    never smoker     Alcohol:   Social History     Substance and Sexual Activity   Alcohol Use No    Alcohol/week: 0.0 standard drinks     Drugs:   Social History     Substance and Sexual Activity   Drug Use No       FAMILY HISTORY: family history includes Cancer (age of onset: 47) in her mother; Depression in her son; Drug Abuse in her son; Emphysema in her sister; Mental Illness in her son; Other in her brother and son; Stroke (age of onset: 76) in her father; Sudden Death (age of onset: 78) in her sister. REVIEW OF SYSTEMS:     Please see history of chief complaint above; otherwise no new problems with respect to General, HEENT, Cardiovascular, Respiratory, Gastrointestinal, Genitourinary, Endocrinologic, Musculoskeletal, or Neuropsychiatric complaints. PHYSICAL EXAMINATION:    Due to this being a telehealth visit, the physical examination was performed by the nurse at the facility. Vitals: Temp: 96.7 deg F. Pulse: 60. Resp: 18. BP: 150/65. General: A 80 y.o.  female. Alert and oriented to person and place and questionably oriented to time. She does not appear to be in any acute distress. Skin: Skin color, texture, turgor normal. No rashes or lesions. HEENT/Neck: Essentially unremarkable  Lungs: Normal - CTA without rales, rhonchi, or wheezing. Heart: regular rate and rhythm, S1, S2 normal, no murmur, click, rub or gallop No S3 or S4. Abdomen: Non-obese soft, non-distended, non-tender, normal active bowel sounds, no masses palpated and no hepatosplenomegaly  Extremities: No clubbing, cyanosis, or edema in any of the extremities. Neurologic: cranial nerves II-XII are grossly intact    ASSESSMENT:     Diagnosis Orders   1. Elevated fasting glucose     2. Essential hypertension     3. Mixed hyperlipidemia     4. Coronary artery disease involving native coronary artery of native heart, angina presence unspecified     5. SSS (sick sinus syndrome) (HCC)     6. Cardiac pacemaker in situ     7. Myocardial infarction, unspecified MI type, unspecified artery ([address]     8. Thyroid cancer ([facility]     9. Postoperative hypothyroidism     10. Gastroesophageal reflux disease without esophagitis     11. Hiatal hernia     12. 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 Irritable bowel syndrome, unspecified type     14.  Polycythemia rubra

## 2020-12-22 PROCEDURE — 99307 SBSQ NF CARE SF MDM 10: CPT | Performed by: INTERNAL MEDICINE

## 2020-12-31 ENCOUNTER — OUTSIDE SERVICES (OUTPATIENT)
Dept: INTERNAL MEDICINE | Age: 85
End: 2020-12-31
Payer: MEDICARE

## 2020-12-31 NOTE — PROGRESS NOTES
DR. Lucinda Saul - TELEHEALTH NURSING HOME VISIT     DATE OF SERVICE: 12/22/20    NURSING HOME: [facility]    CHIEF COMPLAINT/HISTORY OF CHIEF COMPLAINT: This patient is being seen via telehealth for ongoing evaluation and management of her Alzheimer's disease, elevated fasting glucose, hypertension, hyperlipidemia, history of thyroid cancer with postoperative hypothyroidism, coronary artery disease, sick sinus syndrome, gastroesophageal reflux disease, Mcdaniel's esophagus, irritable bowel syndrome, polycythemia, depression, and being overweight. She recently returned from the [facility] facility in [facility]. There are no new complaints. ALLERGIES: No Known Allergies    MEDICATIONS: As noted on the [facility] MAR, referenced and incorporated herein. PAST MEDICAL HISTORY:   Past Medical History:   Diagnosis Date    Age-related osteoporosis with current pathological fracture with routine healing     Anxiety 9/6/2019    Mcdaniel esophagus     CAD (coronary artery disease)     nonobstructive    Cardiac pacemaker in situ 4/24/2014    Put in for severe bradycardia.  Dual chamber     Chronic insomnia     Closed right hip fracture, with routine healing, subsequent encounter 8/18/2019    Early onset Alzheimer's disease with behavioral disturbance ([address] 9/6/2019    Elevated fasting glucose 9/6/2019    GERD (gastroesophageal reflux disease)     Hiatal hernia     Hyperlipidemia     Hypertension     Hypothyroidism s/p surgical removal of thyroid for thyroid carcinoma 7/29/2015    IBS (irritable bowel syndrome)     Major depressive disorder with single episode, in remission ([address] 9/6/2019    Mycosis fungoides ([address]     Osteoarthritis     Osteoporosis     Patient in clinical research study 08/13/2020    Enrolled in convalescent plasma for COVID 19 Date of completion 08/16/20    Polycythemia rubra vera [facility])     sees oncology    PUD (peptic ulcer disease)  RV Pacemaker lead malfunction - Revision 3/24/14-Dr. ray 3/24/2014    SSS (sick sinus syndrome) ([facility] 4/24/2014    Thyroid cancer ([facility] about 5 years ago    thyroid /following with oncology    TIA (transient ischemic attack) 7/23/2014    Confusion--word searching    Vitamin D deficiency        PAST SURGICAL HISTORY:   Past Surgical History:   Procedure Laterality Date    BONE MARROW BIOPSY  September 2010    polycythemia vera    BREAST BIOPSY Right     benign    CARDIAC CATHETERIZATION  2008    nonobstructive CAD    CHOLECYSTECTOMY  1995    COLONOSCOPY  2002    COLONOSCOPY  2/29/2016    colon polyp, diverticulosis - Dr Laine Lacy    after miscarriage    ENDOSCOPY, COLON, DIAGNOSTIC  2002    normal  external hemorrhoids    EYE SURGERY Right 11/07/06    Cataract extraction with lens implant    HEMIARTHROPLASTY HIP Right 8/20/2019    RIGHT HIP HEMIARTHROPLASTY performed by Romie Remy MD at [address], THYROID Left     thyroid cancer    PACEMAKER INSERTION  6/2013    [facility] SKIN BIOPSY  2003    mycosis fungoides    TAE AND BSO      TAE/BSO at age 54 with anterior repair for benign reasons.     TONSILLECTOMY AND ADENOIDECTOMY      age 27   [facility] TUBAL LIGATION      UPPER GASTROINTESTINAL ENDOSCOPY  2002    UPPER GASTROINTESTINAL ENDOSCOPY  08/28/2003    UPPER GASTROINTESTINAL ENDOSCOPY  05/12/2003    UPPER GASTROINTESTINAL ENDOSCOPY  03/20/2001    UPPER GASTROINTESTINAL ENDOSCOPY  02/29/2016    Barretts tissue, body polyp - Dr Urias Lose HISTORY:     Tobacco:   Social History     Tobacco Use   Smoking Status Never Smoker   Smokeless Tobacco Never Used   Tobacco Comment    never smoker     Alcohol:   Social History     Substance and Sexual Activity   Alcohol Use No    Alcohol/week: 0.0 standard drinks     Drugs:   Social History     Substance and Sexual Activity   Drug Use No FAMILY HISTORY: family history includes Cancer (age of onset: 47) in her mother; Depression in her son; Drug Abuse in her son; Emphysema in her sister; Mental Illness in her son; Other in her brother and son; Stroke (age of onset: 76) in her father; Sudden Death (age of onset: 78) in her sister. REVIEW OF SYSTEMS:     Please see history of chief complaint above; otherwise no new problems with respect to General, HEENT, Cardiovascular, Respiratory, Gastrointestinal, Genitourinary, Endocrinologic, Musculoskeletal, or Neuropsychiatric complaints. PHYSICAL EXAMINATION:    Due to this being a telehealth visit, the physical examination was performed by the nurse at the facility. Vitals: Temp: 97.6 deg F. Pulse: 93. Resp: 16. BP: 130/66. General: A 80 y.o.  female. Alert and oriented to person and place and questionably oriented to time. She does not appear to be in any acute distress. Skin: Skin color, texture, turgor normal. No rashes or lesions. HEENT/Neck: Essentially unremarkable  Lungs: Normal - CTA without rales, rhonchi, or wheezing. Heart: regular rate and rhythm, S1, S2 normal, no murmur, click, rub or gallop No S3 or S4. Abdomen: Non-obese soft, non-distended, non-tender, normal active bowel sounds, no masses palpated and no hepatosplenomegaly  Extremities: No clubbing, cyanosis, or edema in any of the extremities. Neurologic: cranial nerves II-XII are grossly intact    ASSESSMENT:     Diagnosis Orders   1. Elevated fasting glucose     2. Essential hypertension     3. Mixed hyperlipidemia     4. Coronary artery disease involving native coronary artery of native heart, angina presence unspecified     5. SSS (sick sinus syndrome) (HCC)     6. Cardiac pacemaker in situ     7. Myocardial infarction, unspecified MI type, unspecified artery ([address]     8. Thyroid cancer ([facility]     9. Postoperative hypothyroidism     10.  Gastroesophageal reflux disease without esophagitis 11. Hiatal hernia     12. 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 Irritable bowel syndrome, unspecified type     14. Polycythemia rubra vera ([facility]     15. Age-related osteoporosis with current pathological fracture with routine healing     16. Early onset Alzheimer's disease with behavioral disturbance ([facility]     17. Major depressive disorder with single episode, in remission ([facility]     18. Anxiety     19. Overweight           PLAN:    1. Continue current treatment  2. Nursing home record reviewed and updates summarized and entered into electronic record  3. See nursing home orders and MAR. Pursuant to the emergency declaration under the [facility], [facility] waiver authority and the [employer] Act, this TelehHealth visit was conducted, with patient's consent, to reduce the patient's risk of exposure to COVID-19 and provide continuity of care for an established patient. Services were provided through a video synchronous discussion virtually (using [email or web address] me) to substitute for in-person clinic visit. The originating site was the nursing home and the distant site was the provider's home. Patient's identity was verified via name and date of birth.         Electronically signed by Edna Valiente DO on 12/31/2020 at 12:57 AM  Internal Medicine

## 2021-01-19 PROCEDURE — 99307 SBSQ NF CARE SF MDM 10: CPT | Performed by: INTERNAL MEDICINE

## 2021-01-28 ENCOUNTER — HOSPITAL ENCOUNTER (EMERGENCY)
Age: 86
Discharge: HOME | End: 2021-01-28
Attending: EMERGENCY MEDICINE
Payer: MEDICARE

## 2021-01-28 ENCOUNTER — OFFICE VISIT (OUTPATIENT)
Dept: CARDIOLOGY | Age: 86
End: 2021-01-28
Payer: MEDICARE

## 2021-01-28 ENCOUNTER — PROCEDURE VISIT (OUTPATIENT)
Dept: CARDIOLOGY | Age: 86
End: 2021-01-28
Payer: MEDICARE

## 2021-01-28 ENCOUNTER — APPOINTMENT (OUTPATIENT)
Dept: CT IMAGING | Age: 86
End: 2021-01-28
Payer: MEDICARE

## 2021-01-28 VITALS
WEIGHT: 175 LBS | TEMPERATURE: 97.3 F | HEIGHT: 66 IN | SYSTOLIC BLOOD PRESSURE: 158 MMHG | HEART RATE: 62 BPM | RESPIRATION RATE: 17 BRPM | OXYGEN SATURATION: 99 % | DIASTOLIC BLOOD PRESSURE: 51 MMHG | BODY MASS INDEX: 28.12 KG/M2

## 2021-01-28 VITALS — HEART RATE: 70 BPM | SYSTOLIC BLOOD PRESSURE: 114 MMHG | DIASTOLIC BLOOD PRESSURE: 55 MMHG

## 2021-01-28 DIAGNOSIS — N30.00 ACUTE CYSTITIS WITHOUT HEMATURIA: ICD-10-CM

## 2021-01-28 DIAGNOSIS — I49.5 SSS (SICK SINUS SYNDROME) (HCC): ICD-10-CM

## 2021-01-28 DIAGNOSIS — Z95.0 CARDIAC PACEMAKER IN SITU: ICD-10-CM

## 2021-01-28 DIAGNOSIS — K44.9 HIATAL HERNIA: ICD-10-CM

## 2021-01-28 DIAGNOSIS — I49.5 SSS (SICK SINUS SYNDROME) (HCC): Primary | ICD-10-CM

## 2021-01-28 DIAGNOSIS — R42 LIGHTHEADED: Primary | ICD-10-CM

## 2021-01-28 LAB
-: ABNORMAL
ABSOLUTE EOS #: 0.18 K/UL (ref 0–0.44)
ABSOLUTE IMMATURE GRANULOCYTE: 0.03 K/UL (ref 0–0.3)
ABSOLUTE LYMPH #: 1.26 K/UL (ref 1.1–3.7)
ABSOLUTE MONO #: 0.67 K/UL (ref 0.1–1.2)
ALBUMIN SERPL-MCNC: 3.5 G/DL (ref 3.5–5.2)
ALBUMIN/GLOBULIN RATIO: 1.5 (ref 1–2.5)
ALP BLD-CCNC: 78 U/L (ref 35–104)
ALT SERPL-CCNC: 12 U/L (ref 5–33)
AMORPHOUS: ABNORMAL
ANION GAP SERPL CALCULATED.3IONS-SCNC: 7 MMOL/L (ref 9–17)
AST SERPL-CCNC: 18 U/L
BACTERIA: ABNORMAL
BASOPHILS # BLD: 1 % (ref 0–2)
BASOPHILS ABSOLUTE: 0.08 K/UL (ref 0–0.2)
BILIRUB SERPL-MCNC: 0.41 MG/DL (ref 0.3–1.2)
BILIRUBIN DIRECT: 0.12 MG/DL
BILIRUBIN URINE: NEGATIVE
BILIRUBIN, INDIRECT: 0.29 MG/DL (ref 0–1)
BUN BLDV-MCNC: 17 MG/DL (ref 8–23)
BUN/CREAT BLD: 23 (ref 9–20)
CALCIUM SERPL-MCNC: 9.4 MG/DL (ref 8.6–10.4)
CASTS UA: ABNORMAL /LPF (ref 0–2)
CHLORIDE BLD-SCNC: 104 MMOL/L (ref 98–107)
CO2: 28 MMOL/L (ref 20–31)
COLOR: ABNORMAL
COMMENT UA: ABNORMAL
CREAT SERPL-MCNC: 0.75 MG/DL (ref 0.5–0.9)
CRYSTALS, UA: ABNORMAL /HPF
DIFFERENTIAL TYPE: ABNORMAL
EKG ATRIAL RATE: 65 BPM
EKG P-R INTERVAL: 172 MS
EKG Q-T INTERVAL: 406 MS
EKG QRS DURATION: 78 MS
EKG QTC CALCULATION (BAZETT): 422 MS
EKG R AXIS: -10 DEGREES
EKG T AXIS: 17 DEGREES
EKG VENTRICULAR RATE: 65 BPM
EOSINOPHILS RELATIVE PERCENT: 3 % (ref 1–4)
EPITHELIAL CELLS UA: ABNORMAL /HPF (ref 0–5)
GFR AFRICAN AMERICAN: >60 ML/MIN
GFR NON-AFRICAN AMERICAN: >60 ML/MIN
GFR SERPL CREATININE-BSD FRML MDRD: ABNORMAL ML/MIN/{1.73_M2}
GFR SERPL CREATININE-BSD FRML MDRD: ABNORMAL ML/MIN/{1.73_M2}
GLOBULIN: 2.3 G/DL (ref 1.5–3.8)
GLUCOSE BLD-MCNC: 130 MG/DL (ref 70–99)
GLUCOSE BLD-MCNC: 133 MG/DL (ref 65–105)
GLUCOSE URINE: NEGATIVE
HCT VFR BLD CALC: 48.5 % (ref 36.3–47.1)
HEMOGLOBIN: 15.7 G/DL (ref 11.9–15.1)
IMMATURE GRANULOCYTES: 1 %
KETONES, URINE: NEGATIVE
LEUKOCYTE ESTERASE, URINE: NEGATIVE
LYMPHOCYTES # BLD: 22 % (ref 24–43)
MCH RBC QN AUTO: 27.5 PG (ref 25.2–33.5)
MCHC RBC AUTO-ENTMCNC: 32.4 G/DL (ref 25.2–33.5)
MCV RBC AUTO: 85.1 FL (ref 82.6–102.9)
MONOCYTES # BLD: 12 % (ref 3–12)
MUCUS: ABNORMAL
NITRITE, URINE: POSITIVE
NRBC AUTOMATED: 0 PER 100 WBC
OTHER OBSERVATIONS UA: ABNORMAL
PDW BLD-RTO: 14.5 % (ref 11.8–14.4)
PH UA: 5 (ref 5–6)
PLATELET # BLD: 200 K/UL (ref 138–453)
PLATELET ESTIMATE: ABNORMAL
PMV BLD AUTO: 9.4 FL (ref 8.1–13.5)
POTASSIUM SERPL-SCNC: 4.3 MMOL/L (ref 3.7–5.3)
PROTEIN UA: NEGATIVE
RBC # BLD: 5.7 M/UL (ref 3.95–5.11)
RBC # BLD: ABNORMAL 10*6/UL
RBC UA: ABNORMAL /HPF (ref 0–4)
RENAL EPITHELIAL, UA: ABNORMAL /HPF
SEG NEUTROPHILS: 61 % (ref 36–65)
SEGMENTED NEUTROPHILS ABSOLUTE COUNT: 3.6 K/UL (ref 1.5–8.1)
SODIUM BLD-SCNC: 139 MMOL/L (ref 135–144)
SPECIFIC GRAVITY UA: 1.02 (ref 1.01–1.02)
TOTAL PROTEIN: 5.8 G/DL (ref 6.4–8.3)
TRICHOMONAS: ABNORMAL
TROPONIN INTERP: NORMAL
TROPONIN INTERP: NORMAL
TROPONIN T: NORMAL NG/ML
TROPONIN T: NORMAL NG/ML
TROPONIN, HIGH SENSITIVITY: 7 NG/L (ref 0–14)
TROPONIN, HIGH SENSITIVITY: 7 NG/L (ref 0–14)
TURBIDITY: ABNORMAL
URINE HGB: NEGATIVE
UROBILINOGEN, URINE: NORMAL
WBC # BLD: 5.8 K/UL (ref 3.5–11.3)
WBC # BLD: ABNORMAL 10*3/UL
WBC UA: ABNORMAL /HPF (ref 0–4)
YEAST: ABNORMAL

## 2021-01-28 PROCEDURE — 1123F ACP DISCUSS/DSCN MKR DOCD: CPT | Performed by: INTERNAL MEDICINE

## 2021-01-28 PROCEDURE — 6360000004 HC RX CONTRAST MEDICATION: Performed by: EMERGENCY MEDICINE

## 2021-01-28 PROCEDURE — 93010 ELECTROCARDIOGRAM REPORT: CPT | Performed by: INTERNAL MEDICINE

## 2021-01-28 PROCEDURE — G8484 FLU IMMUNIZE NO ADMIN: HCPCS | Performed by: INTERNAL MEDICINE

## 2021-01-28 PROCEDURE — 99214 OFFICE O/P EST MOD 30 MIN: CPT | Performed by: INTERNAL MEDICINE

## 2021-01-28 PROCEDURE — 36415 COLL VENOUS BLD VENIPUNCTURE: CPT

## 2021-01-28 PROCEDURE — 1090F PRES/ABSN URINE INCON ASSESS: CPT | Performed by: INTERNAL MEDICINE

## 2021-01-28 PROCEDURE — 4040F PNEUMOC VAC/ADMIN/RCVD: CPT | Performed by: INTERNAL MEDICINE

## 2021-01-28 PROCEDURE — 93005 ELECTROCARDIOGRAM TRACING: CPT | Performed by: INTERNAL MEDICINE

## 2021-01-28 PROCEDURE — 85025 COMPLETE CBC W/AUTO DIFF WBC: CPT

## 2021-01-28 PROCEDURE — 2709999900 CT ABDOMEN PELVIS W IV CONTRAST

## 2021-01-28 PROCEDURE — 93288 INTERROG EVL PM/LDLS PM IP: CPT | Performed by: INTERNAL MEDICINE

## 2021-01-28 PROCEDURE — G8427 DOCREV CUR MEDS BY ELIG CLIN: HCPCS | Performed by: INTERNAL MEDICINE

## 2021-01-28 PROCEDURE — 84484 ASSAY OF TROPONIN QUANT: CPT

## 2021-01-28 PROCEDURE — 93005 ELECTROCARDIOGRAM TRACING: CPT | Performed by: EMERGENCY MEDICINE

## 2021-01-28 PROCEDURE — 80076 HEPATIC FUNCTION PANEL: CPT

## 2021-01-28 PROCEDURE — 99284 EMERGENCY DEPT VISIT MOD MDM: CPT

## 2021-01-28 PROCEDURE — 1036F TOBACCO NON-USER: CPT | Performed by: INTERNAL MEDICINE

## 2021-01-28 PROCEDURE — 81001 URINALYSIS AUTO W/SCOPE: CPT

## 2021-01-28 PROCEDURE — 82947 ASSAY GLUCOSE BLOOD QUANT: CPT

## 2021-01-28 PROCEDURE — 87077 CULTURE AEROBIC IDENTIFY: CPT

## 2021-01-28 PROCEDURE — 87086 URINE CULTURE/COLONY COUNT: CPT

## 2021-01-28 PROCEDURE — 80048 BASIC METABOLIC PNL TOTAL CA: CPT

## 2021-01-28 PROCEDURE — 87186 SC STD MICRODIL/AGAR DIL: CPT

## 2021-01-28 PROCEDURE — G8417 CALC BMI ABV UP PARAM F/U: HCPCS | Performed by: INTERNAL MEDICINE

## 2021-01-28 RX ORDER — NITROFURANTOIN 25; 75 MG/1; MG/1
100 CAPSULE ORAL 2 TIMES DAILY
Qty: 10 CAPSULE | Refills: 0 | Status: SHIPPED | OUTPATIENT
Start: 2021-01-28 | End: 2021-02-02

## 2021-01-28 RX ADMIN — IOPAMIDOL 100 ML: 755 INJECTION, SOLUTION INTRAVENOUS at 11:54

## 2021-01-28 ASSESSMENT — ENCOUNTER SYMPTOMS
SHORTNESS OF BREATH: 0
CONSTIPATION: 0
VOMITING: 0
DIARRHEA: 0
BACK PAIN: 0
EYE PAIN: 0
BLOOD IN STOOL: 0
COUGH: 0
NAUSEA: 1
ABDOMINAL PAIN: 0

## 2021-01-28 NOTE — ED TRIAGE NOTES
Patient had a near syncope at [address] [place] [address] while pacer  checked. Patient felt nausea and was diaphoretic.patient arrived per wheelchair with emesis bag. Patient states feeling better. Color pale skin dry. Patient states no pain and nausea is only slight at this time.

## 2021-01-28 NOTE — ED PROVIDER NOTES
current pathological fracture with routine healing, Anxiety, Mcdaniel esophagus, CAD (coronary artery disease), Cardiac pacemaker in situ, Chronic insomnia, Closed right hip fracture, with routine healing, subsequent encounter, Early onset Alzheimer's disease with behavioral disturbance ([facility] Elevated fasting glucose, GERD (gastroesophageal reflux disease), Hiatal hernia, Hyperlipidemia, Hypertension, Hypothyroidism s/p surgical removal of thyroid for thyroid carcinoma, IBS (irritable bowel syndrome), Major depressive disorder with single episode, in remission ([facility] Mycosis fungoides ([facility] Osteoarthritis, Osteoporosis, Patient in clinical research study, Polycythemia rubra vera ([facility] PUD (peptic ulcer disease), RV Pacemaker lead malfunction - Revision 3/24/14-Dr. ray, SSS (sick sinus syndrome) ([facility] TIA (transient ischemic attack), and Vitamin D deficiency. SURGICAL HISTORY      has a past surgical history that includes Tubal ligation; Lobectomy, Thyroid (Left); babita and bso (cervix removed); Tonsillectomy and adenoidectomy; Cholecystectomy (1995); Breast biopsy (Right); Dilation and curettage of uterus (1960); Upper gastrointestinal endoscopy (2002); skin biopsy (2003); Cardiac catheterization (2008); bone marrow biopsy (September 2010); Pacemaker insertion (6/2013); Endoscopy, colon, diagnostic (2002); Upper gastrointestinal endoscopy (08/28/2003); Upper gastrointestinal endoscopy (05/12/2003); Upper gastrointestinal endoscopy (03/20/2001); eye surgery (Right, 11/07/06); Upper gastrointestinal endoscopy (02/29/2016); Colonoscopy (2002); Colonoscopy (2/29/2016); and HEMIARTHROPLASTY HIP (Right, 8/20/2019).     CURRENT MEDICATIONS       Previous Medications    ACETAMINOPHEN (TYLENOL) 500 MG TABLET    Take 500 mg by mouth every 6 hours as needed for Pain    DOCUSATE SODIUM (COLACE, DULCOLAX) 100 MG CAPS    Take 100 mg by mouth daily    DONEPEZIL (ARICEPT) 5 MG TABLET    Take 5 mg by mouth nightly     GUAIFENESIN (ROBITUSSIN) 100 MG/5ML SYRUP    Take 200 mg by mouth every 6 hours as needed for Cough    HANDICAP PLACARD MISC    by Does not apply route Diagnosis: sick sinus syndrome. Expires: 2023    LEVOTHYROXINE (SYNTHROID) 125 MCG TABLET    Take 1 tablet by mouth Daily    LORATADINE (CLARITIN) 10 MG TABLET    Take 1 tablet by mouth daily    LOSARTAN (COZAAR) 50 MG TABLET    take 1 tablet by mouth once daily    MIRTAZAPINE (REMERON) 7.5 MG TABLET    Take 7.5 mg by mouth nightly     MULTIPLE VITAMINS-MINERALS (THERAPEUTIC MULTIVITAMIN-MINERALS) TABLET    Take 1 tablet by mouth daily    PRAVASTATIN (PRAVACHOL) 40 MG TABLET    take 1 tablet by mouth once daily       ALLERGIES     has No Known Allergies. FAMILY HISTORY     She indicated that her mother is . She indicated that her father is . She indicated that two of her three sisters are . She indicated that only one of her two brothers is alive. She indicated that only one of her two sons is alive. family history includes Cancer (age of onset: 47) in her mother; Depression in her son; Drug Abuse in her son; Emphysema in her sister; Mental Illness in her son; Other in her brother and son; Stroke (age of onset: 76) in her father; Sudden Death (age of onset: 78) in her sister. SOCIAL HISTORY      reports that she has never smoked. She has never used smokeless tobacco. She reports that she does not drink alcohol or use drugs. PHYSICAL EXAM     INITIAL VITALS:  height is 5' 6\" (1.676 m) and weight is 175 lb (79.4 kg). Her tympanic temperature is 97.3 °F (36.3 °C). Her blood pressure is 150/56 (abnormal) and her pulse is 74. Her respiration is 16 and oxygen saturation is 98%. Physical Exam  Constitutional:       General: She is not in acute distress. Appearance: Normal appearance. She is well-developed. She is not ill-appearing or diaphoretic. HENT:      Head: Normocephalic and atraumatic.       Left Ear: External ear normal.   Eyes:      Conjunctiva/sclera: Conjunctivae normal.      Pupils: Pupils are equal, round, and reactive to light. Neck:      Musculoskeletal: Normal range of motion. Cardiovascular:      Rate and Rhythm: Normal rate and regular rhythm. Pulmonary:      Effort: Pulmonary effort is normal.      Breath sounds: Normal breath sounds. Abdominal:      General: Bowel sounds are normal.      Palpations: Abdomen is soft. Musculoskeletal: Normal range of motion. General: No tenderness. Right lower leg: No edema. Left lower leg: No edema. Skin:     General: Skin is warm and dry. Neurological:      General: No focal deficit present. Mental Status: She is alert and oriented to person, place, and time. Psychiatric:         Behavior: Behavior normal.           DIFFERENTIAL DIAGNOSIS/ MDM:     Near syncope will monitor here in the department check baseline labs    DIAGNOSTIC RESULTS     EKG: All EKG's are interpreted by the Emergency Department Physician who either signs or Co-signs this chart in the absence of a cardiologist.  Paced rhythm at 65 bpm SC interval is 172 ms QRS duration 78 ms QT corrected 422 ms axis of -10      RADIOLOGY:   I directly visualized the following  images and reviewed the radiologist interpretations:       EXAMINATION:   CT OF THE ABDOMEN AND PELVIS WITH CONTRAST 1/28/2021 8:54 am       TECHNIQUE:   CT of the abdomen and pelvis was performed with the administration of   intravenous contrast. Multiplanar reformatted images are provided for review. Dose modulation, iterative reconstruction, and/or weight based adjustment of   the mA/kV was utilized to reduce the radiation dose to as low as reasonably   achievable.       COMPARISON:   07/25/2017       HISTORY:   ORDERING SYSTEM PROVIDED HISTORY: Pain   TECHNOLOGIST PROVIDED HISTORY:   IV Only Contrast   Pain   Reason for Exam: c/o nausea.  Hx hysterectomy   Acuity: Acute   Type of Exam: Initial       FINDINGS:   Lower Chest: Chronic atelectatic changes versus scarring in the lung bases. No pleural or pericardial effusion.  Partially imaged large hiatal hernia   with intrathoracic stomach.       Organs: Cholecystectomy.  Intrahepatic and extrahepatic biliary ductal   dilatation, slightly increased from prior with the common bile duct measuring   up to 2 cm.  Mild prominence of the pancreatic duct measuring 0.5 cm. Pancreatic parenchyma appears within normal limits.  Liver upper limits of   normal for size.  Hepatic and portal systems appear grossly patent.  Adrenal   glands are grossly unremarkable.  Bilateral renal sinus cysts and   subcentimeter hypodensities in both kidneys are too small to definitively   characterize.  No hydronephrosis or stones.  Normal caliber ureters.       GI/Bowel: Partially imaged large hiatal hernia with intrathoracic stomach. There is no evidence of bowel obstruction.  No evidence of abnormal bowel   wall thickening or distension. There is diverticulosis without evidence of   diverticulitis. The appendix is visualized and is unremarkable.  No evidence   of acute appendicitis.       Pelvis: Female pelvic organs are surgically absent with mild pelvic floor   descent.       Peritoneum/Retroperitoneum: No free fluid, free air, or appreciable   inflammatory changes with abdominopelvic fat.  A few scattered mildly   prominent lymph nodes are present in the portacaval region which are overall   unchanged from prior.       Bones/Soft Tissues: No acute abnormality in the superficial soft tissues.    Streak artifact from right total hip arthroplasty hardware no acute bony   findings.  Remote right inferior pubic ramus fracture, new in the comparison   interval.           Impression   Large hiatal hernia with intrathoracic stomach, only partially imaged.       Slightly increased intrahepatic and extrahepatic biliary ductal dilatation   relative to prior comparison.  This can be seen in the post cholecystectomy   setting, but due to the slight increase as well as mild dilatation of the   proximal pancreatic duct, recommend correlation with liver function tests. MRCP can further evaluate the biliary system if clinical concern persists.       Colonic diverticulosis without diverticulitis.               ED BEDSIDE ULTRASOUND:       LABS:  Labs Reviewed   CBC WITH AUTO DIFFERENTIAL - Abnormal; Notable for the following components:       Result Value    RBC 5.70 (*)     Hemoglobin 15.7 (*)     Hematocrit 48.5 (*)     RDW 14.5 (*)     Lymphocytes 22 (*)     Immature Granulocytes 1 (*)     All other components within normal limits   BASIC METABOLIC PANEL - Abnormal; Notable for the following components:    Glucose 130 (*)     Bun/Cre Ratio 23 (*)     Anion Gap 7 (*)     All other components within normal limits   URINALYSIS - Abnormal; Notable for the following components:    Nitrite, Urine POSITIVE (*)     All other components within normal limits   HEPATIC FUNCTION PANEL - Abnormal; Notable for the following components:     Total Protein 5.8 (*)     All other components within normal limits   MICROSCOPIC URINALYSIS - Abnormal; Notable for the following components:    Bacteria, UA 3+ (*)     Mucus, UA 1+ (*)     All other components within normal limits   POC GLUCOSE FINGERSTICK - Abnormal; Notable for the following components:    POC Glucose 133 (*)     All other components within normal limits   CULTURE, URINE   TROPONIN   TROPONIN           EMERGENCY DEPARTMENT COURSE:   Vitals:    Vitals:    01/28/21 1100 01/28/21 1130 01/28/21 1135 01/28/21 1225   BP: (!) 122/50 (!) 122/96 (!) 156/23 (!) 150/56   Pulse: 60 60 62 74   Resp: 19 21 20 16   Temp:       TempSrc:       SpO2: 98% 98% 99% 98%   Weight:       Height:         -------------------------  BP: (!) 150/56, Temp: 97.3 °F (36.3 °C), Pulse: 74, Resp: 16        Re-evaluation Notes    Patient was resting comfortably she tried to get up with

## 2021-01-28 NOTE — ED NOTES
Pacer was interrogated in office prior to episode with no problems found.      Ahmet Harris RN  01/28/21 4002

## 2021-01-28 NOTE — PROGRESS NOTES
Today's Date: 1/28/2021  Patient's Name: Julian Kat  Patient's age: 80 y. o., 1934    CC: follow up for PPM    HPI:  Julian Kat  is here for follow up and pacemaker interrogation  Lives in a nursing home  During my encounter she is actively vomiting with nausea and diaphoresis  He denies any chest pain, shortness of breath, orthopnea or lower extremity edema  He is being accompanied by her niece/guardian who wants her to be evaluated in ER      Past Medical History:   has a past medical history of Age-related osteoporosis with current pathological fracture with routine healing, Anxiety, Mcdaniel esophagus, CAD (coronary artery disease), Cardiac pacemaker in situ, Chronic insomnia, Closed right hip fracture, with routine healing, subsequent encounter, Early onset Alzheimer's disease with behavioral disturbance ([address] Elevated fasting glucose, GERD (gastroesophageal reflux disease), Hiatal hernia, Hyperlipidemia, Hypertension, Hypothyroidism s/p surgical removal of thyroid for thyroid carcinoma, IBS (irritable bowel syndrome), Major depressive disorder with single episode, in remission ([address] Mycosis fungoides ([address] Osteoarthritis, Osteoporosis, Patient in clinical research study, Polycythemia rubra vera ([address] PUD (peptic ulcer disease), RV Pacemaker lead malfunction - Revision 3/24/14-Dr. ray, SSS (sick sinus syndrome) ([address] Thyroid cancer ([address] TIA (transient ischemic attack), and Vitamin D deficiency.     Past Surgical History: has a past surgical history that includes Tubal ligation; Lobectomy, Thyroid (Left); babita and bso (cervix removed); Tonsillectomy and adenoidectomy; Cholecystectomy (1995); Breast biopsy (Right); Dilation and curettage of uterus (1960); Upper gastrointestinal endoscopy (2002); skin biopsy (2003); Cardiac catheterization (2008); bone marrow biopsy (September 2010); Pacemaker insertion (6/2013); Endoscopy, colon, diagnostic (2002); Upper gastrointestinal endoscopy (08/28/2003); Upper gastrointestinal endoscopy (05/12/2003); Upper gastrointestinal endoscopy (03/20/2001); eye surgery (Right, 11/07/06); Upper gastrointestinal endoscopy (02/29/2016); Colonoscopy (2002); Colonoscopy (2/29/2016); and HEMIARTHROPLASTY HIP (Right, 8/20/2019). Home Medications:  Prior to Admission medications    Medication Sig Start Date End Date Taking?  Authorizing Provider   guaiFENesin (ROBITUSSIN) 100 MG/5ML syrup Take 200 mg by mouth every 6 hours as needed for Cough   Yes Historical Provider, MD   mirtazapine (REMERON) 7.5 MG tablet Take 7.5 mg by mouth nightly  4/27/20  Yes Historical Provider, MD   donepezil (ARICEPT) 5 MG tablet Take 5 mg by mouth nightly    Yes Historical Provider, MD   Multiple Vitamins-Minerals (THERAPEUTIC MULTIVITAMIN-MINERALS) tablet Take 1 tablet by mouth daily   Yes Historical Provider, MD   acetaminophen (TYLENOL) 500 MG tablet Take 500 mg by mouth every 6 hours as needed for Pain   Yes Historical Provider, MD   docusate sodium (COLACE, DULCOLAX) 100 MG CAPS Take 100 mg by mouth daily 8/22/19  Yes Kavon Nicole   pravastatin (PRAVACHOL) 40 MG tablet take 1 tablet by mouth once daily 10/2/18  Yes DAISY Correa CNP   levothyroxine (SYNTHROID) 125 MCG tablet Take 1 tablet by mouth Daily 7/19/18  Yes DAISY Correa CNP   losartan (COZAAR) 50 MG tablet take 1 tablet by mouth once daily 5/21/18  Yes DAISY Johnson CNP loratadine (CLARITIN) 10 MG tablet Take 1 tablet by mouth daily 10/16/17  Yes DAISY Mcneil CNP   Handicap Placard MISC by Does not apply route Diagnosis: sick sinus syndrome. Expires: 2/28/2023 2/17/20   Carlos Krishna DO       Allergies:  Patient has no known allergies. Social History:   reports that she has never smoked. She has never used smokeless tobacco. She reports that she does not drink alcohol or use drugs. Family History: family history includes Cancer (age of onset: 47) in her mother; Depression in her son; Drug Abuse in her son; Emphysema in her sister; Mental Illness in her son; Other in her brother and son; Stroke (age of onset: 76) in her father; Sudden Death (age of onset: 78) in her sister. No h/o sudden cardiac death. No for premature CAD    REVIEW OF SYSTEMS:    · Constitutional: there has been no unanticipated weight loss. There's been No change in energy level, No change in activity level. · Eyes: No visual changes or diplopia. No scleral icterus. · ENT: No Headaches, hearing loss or vertigo. No mouth sores or sore throat. · Cardiovascular: see above  · Respiratory: see above  · Gastrointestinal: No abdominal pain, appetite loss, blood in stools. · Genitourinary: No dysuria, trouble voiding, or hematuria. · Musculoskeletal:  See above  · Integumentary: No rash or pruritis. · Neurological: No headache or diplopia. No tingling  · Psychiatric: No anxiety, or depression. · Endocrine: No temperature intolerance. · Hematologic/Lymphatic: No abnormal bruising or bleeding, blood clots or swollen lymph nodes. · Allergic/Immunologic: No nasal congestion or hives. PHYSICAL EXAM:      Vitals:    01/28/21 0900   BP: (!) 114/55   Pulse: 70       Constitutional and General Appearance: alert, cooperative, no distress and appears stated age  HEENT: PERRL, no cervical lymphadenopathy. No masses palpable.  Normal oral mucosa  Respiratory:

## 2021-01-28 NOTE — ED NOTES
Attempted to get patient up to use bathroom. Patient with pain to abdomen when attempting to get up. Patient states happens when she tries to get up a certain way. Pain is gone after patient allowed to relax back and lay flat. Patient state has had pain intermittent for \"awhile\" not able to give time line when pain started. Patient with no trauma or fall to area. Left side where the pain is at.skin warm and dry. dr Shelby Keating notified     Juan Pablo Dorantes, RN  01/28/21 2517

## 2021-01-28 NOTE — ED NOTES
Patient and niece given discharge instructions. Also [facility] called to come to  patient . Given card that patient was given to call  .       Dee Prather RN  01/28/21 9294

## 2021-01-29 ENCOUNTER — CARE COORDINATION (OUTPATIENT)
Dept: CARE COORDINATION | Age: 86
End: 2021-01-29

## 2021-01-29 NOTE — CARE COORDINATION
Joe was seen [facility] 1/28/2021- near syncope while at office having pacer checked. Light headed, cystitis, hiatal hernia. Treated with Macrobid. She is eligible for [facility]    1/29/2012- 2:01 pm Left message requesting return call re: Initial ER/Covid F/U call and to enroll in [facility]. Zuleyka Ricardo- legal guardian returned call and stated that Kendall Stokes is doing well. She resides at the [facility]. No further outreaches.

## 2021-01-31 LAB
CULTURE: ABNORMAL
Lab: ABNORMAL
SPECIMEN DESCRIPTION: ABNORMAL

## 2021-02-15 ENCOUNTER — OUTSIDE SERVICES (OUTPATIENT)
Dept: INTERNAL MEDICINE | Age: 86
End: 2021-02-15
Payer: MEDICARE

## 2021-02-15 DIAGNOSIS — I25.10 CORONARY ARTERY DISEASE INVOLVING NATIVE CORONARY ARTERY OF NATIVE HEART, ANGINA PRESENCE UNSPECIFIED: ICD-10-CM

## 2021-02-15 DIAGNOSIS — C73 THYROID CANCER (HCC): ICD-10-CM

## 2021-02-15 DIAGNOSIS — D45 POLYCYTHEMIA RUBRA VERA (HCC): ICD-10-CM

## 2021-02-15 DIAGNOSIS — I10 ESSENTIAL HYPERTENSION: ICD-10-CM

## 2021-02-15 DIAGNOSIS — K21.9 GASTROESOPHAGEAL REFLUX DISEASE WITHOUT ESOPHAGITIS: ICD-10-CM

## 2021-02-15 DIAGNOSIS — K22.70 BARRETT'S ESOPHAGUS WITHOUT DYSPLASIA: ICD-10-CM

## 2021-02-15 DIAGNOSIS — K58.9 IRRITABLE BOWEL SYNDROME, UNSPECIFIED TYPE: ICD-10-CM

## 2021-02-15 DIAGNOSIS — I49.5 SSS (SICK SINUS SYNDROME) (HCC): ICD-10-CM

## 2021-02-15 DIAGNOSIS — F02.818 EARLY ONSET ALZHEIMER'S DISEASE WITH BEHAVIORAL DISTURBANCE (HCC): ICD-10-CM

## 2021-02-15 DIAGNOSIS — E66.3 OVERWEIGHT: ICD-10-CM

## 2021-02-15 DIAGNOSIS — F32.5 MAJOR DEPRESSIVE DISORDER WITH SINGLE EPISODE, IN REMISSION (HCC): ICD-10-CM

## 2021-02-15 DIAGNOSIS — R73.01 ELEVATED FASTING GLUCOSE: Primary | ICD-10-CM

## 2021-02-15 DIAGNOSIS — G30.0 EARLY ONSET ALZHEIMER'S DISEASE WITH BEHAVIORAL DISTURBANCE (HCC): ICD-10-CM

## 2021-02-15 DIAGNOSIS — Z95.0 CARDIAC PACEMAKER IN SITU: ICD-10-CM

## 2021-02-15 DIAGNOSIS — M80.00XD AGE-RELATED OSTEOPOROSIS WITH CURRENT PATHOLOGICAL FRACTURE WITH ROUTINE HEALING: ICD-10-CM

## 2021-02-15 DIAGNOSIS — F41.9 ANXIETY: ICD-10-CM

## 2021-02-15 DIAGNOSIS — K44.9 HIATAL HERNIA: ICD-10-CM

## 2021-02-15 DIAGNOSIS — I21.9 MYOCARDIAL INFARCTION, UNSPECIFIED MI TYPE, UNSPECIFIED ARTERY (HCC): ICD-10-CM

## 2021-02-15 DIAGNOSIS — E89.0 POSTOPERATIVE HYPOTHYROIDISM: ICD-10-CM

## 2021-02-15 DIAGNOSIS — E78.2 MIXED HYPERLIPIDEMIA: ICD-10-CM

## 2021-02-15 NOTE — PROGRESS NOTES
DR. Rosalva Singh - TELEHEALTH NURSING HOME VISIT     DATE OF SERVICE: 1/19/21    NURSING HOME: [facility] [place]    CHIEF COMPLAINT/HISTORY OF CHIEF COMPLAINT: This patient is being seen via telehealth for ongoing evaluation and management of her Alzheimer's disease, elevated fasting glucose, hypertension, hyperlipidemia, history of thyroid cancer with postoperative hypothyroidism, coronary artery disease, sick sinus syndrome, gastroesophageal reflux disease, Mcdaniel's esophagus, irritable bowel syndrome, polycythemia, depression, and being overweight. Her elevated fasting glucose is controlled with diet and exercise. There are no new complaints. ALLERGIES: No Known Allergies    MEDICATIONS: As noted on the [facility] [facility], referenced and incorporated herein. PAST MEDICAL HISTORY:   Past Medical History:   Diagnosis Date    Age-related osteoporosis with current pathological fracture with routine healing     Anxiety 9/6/2019    Mcdaniel esophagus     CAD (coronary artery disease)     nonobstructive    Cardiac pacemaker in situ 4/24/2014    Put in for severe bradycardia.  Dual chamber     Chronic insomnia     Closed right hip fracture, with routine healing, subsequent encounter 8/18/2019    Early onset Alzheimer's disease with behavioral disturbance ([address] 9/6/2019    Elevated fasting glucose 9/6/2019    GERD (gastroesophageal reflux disease)     Hiatal hernia     Hyperlipidemia     Hypertension     Hypothyroidism s/p surgical removal of thyroid for thyroid carcinoma 7/29/2015    IBS (irritable bowel syndrome)     Major depressive disorder with single episode, in remission ([address] 9/6/2019    Mycosis fungoides ([address]     Osteoarthritis     Osteoporosis     Patient in clinical research study 08/13/2020    Enrolled in convalescent plasma for COVID 19 Date of completion 08/16/20    Polycythemia rubra vera [facility])     sees oncology    PUD (peptic ulcer disease)     RV Pacemaker lead malfunction - Revision 3/24/14-Dr. ray 3/24/2014    SSS (sick sinus syndrome) ([facility] 4/24/2014    Thyroid cancer ([facility] [address] about 5 years ago    thyroid /following with oncology    TIA (transient ischemic attack) 7/23/2014    Confusion--word searching    Vitamin D deficiency        PAST SURGICAL HISTORY:   Past Surgical History:   Procedure Laterality Date    BONE MARROW BIOPSY  September 2010    polycythemia vera    BREAST BIOPSY Right     benign    CARDIAC CATHETERIZATION  2008    nonobstructive CAD    CHOLECYSTECTOMY  1995    COLONOSCOPY  2002    COLONOSCOPY  2/29/2016    colon polyp, diverticulosis - Dr Mari Nuñez    after miscarriage    ENDOSCOPY, COLON, DIAGNOSTIC  2002    normal  external hemorrhoids    EYE SURGERY Right 11/07/06    Cataract extraction with lens implant    HEMIARTHROPLASTY HIP Right 8/20/2019    RIGHT HIP HEMIARTHROPLASTY performed by Keeley Montero MD at [address], THYROID Left     thyroid cancer    PACEMAKER INSERTION  6/2013    [facility] SKIN BIOPSY  2003    mycosis fungoides    TAE AND BSO      TAE/BSO at age 54 with anterior repair for benign reasons.     TONSILLECTOMY AND ADENOIDECTOMY      age 27   [facility] TUBAL LIGATION      UPPER GASTROINTESTINAL ENDOSCOPY  2002    UPPER GASTROINTESTINAL ENDOSCOPY  08/28/2003    UPPER GASTROINTESTINAL ENDOSCOPY  05/12/2003    UPPER GASTROINTESTINAL ENDOSCOPY  03/20/2001    UPPER GASTROINTESTINAL ENDOSCOPY  02/29/2016    Barretts tissue, body polyp - Dr Kirstie Grimaldo       SOCIAL HISTORY:     Tobacco:   Social History     Tobacco Use   Smoking Status Never Smoker   Smokeless Tobacco Never Used   Tobacco Comment    never smoker     Alcohol:   Social History     Substance and Sexual Activity   Alcohol Use No    Alcohol/week: 0.0 standard drinks     Drugs:   Social History     Substance and Sexual Activity   Drug Use No       FAMILY HISTORY: family history includes Cancer (age of onset: 47) in her mother; Depression in her son; Drug Abuse in her son; Emphysema in her sister; Mental Illness in her son; Other in her brother and son; Stroke (age of onset: 76) in her father; Sudden Death (age of onset: 78) in her sister. REVIEW OF SYSTEMS:     Please see history of chief complaint above; otherwise no new problems with respect to General, HEENT, Cardiovascular, Respiratory, Gastrointestinal, Genitourinary, Endocrinologic, Musculoskeletal, or Neuropsychiatric complaints. PHYSICAL EXAMINATION:    Due to this being a telehealth visit, the physical examination was performed by Simba Méndez RN. Vitals: Temp: 97.6 deg F. Pulse: 65. Resp: 16. BP: 160/73. General: A 80 y.o.  female. Alert and oriented to person and place and questionably oriented to time. She does not appear to be in any acute distress. Skin: Skin color, texture, turgor normal. No rashes or lesions. HEENT/Neck: Essentially unremarkable  Lungs: Normal - CTA without rales, rhonchi, or wheezing. Heart: regular rate and rhythm, S1, S2 normal, no murmur, click, rub or gallop No S3 or S4. Abdomen: Non-obese soft, non-distended, non-tender, normal active bowel sounds, no masses palpated and no hepatosplenomegaly  Extremities: No clubbing, cyanosis, or edema in any of the extremities. Neurologic: cranial nerves II-XII are grossly intact    ASSESSMENT:     Diagnosis Orders   1. Elevated fasting glucose     2. Essential hypertension     3. Mixed hyperlipidemia     4. Coronary artery disease involving native coronary artery of native heart, angina presence unspecified     5. SSS (sick sinus syndrome) (HCC)     6. Cardiac pacemaker in situ     7. Myocardial infarction, unspecified MI type, unspecified artery ([address]     8. Thyroid cancer ([facility]     9. Postoperative hypothyroidism     10. Gastroesophageal reflux disease without esophagitis     11. Hiatal hernia     12. 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  Irritable bowel syndrome, unspecified type     14. Polycythemia rubra vera ([facility]     15. Age-related osteoporosis with current pathological fracture with routine healing     16. Early onset Alzheimer's disease with behavioral disturbance ([facility] [facility]     17. Major depressive disorder with single episode, in remission ([facility] [address] [facility]     18. Anxiety     19. Overweight           PLAN:    1. Continue current treatment  2. Nursing home record reviewed and updates summarized and entered into electronic record  3. See nursing home orders and MAR. Pursuant to the emergency declaration under the [facility], [facility] waiver authority and the [employer] Act, this TelehHealth visit was conducted, with patient's consent, to reduce the patient's risk of exposure to COVID-19 and provide continuity of care for an established patient. Services were provided through a video synchronous discussion virtually (using [email or web address] me) to substitute for in-person clinic visit. The originating site was the nursing home and the distant site was the provider's office. Patient's identity was verified via name and date of birth.         Electronically signed by Tyree Forrest DO on 2/15/2021 at 1:39 AM  Internal Medicine

## 2021-02-25 DIAGNOSIS — D45 POLYCYTHEMIA RUBRA VERA (HCC): Primary | ICD-10-CM

## 2021-02-27 PROCEDURE — 99307 SBSQ NF CARE SF MDM 10: CPT | Performed by: INTERNAL MEDICINE

## 2021-03-01 ENCOUNTER — OFFICE VISIT (OUTPATIENT)
Dept: ONCOLOGY | Age: 86
End: 2021-03-01
Payer: MEDICARE

## 2021-03-01 ENCOUNTER — HOSPITAL ENCOUNTER (OUTPATIENT)
Dept: LAB | Age: 86
Discharge: HOME | End: 2021-03-01
Payer: MEDICARE

## 2021-03-01 VITALS
HEIGHT: 66 IN | BODY MASS INDEX: 23.5 KG/M2 | TEMPERATURE: 97.9 F | DIASTOLIC BLOOD PRESSURE: 84 MMHG | HEART RATE: 64 BPM | SYSTOLIC BLOOD PRESSURE: 132 MMHG | OXYGEN SATURATION: 96 % | WEIGHT: 146.2 LBS

## 2021-03-01 DIAGNOSIS — D75.1 POLYCYTHEMIA: Primary | ICD-10-CM

## 2021-03-01 DIAGNOSIS — D45 POLYCYTHEMIA RUBRA VERA (HCC): ICD-10-CM

## 2021-03-01 LAB
ABSOLUTE EOS #: 0.24 K/UL (ref 0–0.44)
ABSOLUTE IMMATURE GRANULOCYTE: <0.03 K/UL (ref 0–0.3)
ABSOLUTE LYMPH #: 1.34 K/UL (ref 1.1–3.7)
ABSOLUTE MONO #: 0.64 K/UL (ref 0.1–1.2)
BASOPHILS # BLD: 1 % (ref 0–2)
BASOPHILS ABSOLUTE: 0.06 K/UL (ref 0–0.2)
DIFFERENTIAL TYPE: ABNORMAL
EOSINOPHILS RELATIVE PERCENT: 5 % (ref 1–4)
HCT VFR BLD CALC: 48 % (ref 36.3–47.1)
HEMOGLOBIN: 15.4 G/DL (ref 11.9–15.1)
IMMATURE GRANULOCYTES: 0 %
LYMPHOCYTES # BLD: 25 % (ref 24–43)
MCH RBC QN AUTO: 27.5 PG (ref 25.2–33.5)
MCHC RBC AUTO-ENTMCNC: 32.1 G/DL (ref 25.2–33.5)
MCV RBC AUTO: 85.6 FL (ref 82.6–102.9)
MONOCYTES # BLD: 12 % (ref 3–12)
NRBC AUTOMATED: 0 PER 100 WBC
PDW BLD-RTO: 14.5 % (ref 11.8–14.4)
PLATELET # BLD: 193 K/UL (ref 138–453)
PLATELET ESTIMATE: ABNORMAL
PMV BLD AUTO: 9.9 FL (ref 8.1–13.5)
RBC # BLD: 5.61 M/UL (ref 3.95–5.11)
RBC # BLD: ABNORMAL 10*6/UL
SEG NEUTROPHILS: 57 % (ref 36–65)
SEGMENTED NEUTROPHILS ABSOLUTE COUNT: 3.08 K/UL (ref 1.5–8.1)
WBC # BLD: 5.4 K/UL (ref 3.5–11.3)
WBC # BLD: ABNORMAL 10*3/UL

## 2021-03-01 PROCEDURE — G8420 CALC BMI NORM PARAMETERS: HCPCS | Performed by: INTERNAL MEDICINE

## 2021-03-01 PROCEDURE — G8484 FLU IMMUNIZE NO ADMIN: HCPCS | Performed by: INTERNAL MEDICINE

## 2021-03-01 PROCEDURE — G8427 DOCREV CUR MEDS BY ELIG CLIN: HCPCS | Performed by: INTERNAL MEDICINE

## 2021-03-01 PROCEDURE — 36415 COLL VENOUS BLD VENIPUNCTURE: CPT

## 2021-03-01 PROCEDURE — 85025 COMPLETE CBC W/AUTO DIFF WBC: CPT

## 2021-03-01 PROCEDURE — 1090F PRES/ABSN URINE INCON ASSESS: CPT | Performed by: INTERNAL MEDICINE

## 2021-03-01 PROCEDURE — 99214 OFFICE O/P EST MOD 30 MIN: CPT

## 2021-03-01 PROCEDURE — 1123F ACP DISCUSS/DSCN MKR DOCD: CPT | Performed by: INTERNAL MEDICINE

## 2021-03-01 PROCEDURE — 99214 OFFICE O/P EST MOD 30 MIN: CPT | Performed by: INTERNAL MEDICINE

## 2021-03-01 PROCEDURE — 1036F TOBACCO NON-USER: CPT | Performed by: INTERNAL MEDICINE

## 2021-03-01 PROCEDURE — 4040F PNEUMOC VAC/ADMIN/RCVD: CPT | Performed by: INTERNAL MEDICINE

## 2021-03-01 RX ORDER — LOPERAMIDE HYDROCHLORIDE 2 MG/1
2 CAPSULE ORAL 4 TIMES DAILY PRN
COMMUNITY
End: 2021-06-25

## 2021-03-01 NOTE — PROGRESS NOTES
Patient ID: Ralf Diana, 1934, [id number], 80 y.o. 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 Yesika Marsh until recently for polycythemia. She was diagnosed with polycythemia in September 2010. Her GÓMEZ 2 mutation mutation was negative. She had a bone marrow biopsy done in September 2010 which showed normal cellular marrow with no erythroid hyperplasia. No evidence of malignancy or dysplasia noted. She was treated with intermittent phlebotomies since September 2010. Interval history:  Patient is returning for follow-up visit. She is here to discuss lab results and further recommendations. She is here to discuss lab results and further recommendations. Her recent hematocrit is stable at 48. She denies any headache, dizziness, chest tightness. During this visit patient's allergy, social, medical, surgical history and medications were reviewed and updated. No Known Allergies    Current Outpatient Medications   Medication Sig Dispense Refill    loperamide (IMODIUM) 2 MG capsule Take 2 mg by mouth 4 times daily as needed for Diarrhea      guaiFENesin (ROBITUSSIN) 100 MG/5ML syrup Take 200 mg by mouth every 6 hours as needed for Cough      mirtazapine (REMERON) 7.5 MG tablet Take 7.5 mg by mouth nightly       Handicap Placard MISC by Does not apply route Diagnosis: sick sinus syndrome.  Expires: 2/28/2023 1 each 0    donepezil (ARICEPT) 5 MG tablet Take 5 mg by mouth nightly       Multiple Vitamins-Minerals (THERAPEUTIC MULTIVITAMIN-MINERALS) tablet Take 1 tablet by mouth daily      acetaminophen (TYLENOL) 500 MG tablet Take 500 mg by mouth every 6 hours as needed for Pain      docusate sodium (COLACE, DULCOLAX) 100 MG CAPS Take 100 mg by mouth daily 30 capsule 0    pravastatin (PRAVACHOL) 40 MG tablet take 1 tablet by mouth once daily 90 tablet 1    levothyroxine (SYNTHROID) 125 MCG tablet Take 1 tablet by mouth Daily 30 tablet 5    losartan (COZAAR) 50 MG tablet take 1 tablet by mouth once daily 30 tablet 5    loratadine (CLARITIN) 10 MG tablet Take 1 tablet by mouth daily 30 tablet 11     Current Facility-Administered Medications   Medication Dose Route Frequency Provider Last Rate Last Admin    denosumab (PROLIA) SC injection 60 mg  60 mg Subcutaneous Q6 Months Kathya Henriquez MD         Social History     Socioeconomic History    Marital status:       Spouse name: Not on file    Number of children: Not on file    Years of education: Not on file    Highest education level: Not on file   Occupational History    Occupation: retired     Employer: [employer] resource strain: Not on file    Food insecurity     Worry: Not on file     Inability: Not on file   [employer] needs     Medical: Not on file     Non-medical: Not on file   Tobacco Use    Smoking status: Never Smoker    Smokeless tobacco: Never Used    Tobacco comment: never smoker   Substance and Sexual Activity    Alcohol use: No     Alcohol/week: 0.0 standard drinks    Drug use: No    Sexual activity: Not Currently   Lifestyle    Physical activity     Days per week: Not on file     Minutes per session: Not on file    Stress: Not on file   Relationships    Social connections     Talks on phone: Not on file     Gets together: Not on file     Attends [employer] service: Not on file     Active member of club or organization: Not on file     Attends meetings of clubs or organizations: Not on file     Relationship status: Not on file    Intimate partner violence     Fear of current or ex partner: Not on file     Emotionally abused: Not on file     Physically abused: Not on file     Forced sexual activity: Not on file   Other Topics Concern    Not on file   Social History Narrative    Not on file       Family History   Problem Relation Age of Onset    Cancer Mother 47        throat    Stroke Father 76         of CVA    Emphysema Sister     Other Brother          in train accident    Drug Abuse Son     Depression Son     Other Son         suicide    Mental Illness Son     Sudden Death Sister 78        smoker        REVIEW OF SYSTEM:     Constitutional: No fever or chills. No night sweats, no weight loss   Eyes: No eye discharge, double vision, or eye pain   HEENT: negative for sore mouth, sore throat, hoarseness and voice change   Respiratory: negative for cough , sputum, dyspnea, wheezing, hemoptysis, chest pain   Cardiovascular: negative for chest pain, dyspnea, palpitations, orthopnea, PND   Gastrointestinal: negative for nausea, vomiting, diarrhea, constipation, abdominal pain, Dysphagia, hematemesis and hematochezia   Genitourinary: negative for frequency, dysuria, nocturia, urinary incontinence, and hematuria   Integument: negative for rash, skin lesions, bruises.    Hematologic/Lymphatic: negative for easy bruising, bleeding, lymphadenopathy, petechiae and swelling/edema   Endocrine: negative for heat or cold intolerance, tremor, weight changes, change in bowel habits and hair loss   Musculoskeletal: negative for myalgias, arthralgias, pain, joint swelling,and bone pain   Neurological: negative for headaches, dizziness, seizures, weakness, numbness       OBJECTIVE:         Vitals:    21 1452   BP: 132/84   Pulse: 64   Temp: 97.9 °F (36.6 °C)   SpO2: 96%       PHYSICAL EXAM:   General appearance - well appearing, no in pain or distress   Mental status - alert and cooperative   Eyes - pupils equal and reactive, extraocular eye movements intact   Ears - bilateral TM's and external ear canals normal   Mouth - mucous membranes moist, pharynx normal without lesions   Neck - supple, no significant adenopathy   Lymphatics - no palpable lymphadenopathy, no hepatosplenomegaly   Chest - clear to auscultation, no wheezes, rales or rhonchi, symmetric air entry   Heart - normal rate, regular rhythm, normal S1, S2, no murmurs, rubs, clicks or gallops   Abdomen - soft, nontender, nondistended, no masses or organomegaly   Neurological - alert, oriented, normal speech, no focal findings or movement disorder noted   Musculoskeletal - no joint tenderness, deformity or swelling   Extremities - peripheral pulses normal, no pedal edema, no clubbing or cyanosis   Skin - normal coloration and turgor, no rashes, no suspicious skin lesions noted ,      LABORATORY DATA:     Lab Results   Component Value Date    WBC 5.4 03/01/2021    HGB 15.4 (H) 03/01/2021    HCT 48.0 (H) 03/01/2021    MCV 85.6 03/01/2021     03/01/2021    LYMPHOPCT 25 03/01/2021    RBC 5.61 (H) 03/01/2021    MCH 27.5 03/01/2021    MCHC 32.1 03/01/2021    RDW 14.5 (H) 03/01/2021    MONOPCT 12 03/01/2021    BASOPCT 1 03/01/2021    NEUTROABS 3.08 03/01/2021    LYMPHSABS 1.34 03/01/2021    MONOSABS 0.64 03/01/2021    EOSABS 0.24 03/01/2021    BASOSABS 0.06 03/01/2021         Chemistry        Component Value Date/Time     01/28/2021 1022    K 4.3 01/28/2021 1022     01/28/2021 1022    CO2 28 01/28/2021 1022    BUN 17 01/28/2021 1022    CREATININE 0.75 01/28/2021 1022        Component Value Date/Time    CALCIUM 9.4 01/28/2021 1022    ALKPHOS 78 01/28/2021 1215    AST 18 01/28/2021 1215    ALT 12 01/28/2021 1215    BILITOT 0.41 01/28/2021 1215        PATHOLOGY DATA:   V617F Mutation, Qnt   JAK2 Gene Mutation Quant   Collected: 02/23/17 0933   Resulting lab: [facility]   Reference range: %   Value: 0.0   Comment: (NOTE)   There is no evidence of the JAK2 V617F point mutation by real-time   PCR analysis.       IMAGING DATA:    REviewed  ASSESSMENT:    This is a 80-year-old female with history of secondary polycythemia, GÓMEZ 2 mutation negative and negative bone marrow biopsy. Patient has been treated with intermittent phlebotomies in the past but her last phlebotomy was several months ago. Her most recent hemoglobin in July has been stable. She does not have any symptoms of hyperviscosity. She is very independent activities of daily living and denied any chest pain shortness of breath. We'll continue to monitor every 6 months. PLAN:   I reviewed recent lab work with patient and family  No indication for phlebotomy as hematocrit is stable at 48  If lab work is stable, Return to clinic in 12 months    Santhosh Melvin MD  Hematologist/Medical Oncologist        This note is created with the assistance of a speech recognition program.  While intending to generate a document that actually reflects the content of the visit, the document can still have some errors including those of syntax and sound a like substitutions which may escape proof reading. It such instances, actual meaning can be extrapolated by contextual diversion.

## 2021-03-21 ENCOUNTER — OUTSIDE SERVICES (OUTPATIENT)
Dept: INTERNAL MEDICINE | Age: 86
End: 2021-03-21
Payer: MEDICARE

## 2021-03-21 DIAGNOSIS — I25.10 CORONARY ARTERY DISEASE INVOLVING NATIVE CORONARY ARTERY OF NATIVE HEART, ANGINA PRESENCE UNSPECIFIED: ICD-10-CM

## 2021-03-21 DIAGNOSIS — K58.9 IRRITABLE BOWEL SYNDROME, UNSPECIFIED TYPE: ICD-10-CM

## 2021-03-21 DIAGNOSIS — R73.01 ELEVATED FASTING GLUCOSE: Primary | ICD-10-CM

## 2021-03-21 DIAGNOSIS — E89.0 POSTOPERATIVE HYPOTHYROIDISM: ICD-10-CM

## 2021-03-21 DIAGNOSIS — G30.0 EARLY ONSET ALZHEIMER'S DISEASE WITH BEHAVIORAL DISTURBANCE (HCC): ICD-10-CM

## 2021-03-21 DIAGNOSIS — I49.5 SSS (SICK SINUS SYNDROME) (HCC): ICD-10-CM

## 2021-03-21 DIAGNOSIS — I10 ESSENTIAL HYPERTENSION: ICD-10-CM

## 2021-03-21 DIAGNOSIS — Z95.0 CARDIAC PACEMAKER IN SITU: ICD-10-CM

## 2021-03-21 DIAGNOSIS — E78.2 MIXED HYPERLIPIDEMIA: ICD-10-CM

## 2021-03-21 DIAGNOSIS — F32.5 MAJOR DEPRESSIVE DISORDER WITH SINGLE EPISODE, IN REMISSION (HCC): ICD-10-CM

## 2021-03-21 DIAGNOSIS — K21.9 GASTROESOPHAGEAL REFLUX DISEASE WITHOUT ESOPHAGITIS: ICD-10-CM

## 2021-03-21 DIAGNOSIS — M80.00XD AGE-RELATED OSTEOPOROSIS WITH CURRENT PATHOLOGICAL FRACTURE WITH ROUTINE HEALING: ICD-10-CM

## 2021-03-21 DIAGNOSIS — K22.70 BARRETT'S ESOPHAGUS WITHOUT DYSPLASIA: ICD-10-CM

## 2021-03-21 DIAGNOSIS — C73 THYROID CANCER (HCC): ICD-10-CM

## 2021-03-21 DIAGNOSIS — F41.9 ANXIETY: ICD-10-CM

## 2021-03-21 DIAGNOSIS — I21.9 MYOCARDIAL INFARCTION, UNSPECIFIED MI TYPE, UNSPECIFIED ARTERY (HCC): ICD-10-CM

## 2021-03-21 DIAGNOSIS — K44.9 HIATAL HERNIA: ICD-10-CM

## 2021-03-21 DIAGNOSIS — F02.818 EARLY ONSET ALZHEIMER'S DISEASE WITH BEHAVIORAL DISTURBANCE (HCC): ICD-10-CM

## 2021-03-21 DIAGNOSIS — D45 POLYCYTHEMIA RUBRA VERA (HCC): ICD-10-CM

## 2021-03-22 NOTE — PROGRESS NOTES
DR. Clementina Montoya - [facility] VISIT     DATE OF SERVICE: 2/27/21    NURSING HOME: [facility] [place]    CHIEF COMPLAINT/HISTORY OF CHIEF COMPLAINT: This patient is being seen for ongoing evaluation and management of her Alzheimer's disease, elevated fasting glucose, hypertension, hyperlipidemia, history of thyroid cancer with postoperative hypothyroidism, coronary artery disease, sick sinus syndrome, gastroesophageal reflux disease, Mcdaniel's esophagus, irritable bowel syndrome, polycythemia, depression, and being overweight. Her elevated fasting glucose is controlled with diet and exercise. There are no new complaints. ALLERGIES: No Known Allergies    MEDICATIONS: As noted on the [facility] [place] MAR, referenced and incorporated herein. PAST MEDICAL HISTORY:   Past Medical History:   Diagnosis Date    Age-related osteoporosis with current pathological fracture with routine healing     Anxiety 9/6/2019    Mcdaniel esophagus     CAD (coronary artery disease)     nonobstructive    Cardiac pacemaker in situ 4/24/2014    Put in for severe bradycardia.  Dual chamber     Chronic insomnia     Closed right hip fracture, with routine healing, subsequent encounter 8/18/2019    Early onset Alzheimer's disease with behavioral disturbance ([address] 9/6/2019    Elevated fasting glucose 9/6/2019    GERD (gastroesophageal reflux disease)     Hiatal hernia     Hyperlipidemia     Hypertension     Hypothyroidism s/p surgical removal of thyroid for thyroid carcinoma 7/29/2015    IBS (irritable bowel syndrome)     Major depressive disorder with single episode, in remission ([address] 9/6/2019    Mycosis fungoides ([address]     Osteoarthritis     Osteoporosis     Patient in clinical research study 08/13/2020    Enrolled in convalescent plasma for COVID 19 Date of completion 08/16/20    Polycythemia rubra vera [facility])     sees oncology    PUD (peptic ulcer disease)     RV Pacemaker lead malfunction - Revision 3/24/14-Dr. Juanis Dickinson 3/24/2014    SSS (sick sinus syndrome) ([address] 4/24/2014    Thyroid cancer ([address] about 5 years ago    thyroid /following with oncology    TIA (transient ischemic attack) 7/23/2014    Confusion--word searching    Vitamin D deficiency        PAST SURGICAL HISTORY:   Past Surgical History:   Procedure Laterality Date    BONE MARROW BIOPSY  September 2010    polycythemia vera    BREAST BIOPSY Right     benign    CARDIAC CATHETERIZATION  2008    nonobstructive CAD    CHOLECYSTECTOMY  1995    COLONOSCOPY  2002    COLONOSCOPY  2/29/2016    colon polyp, diverticulosis - Dr Petar Dominguez    after miscarriage    ENDOSCOPY, COLON, DIAGNOSTIC  2002    normal  external hemorrhoids    EYE SURGERY Right 11/07/06    Cataract extraction with lens implant    HEMIARTHROPLASTY HIP Right 8/20/2019    RIGHT HIP HEMIARTHROPLASTY performed by Mary Salgado MD at [address], THYROID Left     thyroid cancer    PACEMAKER INSERTION  6/2013    [facility] SKIN BIOPSY  2003    mycosis fungoides    TAE AND BSO      TAE/BSO at age 54 with anterior repair for benign reasons.     TONSILLECTOMY AND ADENOIDECTOMY      age 27   [facility] TUBAL LIGATION      UPPER GASTROINTESTINAL ENDOSCOPY  2002    UPPER GASTROINTESTINAL ENDOSCOPY  08/28/2003    UPPER GASTROINTESTINAL ENDOSCOPY  05/12/2003    UPPER GASTROINTESTINAL ENDOSCOPY  03/20/2001    UPPER GASTROINTESTINAL ENDOSCOPY  02/29/2016    Barretts tissue, body polyp - Dr Juju Altamirano       SOCIAL HISTORY:     Tobacco:   Social History     Tobacco Use   Smoking Status Never Smoker   Smokeless Tobacco Never Used   Tobacco Comment    never smoker     Alcohol:   Social History     Substance and Sexual Activity   Alcohol Use No    Alcohol/week: 0.0 standard drinks     Drugs:   Social History     Substance and Sexual Activity   Drug Use No       FAMILY HISTORY: family history includes Cancer (age of onset: 47) in her mother; Depression in her son; Drug Abuse in her son; Emphysema in her sister; Mental Illness in her son; Other in her brother and son; Stroke (age of onset: 76) in her father; Sudden Death (age of onset: 78) in her sister. REVIEW OF SYSTEMS:     Please see history of chief complaint above; otherwise no new problems with respect to General, HEENT, Cardiovascular, Respiratory, Gastrointestinal, Genitourinary, Endocrinologic, Musculoskeletal, or Neuropsychiatric complaints. PHYSICAL EXAMINATION:    Vitals: Temp: 97.4 deg F. Pulse: 61. Resp: 18. BP: 147/68. General: A 80 y.o.  female. Alert and oriented to person and place and questionably oriented to time. She does not appear to be in any acute distress. Skin: Skin color, texture, turgor normal. No rashes or lesions. HEENT/Neck: Essentially unremarkable  Lungs: Normal - CTA without rales, rhonchi, or wheezing. Heart: regular rate and rhythm, S1, S2 normal, no murmur, click, rub or gallop No S3 or S4. Abdomen: Non-obese soft, non-distended, non-tender, normal active bowel sounds, no masses palpated and no hepatosplenomegaly  Extremities: No clubbing, cyanosis, or edema in any of the extremities. Neurologic: cranial nerves II-XII are grossly intact    ASSESSMENT:     Diagnosis Orders   1. Elevated fasting glucose     2. Essential hypertension     3. Mixed hyperlipidemia     4. Coronary artery disease involving native coronary artery of native heart, angina presence unspecified     5. SSS (sick sinus syndrome) (HCC)     6. Cardiac pacemaker in situ     7. Myocardial infarction, unspecified MI type, unspecified artery ([address]     8. Thyroid cancer ([address]     9. Postoperative hypothyroidism     10. Gastroesophageal reflux disease without esophagitis     11. Hiatal hernia     12. 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 Irritable bowel syndrome, unspecified type     14. Polycythemia rubra vera ([address]     15.  Age-related osteoporosis with current pathological fracture with routine healing     16. Early onset Alzheimer's disease with behavioral disturbance ([facility]     17. Major depressive disorder with single episode, in remission ([facility]     18. Anxiety           PLAN:    1. Continue current treatment  2. Nursing home record reviewed and updates summarized and entered into electronic record  3. See nursing home orders and MAR.         Electronically signed by Angela Nugent DO on 3/21/2021 at 8:15 PM  Internal Medicine

## 2021-03-28 PROCEDURE — 99307 SBSQ NF CARE SF MDM 10: CPT | Performed by: INTERNAL MEDICINE

## 2021-04-25 PROCEDURE — 99307 SBSQ NF CARE SF MDM 10: CPT | Performed by: INTERNAL MEDICINE

## 2021-04-30 ENCOUNTER — OUTSIDE SERVICES (OUTPATIENT)
Dept: INTERNAL MEDICINE | Age: 86
End: 2021-04-30
Payer: MEDICARE

## 2021-04-30 DIAGNOSIS — G30.0 EARLY ONSET ALZHEIMER'S DISEASE WITH BEHAVIORAL DISTURBANCE (HCC): ICD-10-CM

## 2021-04-30 DIAGNOSIS — E89.0 POSTOPERATIVE HYPOTHYROIDISM: ICD-10-CM

## 2021-04-30 DIAGNOSIS — D45 POLYCYTHEMIA RUBRA VERA (HCC): ICD-10-CM

## 2021-04-30 DIAGNOSIS — M80.00XD AGE-RELATED OSTEOPOROSIS WITH CURRENT PATHOLOGICAL FRACTURE WITH ROUTINE HEALING: ICD-10-CM

## 2021-04-30 DIAGNOSIS — I25.10 CORONARY ARTERY DISEASE INVOLVING NATIVE CORONARY ARTERY OF NATIVE HEART, ANGINA PRESENCE UNSPECIFIED: ICD-10-CM

## 2021-04-30 DIAGNOSIS — K58.9 IRRITABLE BOWEL SYNDROME, UNSPECIFIED TYPE: ICD-10-CM

## 2021-04-30 DIAGNOSIS — I10 ESSENTIAL HYPERTENSION: ICD-10-CM

## 2021-04-30 DIAGNOSIS — I49.5 SSS (SICK SINUS SYNDROME) (HCC): ICD-10-CM

## 2021-04-30 DIAGNOSIS — R73.01 ELEVATED FASTING GLUCOSE: Primary | ICD-10-CM

## 2021-04-30 DIAGNOSIS — I21.9 MYOCARDIAL INFARCTION, UNSPECIFIED MI TYPE, UNSPECIFIED ARTERY (HCC): ICD-10-CM

## 2021-04-30 DIAGNOSIS — F32.5 MAJOR DEPRESSIVE DISORDER WITH SINGLE EPISODE, IN REMISSION (HCC): ICD-10-CM

## 2021-04-30 DIAGNOSIS — Z95.0 CARDIAC PACEMAKER IN SITU: ICD-10-CM

## 2021-04-30 DIAGNOSIS — E78.2 MIXED HYPERLIPIDEMIA: ICD-10-CM

## 2021-04-30 DIAGNOSIS — F41.9 ANXIETY: ICD-10-CM

## 2021-04-30 DIAGNOSIS — K21.9 GASTROESOPHAGEAL REFLUX DISEASE WITHOUT ESOPHAGITIS: ICD-10-CM

## 2021-04-30 DIAGNOSIS — F02.818 EARLY ONSET ALZHEIMER'S DISEASE WITH BEHAVIORAL DISTURBANCE (HCC): ICD-10-CM

## 2021-04-30 DIAGNOSIS — K22.70 BARRETT'S ESOPHAGUS WITHOUT DYSPLASIA: ICD-10-CM

## 2021-04-30 DIAGNOSIS — K44.9 HIATAL HERNIA: ICD-10-CM

## 2021-04-30 DIAGNOSIS — C73 THYROID CANCER (HCC): ICD-10-CM

## 2021-05-01 NOTE — PROGRESS NOTES
DR. Luz Wilkes - [facility] VISIT     DATE OF SERVICE: 3/28/21    NURSING HOME: [facility]    CHIEF COMPLAINT/HISTORY OF CHIEF COMPLAINT: This patient is being seen for ongoing evaluation and management of her Alzheimer's disease, elevated fasting glucose, hypertension, hyperlipidemia, history of thyroid cancer with postoperative hypothyroidism, coronary artery disease, sick sinus syndrome, gastroesophageal reflux disease, Mcdaniel's esophagus, irritable bowel syndrome, polycythemia, depression, and being overweight. Her elevated fasting glucose is controlled with diet and exercise. There are no new complaints. ALLERGIES: No Known Allergies    MEDICATIONS: As noted on the [facility] MAR, referenced and incorporated herein. PAST MEDICAL HISTORY:   Past Medical History:   Diagnosis Date    Age-related osteoporosis with current pathological fracture with routine healing     Anxiety 9/6/2019    Mcdaniel esophagus     CAD (coronary artery disease)     nonobstructive    Cardiac pacemaker in situ 4/24/2014    Put in for severe bradycardia.  Dual chamber     Chronic insomnia     Closed right hip fracture, with routine healing, subsequent encounter 8/18/2019    Early onset Alzheimer's disease with behavioral disturbance ([address] 9/6/2019    Elevated fasting glucose 9/6/2019    GERD (gastroesophageal reflux disease)     Hiatal hernia     Hyperlipidemia     Hypertension     Hypothyroidism s/p surgical removal of thyroid for thyroid carcinoma 7/29/2015    IBS (irritable bowel syndrome)     Major depressive disorder with single episode, in remission ([address] 9/6/2019    Mycosis fungoides ([address]     Osteoarthritis     Osteoporosis     Patient in clinical research study 08/13/2020    Enrolled in convalescent plasma for COVID 19 Date of completion 08/16/20    Polycythemia rubra vera [facility])     sees oncology    PUD (peptic ulcer disease)     RV Pacemaker lead malfunction - Revision 3/24/14-Dr. Humberto Edgar 3/24/2014    SSS (sick sinus syndrome) ([facility] 4/24/2014    Thyroid cancer ([facility] [address] about 5 years ago    thyroid /following with oncology    TIA (transient ischemic attack) 7/23/2014    Confusion--word searching    Vitamin D deficiency        PAST SURGICAL HISTORY:   Past Surgical History:   Procedure Laterality Date    BONE MARROW BIOPSY  September 2010    polycythemia vera    BREAST BIOPSY Right     benign    CARDIAC CATHETERIZATION  2008    nonobstructive CAD    CHOLECYSTECTOMY  1995    COLONOSCOPY  2002    COLONOSCOPY  2/29/2016    colon polyp, diverticulosis - Dr Fouzia Echeverria    after miscarriage    ENDOSCOPY, COLON, DIAGNOSTIC  2002    normal  external hemorrhoids    EYE SURGERY Right 11/07/06    Cataract extraction with lens implant    HEMIARTHROPLASTY HIP Right 8/20/2019    RIGHT HIP HEMIARTHROPLASTY performed by Kerwin Castellanos MD at [address], THYROID Left     thyroid cancer    PACEMAKER INSERTION  6/2013    [facility] SKIN BIOPSY  2003    mycosis fungoides    TAE AND BSO      TAE/BSO at age 54 with anterior repair for benign reasons.     TONSILLECTOMY AND ADENOIDECTOMY      age 27   [facility] TUBAL LIGATION      UPPER GASTROINTESTINAL ENDOSCOPY  2002    UPPER GASTROINTESTINAL ENDOSCOPY  08/28/2003    UPPER GASTROINTESTINAL ENDOSCOPY  05/12/2003    UPPER GASTROINTESTINAL ENDOSCOPY  03/20/2001    UPPER GASTROINTESTINAL ENDOSCOPY  02/29/2016    Barretts tissue, body polyp - Dr Evangelina Turner       SOCIAL HISTORY:     Tobacco:   Social History     Tobacco Use   Smoking Status Never Smoker   Smokeless Tobacco Never Used   Tobacco Comment    never smoker     Alcohol:   Social History     Substance and Sexual Activity   Alcohol Use No    Alcohol/week: 0.0 standard drinks     Drugs:   Social History     Substance and Sexual Activity   Drug Use No       FAMILY HISTORY: family history includes Cancer (age of onset: 47) in her mother; Depression in her son; Drug Abuse in her son; Emphysema in her sister; Mental Illness in her son; Other in her brother and son; Stroke (age of onset: 76) in her father; Sudden Death (age of onset: 78) in her sister. REVIEW OF SYSTEMS:     Please see history of chief complaint above; otherwise no new problems with respect to General, HEENT, Cardiovascular, Respiratory, Gastrointestinal, Genitourinary, Endocrinologic, Musculoskeletal, or Neuropsychiatric complaints. PHYSICAL EXAMINATION:    Vitals: Temp: 97.6 deg F. Pulse: 60. Resp: 16. BP: 155/66. General: A 80 y.o.  female. Alert and oriented to person and place and questionably oriented to time. She does not appear to be in any acute distress. Skin: Skin color, texture, turgor normal. No rashes or lesions. HEENT/Neck: Essentially unremarkable  Lungs: Normal - CTA without rales, rhonchi, or wheezing. Heart: regular rate and rhythm, S1, S2 normal, no murmur, click, rub or gallop No S3 or S4. Abdomen: Non-obese soft, non-distended, non-tender, normal active bowel sounds, no masses palpated and no hepatosplenomegaly  Extremities: No clubbing, cyanosis, or edema in any of the extremities. Neurologic: cranial nerves II-XII are grossly intact    ASSESSMENT:     Diagnosis Orders   1. Elevated fasting glucose     2. Essential hypertension     3. Mixed hyperlipidemia     4. Coronary artery disease involving native coronary artery of native heart, angina presence unspecified     5. SSS (sick sinus syndrome) (HCC)     6. Cardiac pacemaker in situ     7. Myocardial infarction, unspecified MI type, unspecified artery ([address]     8. Thyroid cancer ([address]     9. Postoperative hypothyroidism     10. Gastroesophageal reflux disease without esophagitis     11. Hiatal hernia     12. 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 Irritable bowel syndrome, unspecified type     14. Polycythemia rubra vera ([address]     15.  Age-related osteoporosis with current pathological fracture with routine healing     16. Early onset Alzheimer's disease with behavioral disturbance ([facility] [address] [address]     17. Major depressive disorder with single episode, in remission ([facility] [address] [address]     18. Anxiety           PLAN:    1. Continue current treatment  2. Nursing home record reviewed and updates summarized and entered into electronic record  3. See nursing home orders and MAR.         Electronically signed by Velvet Kahn DO on 4/30/2021 at 11:19 PM  Internal Medicine

## 2021-05-28 ENCOUNTER — TELEPHONE (OUTPATIENT)
Dept: INTERNAL MEDICINE | Age: 86
End: 2021-05-28

## 2021-05-28 NOTE — TELEPHONE ENCOUNTER
Received fax from [facility], see attached scan

## 2021-05-30 PROCEDURE — 99307 SBSQ NF CARE SF MDM 10: CPT | Performed by: INTERNAL MEDICINE

## 2021-06-01 ENCOUNTER — OUTSIDE SERVICES (OUTPATIENT)
Dept: INTERNAL MEDICINE | Age: 86
End: 2021-06-01
Payer: MEDICARE

## 2021-06-01 DIAGNOSIS — F02.818 EARLY ONSET ALZHEIMER'S DISEASE WITH BEHAVIORAL DISTURBANCE (HCC): ICD-10-CM

## 2021-06-01 DIAGNOSIS — F32.5 MAJOR DEPRESSIVE DISORDER WITH SINGLE EPISODE, IN REMISSION (HCC): ICD-10-CM

## 2021-06-01 DIAGNOSIS — I21.9 MYOCARDIAL INFARCTION, UNSPECIFIED MI TYPE, UNSPECIFIED ARTERY (HCC): ICD-10-CM

## 2021-06-01 DIAGNOSIS — I49.5 SSS (SICK SINUS SYNDROME) (HCC): ICD-10-CM

## 2021-06-01 DIAGNOSIS — I25.10 CORONARY ARTERY DISEASE INVOLVING NATIVE CORONARY ARTERY OF NATIVE HEART, ANGINA PRESENCE UNSPECIFIED: ICD-10-CM

## 2021-06-01 DIAGNOSIS — G30.0 EARLY ONSET ALZHEIMER'S DISEASE WITH BEHAVIORAL DISTURBANCE (HCC): ICD-10-CM

## 2021-06-01 DIAGNOSIS — R73.01 ELEVATED FASTING GLUCOSE: Primary | ICD-10-CM

## 2021-06-01 DIAGNOSIS — C73 THYROID CANCER (HCC): ICD-10-CM

## 2021-06-01 DIAGNOSIS — I10 ESSENTIAL HYPERTENSION: ICD-10-CM

## 2021-06-01 DIAGNOSIS — M80.00XD AGE-RELATED OSTEOPOROSIS WITH CURRENT PATHOLOGICAL FRACTURE WITH ROUTINE HEALING: ICD-10-CM

## 2021-06-01 DIAGNOSIS — E78.2 MIXED HYPERLIPIDEMIA: ICD-10-CM

## 2021-06-01 DIAGNOSIS — K44.9 HIATAL HERNIA: ICD-10-CM

## 2021-06-01 DIAGNOSIS — D45 POLYCYTHEMIA RUBRA VERA (HCC): ICD-10-CM

## 2021-06-01 DIAGNOSIS — F41.9 ANXIETY: ICD-10-CM

## 2021-06-01 DIAGNOSIS — Z95.0 CARDIAC PACEMAKER IN SITU: ICD-10-CM

## 2021-06-01 DIAGNOSIS — K21.9 GASTROESOPHAGEAL REFLUX DISEASE WITHOUT ESOPHAGITIS: ICD-10-CM

## 2021-06-01 DIAGNOSIS — K58.9 IRRITABLE BOWEL SYNDROME, UNSPECIFIED TYPE: ICD-10-CM

## 2021-06-01 DIAGNOSIS — K22.70 BARRETT'S ESOPHAGUS WITHOUT DYSPLASIA: ICD-10-CM

## 2021-06-01 DIAGNOSIS — E89.0 POSTOPERATIVE HYPOTHYROIDISM: ICD-10-CM

## 2021-06-01 NOTE — PROGRESS NOTES
DR. Deleon [facility] VISIT     DATE OF SERVICE: 4/25/21    NURSING HOME: [facility]    CHIEF COMPLAINT/HISTORY OF CHIEF COMPLAINT: This patient is being seen for ongoing evaluation and management of her Alzheimer's disease, elevated fasting glucose, hypertension, hyperlipidemia, history of thyroid cancer with postoperative hypothyroidism, coronary artery disease, sick sinus syndrome, gastroesophageal reflux disease, Mcdaniel's esophagus, irritable bowel syndrome, polycythemia, depression, and being overweight. Her elevated fasting glucose is controlled with diet and exercise. There are no new complaints. ALLERGIES: No Known Allergies    MEDICATIONS: As noted on the [facility] MAR, referenced and incorporated herein. PAST MEDICAL HISTORY:   Past Medical History:   Diagnosis Date    Age-related osteoporosis with current pathological fracture with routine healing     Anxiety 9/6/2019    Mcdaniel esophagus     CAD (coronary artery disease)     nonobstructive    Cardiac pacemaker in situ 4/24/2014    Put in for severe bradycardia.  Dual chamber     Chronic insomnia     Closed right hip fracture, with routine healing, subsequent encounter 8/18/2019    Early onset Alzheimer's disease with behavioral disturbance ([address] 9/6/2019    Elevated fasting glucose 9/6/2019    GERD (gastroesophageal reflux disease)     Hiatal hernia     Hyperlipidemia     Hypertension     Hypothyroidism s/p surgical removal of thyroid for thyroid carcinoma 7/29/2015    IBS (irritable bowel syndrome)     Major depressive disorder with single episode, in remission ([address] 9/6/2019    Mycosis fungoides ([address]     Osteoarthritis     Osteoporosis     Patient in clinical research study 08/13/2020    Enrolled in convalescent plasma for COVID 19 Date of completion 08/16/20    Polycythemia rubra vera [facility])     sees oncology    PUD (peptic ulcer disease)     RV Pacemaker lead malfunction - Revision 3/24/14-Dr. Olguin [place] 3/24/2014    SSS (sick sinus syndrome) ([facility] [address] [facility] 4/24/2014    Thyroid cancer ([facility] [address] about 5 years ago    thyroid /following with oncology    TIA (transient ischemic attack) 7/23/2014    Confusion--word searching    Vitamin D deficiency        PAST SURGICAL HISTORY:   Past Surgical History:   Procedure Laterality Date    BONE MARROW BIOPSY  September 2010    polycythemia vera    BREAST BIOPSY Right     benign    CARDIAC CATHETERIZATION  2008    nonobstructive CAD    CHOLECYSTECTOMY  1995    COLONOSCOPY  2002    COLONOSCOPY  2/29/2016    colon polyp, diverticulosis - Dr Cindy Estes    after miscarriage    ENDOSCOPY, COLON, DIAGNOSTIC  2002    normal  external hemorrhoids    EYE SURGERY Right 11/07/06    Cataract extraction with lens implant    HEMIARTHROPLASTY HIP Right 8/20/2019    RIGHT HIP HEMIARTHROPLASTY performed by Bruce Gibbons MD at [address], THYROID Left     thyroid cancer    PACEMAKER INSERTION  6/2013    [facility] SKIN BIOPSY  2003    mycosis fungoides    TAE AND BSO      TAE/BSO at age 54 with anterior repair for benign reasons.     TONSILLECTOMY AND ADENOIDECTOMY      age 27   [place] TUBAL LIGATION      UPPER GASTROINTESTINAL ENDOSCOPY  2002    UPPER GASTROINTESTINAL ENDOSCOPY  08/28/2003    UPPER GASTROINTESTINAL ENDOSCOPY  05/12/2003    UPPER GASTROINTESTINAL ENDOSCOPY  03/20/2001    UPPER GASTROINTESTINAL ENDOSCOPY  02/29/2016    Barretts tissue, body polyp - Dr Eugene Smith       SOCIAL HISTORY:     Tobacco:   Social History     Tobacco Use   Smoking Status Never Smoker   Smokeless Tobacco Never Used   Tobacco Comment    never smoker     Alcohol:   Social History     Substance and Sexual Activity   Alcohol Use No    Alcohol/week: 0.0 standard drinks     Drugs:   Social History     Substance and Sexual Activity   Drug Use No       FAMILY HISTORY: family history includes Cancer (age of onset: 47) in her mother; Depression in her son; Drug Abuse in her son; Emphysema in her sister; Mental Illness in her son; Other in her brother and son; Stroke (age of onset: 76) in her father; Sudden Death (age of onset: 78) in her sister. REVIEW OF SYSTEMS:     Please see history of chief complaint above; otherwise no new problems with respect to General, HEENT, Cardiovascular, Respiratory, Gastrointestinal, Genitourinary, Endocrinologic, Musculoskeletal, or Neuropsychiatric complaints. PHYSICAL EXAMINATION:    Vitals: Temp: 97.8 deg F. Pulse: 61. Resp: 18. BP: 151/58. General: A 80 y.o.  female. Alert and oriented to person and place and questionably oriented to time. She does not appear to be in any acute distress. Skin: Skin color, texture, turgor normal. No rashes or lesions. HEENT/Neck: Essentially unremarkable  Lungs: Normal - CTA without rales, rhonchi, or wheezing. Heart: regular rate and rhythm, S1, S2 normal, no murmur, click, rub or gallop No S3 or S4. Abdomen: Non-obese soft, non-distended, non-tender, normal active bowel sounds, no masses palpated and no hepatosplenomegaly  Extremities: No clubbing, cyanosis, or edema in any of the extremities. Neurologic: cranial nerves II-XII are grossly intact    ASSESSMENT:     Diagnosis Orders   1. Elevated fasting glucose     2. Essential hypertension     3. Mixed hyperlipidemia     4. Coronary artery disease involving native coronary artery of native heart, angina presence unspecified     5. SSS (sick sinus syndrome) (HCC)     6. Cardiac pacemaker in situ     7. Myocardial infarction, unspecified MI type, unspecified artery ([address]     8. Thyroid cancer ([address]     9. Postoperative hypothyroidism     10. Gastroesophageal reflux disease without esophagitis     11. Hiatal hernia     12. 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 Irritable bowel syndrome, unspecified type     14. Polycythemia rubra vera ([address]     15.  Age-related osteoporosis with current pathological fracture with routine healing     16. Early onset Alzheimer's disease with behavioral disturbance ([facility]     17. Major depressive disorder with single episode, in remission ([facility] [address]     18. Anxiety           PLAN:    1. Continue current treatment  2. Nursing home record reviewed and updates summarized and entered into electronic record  3. See nursing home orders and MAR.         Electronically signed by Delpha Prader, DO on 6/1/2021 at 2:15 AM  Internal Medicine

## 2021-06-22 PROCEDURE — 99307 SBSQ NF CARE SF MDM 10: CPT | Performed by: INTERNAL MEDICINE

## 2021-06-25 ENCOUNTER — OUTSIDE SERVICES (OUTPATIENT)
Dept: INTERNAL MEDICINE | Age: 86
End: 2021-06-25
Payer: MEDICARE

## 2021-06-25 ENCOUNTER — PROCEDURE VISIT (OUTPATIENT)
Dept: CARDIOLOGY | Age: 86
End: 2021-06-25
Payer: MEDICARE

## 2021-06-25 ENCOUNTER — OFFICE VISIT (OUTPATIENT)
Dept: CARDIOLOGY | Age: 86
End: 2021-06-25
Payer: MEDICARE

## 2021-06-25 VITALS
HEIGHT: 66 IN | WEIGHT: 180 LBS | HEART RATE: 62 BPM | BODY MASS INDEX: 28.93 KG/M2 | SYSTOLIC BLOOD PRESSURE: 135 MMHG | DIASTOLIC BLOOD PRESSURE: 62 MMHG

## 2021-06-25 DIAGNOSIS — K58.9 IRRITABLE BOWEL SYNDROME, UNSPECIFIED TYPE: ICD-10-CM

## 2021-06-25 DIAGNOSIS — Z95.0 CARDIAC PACEMAKER IN SITU: ICD-10-CM

## 2021-06-25 DIAGNOSIS — K44.9 HIATAL HERNIA: ICD-10-CM

## 2021-06-25 DIAGNOSIS — C73 THYROID CANCER (HCC): ICD-10-CM

## 2021-06-25 DIAGNOSIS — F02.818 EARLY ONSET ALZHEIMER'S DISEASE WITH BEHAVIORAL DISTURBANCE (HCC): ICD-10-CM

## 2021-06-25 DIAGNOSIS — M80.00XD AGE-RELATED OSTEOPOROSIS WITH CURRENT PATHOLOGICAL FRACTURE WITH ROUTINE HEALING: ICD-10-CM

## 2021-06-25 DIAGNOSIS — F32.5 MAJOR DEPRESSIVE DISORDER WITH SINGLE EPISODE, IN REMISSION (HCC): ICD-10-CM

## 2021-06-25 DIAGNOSIS — K22.70 BARRETT'S ESOPHAGUS WITHOUT DYSPLASIA: ICD-10-CM

## 2021-06-25 DIAGNOSIS — I49.5 SSS (SICK SINUS SYNDROME) (HCC): Primary | ICD-10-CM

## 2021-06-25 DIAGNOSIS — D45 POLYCYTHEMIA RUBRA VERA (HCC): ICD-10-CM

## 2021-06-25 DIAGNOSIS — I21.9 MYOCARDIAL INFARCTION, UNSPECIFIED MI TYPE, UNSPECIFIED ARTERY (HCC): ICD-10-CM

## 2021-06-25 DIAGNOSIS — I25.10 CORONARY ARTERY DISEASE INVOLVING NATIVE CORONARY ARTERY OF NATIVE HEART, UNSPECIFIED WHETHER ANGINA PRESENT: ICD-10-CM

## 2021-06-25 DIAGNOSIS — I10 ESSENTIAL HYPERTENSION: ICD-10-CM

## 2021-06-25 DIAGNOSIS — R73.01 ELEVATED FASTING GLUCOSE: Primary | ICD-10-CM

## 2021-06-25 DIAGNOSIS — G30.0 EARLY ONSET ALZHEIMER'S DISEASE WITH BEHAVIORAL DISTURBANCE (HCC): ICD-10-CM

## 2021-06-25 DIAGNOSIS — I49.5 SSS (SICK SINUS SYNDROME) (HCC): ICD-10-CM

## 2021-06-25 DIAGNOSIS — E89.0 POSTOPERATIVE HYPOTHYROIDISM: ICD-10-CM

## 2021-06-25 DIAGNOSIS — E78.2 MIXED HYPERLIPIDEMIA: ICD-10-CM

## 2021-06-25 DIAGNOSIS — F41.9 ANXIETY: ICD-10-CM

## 2021-06-25 DIAGNOSIS — K21.9 GASTROESOPHAGEAL REFLUX DISEASE WITHOUT ESOPHAGITIS: ICD-10-CM

## 2021-06-25 PROCEDURE — G8427 DOCREV CUR MEDS BY ELIG CLIN: HCPCS | Performed by: INTERNAL MEDICINE

## 2021-06-25 PROCEDURE — 93288 INTERROG EVL PM/LDLS PM IP: CPT | Performed by: INTERNAL MEDICINE

## 2021-06-25 PROCEDURE — 93000 ELECTROCARDIOGRAM COMPLETE: CPT | Performed by: INTERNAL MEDICINE

## 2021-06-25 PROCEDURE — 1123F ACP DISCUSS/DSCN MKR DOCD: CPT | Performed by: INTERNAL MEDICINE

## 2021-06-25 PROCEDURE — 1036F TOBACCO NON-USER: CPT | Performed by: INTERNAL MEDICINE

## 2021-06-25 PROCEDURE — 1090F PRES/ABSN URINE INCON ASSESS: CPT | Performed by: INTERNAL MEDICINE

## 2021-06-25 PROCEDURE — G8417 CALC BMI ABV UP PARAM F/U: HCPCS | Performed by: INTERNAL MEDICINE

## 2021-06-25 PROCEDURE — 99213 OFFICE O/P EST LOW 20 MIN: CPT | Performed by: INTERNAL MEDICINE

## 2021-06-25 PROCEDURE — 4040F PNEUMOC VAC/ADMIN/RCVD: CPT | Performed by: INTERNAL MEDICINE

## 2021-06-25 PROCEDURE — 93005 ELECTROCARDIOGRAM TRACING: CPT | Performed by: INTERNAL MEDICINE

## 2021-06-25 ASSESSMENT — ENCOUNTER SYMPTOMS
EYE DISCHARGE: 0
VOMITING: 0
SHORTNESS OF BREATH: 0
ABDOMINAL PAIN: 0
BACK PAIN: 0
NAUSEA: 0
APNEA: 0

## 2021-06-25 NOTE — PROGRESS NOTES
Today's Date: 6/25/2021  Patient's Name: Shant Nair  Patient's age: 80 y. o., 1934    Subjective: The patient is a 80y.o. year old, , female is in the office for SSS. Denies exertional chest pain or SOB, no dizziness or syncope and no palpitations. Past Medical History:   has a past medical history of Age-related osteoporosis with current pathological fracture with routine healing, Anxiety, Mcdaniel esophagus, CAD (coronary artery disease), Cardiac pacemaker in situ, Chronic insomnia, Closed right hip fracture, with routine healing, subsequent encounter, Early onset Alzheimer's disease with behavioral disturbance ([address] Elevated fasting glucose, GERD (gastroesophageal reflux disease), Hiatal hernia, Hyperlipidemia, Hypertension, Hypothyroidism s/p surgical removal of thyroid for thyroid carcinoma, IBS (irritable bowel syndrome), Major depressive disorder with single episode, in remission ([address] Mycosis fungoides ([address] Osteoarthritis, Osteoporosis, Patient in clinical research study, Polycythemia rubra vera ([address] PUD (peptic ulcer disease), RV Pacemaker lead malfunction - Revision 3/24/14-Dr. ray, SSS (sick sinus syndrome) ([address] Thyroid cancer ([address] TIA (transient ischemic attack), and Vitamin D deficiency. Past Surgical History:   has a past surgical history that includes Tubal ligation; Lobectomy, Thyroid (Left); babita and bso (cervix removed); Tonsillectomy and adenoidectomy; Cholecystectomy (1995); Breast biopsy (Right); Dilation and curettage of uterus (1960); Upper gastrointestinal endoscopy (2002); skin biopsy (2003); Cardiac catheterization (2008); bone marrow biopsy (September 2010); Pacemaker insertion (6/2013); Endoscopy, colon, diagnostic (2002); Upper gastrointestinal endoscopy (08/28/2003); Upper gastrointestinal endoscopy (05/12/2003); Upper gastrointestinal endoscopy (03/20/2001); eye surgery (Right, 11/07/06);  Upper gastrointestinal endoscopy (02/29/2016); for discharge. Respiratory: Negative for apnea and shortness of breath. Cardiovascular: Negative for chest pain and palpitations. Gastrointestinal: Negative for abdominal pain, nausea and vomiting. Endocrine: Negative for cold intolerance. Musculoskeletal: Negative for arthralgias and back pain. Neurological: Negative for dizziness and syncope. Psychiatric/Behavioral: Negative for agitation and behavioral problems. Physical Exam:  /62 (Site: Right Upper Arm, Position: Sitting, Cuff Size: Medium Adult)   Pulse 62   Ht 5' 6\" (1.676 m)   Wt 180 lb (81.6 kg)   BMI 29.05 kg/m²    Physical Exam  Constitutional:       Appearance: Normal appearance. HENT:      Head: Normocephalic and atraumatic. Right Ear: Tympanic membrane normal.      Left Ear: Tympanic membrane normal.      Mouth/Throat:      Mouth: Mucous membranes are moist.   Cardiovascular:      Rate and Rhythm: Normal rate and regular rhythm. Pulses: Normal pulses. Heart sounds: Normal heart sounds. No murmur heard. Pulmonary:      Effort: Pulmonary effort is normal. No respiratory distress. Breath sounds: Normal breath sounds. No stridor. Abdominal:      General: There is no distension. Palpations: There is no mass. Musculoskeletal:         General: No swelling or tenderness. Cervical back: Normal range of motion and neck supple. Skin:     Capillary Refill: Capillary refill takes less than 2 seconds. Coloration: Skin is not jaundiced or pale. Neurological:      General: No focal deficit present. Mental Status: She is alert and oriented to person, place, and time. Psychiatric:         Mood and Affect: Mood normal.         Cardiac Data:      Labs:     CBC: No results for input(s): WBC, HGB, HCT, PLT in the last 72 hours. BMP:No results for input(s): NA, K, CO2, BUN, CREATININE, LABGLOM, GLUCOSE in the last 72 hours.   PT/INR: No results for input(s): PROTIME, INR in the last 72

## 2021-06-25 NOTE — PROGRESS NOTES
3/24/2014    SSS (sick sinus syndrome) ([facility] [address] 4/24/2014    Thyroid cancer ([facility] [address] about 5 years ago    thyroid /following with oncology    TIA (transient ischemic attack) 7/23/2014    Confusion--word searching    Vitamin D deficiency        PAST SURGICAL HISTORY:   Past Surgical History:   Procedure Laterality Date    BONE MARROW BIOPSY  September 2010    polycythemia vera    BREAST BIOPSY Right     benign    CARDIAC CATHETERIZATION  2008    nonobstructive CAD    CHOLECYSTECTOMY  1995    COLONOSCOPY  2002    COLONOSCOPY  2/29/2016    colon polyp, diverticulosis - Dr Yee Stamp    after miscarriage    ENDOSCOPY, COLON, DIAGNOSTIC  2002    normal  external hemorrhoids    EYE SURGERY Right 11/07/06    Cataract extraction with lens implant    HEMIARTHROPLASTY HIP Right 8/20/2019    RIGHT HIP HEMIARTHROPLASTY performed by Leo Bird MD at [address], THYROID Left     thyroid cancer    PACEMAKER INSERTION  6/2013    [facility] SKIN BIOPSY  2003    mycosis fungoides    TAE AND BSO      TAE/BSO at age 54 with anterior repair for benign reasons.     TONSILLECTOMY AND ADENOIDECTOMY      age 27   [facility] TUBAL LIGATION      UPPER GASTROINTESTINAL ENDOSCOPY  2002    UPPER GASTROINTESTINAL ENDOSCOPY  08/28/2003    UPPER GASTROINTESTINAL ENDOSCOPY  05/12/2003    UPPER GASTROINTESTINAL ENDOSCOPY  03/20/2001    UPPER GASTROINTESTINAL ENDOSCOPY  02/29/2016    Barretts tissue, body polyp - Dr Irving Murphy       SOCIAL HISTORY:     Tobacco:   Social History     Tobacco Use   Smoking Status Never Smoker   Smokeless Tobacco Never Used   Tobacco Comment    never smoker     Alcohol:   Social History     Substance and Sexual Activity   Alcohol Use No    Alcohol/week: 0.0 standard drinks     Drugs:   Social History     Substance and Sexual Activity   Drug Use No       FAMILY HISTORY: family history includes Cancer (age of onset: 47) in her mother; Depression in her son; Drug Abuse in her son; Emphysema in her sister; Mental Illness in her son; Other in her brother and son; Stroke (age of onset: 76) in her father; Sudden Death (age of onset: 78) in her sister. REVIEW OF SYSTEMS:     Please see history of chief complaint above; otherwise no new problems with respect to General, HEENT, Cardiovascular, Respiratory, Gastrointestinal, Genitourinary, Endocrinologic, Musculoskeletal, or Neuropsychiatric complaints. PHYSICAL EXAMINATION:    Vitals: Temp: 96.9 deg F. Pulse: 62. Resp: 16. BP: 129/69. General: A 80 y.o.  female. Alert and oriented to person and place and questionably oriented to time. She does not appear to be in any acute distress. Skin: Skin color, texture, turgor normal. No rashes or lesions. HEENT/Neck: Essentially unremarkable  Lungs: Normal - CTA without rales, rhonchi, or wheezing. Heart: regular rate and rhythm, S1, S2 normal, no murmur, click, rub or gallop No S3 or S4. Abdomen: Non-obese soft, non-distended, non-tender, normal active bowel sounds, no masses palpated and no hepatosplenomegaly  Extremities: No clubbing, cyanosis, or edema in any of the extremities. Neurologic: cranial nerves II-XII are grossly intact    ASSESSMENT:     Diagnosis Orders   1. Elevated fasting glucose     2. Essential hypertension     3. Mixed hyperlipidemia     4. Coronary artery disease involving native coronary artery of native heart, unspecified whether angina present     5. SSS (sick sinus syndrome) (HCC)     6. Cardiac pacemaker in situ     7. Myocardial infarction, unspecified MI type, unspecified artery ([address]     8. Thyroid cancer ([address]     9. Postoperative hypothyroidism     10. Gastroesophageal reflux disease without esophagitis     11. Hiatal hernia     12. 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 Irritable bowel syndrome, unspecified type     14. Polycythemia rubra vera ([address]     15.  Age-related osteoporosis with current pathological fracture with routine healing     16. Early onset Alzheimer's disease with behavioral disturbance ([facility]     17. Major depressive disorder with single episode, in remission ([facility]     18. Anxiety           PLAN:    1. Continue current treatment  2. Nursing home record reviewed and updates summarized and entered into electronic record  3. See nursing home orders and MAR.         Electronically signed by Estrada Rich DO on 6/25/2021 at 7:39 PM  Internal Medicine

## 2021-06-30 ENCOUNTER — OUTSIDE SERVICES (OUTPATIENT)
Dept: INTERNAL MEDICINE | Age: 86
End: 2021-06-30
Payer: MEDICARE

## 2021-06-30 DIAGNOSIS — M80.00XD AGE-RELATED OSTEOPOROSIS WITH CURRENT PATHOLOGICAL FRACTURE WITH ROUTINE HEALING: ICD-10-CM

## 2021-06-30 DIAGNOSIS — D45 POLYCYTHEMIA RUBRA VERA (HCC): ICD-10-CM

## 2021-06-30 DIAGNOSIS — Z95.0 CARDIAC PACEMAKER IN SITU: ICD-10-CM

## 2021-06-30 DIAGNOSIS — I10 ESSENTIAL HYPERTENSION: ICD-10-CM

## 2021-06-30 DIAGNOSIS — R73.01 ELEVATED FASTING GLUCOSE: Primary | ICD-10-CM

## 2021-06-30 DIAGNOSIS — F41.9 ANXIETY: ICD-10-CM

## 2021-06-30 DIAGNOSIS — E66.3 OVERWEIGHT: ICD-10-CM

## 2021-06-30 DIAGNOSIS — E78.2 MIXED HYPERLIPIDEMIA: ICD-10-CM

## 2021-06-30 DIAGNOSIS — E89.0 POSTOPERATIVE HYPOTHYROIDISM: ICD-10-CM

## 2021-06-30 DIAGNOSIS — G30.0 EARLY ONSET ALZHEIMER'S DISEASE WITH BEHAVIORAL DISTURBANCE (HCC): ICD-10-CM

## 2021-06-30 DIAGNOSIS — I49.5 SSS (SICK SINUS SYNDROME) (HCC): ICD-10-CM

## 2021-06-30 DIAGNOSIS — F32.5 MAJOR DEPRESSIVE DISORDER WITH SINGLE EPISODE, IN REMISSION (HCC): ICD-10-CM

## 2021-06-30 DIAGNOSIS — K58.9 IRRITABLE BOWEL SYNDROME, UNSPECIFIED TYPE: ICD-10-CM

## 2021-06-30 DIAGNOSIS — K22.70 BARRETT'S ESOPHAGUS WITHOUT DYSPLASIA: ICD-10-CM

## 2021-06-30 DIAGNOSIS — I21.9 MYOCARDIAL INFARCTION, UNSPECIFIED MI TYPE, UNSPECIFIED ARTERY (HCC): ICD-10-CM

## 2021-06-30 DIAGNOSIS — F02.818 EARLY ONSET ALZHEIMER'S DISEASE WITH BEHAVIORAL DISTURBANCE (HCC): ICD-10-CM

## 2021-06-30 DIAGNOSIS — I25.10 CORONARY ARTERY DISEASE INVOLVING NATIVE CORONARY ARTERY OF NATIVE HEART, UNSPECIFIED WHETHER ANGINA PRESENT: ICD-10-CM

## 2021-06-30 DIAGNOSIS — K44.9 HIATAL HERNIA: ICD-10-CM

## 2021-06-30 DIAGNOSIS — C73 THYROID CANCER (HCC): ICD-10-CM

## 2021-06-30 DIAGNOSIS — K21.9 GASTROESOPHAGEAL REFLUX DISEASE WITHOUT ESOPHAGITIS: ICD-10-CM

## 2021-06-30 NOTE — PROGRESS NOTES
DR. Lora Purcell - [facility] VISIT     DATE OF SERVICE: 6/22/21    NURSING HOME: [facility]    CHIEF COMPLAINT/HISTORY OF CHIEF COMPLAINT: This patient is being seen for ongoing evaluation and management of her Alzheimer's disease, elevated fasting glucose, hypertension, hyperlipidemia, history of thyroid cancer with postoperative hypothyroidism, coronary artery disease, sick sinus syndrome, gastroesophageal reflux disease, Mcdaniel's esophagus, irritable bowel syndrome, polycythemia, depression, and being overweight. Her elevated fasting glucose is controlled with diet and exercise. There are no new complaints. ALLERGIES: No Known Allergies    MEDICATIONS: As noted on the [facility] MAR, referenced and incorporated herein. PAST MEDICAL HISTORY:   Past Medical History:   Diagnosis Date    Age-related osteoporosis with current pathological fracture with routine healing     Anxiety 9/6/2019    Mcdaniel esophagus     CAD (coronary artery disease)     nonobstructive    Cardiac pacemaker in situ 4/24/2014    Put in for severe bradycardia.  Dual chamber     Chronic insomnia     Closed right hip fracture, with routine healing, subsequent encounter 8/18/2019    Early onset Alzheimer's disease with behavioral disturbance ([address] 9/6/2019    Elevated fasting glucose 9/6/2019    GERD (gastroesophageal reflux disease)     Hiatal hernia     Hyperlipidemia     Hypertension     Hypothyroidism s/p surgical removal of thyroid for thyroid carcinoma 7/29/2015    IBS (irritable bowel syndrome)     Major depressive disorder with single episode, in remission ([address] 9/6/2019    Mycosis fungoides ([address]     Osteoarthritis     Osteoporosis     Patient in clinical research study 08/13/2020    Enrolled in convalescent plasma for COVID 19 Date of completion 08/16/20    Polycythemia rubra vera [facility])     sees oncology    PUD (peptic ulcer disease)     RV Pacemaker lead malfunction - Revision 3/24/14-Dr. Adrian Augustin 3/24/2014    SSS (sick sinus syndrome) ([facility] 4/24/2014    Thyroid cancer ([facility] about 5 years ago    thyroid /following with oncology    TIA (transient ischemic attack) 7/23/2014    Confusion--word searching    Vitamin D deficiency        PAST SURGICAL HISTORY:   Past Surgical History:   Procedure Laterality Date    BONE MARROW BIOPSY  September 2010    polycythemia vera    BREAST BIOPSY Right     benign    CARDIAC CATHETERIZATION  2008    nonobstructive CAD    CHOLECYSTECTOMY  1995    COLONOSCOPY  2002    COLONOSCOPY  2/29/2016    colon polyp, diverticulosis - Dr Blank Sis    after miscarriage    ENDOSCOPY, COLON, DIAGNOSTIC  2002    normal  external hemorrhoids    EYE SURGERY Right 11/07/06    Cataract extraction with lens implant    HEMIARTHROPLASTY HIP Right 8/20/2019    RIGHT HIP HEMIARTHROPLASTY performed by Staci Paredes MD at [address], THYROID Left     thyroid cancer    PACEMAKER INSERTION  6/2013    [facility] SKIN BIOPSY  2003    mycosis fungoides    TAE AND BSO      TAE/BSO at age 54 with anterior repair for benign reasons.     TONSILLECTOMY AND ADENOIDECTOMY      age 27   [employer] TUBAL LIGATION      UPPER GASTROINTESTINAL ENDOSCOPY  2002    UPPER GASTROINTESTINAL ENDOSCOPY  08/28/2003    UPPER GASTROINTESTINAL ENDOSCOPY  05/12/2003    UPPER GASTROINTESTINAL ENDOSCOPY  03/20/2001    UPPER GASTROINTESTINAL ENDOSCOPY  02/29/2016    Barretts tissue, body polyp - Dr Bren Harper       SOCIAL HISTORY:     Tobacco:   Social History     Tobacco Use   Smoking Status Never Smoker   Smokeless Tobacco Never Used   Tobacco Comment    never smoker     Alcohol:   Social History     Substance and Sexual Activity   Alcohol Use No    Alcohol/week: 0.0 standard drinks     Drugs:   Social History     Substance and Sexual Activity   Drug Use No       FAMILY HISTORY: family history includes Cancer (age of onset: 47) in her mother; Depression in her son; Drug Abuse in her son; Emphysema in her sister; Mental Illness in her son; Other in her brother and son; Stroke (age of onset: 76) in her father; Sudden Death (age of onset: 78) in her sister. REVIEW OF SYSTEMS:     Please see history of chief complaint above; otherwise no new problems with respect to General, HEENT, Cardiovascular, Respiratory, Gastrointestinal, Genitourinary, Endocrinologic, Musculoskeletal, or Neuropsychiatric complaints. PHYSICAL EXAMINATION:    Vitals: Temp: 97.1 deg F. Pulse: 82. Resp: 18. BP: 154/60. General: A 80 y.o.  female. Alert and oriented to person and place and questionably oriented to time. She does not appear to be in any acute distress. Skin: Skin color, texture, turgor normal. No rashes or lesions. HEENT/Neck: Essentially unremarkable  Lungs: Normal - CTA without rales, rhonchi, or wheezing. Heart: regular rate and rhythm, S1, S2 normal, no murmur, click, rub or gallop No S3 or S4. Abdomen: Non-obese soft, non-distended, non-tender, normal active bowel sounds, no masses palpated and no hepatosplenomegaly  Extremities: No clubbing, cyanosis, or edema in any of the extremities. Neurologic: cranial nerves II-XII are grossly intact    ASSESSMENT:     Diagnosis Orders   1. Elevated fasting glucose     2. Essential hypertension     3. Mixed hyperlipidemia     4. Coronary artery disease involving native coronary artery of native heart, unspecified whether angina present     5. SSS (sick sinus syndrome) (HCC)     6. Cardiac pacemaker in situ     7. Myocardial infarction, unspecified MI type, unspecified artery ([address]     8. Thyroid cancer ([address]     9. Postoperative hypothyroidism     10. Gastroesophageal reflux disease without esophagitis     11. Hiatal hernia     12. 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 Irritable bowel syndrome, unspecified type     14. Polycythemia rubra vera ([address]     15.  Age-related osteoporosis with current pathological fracture with routine healing     16. Early onset Alzheimer's disease with behavioral disturbance ([facility]     17. Major depressive disorder with single episode, in remission ([facility] [address]     18. Anxiety     19. Overweight           PLAN:    1. Continue current treatment  2. Nursing home record reviewed and updates summarized and entered into electronic record  3. See nursing home orders and MAR.         Electronically signed by Abdulaziz Palacios DO on 6/30/2021 at 3:58 AM  Internal Medicine

## 2021-07-14 ENCOUNTER — OUTSIDE SERVICES (OUTPATIENT)
Dept: INTERNAL MEDICINE | Age: 86
End: 2021-07-14
Payer: MEDICARE

## 2021-07-14 DIAGNOSIS — K44.9 HIATAL HERNIA: Primary | ICD-10-CM

## 2021-07-14 DIAGNOSIS — F02.818 EARLY ONSET ALZHEIMER'S DISEASE WITH BEHAVIORAL DISTURBANCE (HCC): ICD-10-CM

## 2021-07-14 DIAGNOSIS — G30.0 EARLY ONSET ALZHEIMER'S DISEASE WITH BEHAVIORAL DISTURBANCE (HCC): ICD-10-CM

## 2021-07-14 DIAGNOSIS — K58.9 IRRITABLE BOWEL SYNDROME, UNSPECIFIED TYPE: ICD-10-CM

## 2021-07-14 PROCEDURE — 99308 SBSQ NF CARE LOW MDM 20: CPT | Performed by: NURSE PRACTITIONER

## 2021-07-14 NOTE — PROGRESS NOTES
07/14/21  Joe Smith  1934    Patient Resident of [facility]     Chief Complaint:    1. Hiatal hernia    2. Irritable bowel syndrome, unspecified type    3. Early onset Alzheimer's disease with behavioral disturbance (HCC)        HPI:  49-year-old patient with history of Alzheimer's disease, irritable bowel and history of a hiatal hernia being seen at the request of the nursing staff and family that she has been complaining of abdominal pain for the last week. On exam today patient states she has not had any pain for the last 2 to 3 days. States occasionally her hernia gives her fits and then goes away. She has been eating without difficulty. Weights have remained stable. Nursing staff state vitals stable. Having routine bowel movements. Was having some diarrhea over the weekend however that has all subsided    No Known Allergies    Past Medical History:   Diagnosis Date    Age-related osteoporosis with current pathological fracture with routine healing     Anxiety 9/6/2019    Mcdaniel esophagus     CAD (coronary artery disease)     nonobstructive    Cardiac pacemaker in situ 4/24/2014    Put in for severe bradycardia.  Dual chamber     Chronic insomnia     Closed right hip fracture, with routine healing, subsequent encounter 8/18/2019    Early onset Alzheimer's disease with behavioral disturbance ([address] 9/6/2019    Elevated fasting glucose 9/6/2019    GERD (gastroesophageal reflux disease)     Hiatal hernia     Hyperlipidemia     Hypertension     Hypothyroidism s/p surgical removal of thyroid for thyroid carcinoma 7/29/2015    IBS (irritable bowel syndrome)     Major depressive disorder with single episode, in remission ([address] 9/6/2019    Mycosis fungoides [facility]     Osteoarthritis     Osteoporosis     Patient in clinical research study 08/13/2020    Enrolled in convalescent plasma for COVID 19 Date of completion 08/16/20    Polycythemia rubra vera [facility])     sees oncology    PUD (peptic ulcer disease)     RV Pacemaker lead malfunction - Revision 3/24/14-Dr. ray 3/24/2014    SSS (sick sinus syndrome) ([facility] 4/24/2014    Thyroid cancer ([facility] about 5 years ago    thyroid /following with oncology    TIA (transient ischemic attack) 7/23/2014    Confusion--word searching    Vitamin D deficiency        Past Surgical History:   Procedure Laterality Date    BONE MARROW BIOPSY  September 2010    polycythemia vera    BREAST BIOPSY Right     benign    CARDIAC CATHETERIZATION  2008    nonobstructive CAD    CHOLECYSTECTOMY  1995    COLONOSCOPY  2002    COLONOSCOPY  2/29/2016    colon polyp, diverticulosis - Dr Hermilo Valiente    after miscarriage    ENDOSCOPY, COLON, DIAGNOSTIC  2002    normal  external hemorrhoids    EYE SURGERY Right 11/07/06    Cataract extraction with lens implant    HEMIARTHROPLASTY HIP Right 8/20/2019    RIGHT HIP HEMIARTHROPLASTY performed by Kathya Fuentes MD at [address], THYROID Left     thyroid cancer    PACEMAKER INSERTION  6/2013    [facility] SKIN BIOPSY  2003    mycosis fungoides    TAE AND BSO      TAE/BSO at age 54 with anterior repair for benign reasons.  TONSILLECTOMY AND ADENOIDECTOMY      age 27   [facility] TUBAL LIGATION      UPPER GASTROINTESTINAL ENDOSCOPY  2002    UPPER GASTROINTESTINAL ENDOSCOPY  08/28/2003    UPPER GASTROINTESTINAL ENDOSCOPY  05/12/2003    UPPER GASTROINTESTINAL ENDOSCOPY  03/20/2001    UPPER GASTROINTESTINAL ENDOSCOPY  02/29/2016    Barretts tissue, body polyp - Dr Gumaro Gonzalez       Medications as per [facility] Care Chart [facility]ish [facility]oser     Social History     Socioeconomic History    Marital status:       Spouse name: Not on file    Number of children: Not on file    Years of education: Not on file    Highest education level: Not on file   Occupational History    Occupation: retired     Employer: [employer]   Tobacco Use    Smoking status: Never Smoker    Smokeless tobacco: Never Used    Tobacco comment: never smoker   Substance and Sexual Activity    Alcohol use: No     Alcohol/week: 0.0 standard drinks    Drug use: No    Sexual activity: Not Currently   Other Topics Concern    Not on file   Social History Narrative    Not on file     Social Determinants of Health     Financial Resource Strain:     Difficulty of Paying Living Expenses:    Food Insecurity:     Worried About Running Out of Food in the Last Year:     Ran Out of Food in the Last Year:    Transportation Needs:     Lack of Transportation (Medical):  Lack of Transportation (Non-Medical):    Physical Activity:     Days of Exercise per Week:     Minutes of Exercise per Session:    Stress:     Feeling of Stress :    Social Connections:     Frequency of Communication with Friends and Family:     Frequency of Social Gatherings with Friends and Family:     Attends [employer]an Services:     Active Member of Clubs or Organizations:     Attends Club or Organization Meetings:     Marital Status:    Intimate Partner Violence:     Fear of Current or Ex-Partner:     Emotionally Abused:     Physically Abused:     Sexually Abused:        Review of Systems   Constitutional: Negative for activity change, appetite change, chills, fatigue, fever and unexpected weight change. HENT: Negative for congestion, dental problem, ear discharge, ear pain, facial swelling, hearing loss, postnasal drip, rhinorrhea, sinus pressure, sore throat and trouble swallowing. Eyes: Negative for pain and visual disturbance. Respiratory: Negative for cough, chest tightness, shortness of breath and wheezing. Cardiovascular: Negative for chest pain, palpitations and leg swelling. Gastrointestinal: Negative for abdominal pain, blood in stool, constipation, diarrhea, nausea and vomiting. Endocrine: Negative for cold intolerance, heat intolerance and polyuria. Genitourinary: Negative for difficulty urinating. Musculoskeletal: Negative for arthralgias, gait problem, myalgias, neck pain and neck stiffness. Skin: Negative for color change, rash and wound. Neurological: Negative for dizziness, tremors, seizures, weakness, light-headedness, numbness and headaches. Psychiatric/Behavioral: Negative for confusion and hallucinations. The patient is not nervous/anxious. Physical Exam  Vitals and nursing note reviewed. Constitutional:       General: She is not in acute distress. Appearance: Normal appearance. She is well-developed. She is not diaphoretic. HENT:      Head: Normocephalic and atraumatic. Right Ear: External ear normal.      Left Ear: External ear normal.   Eyes:      General:         Right eye: No discharge. Left eye: No discharge. Neck:      Trachea: No tracheal deviation. Cardiovascular:      Rate and Rhythm: Normal rate and regular rhythm. Pulses: Normal pulses. Heart sounds: Normal heart sounds. No murmur heard. No friction rub. No gallop. Pulmonary:      Effort: Pulmonary effort is normal. No respiratory distress. Breath sounds: Normal breath sounds. No stridor. No wheezing, rhonchi or rales. Chest:      Chest wall: No tenderness. Abdominal:      General: Bowel sounds are normal. There is no distension. Palpations: Abdomen is soft. There is no fluid wave, hepatomegaly or mass. Tenderness: There is no abdominal tenderness. There is no guarding or rebound. Hernia: No hernia is present. There is no hernia in the umbilical area or ventral area. Musculoskeletal:         General: No swelling. Skin:     General: Skin is warm and dry. Capillary Refill: Capillary refill takes less than 2 seconds. Coloration: Skin is not pale. Findings: No rash. Neurological:      General: No focal deficit present. Mental Status: She is alert. Mental status is at baseline. Cranial Nerves: No cranial nerve deficit.       Sensory: No sensory deficit. Coordination: Coordination normal.   Psychiatric:         Mood and Affect: Mood normal.         Behavior: Behavior normal.         Vital Signs: Temperature 97.8 °F, blood pressure 140/64, pulse 61, respirations 18, SPO2 93% on room air    Assessment:  1. Hiatal hernia  History of. Patient states last week was causing. Issues. States this week no pain. Continue to monitor    2. Irritable bowel syndrome, unspecified type  Stable at present. Was having some diarrhea episodes last week. States has not had any issues in the last 2 to 3 days    3. Early onset Alzheimer's disease with behavioral disturbance ([facility]  Stable      Plan:  As noted above. Follow up for routine visit. Call sooner with concerns prior.     Electronically signed by DAISY Stratton CNP on 7/14/2021 at 4:02 PM

## 2021-07-15 ASSESSMENT — ENCOUNTER SYMPTOMS
WHEEZING: 0
EYE PAIN: 0
SORE THROAT: 0
CONSTIPATION: 0
DIARRHEA: 0
TROUBLE SWALLOWING: 0
CHEST TIGHTNESS: 0
ABDOMINAL PAIN: 0
SINUS PRESSURE: 0
NAUSEA: 0
SHORTNESS OF BREATH: 0
FACIAL SWELLING: 0
BLOOD IN STOOL: 0
VOMITING: 0
RHINORRHEA: 0
COUGH: 0
COLOR CHANGE: 0

## 2021-07-21 ENCOUNTER — OUTSIDE SERVICES (OUTPATIENT)
Dept: INTERNAL MEDICINE | Age: 86
End: 2021-07-21
Payer: MEDICARE

## 2021-07-21 DIAGNOSIS — K21.9 GASTROESOPHAGEAL REFLUX DISEASE WITHOUT ESOPHAGITIS: ICD-10-CM

## 2021-07-21 DIAGNOSIS — K44.9 HIATAL HERNIA: ICD-10-CM

## 2021-07-21 DIAGNOSIS — K22.70 BARRETT'S ESOPHAGUS WITHOUT DYSPLASIA: ICD-10-CM

## 2021-07-21 DIAGNOSIS — G30.0 EARLY ONSET ALZHEIMER'S DISEASE WITH BEHAVIORAL DISTURBANCE (HCC): ICD-10-CM

## 2021-07-21 DIAGNOSIS — R10.84 GENERALIZED ABDOMINAL PAIN: Primary | ICD-10-CM

## 2021-07-21 DIAGNOSIS — F02.818 EARLY ONSET ALZHEIMER'S DISEASE WITH BEHAVIORAL DISTURBANCE (HCC): ICD-10-CM

## 2021-07-21 DIAGNOSIS — R10.9 ABDOMINAL DISCOMFORT: Primary | ICD-10-CM

## 2021-07-21 PROCEDURE — 99308 SBSQ NF CARE LOW MDM 20: CPT | Performed by: NURSE PRACTITIONER

## 2021-07-21 ASSESSMENT — ENCOUNTER SYMPTOMS
NAUSEA: 0
DIARRHEA: 0
ABDOMINAL PAIN: 1
ANAL BLEEDING: 0
RECTAL PAIN: 0
ABDOMINAL DISTENTION: 0
BLOOD IN STOOL: 0
CONSTIPATION: 0

## 2021-07-21 NOTE — PROGRESS NOTES
07/21/21  Joe Smith  1934    Patient Resident of [facility]     Chief Complaint:    1. Abdominal discomfort    2. Hiatal hernia    3. Early onset Alzheimer's disease with behavioral disturbance ([address]    4. Gastroesophageal reflux disease without esophagitis    5. 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 Patient was seen last week states the pain was completely gone did not want any further follow-up at that time. States the pain is better this morning than it was last night. States it just comes on as a sharp stabbing pain then goes away. Does have some generalized tenderness to the abdomen. She ate breakfast today without any difficulty. States she has daily bowel movements. No diarrhea. Staff deny any blood in stool. No problems with urination. No nausea vomiting. No Known Allergies    Past Medical History:   Diagnosis Date    Age-related osteoporosis with current pathological fracture with routine healing     Anxiety 9/6/2019    Mcdaniel esophagus     CAD (coronary artery disease)     nonobstructive    Cardiac pacemaker in situ 4/24/2014    Put in for severe bradycardia.  Dual chamber     Chronic insomnia     Closed right hip fracture, with routine healing, subsequent encounter 8/18/2019    Early onset Alzheimer's disease with behavioral disturbance ([address] 9/6/2019    Elevated fasting glucose 9/6/2019    GERD (gastroesophageal reflux disease)     Hiatal hernia     Hyperlipidemia     Hypertension     Hypothyroidism s/p surgical removal of thyroid for thyroid carcinoma 7/29/2015    IBS (irritable bowel syndrome)     Major depressive disorder with single episode, in remission ([address] 9/6/2019    Mycosis fungoides ([address]     Osteoarthritis     Osteoporosis     Patient in clinical research study 08/13/2020    Enrolled in convalescent plasma for COVID 19 Date of completion 08/16/20    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 Pedro Luis Fenton 3/24/2014    SSS (sick sinus syndrome) ([facility] 4/24/2014    Thyroid cancer ([facility] [address] about 5 years ago    thyroid /following with oncology    TIA (transient ischemic attack) 7/23/2014    Confusion--word searching    Vitamin D deficiency        Past Surgical History:   Procedure Laterality Date    BONE MARROW BIOPSY  September 2010    polycythemia vera    BREAST BIOPSY Right     benign    CARDIAC CATHETERIZATION  2008    nonobstructive CAD    CHOLECYSTECTOMY  1995    COLONOSCOPY  2002    COLONOSCOPY  2/29/2016    colon polyp, diverticulosis - Dr Paul Cramer    after miscarriage    ENDOSCOPY, COLON, DIAGNOSTIC  2002    normal  external hemorrhoids    EYE SURGERY Right 11/07/06    Cataract extraction with lens implant    HEMIARTHROPLASTY HIP Right 8/20/2019    RIGHT HIP HEMIARTHROPLASTY performed by Adair Iyer MD at [address] [facility], THYROID Left     thyroid cancer    PACEMAKER INSERTION  6/2013    [facility] SKIN BIOPSY  2003    mycosis fungoides    TAE AND BSO      TAE/BSO at age 54 with anterior repair for benign reasons.  TONSILLECTOMY AND ADENOIDECTOMY      age 27   [facility] TUBAL LIGATION      UPPER GASTROINTESTINAL ENDOSCOPY  2002    UPPER GASTROINTESTINAL ENDOSCOPY  08/28/2003    UPPER GASTROINTESTINAL ENDOSCOPY  05/12/2003    UPPER GASTROINTESTINAL ENDOSCOPY  03/20/2001    UPPER GASTROINTESTINAL ENDOSCOPY  02/29/2016    Barretts tissue, body polyp - Dr Padgett Dates       [facility] [address]     Social History     Socioeconomic History    Marital status:       Spouse name: Not on file    Number of children: Not on file    Years of education: Not on file    Highest education level: Not on file   Occupational History    Occupation: retired     Employer: [employer]   Tobacco Use    Smoking status: Never Smoker    Smokeless tobacco: Never Used    Tobacco comment: never smoker   Substance and Sexual Activity    Alcohol use: No     Alcohol/week: 0.0 standard drinks    Drug use: No    Sexual activity: Not Currently   Other Topics Concern    Not on file   Social History Narrative    Not on file     Social Determinants of Health     Financial Resource Strain:     Difficulty of Paying Living Expenses:    Food Insecurity:     Worried About Running Out of Food in the Last Year:     [address] in the Last Year:    Transportation Needs:     Lack of Transportation (Medical):  Lack of Transportation (Non-Medical):    Physical Activity:     Days of Exercise per Week:     Minutes of Exercise per Session:    Stress:     Feeling of Stress :    Social Connections:     Frequency of Communication with Friends and Family:     Frequency of Social Gatherings with Friends and Family:     Attends Anabaptist Services:     Active Member of Clubs or Organizations:     Attends Club or Organization Meetings:     Marital Status:    Intimate Partner Violence:     Fear of Current or Ex-Partner:     Emotionally Abused:     Physically Abused:     Sexually Abused:        Review of Systems   Gastrointestinal: Positive for abdominal pain (Generalized abdominal tenderness). Negative for abdominal distention, anal bleeding, blood in stool, constipation, diarrhea, nausea and rectal pain. All other systems reviewed and are negative. Physical Exam  Vitals and nursing note reviewed. Constitutional:       General: She is not in acute distress. Appearance: Normal appearance. She is well-developed. She is not diaphoretic. HENT:      Head: Normocephalic and atraumatic.       Right Ear: External ear normal.      Left Ear: External ear normal.   Eyes: General:         Right eye: No discharge. Left eye: No discharge. Neck:      Trachea: No tracheal deviation. Cardiovascular:      Rate and Rhythm: Normal rate and regular rhythm. Pulses: Normal pulses. Heart sounds: Normal heart sounds. No murmur heard. No friction rub. No gallop. Pulmonary:      Effort: Pulmonary effort is normal. No respiratory distress. Breath sounds: Normal breath sounds. No stridor. No wheezing, rhonchi or rales. Chest:      Chest wall: No tenderness. Abdominal:      General: Bowel sounds are normal. There is no distension. Palpations: Abdomen is soft. There is no mass. Tenderness: There is abdominal tenderness (Generalized abdominal tenderness). There is no guarding or rebound. Musculoskeletal:         General: No swelling. Skin:     General: Skin is warm and dry. Capillary Refill: Capillary refill takes less than 2 seconds. Coloration: Skin is not pale. Findings: No rash. Neurological:      General: No focal deficit present. Mental Status: She is alert. Mental status is at baseline. Cranial Nerves: No cranial nerve deficit. Sensory: No sensory deficit. Coordination: Coordination normal.   Psychiatric:         Mood and Affect: Mood normal.         Behavior: Behavior normal.         Vital Signs: Temperature 97.3 °F, blood pressure 143/70, pulse 65, respirations 18, SPO2 95% on room air    Assessment:  1. Abdominal discomfort  No recent labs, CBC, CMP, CT of abdomen    2. Hiatal hernia  Previously documented. 3. Early onset Alzheimer's disease with behavioral disturbance (HCC)  Stable, slight cognition decline    4. Gastroesophageal reflux disease without esophagitis  5. 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 Currently not on PPI      Plan:  As noted above.   Intermittent abdominal pain we will do work-up at patient's request.  Call results once completed  Follow up for routine visit. Call sooner with concerns prior.     Electronically signed by DAISY Gordon CNP on 7/21/2021 at 10:26 AM

## 2021-07-31 PROCEDURE — 99307 SBSQ NF CARE SF MDM 10: CPT | Performed by: INTERNAL MEDICINE

## 2021-08-03 DIAGNOSIS — R10.84 GENERALIZED ABDOMINAL PAIN: Primary | ICD-10-CM

## 2021-08-03 NOTE — PROGRESS NOTES
Humberto Durán with [facility] doesn't show record of patient having a recent creatinine. They will have drawn asap for upcoming CT.

## 2021-08-04 ENCOUNTER — HOSPITAL ENCOUNTER (OUTPATIENT)
Dept: CT IMAGING | Age: 86
Discharge: HOME | End: 2021-08-06
Payer: MEDICARE

## 2021-08-04 DIAGNOSIS — R10.84 GENERALIZED ABDOMINAL PAIN: ICD-10-CM

## 2021-08-04 PROCEDURE — 2709999900 CT ABDOMEN PELVIS W IV CONTRAST

## 2021-08-04 PROCEDURE — 6360000004 HC RX CONTRAST MEDICATION: Performed by: NURSE PRACTITIONER

## 2021-08-04 RX ADMIN — IOHEXOL 50 ML: 240 INJECTION, SOLUTION INTRATHECAL; INTRAVASCULAR; INTRAVENOUS; ORAL at 13:53

## 2021-08-04 RX ADMIN — IOPAMIDOL 100 ML: 755 INJECTION, SOLUTION INTRAVENOUS at 14:53

## 2021-08-05 ENCOUNTER — TELEPHONE (OUTPATIENT)
Dept: INTERNAL MEDICINE | Age: 86
End: 2021-08-05

## 2021-08-24 PROCEDURE — 99307 SBSQ NF CARE SF MDM 10: CPT | Performed by: INTERNAL MEDICINE

## 2021-08-31 ENCOUNTER — OUTSIDE SERVICES (OUTPATIENT)
Dept: INTERNAL MEDICINE | Age: 86
End: 2021-08-31
Payer: MEDICARE

## 2021-08-31 DIAGNOSIS — I21.9 MYOCARDIAL INFARCTION, UNSPECIFIED MI TYPE, UNSPECIFIED ARTERY (HCC): ICD-10-CM

## 2021-08-31 DIAGNOSIS — K22.70 BARRETT'S ESOPHAGUS WITHOUT DYSPLASIA: ICD-10-CM

## 2021-08-31 DIAGNOSIS — K21.9 GASTROESOPHAGEAL REFLUX DISEASE WITHOUT ESOPHAGITIS: ICD-10-CM

## 2021-08-31 DIAGNOSIS — F02.818 EARLY ONSET ALZHEIMER'S DISEASE WITH BEHAVIORAL DISTURBANCE (HCC): ICD-10-CM

## 2021-08-31 DIAGNOSIS — G30.0 EARLY ONSET ALZHEIMER'S DISEASE WITH BEHAVIORAL DISTURBANCE (HCC): ICD-10-CM

## 2021-08-31 DIAGNOSIS — I10 ESSENTIAL HYPERTENSION: ICD-10-CM

## 2021-08-31 DIAGNOSIS — E66.3 OVERWEIGHT: ICD-10-CM

## 2021-08-31 DIAGNOSIS — K44.9 HIATAL HERNIA: ICD-10-CM

## 2021-08-31 DIAGNOSIS — I49.5 SSS (SICK SINUS SYNDROME) (HCC): ICD-10-CM

## 2021-08-31 DIAGNOSIS — E89.0 POSTOPERATIVE HYPOTHYROIDISM: ICD-10-CM

## 2021-08-31 DIAGNOSIS — I25.10 CORONARY ARTERY DISEASE INVOLVING NATIVE CORONARY ARTERY OF NATIVE HEART, UNSPECIFIED WHETHER ANGINA PRESENT: ICD-10-CM

## 2021-08-31 DIAGNOSIS — F32.5 MAJOR DEPRESSIVE DISORDER WITH SINGLE EPISODE, IN REMISSION (HCC): ICD-10-CM

## 2021-08-31 DIAGNOSIS — C73 THYROID CANCER (HCC): ICD-10-CM

## 2021-08-31 DIAGNOSIS — D45 POLYCYTHEMIA RUBRA VERA (HCC): ICD-10-CM

## 2021-08-31 DIAGNOSIS — R73.01 ELEVATED FASTING GLUCOSE: Primary | ICD-10-CM

## 2021-08-31 DIAGNOSIS — M80.00XD AGE-RELATED OSTEOPOROSIS WITH CURRENT PATHOLOGICAL FRACTURE WITH ROUTINE HEALING: ICD-10-CM

## 2021-08-31 DIAGNOSIS — Z95.0 CARDIAC PACEMAKER IN SITU: ICD-10-CM

## 2021-08-31 DIAGNOSIS — F41.9 ANXIETY: ICD-10-CM

## 2021-08-31 DIAGNOSIS — K58.9 IRRITABLE BOWEL SYNDROME, UNSPECIFIED TYPE: ICD-10-CM

## 2021-08-31 DIAGNOSIS — E78.2 MIXED HYPERLIPIDEMIA: ICD-10-CM

## 2021-08-31 NOTE — PROGRESS NOTES
DR. Jennifer Myers - [facility] VISIT     DATE OF SERVICE: 7/31/21    NURSING HOME: [facility]    CHIEF COMPLAINT/HISTORY OF CHIEF COMPLAINT: This patient is being seen for ongoing evaluation and management of her Alzheimer's disease, elevated fasting glucose, hypertension, hyperlipidemia, history of thyroid cancer with postoperative hypothyroidism, coronary artery disease, sick sinus syndrome, gastroesophageal reflux disease, Mcdaniel's esophagus, irritable bowel syndrome, polycythemia, depression, and being overweight. Her elevated fasting glucose is controlled with diet and exercise. There are no new complaints. ALLERGIES: No Known Allergies    MEDICATIONS: As noted on the [facility] MAR, referenced and incorporated herein. PAST MEDICAL HISTORY:   Past Medical History:   Diagnosis Date    Age-related osteoporosis with current pathological fracture with routine healing     Anxiety 9/6/2019    Mcdaniel esophagus     CAD (coronary artery disease)     nonobstructive    Cardiac pacemaker in situ 4/24/2014    Put in for severe bradycardia.  Dual chamber     Chronic insomnia     Closed right hip fracture, with routine healing, subsequent encounter 8/18/2019    Early onset Alzheimer's disease with behavioral disturbance ([address] 9/6/2019    Elevated fasting glucose 9/6/2019    GERD (gastroesophageal reflux disease)     Hiatal hernia     Hyperlipidemia     Hypertension     Hypothyroidism s/p surgical removal of thyroid for thyroid carcinoma 7/29/2015    IBS (irritable bowel syndrome)     Major depressive disorder with single episode, in remission ([address] 9/6/2019    Mycosis fungoides ([address]     Osteoarthritis     Osteoporosis     Patient in clinical research study 08/13/2020    Enrolled in convalescent plasma for COVID 19 Date of completion 08/16/20    Polycythemia rubra vera [facility])     sees oncology    PUD (peptic ulcer disease)     RV Pacemaker lead malfunction - Revision 3/24/14-Dr. Cuca Benito 3/24/2014    SSS (sick sinus syndrome) ([facility] 4/24/2014    Thyroid cancer ([facility] about 5 years ago    thyroid /following with oncology    TIA (transient ischemic attack) 7/23/2014    Confusion--word searching    Vitamin D deficiency        PAST SURGICAL HISTORY:   Past Surgical History:   Procedure Laterality Date    BONE MARROW BIOPSY  September 2010    polycythemia vera    BREAST BIOPSY Right     benign    CARDIAC CATHETERIZATION  2008    nonobstructive CAD    CHOLECYSTECTOMY  1995    COLONOSCOPY  2002    COLONOSCOPY  2/29/2016    colon polyp, diverticulosis - Dr Rasmussen Jessica    after miscarriage    ENDOSCOPY, COLON, DIAGNOSTIC  2002    normal  external hemorrhoids    EYE SURGERY Right 11/07/06    Cataract extraction with lens implant    HEMIARTHROPLASTY HIP Right 8/20/2019    RIGHT HIP HEMIARTHROPLASTY performed by Deni Negron MD at [address], THYROID Left     thyroid cancer    PACEMAKER INSERTION  6/2013    [facility] SKIN BIOPSY  2003    mycosis fungoides    TAE AND BSO      TAE/BSO at age 54 with anterior repair for benign reasons.     TONSILLECTOMY AND ADENOIDECTOMY      age 27   [facility]      UPPER GASTROINTESTINAL ENDOSCOPY  2002    UPPER GASTROINTESTINAL ENDOSCOPY  08/28/2003    UPPER GASTROINTESTINAL ENDOSCOPY  05/12/2003    UPPER GASTROINTESTINAL ENDOSCOPY  03/20/2001    UPPER GASTROINTESTINAL ENDOSCOPY  02/29/2016    Barretts tissue, body polyp - Dr Brown Books       SOCIAL HISTORY:     Tobacco:   Social History     Tobacco Use   Smoking Status Never Smoker   Smokeless Tobacco Never Used   Tobacco Comment    never smoker     Alcohol:   Social History     Substance and Sexual Activity   Alcohol Use No    Alcohol/week: 0.0 standard drinks     Drugs:   Social History     Substance and Sexual Activity   Drug Use No       FAMILY HISTORY: family history includes Cancer (age of onset: 47) in her mother; Depression in her son; Drug Abuse in her son; Emphysema in her sister; Mental Illness in her son; Other in her brother and son; Stroke (age of onset: 76) in her father; Sudden Death (age of onset: 78) in her sister. REVIEW OF SYSTEMS:     Please see history of chief complaint above; otherwise no new problems with respect to General, HEENT, Cardiovascular, Respiratory, Gastrointestinal, Genitourinary, Endocrinologic, Musculoskeletal, or Neuropsychiatric complaints. PHYSICAL EXAMINATION:    Vitals: Temp: 97.3 deg F. Pulse: 60. Resp: 20. BP: 132/85. General: A 80 y.o.  female. Alert and oriented to person and place and questionably oriented to time. She does not appear to be in any acute distress. Skin: Skin color, texture, turgor normal. No rashes or lesions. HEENT/Neck: Essentially unremarkable  Lungs: Normal - CTA without rales, rhonchi, or wheezing. Heart: regular rate and rhythm, S1, S2 normal, no murmur, click, rub or gallop No S3 or S4. Abdomen: Non-obese soft, non-distended, non-tender, normal active bowel sounds, no masses palpated and no hepatosplenomegaly  Extremities: No clubbing, cyanosis, or edema in any of the extremities. Neurologic: cranial nerves II-XII are grossly intact    ASSESSMENT:     Diagnosis Orders   1. Elevated fasting glucose     2. Essential hypertension     3. Mixed hyperlipidemia     4. Coronary artery disease involving native coronary artery of native heart, unspecified whether angina present     5. SSS (sick sinus syndrome) (HCC)     6. Cardiac pacemaker in situ     7. Myocardial infarction, unspecified MI type, unspecified artery ([address]     8. Thyroid cancer ([address]     9. Postoperative hypothyroidism     10. Gastroesophageal reflux disease without esophagitis     11. Hiatal hernia     12. 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 Irritable bowel syndrome, unspecified type     14. Polycythemia rubra vera ([address]     15.  Age-related osteoporosis with current pathological fracture with routine healing     16. Early onset Alzheimer's disease with behavioral disturbance ([facility]     17. Major depressive disorder with single episode, in remission ([facility]     18. Anxiety     19. Overweight           PLAN:    1. Continue current treatment  2. Nursing home record reviewed and updates summarized and entered into electronic record  3. See nursing home orders and MAR.         Electronically signed by Theo Hanley DO on 8/31/2021 at 2:40 AM  Internal Medicine

## 2021-09-26 PROCEDURE — 99307 SBSQ NF CARE SF MDM 10: CPT | Performed by: INTERNAL MEDICINE

## 2021-10-24 ENCOUNTER — OUTSIDE SERVICES (OUTPATIENT)
Dept: INTERNAL MEDICINE | Age: 86
End: 2021-10-24
Payer: MEDICARE

## 2021-10-24 DIAGNOSIS — G30.0 EARLY ONSET ALZHEIMER'S DISEASE WITH BEHAVIORAL DISTURBANCE (HCC): ICD-10-CM

## 2021-10-24 DIAGNOSIS — D45 POLYCYTHEMIA RUBRA VERA (HCC): ICD-10-CM

## 2021-10-24 DIAGNOSIS — K22.70 BARRETT'S ESOPHAGUS WITHOUT DYSPLASIA: ICD-10-CM

## 2021-10-24 DIAGNOSIS — F32.5 MAJOR DEPRESSIVE DISORDER WITH SINGLE EPISODE, IN REMISSION (HCC): ICD-10-CM

## 2021-10-24 DIAGNOSIS — I21.9 MYOCARDIAL INFARCTION, UNSPECIFIED MI TYPE, UNSPECIFIED ARTERY (HCC): ICD-10-CM

## 2021-10-24 DIAGNOSIS — E78.2 MIXED HYPERLIPIDEMIA: ICD-10-CM

## 2021-10-24 DIAGNOSIS — E89.0 POSTOPERATIVE HYPOTHYROIDISM: ICD-10-CM

## 2021-10-24 DIAGNOSIS — I25.10 CORONARY ARTERY DISEASE INVOLVING NATIVE CORONARY ARTERY OF NATIVE HEART, UNSPECIFIED WHETHER ANGINA PRESENT: ICD-10-CM

## 2021-10-24 DIAGNOSIS — K21.9 GASTROESOPHAGEAL REFLUX DISEASE WITHOUT ESOPHAGITIS: ICD-10-CM

## 2021-10-24 DIAGNOSIS — K58.9 IRRITABLE BOWEL SYNDROME, UNSPECIFIED TYPE: ICD-10-CM

## 2021-10-24 DIAGNOSIS — E66.3 OVERWEIGHT: ICD-10-CM

## 2021-10-24 DIAGNOSIS — M80.00XD AGE-RELATED OSTEOPOROSIS WITH CURRENT PATHOLOGICAL FRACTURE WITH ROUTINE HEALING: ICD-10-CM

## 2021-10-24 DIAGNOSIS — C73 THYROID CANCER (HCC): ICD-10-CM

## 2021-10-24 DIAGNOSIS — F41.9 ANXIETY: ICD-10-CM

## 2021-10-24 DIAGNOSIS — I10 ESSENTIAL HYPERTENSION: ICD-10-CM

## 2021-10-24 DIAGNOSIS — I49.5 SSS (SICK SINUS SYNDROME) (HCC): ICD-10-CM

## 2021-10-24 DIAGNOSIS — K44.9 HIATAL HERNIA: ICD-10-CM

## 2021-10-24 DIAGNOSIS — Z95.0 CARDIAC PACEMAKER IN SITU: ICD-10-CM

## 2021-10-24 DIAGNOSIS — R73.01 ELEVATED FASTING GLUCOSE: Primary | ICD-10-CM

## 2021-10-24 DIAGNOSIS — F02.818 EARLY ONSET ALZHEIMER'S DISEASE WITH BEHAVIORAL DISTURBANCE (HCC): ICD-10-CM

## 2021-10-25 NOTE — PROGRESS NOTES
DR. Layla Iglesias - [facility] VISIT     DATE OF SERVICE: 8/24/21    NURSING HOME: [facility]    CHIEF COMPLAINT/HISTORY OF CHIEF COMPLAINT: This patient is being seen for ongoing evaluation and management of her Alzheimer's disease, elevated fasting glucose, hypertension, hyperlipidemia, history of thyroid cancer with postoperative hypothyroidism, coronary artery disease, sick sinus syndrome, gastroesophageal reflux disease, Mcdaniel's esophagus, irritable bowel syndrome, polycythemia, depression, and being overweight. Her elevated fasting glucose is controlled with diet and exercise. There are no new complaints at this time. ALLERGIES: No Known Allergies    MEDICATIONS: As noted on the [facility], referenced and incorporated herein. PAST MEDICAL HISTORY:   Past Medical History:   Diagnosis Date    Age-related osteoporosis with current pathological fracture with routine healing     Anxiety 9/6/2019    Mcdaniel esophagus     CAD (coronary artery disease)     nonobstructive    Cardiac pacemaker in situ 4/24/2014    Put in for severe bradycardia.  Dual chamber     Chronic insomnia     Closed right hip fracture, with routine healing, subsequent encounter 8/18/2019    Early onset Alzheimer's disease with behavioral disturbance ([address] 9/6/2019    Elevated fasting glucose 9/6/2019    GERD (gastroesophageal reflux disease)     Hiatal hernia     Hyperlipidemia     Hypertension     Hypothyroidism s/p surgical removal of thyroid for thyroid carcinoma 7/29/2015    IBS (irritable bowel syndrome)     Major depressive disorder with single episode, in remission ([address] 9/6/2019    Mycosis fungoides ([address]     Osteoarthritis     Osteoporosis     Patient in clinical research study 08/13/2020    Enrolled in convalescent plasma for COVID 19 Date of completion 08/16/20    Polycythemia rubra vera [facility])     sees oncology    PUD (peptic ulcer disease)     RV Pacemaker lead malfunction - Revision 3/24/14-Dr. ray 3/24/2014    SSS (sick sinus syndrome) ([facility] 4/24/2014    Thyroid cancer ([facility] about 5 years ago    thyroid /following with oncology    TIA (transient ischemic attack) 7/23/2014    Confusion--word searching    Vitamin D deficiency        PAST SURGICAL HISTORY:   Past Surgical History:   Procedure Laterality Date    BONE MARROW BIOPSY  September 2010    polycythemia vera    BREAST BIOPSY Right     benign    CARDIAC CATHETERIZATION  2008    nonobstructive CAD    CHOLECYSTECTOMY  1995    COLONOSCOPY  2002    COLONOSCOPY  2/29/2016    colon polyp, diverticulosis - Dr Amaryllis Prader    after miscarriage    ENDOSCOPY, COLON, DIAGNOSTIC  2002    normal  external hemorrhoids    EYE SURGERY Right 11/07/06    Cataract extraction with lens implant    HEMIARTHROPLASTY HIP Right 8/20/2019    RIGHT HIP HEMIARTHROPLASTY performed by Ton Malik MD at [address], THYROID Left     thyroid cancer    PACEMAKER INSERTION  6/2013    [facility] SKIN BIOPSY  2003    mycosis fungoides    TAE AND BSO      TAE/BSO at age 54 with anterior repair for benign reasons.     TONSILLECTOMY AND ADENOIDECTOMY      age 27   [facility] TUBAL LIGATION      UPPER GASTROINTESTINAL ENDOSCOPY  2002    UPPER GASTROINTESTINAL ENDOSCOPY  08/28/2003    UPPER GASTROINTESTINAL ENDOSCOPY  05/12/2003    UPPER GASTROINTESTINAL ENDOSCOPY  03/20/2001    UPPER GASTROINTESTINAL ENDOSCOPY  02/29/2016    Barretts tissue, body polyp - Dr Bubba Escalante       SOCIAL HISTORY:     Tobacco:   Social History     Tobacco Use   Smoking Status Never Smoker   Smokeless Tobacco Never Used   Tobacco Comment    never smoker     Alcohol:   Social History     Substance and Sexual Activity   Alcohol Use No    Alcohol/week: 0.0 standard drinks     Drugs:   Social History     Substance and Sexual Activity   Drug Use No       FAMILY HISTORY: family history includes Cancer (age of onset: 47) in her mother; Depression in her son; Drug Abuse in her son; Emphysema in her sister; Mental Illness in her son; Other in her brother and son; Stroke (age of onset: 76) in her father; Sudden Death (age of onset: 78) in her sister. REVIEW OF SYSTEMS:     Please see history of chief complaint above; otherwise no new problems with respect to General, HEENT, Cardiovascular, Respiratory, Gastrointestinal, Genitourinary, Endocrinologic, Musculoskeletal, or Neuropsychiatric complaints. PHYSICAL EXAMINATION:    Vitals: Temp: 97.5 deg F. Pulse: 66. Resp: 18. BP: 130/85. General: A 80 y.o.  female. Alert and oriented to person and place and questionably oriented to time. She does not appear to be in any acute distress. Skin: Skin color, texture, turgor normal. No rashes or lesions. HEENT/Neck: Essentially unremarkable  Lungs: Normal - CTA without rales, rhonchi, or wheezing. Heart: regular rate and rhythm, S1, S2 normal, no murmur, click, rub or gallop No S3 or S4. Abdomen: Non-obese soft, non-distended, non-tender, normal active bowel sounds, no masses palpated and no hepatosplenomegaly  Extremities: No clubbing, cyanosis, or edema in any of the extremities. Neurologic: cranial nerves II-XII are grossly intact    ASSESSMENT:     Diagnosis Orders   1. Elevated fasting glucose     2. Essential hypertension     3. Mixed hyperlipidemia     4. Coronary artery disease involving native coronary artery of native heart, unspecified whether angina present     5. SSS (sick sinus syndrome) (HCC)     6. Cardiac pacemaker in situ     7. Myocardial infarction, unspecified MI type, unspecified artery ([address]     8. Thyroid cancer ([address]     9. Postoperative hypothyroidism     10. Gastroesophageal reflux disease without esophagitis     11. Hiatal hernia     12. Mcdanile's esophagus without dysplasia     13. Irritable bowel syndrome, unspecified type     14. Polycythemia rubra vera ([address]     15.  Age-related osteoporosis with current pathological fracture with routine healing     16. Early onset Alzheimer's disease with behavioral disturbance ([facility]     17. Major depressive disorder with single episode, in remission ([facility]     18. Anxiety     19. Overweight           PLAN:    1. Continue current treatment  2. Nursing home record reviewed and updates summarized and entered into electronic record  3. See nursing home orders and MAR.         Electronically signed by Olivia Ruff DO on 10/24/2021 at 11:47 PM  Internal Medicine

## 2021-10-26 PROCEDURE — 99307 SBSQ NF CARE SF MDM 10: CPT | Performed by: INTERNAL MEDICINE

## 2021-11-18 ENCOUNTER — OUTSIDE SERVICES (OUTPATIENT)
Dept: INTERNAL MEDICINE | Age: 86
End: 2021-11-18
Payer: MEDICARE

## 2021-11-18 DIAGNOSIS — K58.9 IRRITABLE BOWEL SYNDROME, UNSPECIFIED TYPE: ICD-10-CM

## 2021-11-18 DIAGNOSIS — K22.70 BARRETT'S ESOPHAGUS WITHOUT DYSPLASIA: ICD-10-CM

## 2021-11-18 DIAGNOSIS — Z95.0 CARDIAC PACEMAKER IN SITU: ICD-10-CM

## 2021-11-18 DIAGNOSIS — I21.9 MYOCARDIAL INFARCTION, UNSPECIFIED MI TYPE, UNSPECIFIED ARTERY (HCC): ICD-10-CM

## 2021-11-18 DIAGNOSIS — K21.9 GASTROESOPHAGEAL REFLUX DISEASE WITHOUT ESOPHAGITIS: ICD-10-CM

## 2021-11-18 DIAGNOSIS — M80.00XD AGE-RELATED OSTEOPOROSIS WITH CURRENT PATHOLOGICAL FRACTURE WITH ROUTINE HEALING: ICD-10-CM

## 2021-11-18 DIAGNOSIS — I25.10 CORONARY ARTERY DISEASE INVOLVING NATIVE CORONARY ARTERY OF NATIVE HEART, UNSPECIFIED WHETHER ANGINA PRESENT: ICD-10-CM

## 2021-11-18 DIAGNOSIS — G30.0 EARLY ONSET ALZHEIMER'S DISEASE WITH BEHAVIORAL DISTURBANCE (HCC): ICD-10-CM

## 2021-11-18 DIAGNOSIS — E78.2 MIXED HYPERLIPIDEMIA: ICD-10-CM

## 2021-11-18 DIAGNOSIS — F32.5 MAJOR DEPRESSIVE DISORDER WITH SINGLE EPISODE, IN REMISSION (HCC): ICD-10-CM

## 2021-11-18 DIAGNOSIS — I49.5 SSS (SICK SINUS SYNDROME) (HCC): ICD-10-CM

## 2021-11-18 DIAGNOSIS — F41.9 ANXIETY: ICD-10-CM

## 2021-11-18 DIAGNOSIS — R73.01 ELEVATED FASTING GLUCOSE: Primary | ICD-10-CM

## 2021-11-18 DIAGNOSIS — F02.818 EARLY ONSET ALZHEIMER'S DISEASE WITH BEHAVIORAL DISTURBANCE (HCC): ICD-10-CM

## 2021-11-18 DIAGNOSIS — E89.0 POSTOPERATIVE HYPOTHYROIDISM: ICD-10-CM

## 2021-11-18 DIAGNOSIS — K44.9 HIATAL HERNIA: ICD-10-CM

## 2021-11-18 DIAGNOSIS — E66.3 OVERWEIGHT: ICD-10-CM

## 2021-11-18 DIAGNOSIS — D45 POLYCYTHEMIA RUBRA VERA (HCC): ICD-10-CM

## 2021-11-18 DIAGNOSIS — C73 THYROID CANCER (HCC): ICD-10-CM

## 2021-11-18 DIAGNOSIS — I10 ESSENTIAL HYPERTENSION: ICD-10-CM

## 2021-11-18 NOTE — PROGRESS NOTES
3/24/2014    SSS (sick sinus syndrome) ([facility] 4/24/2014    Thyroid cancer ([facility] about 5 years ago    thyroid /following with oncology    TIA (transient ischemic attack) 7/23/2014    Confusion--word searching    Vitamin D deficiency        PAST SURGICAL HISTORY:   Past Surgical History:   Procedure Laterality Date    BONE MARROW BIOPSY  September 2010    polycythemia vera    BREAST BIOPSY Right     benign    CARDIAC CATHETERIZATION  2008    nonobstructive CAD    CHOLECYSTECTOMY  1995    COLONOSCOPY  2002    COLONOSCOPY  2/29/2016    colon polyp, diverticulosis - Dr Allyn Krueger    after miscarriage    ENDOSCOPY, COLON, DIAGNOSTIC  2002    normal  external hemorrhoids    EYE SURGERY Right 11/07/06    Cataract extraction with lens implant    HEMIARTHROPLASTY HIP Right 8/20/2019    RIGHT HIP HEMIARTHROPLASTY performed by Shayy Mckinney MD at [address], THYROID Left     thyroid cancer    PACEMAKER INSERTION  6/2013    [facility] SKIN BIOPSY  2003    mycosis fungoides    TAE AND BSO      TAE/BSO at age 54 with anterior repair for benign reasons.     TONSILLECTOMY AND ADENOIDECTOMY      age 27   [facility]      UPPER GASTROINTESTINAL ENDOSCOPY  2002    UPPER GASTROINTESTINAL ENDOSCOPY  08/28/2003    UPPER GASTROINTESTINAL ENDOSCOPY  05/12/2003    UPPER GASTROINTESTINAL ENDOSCOPY  03/20/2001    UPPER GASTROINTESTINAL ENDOSCOPY  02/29/2016    Barretts tissue, body polyp - Dr Sharita Rizvi       SOCIAL HISTORY:     Tobacco:   Social History     Tobacco Use   Smoking Status Never Smoker   Smokeless Tobacco Never Used   Tobacco Comment    never smoker     Alcohol:   Social History     Substance and Sexual Activity   Alcohol Use No    Alcohol/week: 0.0 standard drinks     Drugs:   Social History     Substance and Sexual Activity   Drug Use No       FAMILY HISTORY: family history includes Cancer (age of onset: 47) in her mother; Depression in her son; Drug Abuse in her son; Emphysema in her sister; Mental Illness in her son; Other in her brother and son; Stroke (age of onset: 76) in her father; Sudden Death (age of onset: 78) in her sister. REVIEW OF SYSTEMS:     Please see history of chief complaint above; otherwise no new problems with respect to General, HEENT, Cardiovascular, Respiratory, Gastrointestinal, Genitourinary, Endocrinologic, Musculoskeletal, or Neuropsychiatric complaints. PHYSICAL EXAMINATION:    Vitals: Temp: 98.6 deg F. Pulse: 60. Resp: 18. BP: 98/62. General: A 80 y.o.  female. Alert and oriented to person and place and questionably oriented to time. She does not appear to be in any acute distress. Skin: Skin color, texture, turgor normal. No rashes or lesions. HEENT/Neck: Essentially unremarkable  Lungs: Normal - CTA without rales, rhonchi, or wheezing. Heart: regular rate and rhythm, S1, S2 normal, no murmur, click, rub or gallop No S3 or S4. Abdomen: Non-obese soft, non-distended, non-tender, normal active bowel sounds, no masses palpated and no hepatosplenomegaly  Extremities: No clubbing, cyanosis, or edema in any of the extremities. Neurologic: cranial nerves II-XII are grossly intact    ASSESSMENT:     Diagnosis Orders   1. Elevated fasting glucose     2. Essential hypertension     3. Mixed hyperlipidemia     4. Coronary artery disease involving native coronary artery of native heart, unspecified whether angina present     5. SSS (sick sinus syndrome) (HCC)     6. Cardiac pacemaker in situ     7. Myocardial infarction, unspecified MI type, unspecified artery ([address]     8. Thyroid cancer ([address]     9. Postoperative hypothyroidism     10. Gastroesophageal reflux disease without esophagitis     11. Hiatal hernia     12. 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 Irritable bowel syndrome, unspecified type     14. Polycythemia rubra vera ([address]     15.  Age-related osteoporosis with current pathological fracture with routine healing     16. Early onset Alzheimer's disease with behavioral disturbance ([facility]     17. Major depressive disorder with single episode, in remission ([facility]     18. Anxiety     19. Overweight           PLAN:    1. Continue current treatment  2. Nursing home record reviewed and updates summarized and entered into electronic record  3. See nursing home orders and MAR.         Electronically signed by Olivia Ruff DO on 11/18/2021 at 2:31 AM  Internal Medicine

## 2021-11-28 ENCOUNTER — OUTSIDE SERVICES (OUTPATIENT)
Dept: INTERNAL MEDICINE | Age: 86
End: 2021-11-28
Payer: MEDICARE

## 2021-11-28 DIAGNOSIS — D45 POLYCYTHEMIA RUBRA VERA (HCC): ICD-10-CM

## 2021-11-28 DIAGNOSIS — E78.2 MIXED HYPERLIPIDEMIA: ICD-10-CM

## 2021-11-28 DIAGNOSIS — Z95.0 CARDIAC PACEMAKER IN SITU: ICD-10-CM

## 2021-11-28 DIAGNOSIS — K22.70 BARRETT'S ESOPHAGUS WITHOUT DYSPLASIA: ICD-10-CM

## 2021-11-28 DIAGNOSIS — F02.818 EARLY ONSET ALZHEIMER'S DISEASE WITH BEHAVIORAL DISTURBANCE (HCC): ICD-10-CM

## 2021-11-28 DIAGNOSIS — G30.0 EARLY ONSET ALZHEIMER'S DISEASE WITH BEHAVIORAL DISTURBANCE (HCC): ICD-10-CM

## 2021-11-28 DIAGNOSIS — R73.01 ELEVATED FASTING GLUCOSE: Primary | ICD-10-CM

## 2021-11-28 DIAGNOSIS — C73 THYROID CANCER (HCC): ICD-10-CM

## 2021-11-28 DIAGNOSIS — F32.5 MAJOR DEPRESSIVE DISORDER WITH SINGLE EPISODE, IN REMISSION (HCC): ICD-10-CM

## 2021-11-28 DIAGNOSIS — I10 ESSENTIAL HYPERTENSION: ICD-10-CM

## 2021-11-28 DIAGNOSIS — M80.00XD AGE-RELATED OSTEOPOROSIS WITH CURRENT PATHOLOGICAL FRACTURE WITH ROUTINE HEALING: ICD-10-CM

## 2021-11-28 DIAGNOSIS — I25.10 CORONARY ARTERY DISEASE INVOLVING NATIVE CORONARY ARTERY OF NATIVE HEART, UNSPECIFIED WHETHER ANGINA PRESENT: ICD-10-CM

## 2021-11-28 DIAGNOSIS — E89.0 POSTOPERATIVE HYPOTHYROIDISM: ICD-10-CM

## 2021-11-28 DIAGNOSIS — I21.9 MYOCARDIAL INFARCTION, UNSPECIFIED MI TYPE, UNSPECIFIED ARTERY (HCC): ICD-10-CM

## 2021-11-28 DIAGNOSIS — E66.3 OVERWEIGHT: ICD-10-CM

## 2021-11-28 DIAGNOSIS — K58.9 IRRITABLE BOWEL SYNDROME, UNSPECIFIED TYPE: ICD-10-CM

## 2021-11-28 DIAGNOSIS — K44.9 HIATAL HERNIA: ICD-10-CM

## 2021-11-28 DIAGNOSIS — I49.5 SSS (SICK SINUS SYNDROME) (HCC): ICD-10-CM

## 2021-11-28 DIAGNOSIS — F41.9 ANXIETY: ICD-10-CM

## 2021-11-28 DIAGNOSIS — K21.9 GASTROESOPHAGEAL REFLUX DISEASE WITHOUT ESOPHAGITIS: ICD-10-CM

## 2021-11-28 PROCEDURE — 99307 SBSQ NF CARE SF MDM 10: CPT | Performed by: INTERNAL MEDICINE

## 2021-11-28 NOTE — PROGRESS NOTES
DR. Cornelio Blank - [facility] VISIT     DATE OF SERVICE: 10/26/21    NURSING HOME: [facility]    CHIEF COMPLAINT/HISTORY OF CHIEF COMPLAINT: This patient is being seen for ongoing evaluation and management of her Alzheimer's disease, elevated fasting glucose, hypertension, hyperlipidemia, history of thyroid cancer with postoperative hypothyroidism, coronary artery disease, sick sinus syndrome, gastroesophageal reflux disease, Mcdaniel's esophagus, irritable bowel syndrome, polycythemia, depression, and being overweight. Her elevated fasting glucose is controlled with diet and exercise. There are no new complaints at this time. ALLERGIES: No Known Allergies    MEDICATIONS: As noted on the [facility], referenced and incorporated herein. PAST MEDICAL HISTORY:   Past Medical History:   Diagnosis Date    Age-related osteoporosis with current pathological fracture with routine healing     Anxiety 9/6/2019    Mcdaniel esophagus     CAD (coronary artery disease)     nonobstructive    Cardiac pacemaker in situ 4/24/2014    Put in for severe bradycardia.  Dual chamber     Chronic insomnia     Closed right hip fracture, with routine healing, subsequent encounter 8/18/2019    Early onset Alzheimer's disease with behavioral disturbance ([address] 9/6/2019    Elevated fasting glucose 9/6/2019    GERD (gastroesophageal reflux disease)     Hiatal hernia     Hyperlipidemia     Hypertension     Hypothyroidism s/p surgical removal of thyroid for thyroid carcinoma 7/29/2015    IBS (irritable bowel syndrome)     Major depressive disorder with single episode, in remission ([address] 9/6/2019    Mycosis fungoides ([address]     Osteoarthritis     Osteoporosis     Patient in clinical research study 08/13/2020    Enrolled in convalescent plasma for COVID 19 Date of completion 08/16/20    Polycythemia rubra vera [facility])     sees oncology    PUD (peptic ulcer disease)     RV Pacemaker lead malfunction - Revision Depression in her son; Drug Abuse in her son; Emphysema in her sister; Mental Illness in her son; Other in her brother and son; Stroke (age of onset: 76) in her father; Sudden Death (age of onset: 78) in her sister. REVIEW OF SYSTEMS:     Please see history of chief complaint above; otherwise no new problems with respect to General, HEENT, Cardiovascular, Respiratory, Gastrointestinal, Genitourinary, Endocrinologic, Musculoskeletal, or Neuropsychiatric complaints. PHYSICAL EXAMINATION:    Vitals: Temp: 97.3 deg F. Pulse: 63. Resp: 18. BP: 104/75. General: A 80 y.o.  female. Alert and oriented to person and place and questionably oriented to time. She does not appear to be in any acute distress. Skin: Skin color, texture, turgor normal. No rashes or lesions. HEENT/Neck: Essentially unremarkable  Lungs: Normal - CTA without rales, rhonchi, or wheezing. Heart: regular rate and rhythm, S1, S2 normal, no murmur, click, rub or gallop No S3 or S4. Abdomen: Non-obese soft, non-distended, non-tender, normal active bowel sounds, no masses palpated and no hepatosplenomegaly  Extremities: No clubbing, cyanosis, or edema in any of the extremities. Neurologic: cranial nerves II-XII are grossly intact    ASSESSMENT:     Diagnosis Orders   1. Elevated fasting glucose     2. Essential hypertension     3. Mixed hyperlipidemia     4. Coronary artery disease involving native coronary artery of native heart, unspecified whether angina present     5. SSS (sick sinus syndrome) (HCC)     6. Cardiac pacemaker in situ     7. Myocardial infarction, unspecified MI type, unspecified artery ([address]     8. Thyroid cancer ([address]     9. Postoperative hypothyroidism     10. Gastroesophageal reflux disease without esophagitis     11. Hiatal hernia     12. 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 Irritable bowel syndrome, unspecified type     14. Polycythemia rubra vera ([address]     15.  Age-related osteoporosis with current pathological fracture with routine healing     16. Early onset Alzheimer's disease with behavioral disturbance ([facility]     17. Major depressive disorder with single episode, in remission ([facility]     18. Anxiety     19. Overweight           PLAN:    1. Continue current treatment  2. Nursing home record reviewed and updates summarized and entered into electronic record  3. See nursing home orders and MAR.         Electronically signed by Jayla Steiner DO on 11/28/2021 at 3:56 PM  Internal Medicine

## 2021-12-17 ENCOUNTER — OFFICE VISIT (OUTPATIENT)
Dept: CARDIOLOGY | Age: 86
End: 2021-12-17
Payer: MEDICARE

## 2021-12-17 ENCOUNTER — PROCEDURE VISIT (OUTPATIENT)
Dept: CARDIOLOGY | Age: 86
End: 2021-12-17
Payer: MEDICARE

## 2021-12-17 VITALS
HEART RATE: 74 BPM | HEIGHT: 66 IN | DIASTOLIC BLOOD PRESSURE: 62 MMHG | SYSTOLIC BLOOD PRESSURE: 120 MMHG | BODY MASS INDEX: 28.93 KG/M2 | WEIGHT: 180 LBS

## 2021-12-17 DIAGNOSIS — I25.10 CORONARY ARTERY DISEASE INVOLVING NATIVE CORONARY ARTERY OF NATIVE HEART, UNSPECIFIED WHETHER ANGINA PRESENT: ICD-10-CM

## 2021-12-17 DIAGNOSIS — I10 ESSENTIAL HYPERTENSION: ICD-10-CM

## 2021-12-17 DIAGNOSIS — Z95.0 CARDIAC PACEMAKER IN SITU: ICD-10-CM

## 2021-12-17 DIAGNOSIS — I25.10 CORONARY ARTERY DISEASE INVOLVING NATIVE CORONARY ARTERY OF NATIVE HEART WITHOUT ANGINA PECTORIS: ICD-10-CM

## 2021-12-17 DIAGNOSIS — E78.5 DYSLIPIDEMIA: ICD-10-CM

## 2021-12-17 DIAGNOSIS — I49.5 SSS (SICK SINUS SYNDROME) (HCC): Primary | ICD-10-CM

## 2021-12-17 DIAGNOSIS — I49.5 SSS (SICK SINUS SYNDROME) (HCC): ICD-10-CM

## 2021-12-17 DIAGNOSIS — Z95.0 PACEMAKER: ICD-10-CM

## 2021-12-17 PROCEDURE — G8417 CALC BMI ABV UP PARAM F/U: HCPCS | Performed by: INTERNAL MEDICINE

## 2021-12-17 PROCEDURE — 1036F TOBACCO NON-USER: CPT | Performed by: INTERNAL MEDICINE

## 2021-12-17 PROCEDURE — 1090F PRES/ABSN URINE INCON ASSESS: CPT | Performed by: INTERNAL MEDICINE

## 2021-12-17 PROCEDURE — 93005 ELECTROCARDIOGRAM TRACING: CPT | Performed by: INTERNAL MEDICINE

## 2021-12-17 PROCEDURE — 93010 ELECTROCARDIOGRAM REPORT: CPT | Performed by: INTERNAL MEDICINE

## 2021-12-17 PROCEDURE — 99212 OFFICE O/P EST SF 10 MIN: CPT | Performed by: INTERNAL MEDICINE

## 2021-12-17 PROCEDURE — 93288 INTERROG EVL PM/LDLS PM IP: CPT | Performed by: INTERNAL MEDICINE

## 2021-12-17 PROCEDURE — 4040F PNEUMOC VAC/ADMIN/RCVD: CPT | Performed by: INTERNAL MEDICINE

## 2021-12-17 PROCEDURE — G8427 DOCREV CUR MEDS BY ELIG CLIN: HCPCS | Performed by: INTERNAL MEDICINE

## 2021-12-17 PROCEDURE — 1123F ACP DISCUSS/DSCN MKR DOCD: CPT | Performed by: INTERNAL MEDICINE

## 2021-12-17 PROCEDURE — 99214 OFFICE O/P EST MOD 30 MIN: CPT | Performed by: INTERNAL MEDICINE

## 2021-12-17 PROCEDURE — G8484 FLU IMMUNIZE NO ADMIN: HCPCS | Performed by: INTERNAL MEDICINE

## 2021-12-17 ASSESSMENT — ENCOUNTER SYMPTOMS
SHORTNESS OF BREATH: 0
ABDOMINAL DISTENTION: 0
BACK PAIN: 0
EYE ITCHING: 0
ABDOMINAL PAIN: 0
CHEST TIGHTNESS: 0
EYE DISCHARGE: 0

## 2021-12-17 NOTE — PROGRESS NOTES
Today's Date: 12/17/2021  Patient's Name: Imelda Mayer  Patient's age: 80 y. o., 1934    Subjective: The patient is a 80y.o. year old, , female is in the office for SSS. Denies exertional chest pain or SOB, no dizziness or syncope and no palpitations. Past Medical History:   has a past medical history of Age-related osteoporosis with current pathological fracture with routine healing, Anxiety, Mcdaniel esophagus, CAD (coronary artery disease), Cardiac pacemaker in situ, Chronic insomnia, Closed right hip fracture, with routine healing, subsequent encounter, Early onset Alzheimer's disease with behavioral disturbance ([address] Elevated fasting glucose, GERD (gastroesophageal reflux disease), Hiatal hernia, Hyperlipidemia, Hypertension, Hypothyroidism s/p surgical removal of thyroid for thyroid carcinoma, IBS (irritable bowel syndrome), Major depressive disorder with single episode, in remission ([address] Mycosis fungoides ([address] Osteoarthritis, Osteoporosis, Patient in clinical research study, Polycythemia rubra vera ([address] PUD (peptic ulcer disease), RV Pacemaker lead malfunction - Revision 3/24/14-Dr. ray, SSS (sick sinus syndrome) ([address] Thyroid cancer ([address] TIA (transient ischemic attack), and Vitamin D deficiency. Past Surgical History:   has a past surgical history that includes Tubal ligation; Lobectomy, Thyroid (Left); babita and bso (cervix removed); Tonsillectomy and adenoidectomy; Cholecystectomy (1995); Breast biopsy (Right); Dilation and curettage of uterus (1960); Upper gastrointestinal endoscopy (2002); skin biopsy (2003); Cardiac catheterization (2008); bone marrow biopsy (September 2010); Pacemaker insertion (6/2013); Endoscopy, colon, diagnostic (2002); Upper gastrointestinal endoscopy (08/28/2003); Upper gastrointestinal endoscopy (05/12/2003); Upper gastrointestinal endoscopy (03/20/2001); eye surgery (Right, 11/07/06);  Upper gastrointestinal endoscopy (02/29/2016); shortness of breath. Cardiovascular: Negative for chest pain and palpitations. Gastrointestinal: Negative for abdominal distention and abdominal pain. Endocrine: Negative for cold intolerance and heat intolerance. Genitourinary: Negative for difficulty urinating and dyspareunia. Musculoskeletal: Negative for arthralgias and back pain. Skin: Negative for rash and wound. Neurological: Negative for dizziness and facial asymmetry. Hematological: Negative for adenopathy. Does not bruise/bleed easily. Psychiatric/Behavioral: Negative for agitation and behavioral problems. Physical Exam:  /62   Pulse 74   Ht 5' 6\" (1.676 m)   Wt 180 lb (81.6 kg)   BMI 29.05 kg/m²    Physical Exam  Constitutional:       Appearance: Normal appearance. HENT:      Head: Normocephalic and atraumatic. Right Ear: Tympanic membrane normal.      Left Ear: Tympanic membrane normal.   Cardiovascular:      Rate and Rhythm: Normal rate and regular rhythm. Pulses: Normal pulses. Heart sounds: Normal heart sounds. No murmur heard. Pulmonary:      Effort: Pulmonary effort is normal. No respiratory distress. Breath sounds: Normal breath sounds. No stridor. Abdominal:      General: There is no distension. Palpations: There is no mass. Musculoskeletal:         General: No swelling or tenderness. Cervical back: No rigidity or tenderness. Skin:     Capillary Refill: Capillary refill takes less than 2 seconds. Coloration: Skin is not jaundiced or pale. Neurological:      General: No focal deficit present. Mental Status: She is alert and oriented to person, place, and time. Psychiatric:         Mood and Affect: Mood normal.         Behavior: Behavior normal.         Cardiac Data:      Labs:     CBC: No results for input(s): WBC, HGB, HCT, PLT in the last 72 hours. BMP:No results for input(s): NA, K, CO2, BUN, CREATININE, LABGLOM, GLUCOSE in the last 72 hours.   PT/INR: No results for input(s): PROTIME, INR in the last 72 hours. FASTING LIPID PANEL:  Lab Results   Component Value Date    HDL 54 06/01/2020    LDLCALC 76 06/01/2020    TRIG 134 06/01/2020     LIVER PROFILE:No results for input(s): AST, ALT, LABALBU in the last 72 hours. IMPRESSION:    Patient Active Problem List   Diagnosis    Osteoarthritis    Hyperlipidemia    Hypertension    Mcdaniel esophagus    GERD (gastroesophageal reflux disease)    PUD (peptic ulcer disease)    Hiatal hernia    Chronic insomnia    IBS (irritable bowel syndrome)    Age-related osteoporosis with current pathological fracture with routine healing    Vitamin D deficiency    Thyroid cancer ([address]    Polycythemia rubra vera ([address]    [facility]SS [facility]sick [facility] syndrome) [facility]    Cardiac pacemaker in situ    TIA (transient ischemic attack)    Hypothyroidism    Closed right hip fracture, with routine healing, subsequent encounter    Early onset Alzheimer's disease with behavioral disturbance ([address]    Major depressive disorder with single episode, in remission ([facility]    Anxiety    Elevated fasting glucose    CAD (coronary artery disease)    COVID-19    Pneumonia due to COVID-19 virus       Assessment/Plan:  1. SSS s/p PPM insertion. Checked in office 12/2021, normal function. Will monitor. Continue pacer checks every 6 months.     2. CAD. Coronary angiography in 2008 showed mild CAD. Stress test in 2011 showed no ischemia. Echo on 8/18/19, EF 60%, mild LVH, grade II DD, no significant valvular disease seen. Continue current medications.     3. HTN. Continue current medications. Well controlled.      4.  HPL. On Pravachol. LDL 76 on 6/20           Edna Nelson MD, MD  [facility]           [phone]

## 2021-12-27 ENCOUNTER — OUTSIDE SERVICES (OUTPATIENT)
Dept: INTERNAL MEDICINE | Age: 86
End: 2021-12-27
Payer: MEDICARE

## 2021-12-27 DIAGNOSIS — K44.9 HIATAL HERNIA: ICD-10-CM

## 2021-12-27 DIAGNOSIS — K58.9 IRRITABLE BOWEL SYNDROME, UNSPECIFIED TYPE: ICD-10-CM

## 2021-12-27 DIAGNOSIS — Z95.0 CARDIAC PACEMAKER IN SITU: ICD-10-CM

## 2021-12-27 DIAGNOSIS — K21.9 GASTROESOPHAGEAL REFLUX DISEASE WITHOUT ESOPHAGITIS: ICD-10-CM

## 2021-12-27 DIAGNOSIS — I10 ESSENTIAL HYPERTENSION: ICD-10-CM

## 2021-12-27 DIAGNOSIS — G30.0 EARLY ONSET ALZHEIMER'S DISEASE WITH BEHAVIORAL DISTURBANCE (HCC): ICD-10-CM

## 2021-12-27 DIAGNOSIS — M80.00XD AGE-RELATED OSTEOPOROSIS WITH CURRENT PATHOLOGICAL FRACTURE WITH ROUTINE HEALING: ICD-10-CM

## 2021-12-27 DIAGNOSIS — F02.818 EARLY ONSET ALZHEIMER'S DISEASE WITH BEHAVIORAL DISTURBANCE (HCC): ICD-10-CM

## 2021-12-27 DIAGNOSIS — I25.10 CORONARY ARTERY DISEASE INVOLVING NATIVE CORONARY ARTERY OF NATIVE HEART, UNSPECIFIED WHETHER ANGINA PRESENT: ICD-10-CM

## 2021-12-27 DIAGNOSIS — D45 POLYCYTHEMIA RUBRA VERA (HCC): ICD-10-CM

## 2021-12-27 DIAGNOSIS — F32.5 MAJOR DEPRESSIVE DISORDER WITH SINGLE EPISODE, IN REMISSION (HCC): ICD-10-CM

## 2021-12-27 DIAGNOSIS — K22.70 BARRETT'S ESOPHAGUS WITHOUT DYSPLASIA: ICD-10-CM

## 2021-12-27 DIAGNOSIS — I21.9 MYOCARDIAL INFARCTION, UNSPECIFIED MI TYPE, UNSPECIFIED ARTERY (HCC): ICD-10-CM

## 2021-12-27 DIAGNOSIS — R73.01 ELEVATED FASTING GLUCOSE: Primary | ICD-10-CM

## 2021-12-27 DIAGNOSIS — I49.5 SSS (SICK SINUS SYNDROME) (HCC): ICD-10-CM

## 2021-12-27 DIAGNOSIS — F41.9 ANXIETY: ICD-10-CM

## 2021-12-27 DIAGNOSIS — E66.3 OVERWEIGHT: ICD-10-CM

## 2021-12-27 DIAGNOSIS — E78.2 MIXED HYPERLIPIDEMIA: ICD-10-CM

## 2021-12-27 DIAGNOSIS — C73 THYROID CANCER (HCC): ICD-10-CM

## 2021-12-27 DIAGNOSIS — E89.0 POSTOPERATIVE HYPOTHYROIDISM: ICD-10-CM

## 2021-12-28 NOTE — PROGRESS NOTES
DR. Joan Dunn - [facility] VISIT     DATE OF SERVICE: 11/28/21    NURSING HOME: [facility]    CHIEF COMPLAINT/HISTORY OF CHIEF COMPLAINT: This patient is being seen for ongoing evaluation and management of her Alzheimer's disease, elevated fasting glucose, hypertension, hyperlipidemia, history of thyroid cancer with postoperative hypothyroidism, coronary artery disease, sick sinus syndrome, gastroesophageal reflux disease, Mcdaniel's esophagus, irritable bowel syndrome, polycythemia, depression, and being overweight. Her elevated fasting glucose is controlled with diet and exercise. There are no new complaints. ALLERGIES: No Known Allergies    MEDICATIONS: As noted on the [facility] MAR, referenced and incorporated herein. PAST MEDICAL HISTORY:   Past Medical History:   Diagnosis Date    Age-related osteoporosis with current pathological fracture with routine healing     Anxiety 9/6/2019    Mcdaniel esophagus     CAD (coronary artery disease)     nonobstructive    Cardiac pacemaker in situ 4/24/2014    Put in for severe bradycardia.  Dual chamber     Chronic insomnia     Closed right hip fracture, with routine healing, subsequent encounter 8/18/2019    Early onset Alzheimer's disease with behavioral disturbance ([address] 9/6/2019    Elevated fasting glucose 9/6/2019    GERD (gastroesophageal reflux disease)     Hiatal hernia     Hyperlipidemia     Hypertension     Hypothyroidism s/p surgical removal of thyroid for thyroid carcinoma 7/29/2015    IBS (irritable bowel syndrome)     Major depressive disorder with single episode, in remission ([address] 9/6/2019    Mycosis fungoides ([address]     Osteoarthritis     Osteoporosis     Patient in clinical research study 08/13/2020    Enrolled in convalescent plasma for COVID 19 Date of completion 08/16/20    Polycythemia rubra vera [facility])     sees oncology    PUD (peptic ulcer disease)     RV Pacemaker lead malfunction - Revision 3/24/14-Dr. Clarisa Chahal 3/24/2014    SSS (sick sinus syndrome) ([facility] 4/24/2014    Thyroid cancer ([facility] about 5 years ago    thyroid /following with oncology    TIA (transient ischemic attack) 7/23/2014    Confusion--word searching    Vitamin D deficiency        PAST SURGICAL HISTORY:   Past Surgical History:   Procedure Laterality Date    BONE MARROW BIOPSY  September 2010    polycythemia vera    BREAST BIOPSY Right     benign    CARDIAC CATHETERIZATION  2008    nonobstructive CAD    CHOLECYSTECTOMY  1995    COLONOSCOPY  2002    COLONOSCOPY  2/29/2016    colon polyp, diverticulosis - Dr Felix Diaz    after miscarriage    ENDOSCOPY, COLON, DIAGNOSTIC  2002    normal  external hemorrhoids    EYE SURGERY Right 11/07/06    Cataract extraction with lens implant    HEMIARTHROPLASTY HIP Right 8/20/2019    RIGHT HIP HEMIARTHROPLASTY performed by Melchor Whitmore MD at [address], THYROID Left     thyroid cancer    PACEMAKER INSERTION  6/2013    [facility] SKIN BIOPSY  2003    mycosis fungoides    TAE AND BSO      TAE/BSO at age 54 with anterior repair for benign reasons.     TONSILLECTOMY AND ADENOIDECTOMY      age 27   [facility] TUBAL LIGATION      UPPER GASTROINTESTINAL ENDOSCOPY  2002    UPPER GASTROINTESTINAL ENDOSCOPY  08/28/2003    UPPER GASTROINTESTINAL ENDOSCOPY  05/12/2003    UPPER GASTROINTESTINAL ENDOSCOPY  03/20/2001    UPPER GASTROINTESTINAL ENDOSCOPY  02/29/2016    Barretts tissue, body polyp - Dr Atif Mac       SOCIAL HISTORY:     Tobacco:   Social History     Tobacco Use   Smoking Status Never Smoker   Smokeless Tobacco Never Used   Tobacco Comment    never smoker     Alcohol:   Social History     Substance and Sexual Activity   Alcohol Use No    Alcohol/week: 0.0 standard drinks     Drugs:   Social History     Substance and Sexual Activity   Drug Use No       FAMILY HISTORY: family history includes Cancer (age of onset: 47) in her mother; Depression in her son; Drug Abuse in her son; Emphysema in her sister; Mental Illness in her son; Other in her brother and son; Stroke (age of onset: 76) in her father; Sudden Death (age of onset: 78) in her sister. REVIEW OF SYSTEMS:     Please see history of chief complaint above; otherwise no new problems with respect to General, HEENT, Cardiovascular, Respiratory, Gastrointestinal, Genitourinary, Endocrinologic, Musculoskeletal, or Neuropsychiatric complaints. PHYSICAL EXAMINATION:    Vitals: Temp: 97.0 deg F. Pulse: 69. Resp: 18. BP: 128/78. General: A 80 y.o.  female. Alert and oriented to person and place and questionably oriented to time. She does not appear to be in any acute distress. Skin: Skin color, texture, turgor normal. No rashes or lesions. HEENT/Neck: Essentially unremarkable  Lungs: Normal - CTA without rales, rhonchi, or wheezing. Heart: regular rate and rhythm, S1, S2 normal, no murmur, click, rub or gallop No S3 or S4. Abdomen: Non-obese soft, non-distended, non-tender, normal active bowel sounds, no masses palpated and no hepatosplenomegaly  Extremities: No clubbing, cyanosis, or edema in any of the extremities. Neurologic: cranial nerves II-XII are grossly intact    ASSESSMENT:     Diagnosis Orders   1. Elevated fasting glucose     2. Essential hypertension     3. Mixed hyperlipidemia     4. Coronary artery disease involving native coronary artery of native heart, unspecified whether angina present     5. SSS (sick sinus syndrome) (HCC)     6. Cardiac pacemaker in situ     7. Myocardial infarction, unspecified MI type, unspecified artery ([address]     8. Thyroid cancer ([address]     9. Postoperative hypothyroidism     10. Gastroesophageal reflux disease without esophagitis     11. Hiatal hernia     12. 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 Irritable bowel syndrome, unspecified type     14. Polycythemia rubra vera ([address]     15.  Age-related osteoporosis with current pathological fracture with routine healing     16. Early onset Alzheimer's disease with behavioral disturbance ([facility]     17. Major depressive disorder with single episode, in remission ([facility]     18. Anxiety     19. Overweight           PLAN:    1. Continue current treatment  2. Nursing home record reviewed and updates summarized and entered into electronic record  3. See nursing home orders and MAR.         Electronically signed by Daysi Correa DO on 12/27/2021 at 11:36 PM  Internal Medicine

## 2021-12-31 PROCEDURE — 99307 SBSQ NF CARE SF MDM 10: CPT | Performed by: INTERNAL MEDICINE

## 2022-01-30 PROCEDURE — 99307 SBSQ NF CARE SF MDM 10: CPT | Performed by: INTERNAL MEDICINE

## 2022-01-31 ENCOUNTER — OUTSIDE SERVICES (OUTPATIENT)
Dept: INTERNAL MEDICINE | Age: 87
End: 2022-01-31
Payer: MEDICARE

## 2022-01-31 DIAGNOSIS — F02.818 EARLY ONSET ALZHEIMER'S DISEASE WITH BEHAVIORAL DISTURBANCE (HCC): ICD-10-CM

## 2022-01-31 DIAGNOSIS — C73 THYROID CANCER (HCC): ICD-10-CM

## 2022-01-31 DIAGNOSIS — G30.0 EARLY ONSET ALZHEIMER'S DISEASE WITH BEHAVIORAL DISTURBANCE (HCC): ICD-10-CM

## 2022-01-31 DIAGNOSIS — K22.70 BARRETT'S ESOPHAGUS WITHOUT DYSPLASIA: ICD-10-CM

## 2022-01-31 DIAGNOSIS — F32.5 MAJOR DEPRESSIVE DISORDER WITH SINGLE EPISODE, IN REMISSION (HCC): ICD-10-CM

## 2022-01-31 DIAGNOSIS — E78.2 MIXED HYPERLIPIDEMIA: ICD-10-CM

## 2022-01-31 DIAGNOSIS — K58.9 IRRITABLE BOWEL SYNDROME, UNSPECIFIED TYPE: ICD-10-CM

## 2022-01-31 DIAGNOSIS — R73.01 ELEVATED FASTING GLUCOSE: Primary | ICD-10-CM

## 2022-01-31 DIAGNOSIS — D45 POLYCYTHEMIA RUBRA VERA (HCC): ICD-10-CM

## 2022-01-31 DIAGNOSIS — I21.9 MYOCARDIAL INFARCTION, UNSPECIFIED MI TYPE, UNSPECIFIED ARTERY (HCC): ICD-10-CM

## 2022-01-31 DIAGNOSIS — F41.9 ANXIETY: ICD-10-CM

## 2022-01-31 DIAGNOSIS — Z95.0 CARDIAC PACEMAKER IN SITU: ICD-10-CM

## 2022-01-31 DIAGNOSIS — E89.0 POSTOPERATIVE HYPOTHYROIDISM: ICD-10-CM

## 2022-01-31 DIAGNOSIS — I25.10 CORONARY ARTERY DISEASE INVOLVING NATIVE CORONARY ARTERY OF NATIVE HEART, UNSPECIFIED WHETHER ANGINA PRESENT: ICD-10-CM

## 2022-01-31 DIAGNOSIS — M80.00XD AGE-RELATED OSTEOPOROSIS WITH CURRENT PATHOLOGICAL FRACTURE WITH ROUTINE HEALING: ICD-10-CM

## 2022-01-31 DIAGNOSIS — K44.9 HIATAL HERNIA: ICD-10-CM

## 2022-01-31 DIAGNOSIS — I10 ESSENTIAL HYPERTENSION: ICD-10-CM

## 2022-01-31 DIAGNOSIS — K21.9 GASTROESOPHAGEAL REFLUX DISEASE WITHOUT ESOPHAGITIS: ICD-10-CM

## 2022-01-31 DIAGNOSIS — E66.3 OVERWEIGHT: ICD-10-CM

## 2022-01-31 DIAGNOSIS — I49.5 SSS (SICK SINUS SYNDROME) (HCC): ICD-10-CM

## 2022-01-31 NOTE — PROGRESS NOTES
DR. Raymundo Cueva - [facility] VISIT     DATE OF SERVICE: 12/31/21    NURSING HOME: [facility]    CHIEF COMPLAINT/HISTORY OF CHIEF COMPLAINT: This patient is being seen for ongoing evaluation and management of her Alzheimer's disease, elevated fasting glucose, hypertension, hyperlipidemia, history of thyroid cancer with postoperative hypothyroidism, coronary artery disease, sick sinus syndrome, gastroesophageal reflux disease, Mcdaniel's esophagus, irritable bowel syndrome, polycythemia, depression, and being overweight. Her elevated fasting glucose is controlled with diet and exercise. There are no new complaints at this time. ALLERGIES: No Known Allergies    MEDICATIONS: As noted on the [facility] MAR, referenced and incorporated herein. PAST MEDICAL HISTORY:   Past Medical History:   Diagnosis Date    Age-related osteoporosis with current pathological fracture with routine healing     Anxiety 9/6/2019    Mcdaniel esophagus     CAD (coronary artery disease)     nonobstructive    Cardiac pacemaker in situ 4/24/2014    Put in for severe bradycardia.  Dual chamber     Chronic insomnia     Closed right hip fracture, with routine healing, subsequent encounter 8/18/2019    Early onset Alzheimer's disease with behavioral disturbance ([address] 9/6/2019    Elevated fasting glucose 9/6/2019    GERD (gastroesophageal reflux disease)     Hiatal hernia     Hyperlipidemia     Hypertension     Hypothyroidism s/p surgical removal of thyroid for thyroid carcinoma 7/29/2015    IBS (irritable bowel syndrome)     Major depressive disorder with single episode, in remission ([address] 9/6/2019    Mycosis fungoides ([address]     Osteoarthritis     Osteoporosis     Patient in clinical research study 08/13/2020    Enrolled in convalescent plasma for COVID 19 Date of completion 08/16/20    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 ray 3/24/2014    SSS (sick sinus syndrome) ([facility] 4/24/2014    Thyroid cancer ([facility] about 5 years ago    thyroid /following with oncology    TIA (transient ischemic attack) 7/23/2014    Confusion--word searching    Vitamin D deficiency        PAST SURGICAL HISTORY:   Past Surgical History:   Procedure Laterality Date    BONE MARROW BIOPSY  September 2010    polycythemia vera    BREAST BIOPSY Right     benign    CARDIAC CATHETERIZATION  2008    nonobstructive CAD    CHOLECYSTECTOMY  1995    COLONOSCOPY  2002    COLONOSCOPY  2/29/2016    colon polyp, diverticulosis - Dr Bri Mcrae    after miscarriage    ENDOSCOPY, COLON, DIAGNOSTIC  2002    normal  external hemorrhoids    EYE SURGERY Right 11/07/06    Cataract extraction with lens implant    HEMIARTHROPLASTY HIP Right 8/20/2019    RIGHT HIP HEMIARTHROPLASTY performed by Salazar Knapp MD at [address], THYROID Left     thyroid cancer    PACEMAKER INSERTION  6/2013    [facility] SKIN BIOPSY  2003    mycosis fungoides    TAE AND BSO      TAE/BSO at age 54 with anterior repair for benign reasons.     TONSILLECTOMY AND ADENOIDECTOMY      age 27   Emily Manual TUBAL LIGATION      UPPER GASTROINTESTINAL ENDOSCOPY  2002    UPPER GASTROINTESTINAL ENDOSCOPY  08/28/2003    UPPER GASTROINTESTINAL ENDOSCOPY  05/12/2003    UPPER GASTROINTESTINAL ENDOSCOPY  03/20/2001    UPPER GASTROINTESTINAL ENDOSCOPY  02/29/2016    Barretts tissue, body polyp - Dr Ish Scherer       SOCIAL HISTORY:     Tobacco:   Social History     Tobacco Use   Smoking Status Never Smoker   Smokeless Tobacco Never Used   Tobacco Comment    never smoker     Alcohol:   Social History     Substance and Sexual Activity   Alcohol Use No    Alcohol/week: 0.0 standard drinks     Drugs:   Social History     Substance and Sexual Activity   Drug Use No       FAMILY HISTORY: family history includes Cancer (age of onset: 47) in her mother; pathological fracture with routine healing     16. Early onset Alzheimer's disease with behavioral disturbance ([facility]     17. Major depressive disorder with single episode, in remission ([facility]     18. Anxiety     19. Overweight           PLAN:    1. Continue current treatment  2. Nursing home record reviewed and updates summarized and entered into electronic record  3. See nursing home orders and MAR.         Electronically signed by Brunilda Clayton DO on 1/31/2022 at 2:02 AM  Internal Medicine

## 2022-02-15 ENCOUNTER — OUTSIDE SERVICES (OUTPATIENT)
Dept: INTERNAL MEDICINE | Age: 87
End: 2022-02-15
Payer: MEDICARE

## 2022-02-15 VITALS
HEART RATE: 68 BPM | OXYGEN SATURATION: 95 % | TEMPERATURE: 97.2 F | SYSTOLIC BLOOD PRESSURE: 118 MMHG | BODY MASS INDEX: 30.18 KG/M2 | RESPIRATION RATE: 16 BRPM | DIASTOLIC BLOOD PRESSURE: 66 MMHG | WEIGHT: 187 LBS

## 2022-02-15 DIAGNOSIS — Z00.8 ENCOUNTER FOR OTHER GENERAL EXAMINATION: Primary | ICD-10-CM

## 2022-02-15 PROCEDURE — G0439 PPPS, SUBSEQ VISIT: HCPCS | Performed by: NURSE PRACTITIONER

## 2022-02-15 ASSESSMENT — PATIENT HEALTH QUESTIONNAIRE - PHQ9
SUM OF ALL RESPONSES TO PHQ QUESTIONS 1-9: 0
2. FEELING DOWN, DEPRESSED OR HOPELESS: 0
1. LITTLE INTEREST OR PLEASURE IN DOING THINGS: 0
SUM OF ALL RESPONSES TO PHQ QUESTIONS 1-9: 0
SUM OF ALL RESPONSES TO PHQ9 QUESTIONS 1 & 2: 0

## 2022-02-15 ASSESSMENT — LIFESTYLE VARIABLES: HOW OFTEN DO YOU HAVE A DRINK CONTAINING ALCOHOL: 0

## 2022-02-15 NOTE — PATIENT INSTRUCTIONS
Personalized Preventive Plan for Rodo Figueroa - 2/15/2022  Medicare offers a range of preventive health benefits. Some of the tests and screenings are paid in full while other may be subject to a deductible, co-insurance, and/or copay. Some of these benefits include a comprehensive review of your medical history including lifestyle, illnesses that may run in your family, and various assessments and screenings as appropriate. After reviewing your medical record and screening and assessments performed today your provider may have ordered immunizations, labs, imaging, and/or referrals for you. A list of these orders (if applicable) as well as your Preventive Care list are included within your After Visit Summary for your review. Other Preventive Recommendations:    · A preventive eye exam performed by an eye specialist is recommended every 1-2 years to screen for glaucoma; cataracts, macular degeneration, and other eye disorders. · A preventive dental visit is recommended every 6 months. · Try to get at least 150 minutes of exercise per week or 10,000 steps per day on a pedometer . · Order or download the FREE \"Exercise & Physical Activity: Your Everyday Guide\" from [employer]. Call 5-985.626.4636 or search [employer] online. · You need 7716-3281 mg of calcium and 2391-3492 IU of vitamin D per day. It is possible to meet your calcium requirement with diet alone, but a vitamin D supplement is usually necessary to meet this goal.  · When exposed to the sun, use a sunscreen that protects against both UVA and UVB radiation with an SPF of 30 or greater. Reapply every 2 to 3 hours or after sweating, drying off with a towel, or swimming. · Always wear a seat belt when traveling in a car. Always wear a helmet when riding a bicycle or motorcycle.

## 2022-02-15 NOTE — PROGRESS NOTES
Medicare Annual Wellness Visit  Name: Jo Flores Date: 2/15/2022   MRN: [id number] Sex: Female   Age: 80 y.o. Ethnicity: Non- / Non    : 1934 Race: White (non-)      Emmanuel Mishra is here for Medicare AWV    Screenings for behavioral, psychosocial and functional/safety risks, and cognitive dysfunction are all negative except as indicated below. These results, as well as other patient data from the [address] form, are documented in Flowsheets linked to this Encounter. No Known Allergies    Prior to Visit Medications    Medication Sig Taking? Authorizing Provider   Handicap Placard MISC by Does not apply route Expected Use:  Lifetime  Mayra West MD   guaiFENesin (ROBITUSSIN) 100 MG/5ML syrup Take 200 mg by mouth every 6 hours as needed for Cough  Historical Provider, MD   Handicap Placard MISC by Does not apply route Diagnosis: sick sinus syndrome.  Expires: 2023  Sun Krishna DO   donepezil (ARICEPT) 5 MG tablet Take 5 mg by mouth nightly   Historical Provider, MD   Multiple Vitamins-Minerals (THERAPEUTIC MULTIVITAMIN-MINERALS) tablet Take 1 tablet by mouth daily  Historical Provider, MD   acetaminophen (TYLENOL) 500 MG tablet Take 500 mg by mouth every 6 hours as needed for Pain  Historical Provider, MD   docusate sodium (COLACE, DULCOLAX) 100 MG CAPS Take 100 mg by mouth daily  Lanre Hillman   pravastatin (PRAVACHOL) 40 MG tablet take 1 tablet by mouth once daily  DAISY Correa CNP   levothyroxine (SYNTHROID) 125 MCG tablet Take 1 tablet by mouth Daily  DAISY Correa CNP   losartan (COZAAR) 50 MG tablet take 1 tablet by mouth once daily  DAISY Correa CNP   loratadine (CLARITIN) 10 MG tablet Take 1 tablet by mouth daily  DAISY Murray CNP       Past Medical History:   Diagnosis Date    Age-related osteoporosis with current pathological fracture with routine healing     Anxiety 2019  Mcdaniel esophagus     CAD (coronary artery disease)     nonobstructive    Cardiac pacemaker in situ 4/24/2014    Put in for severe bradycardia.  Dual chamber     Chronic insomnia     Closed right hip fracture, with routine healing, subsequent encounter 8/18/2019    Early onset Alzheimer's disease with behavioral disturbance ([address] 9/6/2019    Elevated fasting glucose 9/6/2019    GERD (gastroesophageal reflux disease)     Hiatal hernia     Hyperlipidemia     Hypertension     Hypothyroidism s/p surgical removal of thyroid for thyroid carcinoma 7/29/2015    IBS (irritable bowel syndrome)     Major depressive disorder with single episode, in remission ([address] 9/6/2019    Mycosis fungoides ([address]     Osteoarthritis     Osteoporosis     Patient in clinical research study 08/13/2020    Enrolled in convalescent plasma for COVID 19 Date of completion 08/16/20    Polycythemia rubra vera ([address]     sees oncology    PUD (peptic ulcer disease)     RV Pacemaker lead malfunction - Revision 3/24/14-Dr. Marielos Solano 3/24/2014    SSS (sick sinus syndrome) ([address] 4/24/2014    Thyroid cancer ([address] about 5 years ago    thyroid /following with oncology    TIA (transient ischemic attack) 7/23/2014    Confusion--word searching    Vitamin D deficiency        Past Surgical History:   Procedure Laterality Date    BONE MARROW BIOPSY  September 2010    polycythemia vera    BREAST BIOPSY Right     benign    CARDIAC CATHETERIZATION  2008    nonobstructive CAD    CHOLECYSTECTOMY  1995    COLONOSCOPY  2002    COLONOSCOPY  2/29/2016    colon polyp, diverticulosis - Dr Kristel Okeefe    after miscarriage    ENDOSCOPY, COLON, DIAGNOSTIC  2002    normal  external hemorrhoids    EYE SURGERY Right 11/07/06    Cataract extraction with lens implant    HEMIARTHROPLASTY HIP Right 8/20/2019    RIGHT HIP HEMIARTHROPLASTY performed by Soni Medina MD at [address], THYROID Left     thyroid cancer    PACEMAKER INSERTION  2013    [facility] SKIN BIOPSY      mycosis fungoides    TAE AND BSO      TAE/BSO at age 54 with anterior repair for benign reasons.  TONSILLECTOMY AND ADENOIDECTOMY      age 27   Graciela Current TUBAL LIGATION      UPPER GASTROINTESTINAL ENDOSCOPY      UPPER GASTROINTESTINAL ENDOSCOPY  2003    UPPER GASTROINTESTINAL ENDOSCOPY  2003    UPPER GASTROINTESTINAL ENDOSCOPY  2001    UPPER GASTROINTESTINAL ENDOSCOPY  2016    Barretts tissue, body polyp - Dr Clau Woodward       Family History   Problem Relation Age of Onset    Cancer Mother 47        throat    Stroke Father 76         of CVA    Emphysema Sister     Other Brother          in train accident    Drug Abuse Son     Depression Son     Other Son         suicide    Mental Illness Son     Sudden Death Sister 78        smoker       CareTeam (Including outside providers/suppliers regularly involved in providing care):   Patient Care Team:  Mary Marc DO as PCP - General (Internal Medicine)  Mary Marc DO as PCP - [facility] Empaneled Provider    Wt Readings from Last 3 Encounters:   02/15/22 187 lb (84.8 kg)   21 180 lb (81.6 kg)   21 180 lb (81.6 kg)     Vitals:    02/15/22 1542   BP: 118/66   Pulse: 68   Resp: 16   Temp: 97.2 °F (36.2 °C)   TempSrc: Tympanic   SpO2: 95%   Weight: 187 lb (84.8 kg)     Body mass index is 30.18 kg/m². Based upon direct observation of the patient, evaluation of cognition reveals global memory impairment noted. 80year-old patient resting quietly in bed awakens to verbal stimuli. Slightly hard of hearing. In no acute distress. Well-groomed. Respirations easy unlabored. Chest rises and falls symmetrically. No use of accessory muscles. Lung sounds clear throughout. Abdomen obese soft nontender active bowel sounds all four quadrants. Extremities without clubbing cyanosis or edema.   Alert and oriented to person and place but not to time. Patient's complete Health Risk Assessment and screening values have been reviewed and are found in Flowsheets. The following problems were reviewed today and where indicated follow up appointments were made and/or referrals ordered. Positive Risk Factor Screenings with Interventions:            General Health and ACP:  General  In general, how would you say your health is?: Good  In the past 7 days, have you experienced any of the following?  New or Increased Pain, New or Increased Fatigue, Loneliness, Social Isolation, Stress or Anger?: None of These  Do you get the social and emotional support that you need?: Yes  Do you have a Living Will?: Yes  Advance Directives     Power of  Living Will ACP-Advance Directive ACP-Power of     Not on File Not on File Not on File [address] Interventions:  · Patient resides at [facility] support provided    Health Habits/Nutrition:  Health Habits/Nutrition  Do you exercise for at least 20 minutes 2-3 times per week?: (!) No  Have you lost any weight without trying in the past 3 months?: No  Do you eat only one meal per day?: No  Have you seen the dentist within the past year?:  (Follows with a dentist at [facility])     Health Habits/Nutrition Interventions:  · Staff assist in scheduling appointment         Personalized Preventive Plan   Current Health Maintenance Status  Immunization History   Administered Date(s) Administered    COVID-19, Pfizer Purple top, DILUTE for use, 12+ yrs, 30mcg/0.3mL dose 01/13/2021, 01/13/2021, 02/03/2021    DTaP 04/10/1997, 07/28/2009    Pneumococcal Conjugate 13-valent (Ferol Fore) 07/26/2017    Pneumococcal Polysaccharide ([id number]) 11/02/2005, 03/23/2020    Zoster Live (Zostavax) 06/07/2012        Health Maintenance   Topic Date Due    Depression Monitoring  Never done    Shingles Vaccine (2 of 3) 08/02/2012    DTaP/Tdap/Td vaccine (3 - Tdap) 07/28/2019    Annual Wellness Visit (AWV)  Never done    Lipid screen  06/01/2021    TSH testing  06/01/2021    COVID-19 Vaccine (3 - Booster for Pfizer series) 07/03/2021    Flu vaccine (1) Never done    Potassium monitoring  08/06/2022    Creatinine monitoring  08/06/2022    Pneumococcal 65+ years Vaccine  Completed    Hepatitis A vaccine  Aged Out    Hepatitis B vaccine  Aged Out    Hib vaccine  Aged Out    Meningococcal (ACWY) vaccine  Aged Out     Recommendations for [place] Due: see orders and patient instructions/AVS.  . Recommended screening schedule for the next 5-10 years is provided to the patient in written form: see Patient Instructions/AVS.    Namrata Ankita was seen today for medicare awv.     Diagnoses and all orders for this visit:    Encounter for other general examination

## 2022-02-27 PROCEDURE — 99307 SBSQ NF CARE SF MDM 10: CPT | Performed by: INTERNAL MEDICINE

## 2022-03-02 DIAGNOSIS — D75.1 POLYCYTHEMIA: Primary | ICD-10-CM

## 2022-03-07 ENCOUNTER — OFFICE VISIT (OUTPATIENT)
Dept: ONCOLOGY | Age: 87
End: 2022-03-07
Payer: MEDICARE

## 2022-03-07 ENCOUNTER — HOSPITAL ENCOUNTER (OUTPATIENT)
Dept: LAB | Age: 87
Discharge: HOME | End: 2022-03-07
Payer: MEDICARE

## 2022-03-07 VITALS
RESPIRATION RATE: 16 BRPM | OXYGEN SATURATION: 96 % | BODY MASS INDEX: 29.25 KG/M2 | SYSTOLIC BLOOD PRESSURE: 116 MMHG | HEIGHT: 66 IN | DIASTOLIC BLOOD PRESSURE: 62 MMHG | HEART RATE: 71 BPM | TEMPERATURE: 97.3 F | WEIGHT: 182 LBS

## 2022-03-07 DIAGNOSIS — D75.1 POLYCYTHEMIA: ICD-10-CM

## 2022-03-07 DIAGNOSIS — D75.1 POLYCYTHEMIA: Primary | ICD-10-CM

## 2022-03-07 LAB
ABSOLUTE EOS #: 0.23 K/UL (ref 0–0.44)
ABSOLUTE IMMATURE GRANULOCYTE: <0.03 K/UL (ref 0–0.3)
ABSOLUTE LYMPH #: 1.31 K/UL (ref 1.1–3.7)
ABSOLUTE MONO #: 0.52 K/UL (ref 0.1–1.2)
BASOPHILS # BLD: 1 % (ref 0–2)
BASOPHILS ABSOLUTE: 0.05 K/UL (ref 0–0.2)
EOSINOPHILS RELATIVE PERCENT: 4 % (ref 1–4)
HCT VFR BLD CALC: 49.4 % (ref 36.3–47.1)
HEMOGLOBIN: 16 G/DL (ref 11.9–15.1)
IMMATURE GRANULOCYTES: 0 %
LYMPHOCYTES # BLD: 23 % (ref 24–43)
MCH RBC QN AUTO: 27.5 PG (ref 25.2–33.5)
MCHC RBC AUTO-ENTMCNC: 32.4 G/DL (ref 25.2–33.5)
MCV RBC AUTO: 85 FL (ref 82.6–102.9)
MONOCYTES # BLD: 9 % (ref 3–12)
NRBC AUTOMATED: 0 PER 100 WBC
PDW BLD-RTO: 13.9 % (ref 11.8–14.4)
PLATELET # BLD: 199 K/UL (ref 138–453)
PMV BLD AUTO: 10.8 FL (ref 8.1–13.5)
RBC # BLD: 5.81 M/UL (ref 3.95–5.11)
SEG NEUTROPHILS: 63 % (ref 36–65)
SEGMENTED NEUTROPHILS ABSOLUTE COUNT: 3.49 K/UL (ref 1.5–8.1)
WBC # BLD: 5.6 K/UL (ref 3.5–11.3)

## 2022-03-07 PROCEDURE — G8484 FLU IMMUNIZE NO ADMIN: HCPCS | Performed by: INTERNAL MEDICINE

## 2022-03-07 PROCEDURE — 1090F PRES/ABSN URINE INCON ASSESS: CPT | Performed by: INTERNAL MEDICINE

## 2022-03-07 PROCEDURE — 99214 OFFICE O/P EST MOD 30 MIN: CPT | Performed by: INTERNAL MEDICINE

## 2022-03-07 PROCEDURE — 1123F ACP DISCUSS/DSCN MKR DOCD: CPT | Performed by: INTERNAL MEDICINE

## 2022-03-07 PROCEDURE — 4040F PNEUMOC VAC/ADMIN/RCVD: CPT | Performed by: INTERNAL MEDICINE

## 2022-03-07 PROCEDURE — G8427 DOCREV CUR MEDS BY ELIG CLIN: HCPCS | Performed by: INTERNAL MEDICINE

## 2022-03-07 PROCEDURE — 1036F TOBACCO NON-USER: CPT | Performed by: INTERNAL MEDICINE

## 2022-03-07 PROCEDURE — 85025 COMPLETE CBC W/AUTO DIFF WBC: CPT

## 2022-03-07 PROCEDURE — 36415 COLL VENOUS BLD VENIPUNCTURE: CPT

## 2022-03-07 PROCEDURE — G8417 CALC BMI ABV UP PARAM F/U: HCPCS | Performed by: INTERNAL MEDICINE

## 2022-03-07 NOTE — PROGRESS NOTES
Patient ID: Maria G Gilbert, 1934, [id number], 80 y.o. 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 Shanae Pena until recently for polycythemia. She was diagnosed with polycythemia in September 2010. Her GÓMEZ 2 mutation mutation was negative. She had a bone marrow biopsy done in September 2010 which showed normal cellular marrow with no erythroid hyperplasia. No evidence of malignancy or dysplasia noted. She was treated with intermittent phlebotomies since September 2010. Interval history:  Patient is return for follow-up sooner to discuss lab results and further recommendations. Clinically she is doing well and denies any chest pain, shortness of it. She is eating and drinking well. She denies any unintentional weight loss, drenching night sweats fever chills. During this visit patient's allergy, social, medical, surgical history and medications were reviewed and updated. No Known Allergies    Current Outpatient Medications   Medication Sig Dispense Refill    Handicap Placard MISC by Does not apply route Expected Use:  Lifetime 1 each 0    guaiFENesin (ROBITUSSIN) 100 MG/5ML syrup Take 200 mg by mouth every 6 hours as needed for Cough      Handicap Placard MISC by Does not apply route Diagnosis: sick sinus syndrome.  Expires: 2/28/2023 1 each 0    donepezil (ARICEPT) 5 MG tablet Take 5 mg by mouth nightly       Multiple Vitamins-Minerals (THERAPEUTIC MULTIVITAMIN-MINERALS) tablet Take 1 tablet by mouth daily      acetaminophen (TYLENOL) 500 MG tablet Take 500 mg by mouth every 6 hours as needed for Pain      docusate sodium (COLACE, DULCOLAX) 100 MG CAPS Take 100 mg by mouth daily 30 capsule 0    pravastatin (PRAVACHOL) 40 MG tablet take 1 tablet by mouth once daily 90 tablet 1    levothyroxine (SYNTHROID) 125 MCG tablet Take 1 tablet by mouth Daily 30 tablet 5    losartan (COZAAR) 50 MG tablet take 1 tablet by mouth once daily 30 tablet 5    loratadine (CLARITIN) 10 MG tablet Take 1 tablet by mouth daily 30 tablet 11     Current Facility-Administered Medications   Medication Dose Route Frequency Provider Last Rate Last Admin    denosumab (PROLIA) SC injection 60 mg  60 mg SubCUTAneous Q6 Months Raj Darnell MD         Social History     Socioeconomic History    Marital status:      Spouse name: Not on file    Number of children: Not on file    Years of education: Not on file    Highest education level: Not on file   Occupational History    Occupation: retired     Employer: [employer]   Tobacco Use    Smoking status: Never Smoker    Smokeless tobacco: Never Used    Tobacco comment: never smoker   Substance and Sexual Activity    Alcohol use: No     Alcohol/week: 0.0 standard drinks    Drug use: No    Sexual activity: Not Currently   Other Topics Concern    Not on file   Social History Narrative    Not on file     Social Determinants of Health     Financial Resource Strain:     Difficulty of Paying Living Expenses: Not on file   Food Insecurity:     Worried About [address] in the Last Year: Not on file    [address] in the Last Year: Not on file   Transportation Needs:     Lack of Transportation (Medical): Not on file    Lack of Transportation (Non-Medical):  Not on file   Physical Activity:     Days of Exercise per Week: Not on file    Minutes of Exercise per Session: Not on file   Stress:     Feeling of Stress : Not on file   Social Connections:     Frequency of Communication with Friends and Family: Not on file    Frequency of Social Gatherings with Friends and Family: Not on file    Attends Sabianism Services: Not on file    Active Member of Clubs or Organizations: Not on file    Attends Club or Organization Meetings: Not on file    Marital Status: Not on file   Intimate Partner Violence:     Fear of Current or Ex-Partner: Not on file    Emotionally Abused: Not on file    Physically Abused: Not on file    Sexually Abused: Not on file   Housing Stability:     Unable to Pay for Housing in the Last Year: Not on file    Number of [place] in the Last Year: Not on file    Unstable Housing in the Last Year: Not on file       Family History   Problem Relation Age of Onset    Cancer Mother 47        throat    Stroke Father 76         of CVA    Emphysema Sister     Other Brother          in train accident    Drug Abuse Son     Depression Son     Other Son         suicide    Mental Illness Son     Sudden Death Sister 78        smoker        REVIEW OF SYSTEM:     Constitutional: No fever or chills. No night sweats, no weight loss   Eyes: No eye discharge, double vision, or eye pain   HEENT: negative for sore mouth, sore throat, hoarseness and voice change   Respiratory: negative for cough , sputum, dyspnea, wheezing, hemoptysis, chest pain   Cardiovascular: negative for chest pain, dyspnea, palpitations, orthopnea, PND   Gastrointestinal: negative for nausea, vomiting, diarrhea, constipation, abdominal pain, Dysphagia, hematemesis and hematochezia   Genitourinary: negative for frequency, dysuria, nocturia, urinary incontinence, and hematuria   Integument: negative for rash, skin lesions, bruises.    Hematologic/Lymphatic: negative for easy bruising, bleeding, lymphadenopathy, petechiae and swelling/edema   Endocrine: negative for heat or cold intolerance, tremor, weight changes, change in bowel habits and hair loss   Musculoskeletal: negative for myalgias, arthralgias, pain, joint swelling,and bone pain   Neurological: negative for headaches, dizziness, seizures, weakness, numbness       OBJECTIVE:         Vitals:    22 1304   BP: 116/62   Pulse: 71   Resp: 16   Temp: 97.3 °F (36.3 °C)   SpO2: 96%       PHYSICAL EXAM:   General appearance - well appearing, no in pain or distress   Mental status - alert and cooperative   Eyes - pupils equal and reactive, extraocular eye movements intact   Ears - bilateral TM's and external ear canals normal   Mouth - mucous membranes moist, pharynx normal without lesions   Neck - supple, no significant adenopathy   Lymphatics - no palpable lymphadenopathy, no hepatosplenomegaly   Chest - clear to auscultation, no wheezes, rales or rhonchi, symmetric air entry   Heart - normal rate, regular rhythm, normal S1, S2, no murmurs, rubs, clicks or gallops   Abdomen - soft, nontender, nondistended, no masses or organomegaly   Neurological - alert, oriented, normal speech, no focal findings or movement disorder noted   Musculoskeletal - no joint tenderness, deformity or swelling   Extremities - peripheral pulses normal, no pedal edema, no clubbing or cyanosis   Skin - normal coloration and turgor, no rashes, no suspicious skin lesions noted ,      LABORATORY DATA:     Lab Results   Component Value Date    WBC 5.4 03/01/2021    HGB 15.4 (H) 03/01/2021    HCT 48.0 (H) 03/01/2021    MCV 85.6 03/01/2021     03/01/2021    LYMPHOPCT 25 03/01/2021    RBC 5.61 (H) 03/01/2021    MCH 27.5 03/01/2021    MCHC 32.1 03/01/2021    RDW 14.5 (H) 03/01/2021    MONOPCT 12 03/01/2021    BASOPCT 1 03/01/2021    NEUTROABS 3.08 03/01/2021    LYMPHSABS 1.34 03/01/2021    MONOSABS 0.64 03/01/2021    EOSABS 0.24 03/01/2021    BASOSABS 0.06 03/01/2021         Chemistry        Component Value Date/Time     01/28/2021 1022    K 4.3 01/28/2021 1022     01/28/2021 1022    CO2 28 01/28/2021 1022    BUN 17 01/28/2021 1022    CREATININE 0.75 01/28/2021 1022        Component Value Date/Time    CALCIUM 9.4 01/28/2021 1022    ALKPHOS 78 01/28/2021 1215    AST 18 01/28/2021 1215    ALT 12 01/28/2021 1215    BILITOT 0.41 01/28/2021 1215        PATHOLOGY DATA: V617F Mutation, Qnt   JAK2 Gene Mutation Quant   Collected: 02/23/17 0933   Resulting lab: [facility]   Reference range: %   Value: 0.0   Comment: (NOTE)   There is no evidence of the JAK2 V617F point mutation by real-time   PCR analysis. IMAGING DATA:    REviewed  ASSESSMENT:    This is a 80 y.o.  female with history of secondary polycythemia, GÓMEZ 2 mutation negative and negative bone marrow biopsy. Patient has been treated with intermittent phlebotomies in the past but her last phlebotomy was several months ago. Her most recent hemoglobin in July has been stable. She does not have any symptoms of hyperviscosity. She is very independent activities of daily living and denied any chest pain shortness of breath. We'll continue to monitor every 6 months. PLAN:   I reviewed her recent lab work, imaging studies and discussed the diagnosis and treatment recommendations clinically she is doing well     We will check her labs today   If labs are stable we will see her in about 1 year with CBC prior       Santhosh Palacios MD  Hematologist/Medical Oncologist        This note is created with the assistance of a speech recognition program.  While intending to generate a document that actually reflects the content of the visit, the document can still have some errors including those of syntax and sound a like substitutions which may escape proof reading. It such instances, actual meaning can be extrapolated by contextual diversion.

## 2022-03-20 ENCOUNTER — OUTSIDE SERVICES (OUTPATIENT)
Dept: INTERNAL MEDICINE | Age: 87
End: 2022-03-20
Payer: MEDICARE

## 2022-03-20 DIAGNOSIS — F41.9 ANXIETY: ICD-10-CM

## 2022-03-20 DIAGNOSIS — E89.0 POSTOPERATIVE HYPOTHYROIDISM: ICD-10-CM

## 2022-03-20 DIAGNOSIS — C73 THYROID CANCER (HCC): ICD-10-CM

## 2022-03-20 DIAGNOSIS — M80.00XD AGE-RELATED OSTEOPOROSIS WITH CURRENT PATHOLOGICAL FRACTURE WITH ROUTINE HEALING: ICD-10-CM

## 2022-03-20 DIAGNOSIS — I49.5 SSS (SICK SINUS SYNDROME) (HCC): ICD-10-CM

## 2022-03-20 DIAGNOSIS — E78.2 MIXED HYPERLIPIDEMIA: ICD-10-CM

## 2022-03-20 DIAGNOSIS — E66.3 OVERWEIGHT: ICD-10-CM

## 2022-03-20 DIAGNOSIS — R73.01 ELEVATED FASTING GLUCOSE: Primary | ICD-10-CM

## 2022-03-20 DIAGNOSIS — K21.9 GASTROESOPHAGEAL REFLUX DISEASE WITHOUT ESOPHAGITIS: ICD-10-CM

## 2022-03-20 DIAGNOSIS — F32.5 MAJOR DEPRESSIVE DISORDER WITH SINGLE EPISODE, IN REMISSION (HCC): ICD-10-CM

## 2022-03-20 DIAGNOSIS — I10 ESSENTIAL HYPERTENSION: ICD-10-CM

## 2022-03-20 DIAGNOSIS — K22.70 BARRETT'S ESOPHAGUS WITHOUT DYSPLASIA: ICD-10-CM

## 2022-03-20 DIAGNOSIS — I21.9 MYOCARDIAL INFARCTION, UNSPECIFIED MI TYPE, UNSPECIFIED ARTERY (HCC): ICD-10-CM

## 2022-03-20 DIAGNOSIS — Z95.0 CARDIAC PACEMAKER IN SITU: ICD-10-CM

## 2022-03-20 DIAGNOSIS — K44.9 HIATAL HERNIA: ICD-10-CM

## 2022-03-20 DIAGNOSIS — I25.10 CORONARY ARTERY DISEASE INVOLVING NATIVE CORONARY ARTERY OF NATIVE HEART, UNSPECIFIED WHETHER ANGINA PRESENT: ICD-10-CM

## 2022-03-20 DIAGNOSIS — K58.9 IRRITABLE BOWEL SYNDROME, UNSPECIFIED TYPE: ICD-10-CM

## 2022-03-20 DIAGNOSIS — G30.0 EARLY ONSET ALZHEIMER'S DISEASE WITH BEHAVIORAL DISTURBANCE (HCC): ICD-10-CM

## 2022-03-20 DIAGNOSIS — D45 POLYCYTHEMIA RUBRA VERA (HCC): ICD-10-CM

## 2022-03-20 DIAGNOSIS — F02.818 EARLY ONSET ALZHEIMER'S DISEASE WITH BEHAVIORAL DISTURBANCE (HCC): ICD-10-CM

## 2022-03-21 NOTE — PROGRESS NOTES
DR. Paul Desir - [facility] VISIT     DATE OF SERVICE: 1/30/22    NURSING [facility]     CHIEF COMPLAINT/HISTORY OF CHIEF COMPLAINT: This patient is being seen for ongoing evaluation and management of her Alzheimer's disease, elevated fasting glucose, hypertension, hyperlipidemia, history of thyroid cancer with postoperative hypothyroidism, coronary artery disease, sick sinus syndrome, gastroesophageal reflux disease, Mcdaniel's esophagus, irritable bowel syndrome, polycythemia, depression, and being overweight. Her elevated fasting glucose is controlled with diet and exercise. There are no new complaints. ALLERGIES: No Known Allergies    MEDICATIONS: As noted on the [facility], referenced and incorporated herein. PAST MEDICAL HISTORY:   Past Medical History:   Diagnosis Date    Age-related osteoporosis with current pathological fracture with routine healing     Anxiety 9/6/2019    Mcdaniel esophagus     CAD (coronary artery disease)     nonobstructive    Cardiac pacemaker in situ 4/24/2014    Put in for severe bradycardia.  Dual chamber     Chronic insomnia     Closed right hip fracture, with routine healing, subsequent encounter 8/18/2019    Early onset Alzheimer's disease with behavioral disturbance ([address] 9/6/2019    Elevated fasting glucose 9/6/2019    GERD (gastroesophageal reflux disease)     Hiatal hernia     Hyperlipidemia     Hypertension     Hypothyroidism s/p surgical removal of thyroid for thyroid carcinoma 7/29/2015    IBS (irritable bowel syndrome)     Major depressive disorder with single episode, in remission ([address] 9/6/2019    Mycosis fungoides ([address]     Osteoarthritis     Osteoporosis     Patient in clinical research study 08/13/2020    Enrolled in convalescent plasma for COVID 19 Date of completion 08/16/20    Polycythemia rubra vera [facility])     sees oncology    PUD (peptic ulcer disease)     RV Pacemaker lead malfunction - Revision 3/24/14-Dr. ray 3/24/2014    SSS (sick sinus syndrome) ([facility] 4/24/2014    Thyroid cancer ([facility] about 5 years ago    thyroid /following with oncology    TIA (transient ischemic attack) 7/23/2014    Confusion--word searching    Vitamin D deficiency        PAST SURGICAL HISTORY:   Past Surgical History:   Procedure Laterality Date    BONE MARROW BIOPSY  September 2010    polycythemia vera    BREAST BIOPSY Right     benign    CARDIAC CATHETERIZATION  2008    nonobstructive CAD    CHOLECYSTECTOMY  1995    COLONOSCOPY  2002    COLONOSCOPY  2/29/2016    colon polyp, diverticulosis - Dr Perkins O[facility]    after miscarriage    ENDOSCOPY, COLON, DIAGNOSTIC  2002    normal  external hemorrhoids    EYE SURGERY Right 11/07/06    Cataract extraction with lens implant    HEMIARTHROPLASTY HIP Right 8/20/2019    RIGHT HIP HEMIARTHROPLASTY performed by Adamaris Casey MD at [address], THYROID Left     thyroid cancer    PACEMAKER INSERTION  6/2013    [facility] SKIN BIOPSY  2003    mycosis fungoides    TAE AND BSO      TAE/BSO at age 54 with anterior repair for benign reasons.     TONSILLECTOMY AND ADENOIDECTOMY      age 27   [facility] TUBAL LIGATION      UPPER GASTROINTESTINAL ENDOSCOPY  2002    UPPER GASTROINTESTINAL ENDOSCOPY  08/28/2003    UPPER GASTROINTESTINAL ENDOSCOPY  05/12/2003    UPPER GASTROINTESTINAL ENDOSCOPY  03/20/2001    UPPER GASTROINTESTINAL ENDOSCOPY  02/29/2016    Barretts tissue, body polyp - Dr Jt Victor       SOCIAL HISTORY:     Tobacco:   Social History     Tobacco Use   Smoking Status Never Smoker   Smokeless Tobacco Never Used   Tobacco Comment    never smoker     Alcohol:   Social History     Substance and Sexual Activity   Alcohol Use No    Alcohol/week: 0.0 standard drinks     Drugs:   Social History     Substance and Sexual Activity   Drug Use No       FAMILY HISTORY: family history includes Cancer (age of onset: 47) in her mother; Depression in her son; Drug Abuse in her son; Emphysema in her sister; Mental Illness in her son; Other in her brother and son; Stroke (age of onset: 76) in her father; Sudden Death (age of onset: 78) in her sister. REVIEW OF SYSTEMS:     Please see history of chief complaint above; otherwise no new problems with respect to General, HEENT, Cardiovascular, Respiratory, Gastrointestinal, Genitourinary, Endocrinologic, Musculoskeletal, or Neuropsychiatric complaints. PHYSICAL EXAMINATION:    Vitals: Temp: 97.8 deg F. Pulse: 78. Resp: 18. BP: 122/57. General: A 80 y.o.  female. Alert and oriented to person and place and questionably oriented to time. She does not appear to be in any acute distress. Skin: Skin color, texture, turgor normal. No rashes or lesions. HEENT/Neck: Essentially unremarkable  Lungs: Normal - CTA without rales, rhonchi, or wheezing. Heart: regular rate and rhythm, S1, S2 normal, no murmur, click, rub or gallop No S3 or S4. Abdomen: Non-obese soft, non-distended, non-tender, normal active bowel sounds, no masses palpated and no hepatosplenomegaly  Extremities: No clubbing, cyanosis, or edema in any of the extremities. Neurologic: cranial nerves II-XII are grossly intact    ASSESSMENT:     Diagnosis Orders   1. Elevated fasting glucose     2. Essential hypertension     3. Mixed hyperlipidemia     4. Coronary artery disease involving native coronary artery of native heart, unspecified whether angina present     5. SSS (sick sinus syndrome) (HCC)     6. Cardiac pacemaker in situ     7. Myocardial infarction, unspecified MI type, unspecified artery ([address]     8. Thyroid cancer ([facility]     9. Postoperative hypothyroidism     10. Gastroesophageal reflux disease without esophagitis     11. Hiatal hernia     12. 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 Irritable bowel syndrome, unspecified type     14. Polycythemia rubra vera ([address]     15.  Age-related osteoporosis with current pathological fracture with routine healing     16. Early onset Alzheimer's disease with behavioral disturbance ([facility]     17. Major depressive disorder with single episode, in remission ([facility] [facility]     18. Anxiety     19. Overweight           PLAN:    1. Continue current treatment  2. Nursing home record reviewed and updates summarized and entered into electronic record  3. See nursing home orders and MAR.         Electronically signed by Ovi Chin DO on 3/20/2022 at 11:03 PM  Internal Medicine

## 2022-03-25 ENCOUNTER — OUTSIDE SERVICES (OUTPATIENT)
Dept: INTERNAL MEDICINE | Age: 87
End: 2022-03-25
Payer: MEDICARE

## 2022-03-25 DIAGNOSIS — F41.9 ANXIETY: ICD-10-CM

## 2022-03-25 DIAGNOSIS — I10 ESSENTIAL HYPERTENSION: ICD-10-CM

## 2022-03-25 DIAGNOSIS — E89.0 POSTOPERATIVE HYPOTHYROIDISM: ICD-10-CM

## 2022-03-25 DIAGNOSIS — F32.5 MAJOR DEPRESSIVE DISORDER WITH SINGLE EPISODE, IN REMISSION (HCC): ICD-10-CM

## 2022-03-25 DIAGNOSIS — K21.9 GASTROESOPHAGEAL REFLUX DISEASE WITHOUT ESOPHAGITIS: ICD-10-CM

## 2022-03-25 DIAGNOSIS — K58.9 IRRITABLE BOWEL SYNDROME, UNSPECIFIED TYPE: ICD-10-CM

## 2022-03-25 DIAGNOSIS — Z95.0 CARDIAC PACEMAKER IN SITU: ICD-10-CM

## 2022-03-25 DIAGNOSIS — I21.9 MYOCARDIAL INFARCTION, UNSPECIFIED MI TYPE, UNSPECIFIED ARTERY (HCC): ICD-10-CM

## 2022-03-25 DIAGNOSIS — I25.10 CORONARY ARTERY DISEASE INVOLVING NATIVE CORONARY ARTERY OF NATIVE HEART, UNSPECIFIED WHETHER ANGINA PRESENT: ICD-10-CM

## 2022-03-25 DIAGNOSIS — C73 THYROID CANCER (HCC): ICD-10-CM

## 2022-03-25 DIAGNOSIS — K44.9 HIATAL HERNIA: ICD-10-CM

## 2022-03-25 DIAGNOSIS — E78.2 MIXED HYPERLIPIDEMIA: ICD-10-CM

## 2022-03-25 DIAGNOSIS — M80.00XD AGE-RELATED OSTEOPOROSIS WITH CURRENT PATHOLOGICAL FRACTURE WITH ROUTINE HEALING: ICD-10-CM

## 2022-03-25 DIAGNOSIS — R73.01 ELEVATED FASTING GLUCOSE: Primary | ICD-10-CM

## 2022-03-25 DIAGNOSIS — K22.70 BARRETT'S ESOPHAGUS WITHOUT DYSPLASIA: ICD-10-CM

## 2022-03-25 DIAGNOSIS — D45 POLYCYTHEMIA RUBRA VERA (HCC): ICD-10-CM

## 2022-03-25 DIAGNOSIS — F02.818 EARLY ONSET ALZHEIMER'S DISEASE WITH BEHAVIORAL DISTURBANCE (HCC): ICD-10-CM

## 2022-03-25 DIAGNOSIS — G30.0 EARLY ONSET ALZHEIMER'S DISEASE WITH BEHAVIORAL DISTURBANCE (HCC): ICD-10-CM

## 2022-03-25 DIAGNOSIS — I49.5 SSS (SICK SINUS SYNDROME) (HCC): ICD-10-CM

## 2022-03-25 DIAGNOSIS — E66.3 OVERWEIGHT: ICD-10-CM

## 2022-03-25 NOTE — PROGRESS NOTES
DR. Lior Ramirez - [facility] VISIT     DATE OF SERVICE: 2/27/22    [facility]URSING [facility]  [facility]     CHIEF COMPLAINT/HISTORY OF CHIEF COMPLAINT: This patient is being seen for ongoing evaluation and management of her Alzheimer's disease, elevated fasting glucose, hypertension, hyperlipidemia, history of thyroid cancer with postoperative hypothyroidism, coronary artery disease, sick sinus syndrome, gastroesophageal reflux disease, Mcdaniel's esophagus, irritable bowel syndrome, polycythemia, depression, and being overweight. Her elevated fasting glucose is controlled with diet and exercise. There are no new complaints at this time. ALLERGIES: No Known Allergies    MEDICATIONS: As noted on the [facility], referenced and incorporated herein. PAST MEDICAL HISTORY:   Past Medical History:   Diagnosis Date    Age-related osteoporosis with current pathological fracture with routine healing     Anxiety 9/6/2019    Mcdaniel esophagus     CAD (coronary artery disease)     nonobstructive    Cardiac pacemaker in situ 4/24/2014    Put in for severe bradycardia.  Dual chamber     Chronic insomnia     Closed right hip fracture, with routine healing, subsequent encounter 8/18/2019    Early onset Alzheimer's disease with behavioral disturbance ([address] 9/6/2019    Elevated fasting glucose 9/6/2019    GERD (gastroesophageal reflux disease)     Hiatal hernia     Hyperlipidemia     Hypertension     Hypothyroidism s/p surgical removal of thyroid for thyroid carcinoma 7/29/2015    IBS (irritable bowel syndrome)     Major depressive disorder with single episode, in remission ([address] 9/6/2019    Mycosis fungoides ([address]     Osteoarthritis     Osteoporosis     Patient in clinical research study 08/13/2020    Enrolled in convalescent plasma for COVID 19 Date of completion 08/16/20    Polycythemia rubra vera [facility])     sees oncology    PUD (peptic ulcer disease)     RV Pacemaker lead malfunction - Revision 3/24/14-Dr. ray 3/24/2014    SSS (sick sinus syndrome) ([facility] 4/24/2014    Thyroid cancer ([facility] about 5 years ago    thyroid /following with oncology    TIA (transient ischemic attack) 7/23/2014    Confusion--word searching    Vitamin D deficiency        PAST SURGICAL HISTORY:   Past Surgical History:   Procedure Laterality Date    BONE MARROW BIOPSY  September 2010    polycythemia vera    BREAST BIOPSY Right     benign    CARDIAC CATHETERIZATION  2008    nonobstructive CAD    CHOLECYSTECTOMY  1995    COLONOSCOPY  2002    COLONOSCOPY  2/29/2016    colon polyp, diverticulosis - Dr Gus Borja    after miscarriage    ENDOSCOPY, COLON, DIAGNOSTIC  2002    normal  external hemorrhoids    EYE SURGERY Right 11/07/06    Cataract extraction with lens implant    HEMIARTHROPLASTY HIP Right 8/20/2019    RIGHT HIP HEMIARTHROPLASTY performed by Frandy Clayton MD at [address], THYROID Left     thyroid cancer    PACEMAKER INSERTION  6/2013    [facility] SKIN BIOPSY  2003    mycosis fungoides    TAE AND BSO      TAE/BSO at age 54 with anterior repair for benign reasons.     TONSILLECTOMY AND ADENOIDECTOMY      age 27   [facility] TUBAL LIGATION      UPPER GASTROINTESTINAL ENDOSCOPY  2002    UPPER GASTROINTESTINAL ENDOSCOPY  08/28/2003    UPPER GASTROINTESTINAL ENDOSCOPY  05/12/2003    UPPER GASTROINTESTINAL ENDOSCOPY  03/20/2001    UPPER GASTROINTESTINAL ENDOSCOPY  02/29/2016    Barretts tissue, body polyp - Dr Annie Santacruz       SOCIAL HISTORY:     Tobacco:   Social History     Tobacco Use   Smoking Status Never Smoker   Smokeless Tobacco Never Used   Tobacco Comment    never smoker     Alcohol:   Social History     Substance and Sexual Activity   Alcohol Use No    Alcohol/week: 0.0 standard drinks     Drugs:   Social History     Substance and Sexual Activity   Drug Use No       FAMILY HISTORY: family history includes Cancer (age of onset: 47) in her mother; Depression in her son; Drug Abuse in her son; Emphysema in her sister; Mental Illness in her son; Other in her brother and son; Stroke (age of onset: 76) in her father; Sudden Death (age of onset: 78) in her sister. REVIEW OF SYSTEMS:     Please see history of chief complaint above; otherwise no new problems with respect to General, HEENT, Cardiovascular, Respiratory, Gastrointestinal, Genitourinary, Endocrinologic, Musculoskeletal, or Neuropsychiatric complaints. PHYSICAL EXAMINATION:    Vitals: Temp: 97.7 deg F. Pulse: 68. Resp: 16. BP: 118/66. General: A 80 y.o.  female. Alert and oriented to person and place and questionably oriented to time. She does not appear to be in any acute distress. Skin: Skin color, texture, turgor normal. No rashes or lesions. HEENT/Neck: Essentially unremarkable  Lungs: Normal - CTA without rales, rhonchi, or wheezing. Heart: regular rate and rhythm, S1, S2 normal, no murmur, click, rub or gallop No S3 or S4. Abdomen: Non-obese soft, non-distended, non-tender, normal active bowel sounds, no masses palpated and no hepatosplenomegaly  Extremities: No clubbing, cyanosis, or edema in any of the extremities. Neurologic: cranial nerves II-XII are grossly intact    ASSESSMENT:     Diagnosis Orders   1. Elevated fasting glucose     2. Essential hypertension     3. Mixed hyperlipidemia     4. Coronary artery disease involving native coronary artery of native heart, unspecified whether angina present     5. SSS (sick sinus syndrome) (HCC)     6. Cardiac pacemaker in situ     7. Myocardial infarction, unspecified MI type, unspecified artery ([facility]     8. Thyroid cancer ([facility]     9. Postoperative hypothyroidism     10. Gastroesophageal reflux disease without esophagitis     11. Hiatal hernia     12. 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 Irritable bowel syndrome, unspecified type     14. Polycythemia rubra vera ([address]     15. Age-related osteoporosis with current pathological fracture with routine healing     16. Early onset Alzheimer's disease with behavioral disturbance ([facility]     17. Major depressive disorder with single episode, in remission ([facility]     18. Anxiety     19. Overweight           PLAN:    1. Continue current treatment  2. Nursing home record reviewed and updates summarized and entered into electronic record  3. See nursing home orders and MAR.         Electronically signed by Flo Noble DO on 3/25/2022 at 1:50 AM  Internal Medicine

## 2022-03-27 PROCEDURE — 99308 SBSQ NF CARE LOW MDM 20: CPT | Performed by: INTERNAL MEDICINE

## 2022-04-24 PROCEDURE — 99307 SBSQ NF CARE SF MDM 10: CPT | Performed by: INTERNAL MEDICINE

## 2022-05-09 ENCOUNTER — OUTSIDE SERVICES (OUTPATIENT)
Dept: INTERNAL MEDICINE | Age: 87
End: 2022-05-09
Payer: MEDICARE

## 2022-05-09 DIAGNOSIS — M80.00XD AGE-RELATED OSTEOPOROSIS WITH CURRENT PATHOLOGICAL FRACTURE WITH ROUTINE HEALING: ICD-10-CM

## 2022-05-09 DIAGNOSIS — F32.5 MAJOR DEPRESSIVE DISORDER WITH SINGLE EPISODE, IN REMISSION (HCC): ICD-10-CM

## 2022-05-09 DIAGNOSIS — E78.2 MIXED HYPERLIPIDEMIA: ICD-10-CM

## 2022-05-09 DIAGNOSIS — E66.3 OVERWEIGHT: ICD-10-CM

## 2022-05-09 DIAGNOSIS — D45 POLYCYTHEMIA RUBRA VERA (HCC): ICD-10-CM

## 2022-05-09 DIAGNOSIS — F41.9 ANXIETY: ICD-10-CM

## 2022-05-09 DIAGNOSIS — I10 ESSENTIAL HYPERTENSION: ICD-10-CM

## 2022-05-09 DIAGNOSIS — K44.9 HIATAL HERNIA: ICD-10-CM

## 2022-05-09 DIAGNOSIS — E89.0 POSTOPERATIVE HYPOTHYROIDISM: ICD-10-CM

## 2022-05-09 DIAGNOSIS — G30.0 EARLY ONSET ALZHEIMER'S DISEASE WITH BEHAVIORAL DISTURBANCE (HCC): ICD-10-CM

## 2022-05-09 DIAGNOSIS — C73 THYROID CANCER (HCC): ICD-10-CM

## 2022-05-09 DIAGNOSIS — I49.5 SSS (SICK SINUS SYNDROME) (HCC): ICD-10-CM

## 2022-05-09 DIAGNOSIS — R73.01 ELEVATED FASTING GLUCOSE: Primary | ICD-10-CM

## 2022-05-09 DIAGNOSIS — K58.9 IRRITABLE BOWEL SYNDROME, UNSPECIFIED TYPE: ICD-10-CM

## 2022-05-09 DIAGNOSIS — K22.70 BARRETT'S ESOPHAGUS WITHOUT DYSPLASIA: ICD-10-CM

## 2022-05-09 DIAGNOSIS — I25.10 CORONARY ARTERY DISEASE INVOLVING NATIVE CORONARY ARTERY OF NATIVE HEART, UNSPECIFIED WHETHER ANGINA PRESENT: ICD-10-CM

## 2022-05-09 DIAGNOSIS — Z95.0 CARDIAC PACEMAKER IN SITU: ICD-10-CM

## 2022-05-09 DIAGNOSIS — F02.818 EARLY ONSET ALZHEIMER'S DISEASE WITH BEHAVIORAL DISTURBANCE (HCC): ICD-10-CM

## 2022-05-09 DIAGNOSIS — I21.9 MYOCARDIAL INFARCTION, UNSPECIFIED MI TYPE, UNSPECIFIED ARTERY (HCC): ICD-10-CM

## 2022-05-09 DIAGNOSIS — K21.9 GASTROESOPHAGEAL REFLUX DISEASE WITHOUT ESOPHAGITIS: ICD-10-CM

## 2022-05-10 NOTE — PROGRESS NOTES
DR. Luis Barrios - [facility] VISIT     DATE OF SERVICE: 3/27/22    [facility]     CHIEF COMPLAINT/HISTORY OF CHIEF COMPLAINT: This patient is being seen for ongoing evaluation and management of her Alzheimer's disease, elevated fasting glucose, hypertension, hyperlipidemia, history of thyroid cancer with postoperative hypothyroidism, coronary artery disease, sick sinus syndrome, gastroesophageal reflux disease, Mcdaniel's esophagus, irritable bowel syndrome, polycythemia, depression, and being overweight. Her elevated fasting glucose is controlled with diet and exercise. She saw Dr. Kaycee Choudhury earlier this month for her polycythemia. There are no other complaints. ALLERGIES: No Known Allergies    MEDICATIONS: As noted on the [facility], referenced and incorporated herein. PAST MEDICAL HISTORY:   Past Medical History:   Diagnosis Date    Age-related osteoporosis with current pathological fracture with routine healing     Anxiety 9/6/2019    Mcdaniel esophagus     CAD (coronary artery disease)     nonobstructive    Cardiac pacemaker in situ 4/24/2014    Put in for severe bradycardia.  Dual chamber     Chronic insomnia     Closed right hip fracture, with routine healing, subsequent encounter 8/18/2019    Early onset Alzheimer's disease with behavioral disturbance ([address] 9/6/2019    Elevated fasting glucose 9/6/2019    GERD (gastroesophageal reflux disease)     Hiatal hernia     Hyperlipidemia     Hypertension     Hypothyroidism s/p surgical removal of thyroid for thyroid carcinoma 7/29/2015    IBS (irritable bowel syndrome)     Major depressive disorder with single episode, in remission ([address] 9/6/2019    Mycosis fungoides ([address]     Osteoarthritis     Osteoporosis     Patient in clinical research study 08/13/2020    Enrolled in convalescent plasma for COVID 19 Date of completion 08/16/20    Polycythemia rubra vera ([address]     sees oncology    PUD (peptic ulcer disease)     RV Pacemaker lead malfunction - Revision 3/24/14-Dr. ray 3/24/2014    SSS (sick sinus syndrome) ([facility] [address] 4/24/2014    Thyroid cancer ([facility] [address] about 5 years ago    thyroid /following with oncology    TIA (transient ischemic attack) 7/23/2014    Confusion--word searching    Vitamin D deficiency        PAST SURGICAL HISTORY:   Past Surgical History:   Procedure Laterality Date    BONE MARROW BIOPSY  September 2010    polycythemia vera    BREAST BIOPSY Right     benign    CARDIAC CATHETERIZATION  2008    nonobstructive CAD    CHOLECYSTECTOMY  1995    COLONOSCOPY  2002    COLONOSCOPY  2/29/2016    colon polyp, diverticulosis - Dr Elke Georges    after miscarriage    ENDOSCOPY, COLON, DIAGNOSTIC  2002    normal  external hemorrhoids    EYE SURGERY Right 11/07/06    Cataract extraction with lens implant    HEMIARTHROPLASTY HIP Right 8/20/2019    RIGHT HIP HEMIARTHROPLASTY performed by Hugo Nguyen MD at [address], THYROID Left     thyroid cancer    PACEMAKER INSERTION  6/2013    [facility] SKIN BIOPSY  2003    mycosis fungoides    TAE AND BSO      TAE/BSO at age 54 with anterior repair for benign reasons.     TONSILLECTOMY AND ADENOIDECTOMY      age 27   [facility] TUBAL LIGATION      UPPER GASTROINTESTINAL ENDOSCOPY  2002    UPPER GASTROINTESTINAL ENDOSCOPY  08/28/2003    UPPER GASTROINTESTINAL ENDOSCOPY  05/12/2003    UPPER GASTROINTESTINAL ENDOSCOPY  03/20/2001    UPPER GASTROINTESTINAL ENDOSCOPY  02/29/2016    Barretts tissue, body polyp - Dr Ramya Sharp HISTORY:     Tobacco:   Social History     Tobacco Use   Smoking Status Never Smoker   Smokeless Tobacco Never Used   Tobacco Comment    never smoker     Alcohol:   Social History     Substance and Sexual Activity   Alcohol Use No    Alcohol/week: 0.0 standard drinks     Drugs:   Social History     Substance and Sexual Activity   Drug Use No FAMILY HISTORY: family history includes Cancer (age of onset: 47) in her mother; Depression in her son; Drug Abuse in her son; Emphysema in her sister; Mental Illness in her son; Other in her brother and son; Stroke (age of onset: 76) in her father; Sudden Death (age of onset: 78) in her sister. REVIEW OF SYSTEMS:     Please see history of chief complaint above; otherwise no new problems with respect to General, HEENT, Cardiovascular, Respiratory, Gastrointestinal, Genitourinary, Endocrinologic, Musculoskeletal, or Neuropsychiatric complaints. PHYSICAL EXAMINATION:    Vitals: Temp: 97.6 deg F. Pulse: 70. Resp: 18. BP: 122/70. General: A 80 y.o.  female. Alert and oriented to person and place and questionably oriented to time. She does not appear to be in any acute distress. Skin: Skin color, texture, turgor normal. No rashes or lesions. HEENT/Neck: Essentially unremarkable  Lungs: Normal - CTA without rales, rhonchi, or wheezing. Heart: regular rate and rhythm, S1, S2 normal, no murmur, click, rub or gallop No S3 or S4. Abdomen: Non-obese soft, non-distended, non-tender, normal active bowel sounds, no masses palpated and no hepatosplenomegaly  Extremities: No clubbing, cyanosis, or edema in any of the extremities. Neurologic: cranial nerves II-XII are grossly intact    ASSESSMENT:     Diagnosis Orders   1. Elevated fasting glucose     2. Essential hypertension     3. Mixed hyperlipidemia     4. Coronary artery disease involving native coronary artery of native heart, unspecified whether angina present     5. SSS (sick sinus syndrome) (HCC)     6. Cardiac pacemaker in situ     7. Myocardial infarction, unspecified MI type, unspecified artery ([facility] [address]     8. Thyroid cancer ([facility] [address]     9. Postoperative hypothyroidism     10. Gastroesophageal reflux disease without esophagitis     11. Hiatal hernia     12. 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  Irritable bowel syndrome, unspecified type 14. Polycythemia rubra vera ([facility] [address]     15. Age-related osteoporosis with current pathological fracture with routine healing     16. Early onset Alzheimer's disease with behavioral disturbance ([facility] [address]     17. Major depressive disorder with single episode, [facility]     18. Anxiety     19. Overweight           PLAN:    1. Continue current treatment  2. Nursing home record reviewed and updates summarized and entered into electronic record  3. We reviewed Dr. Kike Cyr most recent office visit report  4. See nursing home orders and MAR.         Electronically signed by Abundio Isbell DO on 5/9/2022 at 9:06 PM  Internal Medicine

## 2022-05-30 ENCOUNTER — OUTSIDE SERVICES (OUTPATIENT)
Dept: INTERNAL MEDICINE | Age: 87
End: 2022-05-30
Payer: MEDICARE

## 2022-05-30 DIAGNOSIS — F02.818 EARLY ONSET ALZHEIMER'S DISEASE WITH BEHAVIORAL DISTURBANCE (HCC): ICD-10-CM

## 2022-05-30 DIAGNOSIS — K21.9 GASTROESOPHAGEAL REFLUX DISEASE WITHOUT ESOPHAGITIS: ICD-10-CM

## 2022-05-30 DIAGNOSIS — K22.70 BARRETT'S ESOPHAGUS WITHOUT DYSPLASIA: ICD-10-CM

## 2022-05-30 DIAGNOSIS — I49.5 SSS (SICK SINUS SYNDROME) (HCC): ICD-10-CM

## 2022-05-30 DIAGNOSIS — Z95.0 CARDIAC PACEMAKER IN SITU: ICD-10-CM

## 2022-05-30 DIAGNOSIS — E66.3 OVERWEIGHT: ICD-10-CM

## 2022-05-30 DIAGNOSIS — M80.00XD AGE-RELATED OSTEOPOROSIS WITH CURRENT PATHOLOGICAL FRACTURE WITH ROUTINE HEALING: ICD-10-CM

## 2022-05-30 DIAGNOSIS — R73.01 ELEVATED FASTING GLUCOSE: Primary | ICD-10-CM

## 2022-05-30 DIAGNOSIS — I10 ESSENTIAL HYPERTENSION: ICD-10-CM

## 2022-05-30 DIAGNOSIS — G30.0 EARLY ONSET ALZHEIMER'S DISEASE WITH BEHAVIORAL DISTURBANCE (HCC): ICD-10-CM

## 2022-05-30 DIAGNOSIS — C73 THYROID CANCER (HCC): ICD-10-CM

## 2022-05-30 DIAGNOSIS — F32.5 MAJOR DEPRESSIVE DISORDER WITH SINGLE EPISODE, IN REMISSION (HCC): ICD-10-CM

## 2022-05-30 DIAGNOSIS — F41.9 ANXIETY: ICD-10-CM

## 2022-05-30 DIAGNOSIS — D45 POLYCYTHEMIA RUBRA VERA (HCC): ICD-10-CM

## 2022-05-30 DIAGNOSIS — I25.10 CORONARY ARTERY DISEASE INVOLVING NATIVE CORONARY ARTERY OF NATIVE HEART, UNSPECIFIED WHETHER ANGINA PRESENT: ICD-10-CM

## 2022-05-30 DIAGNOSIS — E89.0 POSTOPERATIVE HYPOTHYROIDISM: ICD-10-CM

## 2022-05-30 DIAGNOSIS — E78.2 MIXED HYPERLIPIDEMIA: ICD-10-CM

## 2022-05-30 DIAGNOSIS — K58.9 IRRITABLE BOWEL SYNDROME, UNSPECIFIED TYPE: ICD-10-CM

## 2022-05-30 DIAGNOSIS — K44.9 HIATAL HERNIA: ICD-10-CM

## 2022-05-30 DIAGNOSIS — I21.9 MYOCARDIAL INFARCTION, UNSPECIFIED MI TYPE, UNSPECIFIED ARTERY (HCC): ICD-10-CM

## 2022-05-30 PROCEDURE — 99307 SBSQ NF CARE SF MDM 10: CPT | Performed by: INTERNAL MEDICINE

## 2022-05-30 NOTE — PROGRESS NOTES
DR. Sonal Pillai - [facility] VISIT     DATE OF SERVICE: 4/24/22    NURSING HOME:  [facility]     CHIEF COMPLAINT/HISTORY OF CHIEF COMPLAINT: This patient is being seen for ongoing evaluation and management of her Alzheimer's disease, elevated fasting glucose, hypertension, hyperlipidemia, history of thyroid cancer with postoperative hypothyroidism, coronary artery disease, sick sinus syndrome, gastroesophageal reflux disease, Mcdaniel's esophagus, irritable bowel syndrome, polycythemia, depression, and being overweight. Her elevated fasting glucose is controlled with diet and exercise. There are no new complaints at this time. ALLERGIES: No Known Allergies    MEDICATIONS: As noted on the [facility], referenced and incorporated herein. PAST MEDICAL HISTORY:   Past Medical History:   Diagnosis Date    Age-related osteoporosis with current pathological fracture with routine healing     Anxiety 9/6/2019    Mcdaniel esophagus     CAD (coronary artery disease)     nonobstructive    Cardiac pacemaker in situ 4/24/2014    Put in for severe bradycardia.  Dual chamber     Chronic insomnia     Closed right hip fracture, with routine healing, subsequent encounter 8/18/2019    Early onset Alzheimer's disease with behavioral disturbance ([address] 9/6/2019    Elevated fasting glucose 9/6/2019    GERD (gastroesophageal reflux disease)     Hiatal hernia     Hyperlipidemia     Hypertension     Hypothyroidism s/p surgical removal of thyroid for thyroid carcinoma 7/29/2015    IBS (irritable bowel syndrome)     Major depressive disorder with single episode, in remission ([address] 9/6/2019    Mycosis fungoides ([address]     Osteoarthritis     Osteoporosis     Patient in clinical research study 08/13/2020    Enrolled in convalescent plasma for COVID 19 Date of completion 08/16/20    Polycythemia rubra vera [facility])     sees oncology    PUD (peptic ulcer disease)     RV Pacemaker lead malfunction - Revision 3/24/14-Dr. ray 3/24/2014    SSS (sick sinus syndrome) ([facility] 4/24/2014    Thyroid cancer ([facility] about 5 years ago    thyroid /following with oncology    TIA (transient ischemic attack) 7/23/2014    Confusion--word searching    Vitamin D deficiency        PAST SURGICAL HISTORY:   Past Surgical History:   Procedure Laterality Date    BONE MARROW BIOPSY  September 2010    polycythemia vera    BREAST BIOPSY Right     benign    CARDIAC CATHETERIZATION  2008    nonobstructive CAD    CHOLECYSTECTOMY  1995    COLONOSCOPY  2002    COLONOSCOPY  2/29/2016    colon polyp, diverticulosis - Dr Booker Dao    after miscarriage    ENDOSCOPY, COLON, DIAGNOSTIC  2002    normal  external hemorrhoids    EYE SURGERY Right 11/07/06    Cataract extraction with lens implant    HEMIARTHROPLASTY HIP Right 8/20/2019    RIGHT HIP HEMIARTHROPLASTY performed by Dominik Rodriguez MD at [address], THYROID Left     thyroid cancer    PACEMAKER INSERTION  6/2013    [facility] SKIN BIOPSY  2003    mycosis fungoides    TAE AND BSO      TAE/BSO at age 54 with anterior repair for benign reasons.     TONSILLECTOMY AND ADENOIDECTOMY      age 27   Blank See TUBAL LIGATION      UPPER GASTROINTESTINAL ENDOSCOPY  2002    UPPER GASTROINTESTINAL ENDOSCOPY  08/28/2003    UPPER GASTROINTESTINAL ENDOSCOPY  05/12/2003    UPPER GASTROINTESTINAL ENDOSCOPY  03/20/2001    UPPER GASTROINTESTINAL ENDOSCOPY  02/29/2016    Barretts tissue, body polyp - Dr Sotero Galicia       SOCIAL HISTORY:     Tobacco:   Social History     Tobacco Use   Smoking Status Never Smoker   Smokeless Tobacco Never Used   Tobacco Comment    never smoker     Alcohol:   Social History     Substance and Sexual Activity   Alcohol Use No    Alcohol/week: 0.0 standard drinks     Drugs:   Social History     Substance and Sexual Activity   Drug Use No       FAMILY HISTORY: family history includes Cancer (age of onset: 47) in her mother; Depression in her son; Drug Abuse in her son; Emphysema in her sister; Mental Illness in her son; Other in her brother and son; Stroke (age of onset: 76) in her father; Sudden Death (age of onset: 78) in her sister. REVIEW OF SYSTEMS:     Please see history of chief complaint above; otherwise no new problems with respect to General, HEENT, Cardiovascular, Respiratory, Gastrointestinal, Genitourinary, Endocrinologic, Musculoskeletal, or Neuropsychiatric complaints. PHYSICAL EXAMINATION:    Vitals: Temp: 97.3 deg F. Pulse: 62. Resp: 22. BP: 132/63. General: A 80 y.o.  female. Alert and oriented to person and place and questionably oriented to time. She does not appear to be in any acute distress. Skin: Skin color, texture, turgor normal. No rashes or lesions. HEENT/Neck: Essentially unremarkable  Lungs: Normal - CTA without rales, rhonchi, or wheezing. Heart: regular rate and rhythm, S1, S2 normal, no murmur, click, rub or gallop No S3 or S4. Abdomen: Non-obese soft, non-distended, non-tender, normal active bowel sounds, no masses palpated and no hepatosplenomegaly  Extremities: No clubbing, cyanosis, or edema in any of the extremities. Neurologic: cranial nerves II-XII are grossly intact    ASSESSMENT:     Diagnosis Orders   1. Elevated fasting glucose     2. Essential hypertension     3. Mixed hyperlipidemia     4. Coronary artery disease involving native coronary artery of native heart, unspecified whether angina present     5. SSS (sick sinus syndrome) (HCC)     6. Cardiac pacemaker in situ     7. Myocardial infarction, unspecified MI type, unspecified artery ([facility]     8. Thyroid cancer ([facility]     9. Postoperative hypothyroidism     10. Gastroesophageal reflux disease without esophagitis     11. Hiatal hernia     12. 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 Irritable bowel syndrome, unspecified type     14. Polycythemia rubra vera ([facility]     15. Age-related osteoporosis with current pathological fracture with routine healing     16. Early onset Alzheimer's disease with behavioral disturbance ([facility]     17. Major depressive disorder with single episode, in remission ([facility]     18. Anxiety     19. Overweight           PLAN:    1. Continue current treatment  2. Nursing home record reviewed and updates summarized and entered into electronic record  3. See nursing home orders and MAR.         Electronically signed by Alice Sheikh DO on 5/30/2022 at 12:02 AM  Internal Medicine

## 2022-06-21 ENCOUNTER — OUTSIDE SERVICES (OUTPATIENT)
Dept: INTERNAL MEDICINE | Age: 87
End: 2022-06-21
Payer: MEDICARE

## 2022-06-21 DIAGNOSIS — G30.0 EARLY ONSET ALZHEIMER'S DISEASE WITH BEHAVIORAL DISTURBANCE (HCC): ICD-10-CM

## 2022-06-21 DIAGNOSIS — F02.818 EARLY ONSET ALZHEIMER'S DISEASE WITH BEHAVIORAL DISTURBANCE (HCC): ICD-10-CM

## 2022-06-21 DIAGNOSIS — I25.10 CORONARY ARTERY DISEASE INVOLVING NATIVE CORONARY ARTERY OF NATIVE HEART, UNSPECIFIED WHETHER ANGINA PRESENT: ICD-10-CM

## 2022-06-21 DIAGNOSIS — E66.3 OVERWEIGHT: ICD-10-CM

## 2022-06-21 DIAGNOSIS — K22.70 BARRETT'S ESOPHAGUS WITHOUT DYSPLASIA: ICD-10-CM

## 2022-06-21 DIAGNOSIS — K21.9 GASTROESOPHAGEAL REFLUX DISEASE WITHOUT ESOPHAGITIS: ICD-10-CM

## 2022-06-21 DIAGNOSIS — F32.5 MAJOR DEPRESSIVE DISORDER WITH SINGLE EPISODE, IN REMISSION (HCC): ICD-10-CM

## 2022-06-21 DIAGNOSIS — E78.2 MIXED HYPERLIPIDEMIA: ICD-10-CM

## 2022-06-21 DIAGNOSIS — K58.9 IRRITABLE BOWEL SYNDROME, UNSPECIFIED TYPE: ICD-10-CM

## 2022-06-21 DIAGNOSIS — M80.00XD AGE-RELATED OSTEOPOROSIS WITH CURRENT PATHOLOGICAL FRACTURE WITH ROUTINE HEALING: ICD-10-CM

## 2022-06-21 DIAGNOSIS — I21.9 MYOCARDIAL INFARCTION, UNSPECIFIED MI TYPE, UNSPECIFIED ARTERY (HCC): ICD-10-CM

## 2022-06-21 DIAGNOSIS — Z95.0 CARDIAC PACEMAKER IN SITU: ICD-10-CM

## 2022-06-21 DIAGNOSIS — E89.0 POSTOPERATIVE HYPOTHYROIDISM: ICD-10-CM

## 2022-06-21 DIAGNOSIS — I10 ESSENTIAL HYPERTENSION: ICD-10-CM

## 2022-06-21 DIAGNOSIS — C73 THYROID CANCER (HCC): ICD-10-CM

## 2022-06-21 DIAGNOSIS — R73.01 ELEVATED FASTING GLUCOSE: Primary | ICD-10-CM

## 2022-06-21 DIAGNOSIS — I49.5 SSS (SICK SINUS SYNDROME) (HCC): ICD-10-CM

## 2022-06-21 DIAGNOSIS — D45 POLYCYTHEMIA RUBRA VERA (HCC): ICD-10-CM

## 2022-06-21 DIAGNOSIS — K44.9 HIATAL HERNIA: ICD-10-CM

## 2022-06-21 DIAGNOSIS — F41.9 ANXIETY: ICD-10-CM

## 2022-06-21 PROCEDURE — 99307 SBSQ NF CARE SF MDM 10: CPT | Performed by: INTERNAL MEDICINE

## 2022-06-21 NOTE — PROGRESS NOTES
DR. Haven Elliott - [facility] VISIT     DATE OF SERVICE: 5/30/22    [facility]URSING [facility]     CHIEF COMPLAINT/HISTORY OF CHIEF COMPLAINT: This patient is being seen for ongoing evaluation and management of her Alzheimer's disease, elevated fasting glucose, hypertension, hyperlipidemia, history of thyroid cancer with postoperative hypothyroidism, coronary artery disease, sick sinus syndrome, gastroesophageal reflux disease, Mcdaniel's esophagus, irritable bowel syndrome, polycythemia, depression, and being overweight. Her elevated fasting glucose is controlled with diet and exercise. There are no new complaints. ALLERGIES: No Known Allergies    MEDICATIONS: As noted on the [facility], referenced and incorporated herein. PAST MEDICAL HISTORY:   Past Medical History:   Diagnosis Date    Age-related osteoporosis with current pathological fracture with routine healing     Anxiety 9/6/2019    Mcdaniel esophagus     CAD (coronary artery disease)     nonobstructive    Cardiac pacemaker in situ 4/24/2014    Put in for severe bradycardia.  Dual chamber     Chronic insomnia     Closed right hip fracture, with routine healing, subsequent encounter 8/18/2019    Early onset Alzheimer's disease with behavioral disturbance ([address] 9/6/2019    Elevated fasting glucose 9/6/2019    GERD (gastroesophageal reflux disease)     Hiatal hernia     Hyperlipidemia     Hypertension     Hypothyroidism s/p surgical removal of thyroid for thyroid carcinoma 7/29/2015    IBS (irritable bowel syndrome)     Major depressive disorder with single episode, in remission ([address] 9/6/2019    Mycosis fungoides ([address]     Osteoarthritis     Osteoporosis     Patient in clinical research study 08/13/2020    Enrolled in convalescent plasma for COVID 19 Date of completion 08/16/20    Polycythemia rubra vera [facility])     sees oncology    PUD (peptic ulcer disease)     RV Pacemaker lead malfunction - Revision 3/24/14-Dr. ray 3/24/2014    SSS (sick sinus syndrome) ([facility] 4/24/2014    Thyroid cancer ([facility] about 5 years ago    thyroid /following with oncology    TIA (transient ischemic attack) 7/23/2014    Confusion--word searching    Vitamin D deficiency        PAST SURGICAL HISTORY:   Past Surgical History:   Procedure Laterality Date    BONE MARROW BIOPSY  September 2010    polycythemia vera    BREAST BIOPSY Right     benign    CARDIAC CATHETERIZATION  2008    nonobstructive CAD    CHOLECYSTECTOMY  1995    COLONOSCOPY  2002    COLONOSCOPY  2/29/2016    colon polyp, diverticulosis - Dr Desire Worley    after miscarriage    ENDOSCOPY, COLON, DIAGNOSTIC  2002    normal  external hemorrhoids    EYE SURGERY Right 11/07/06    Cataract extraction with lens implant    HEMIARTHROPLASTY HIP Right 8/20/2019    RIGHT HIP HEMIARTHROPLASTY performed by Carrol Mcintyre MD at [address], THYROID Left     thyroid cancer    PACEMAKER INSERTION  6/2013    [facility] SKIN BIOPSY  2003    mycosis fungoides    TONSILLECTOMY AND ADENOIDECTOMY      age 27   Ruthie Anderson TOTAL ABDOMINAL HYSTERECTOMY W/ BILATERAL SALPINGOOPHORECTOMY      TAE/BSO at age 54 with anterior repair for benign reasons.     TUBAL LIGATION      UPPER GASTROINTESTINAL ENDOSCOPY  2002    UPPER GASTROINTESTINAL ENDOSCOPY  08/28/2003    UPPER GASTROINTESTINAL ENDOSCOPY  05/12/2003    UPPER GASTROINTESTINAL ENDOSCOPY  03/20/2001    UPPER GASTROINTESTINAL ENDOSCOPY  02/29/2016    Barretts tissue, body polyp - Dr Janette Mario       SOCIAL HISTORY:     Tobacco:   Social History     Tobacco Use   Smoking Status Never Smoker   Smokeless Tobacco Never Used   Tobacco Comment    never smoker     Alcohol:   Social History     Substance and Sexual Activity   Alcohol Use No    Alcohol/week: 0.0 standard drinks     Drugs:   Social History     Substance and Sexual Activity   Drug Use No       FAMILY HISTORY: family history includes Cancer (age of onset: 47) in her mother; Depression in her son; Drug Abuse in her son; Emphysema in her sister; Mental Illness in her son; Other in her brother and son; Stroke (age of onset: 76) in her father; Sudden Death (age of onset: 78) in her sister. REVIEW OF SYSTEMS:     Please see history of chief complaint above; otherwise no new problems with respect to General, HEENT, Cardiovascular, Respiratory, Gastrointestinal, Genitourinary, Endocrinologic, Musculoskeletal, or Neuropsychiatric complaints. PHYSICAL EXAMINATION:    Vitals: Temp: 97.1 deg F. Pulse: 66. Resp: 18. BP: 126/66. General: A 80 y.o.  female. Alert and oriented to person and place and questionably oriented to time. She does not appear to be in any acute distress. Skin: Skin color, texture, turgor normal. No rashes or lesions. HEENT/Neck: Essentially unremarkable  Lungs: Normal - CTA without rales, rhonchi, or wheezing. Heart: regular rate and rhythm, S1, S2 normal, no murmur, click, rub or gallop No S3 or S4. Abdomen: Non-obese soft, non-distended, non-tender, normal active bowel sounds, no masses palpated and no hepatosplenomegaly  Extremities: No clubbing, cyanosis, or edema in any of the extremities. Neurologic: cranial nerves II-XII are grossly intact    ASSESSMENT:     Diagnosis Orders   1. Elevated fasting glucose     2. Essential hypertension     3. Mixed hyperlipidemia     4. Coronary artery disease involving native coronary artery of native heart, unspecified whether angina present     5. SSS (sick sinus syndrome) (HCC)     6. Cardiac pacemaker in situ     7. Myocardial infarction, unspecified MI type, unspecified artery ([address]     8. Thyroid cancer ([facility]     9. Postoperative hypothyroidism     10. Gastroesophageal reflux disease without esophagitis     11. Hiatal hernia     12. 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 Irritable bowel syndrome, unspecified type     14. Polycythemia rubra vera ([facility]     15. Age-related osteoporosis with current pathological fracture with routine healing     16. Early onset Alzheimer's disease with behavioral disturbance ([facility]     17. Major depressive disorder with single episode, in remission ([facility]     18. Anxiety     19. Overweight           PLAN:    1. Continue current treatment  2. Nursing home record reviewed and updates summarized and entered into electronic record  3. See nursing home orders and MAR.         Electronically signed by Artie Lee DO on 6/21/2022 at 1:26 AM  Internal Medicine

## 2022-06-24 ENCOUNTER — PROCEDURE VISIT (OUTPATIENT)
Dept: CARDIOLOGY | Age: 87
End: 2022-06-24
Payer: MEDICARE

## 2022-06-24 ENCOUNTER — OFFICE VISIT (OUTPATIENT)
Dept: CARDIOLOGY | Age: 87
End: 2022-06-24
Payer: MEDICARE

## 2022-06-24 VITALS
HEIGHT: 67 IN | BODY MASS INDEX: 28.25 KG/M2 | DIASTOLIC BLOOD PRESSURE: 59 MMHG | WEIGHT: 180 LBS | HEART RATE: 68 BPM | SYSTOLIC BLOOD PRESSURE: 129 MMHG

## 2022-06-24 DIAGNOSIS — I49.5 SSS (SICK SINUS SYNDROME) (HCC): ICD-10-CM

## 2022-06-24 DIAGNOSIS — I25.10 CORONARY ARTERY DISEASE INVOLVING NATIVE CORONARY ARTERY OF NATIVE HEART, UNSPECIFIED WHETHER ANGINA PRESENT: Primary | ICD-10-CM

## 2022-06-24 DIAGNOSIS — Z95.0 CARDIAC PACEMAKER IN SITU: ICD-10-CM

## 2022-06-24 PROCEDURE — 93288 INTERROG EVL PM/LDLS PM IP: CPT | Performed by: INTERNAL MEDICINE

## 2022-06-24 PROCEDURE — 1123F ACP DISCUSS/DSCN MKR DOCD: CPT | Performed by: INTERNAL MEDICINE

## 2022-06-24 PROCEDURE — 93010 ELECTROCARDIOGRAM REPORT: CPT | Performed by: INTERNAL MEDICINE

## 2022-06-24 PROCEDURE — 93005 ELECTROCARDIOGRAM TRACING: CPT | Performed by: INTERNAL MEDICINE

## 2022-06-24 PROCEDURE — 1036F TOBACCO NON-USER: CPT | Performed by: INTERNAL MEDICINE

## 2022-06-24 PROCEDURE — 99213 OFFICE O/P EST LOW 20 MIN: CPT | Performed by: INTERNAL MEDICINE

## 2022-06-24 PROCEDURE — G8427 DOCREV CUR MEDS BY ELIG CLIN: HCPCS | Performed by: INTERNAL MEDICINE

## 2022-06-24 PROCEDURE — G8417 CALC BMI ABV UP PARAM F/U: HCPCS | Performed by: INTERNAL MEDICINE

## 2022-06-24 PROCEDURE — 99214 OFFICE O/P EST MOD 30 MIN: CPT | Performed by: INTERNAL MEDICINE

## 2022-06-24 PROCEDURE — 1090F PRES/ABSN URINE INCON ASSESS: CPT | Performed by: INTERNAL MEDICINE

## 2022-06-24 ASSESSMENT — ENCOUNTER SYMPTOMS
WHEEZING: 0
SHORTNESS OF BREATH: 0
APNEA: 0
EYE DISCHARGE: 0
EYE ITCHING: 0
COUGH: 0
BACK PAIN: 0

## 2022-06-24 NOTE — PROGRESS NOTES
Today's Date: 6/24/2022  Patient's Name: Jose Monday  Patient's age: 80 y. o., 1934    Subjective: The patient is a 80 y. o. year old, , female is in the office for SSS. Denies exertional chest pain or SOB, no dizziness or syncope and no palpitations. Past Medical History:   has a past medical history of Age-related osteoporosis with current pathological fracture with routine healing, Anxiety, Mcdaniel esophagus, CAD (coronary artery disease), Cardiac pacemaker in situ, Chronic insomnia, Closed right hip fracture, with routine healing, subsequent encounter, Early onset Alzheimer's disease with behavioral disturbance ([address] Elevated fasting glucose, GERD (gastroesophageal reflux disease), Hiatal hernia, Hyperlipidemia, Hypertension, Hypothyroidism s/p surgical removal of thyroid for thyroid carcinoma, IBS (irritable bowel syndrome), Major depressive disorder with single episode, in remission ([address] Mycosis fungoides ([address] Osteoarthritis, Osteoporosis, Patient in clinical research study, Polycythemia rubra vera ([address] PUD (peptic ulcer disease), RV Pacemaker lead malfunction - Revision 3/24/14-Dr. ray, SSS (sick sinus syndrome) ([address] Thyroid cancer ([address] TIA (transient ischemic attack), and Vitamin D deficiency. Past Surgical History:   has a past surgical history that includes Tubal ligation; Lobectomy, Thyroid (Left); Total abdominal hysterectomy w/ bilateral salpingoophorectomy; Tonsillectomy and adenoidectomy; Cholecystectomy (1995); Breast biopsy (Right); Dilation and curettage of uterus (1960); Upper gastrointestinal endoscopy (2002); skin biopsy (2003); Cardiac catheterization (2008); bone marrow biopsy (September 2010); Pacemaker insertion (6/2013); Endoscopy, colon, diagnostic (2002); Upper gastrointestinal endoscopy (08/28/2003); Upper gastrointestinal endoscopy (05/12/2003); Upper gastrointestinal endoscopy (03/20/2001); eye surgery (Right, 11/07/06);  Upper gastrointestinal endoscopy (02/29/2016); Colonoscopy (2002); Colonoscopy (2/29/2016); and HEMIARTHROPLASTY HIP (Right, 8/20/2019). Home Medications:  Prior to Admission medications    Medication Sig Start Date End Date Taking? Authorizing Provider   Handicap Placard MISC by Does not apply route Expected Use:  [facility] 6/25/21  Yes Mayelin Barboza MD   guaiFENesin (ROBITUSSIN) 100 MG/5ML syrup Take 200 mg by mouth every 6 hours as needed for Cough   Yes Historical Provider, MD   Handicap Placard MISC by Does not apply route Diagnosis: sick sinus syndrome. Expires: 2/28/2023 2/17/20  Yes Rahul Krishna DO   donepezil (ARICEPT) 5 MG tablet Take 5 mg by mouth nightly    Yes Historical Provider, MD   Multiple Vitamins-Minerals (THERAPEUTIC MULTIVITAMIN-MINERALS) tablet Take 1 tablet by mouth daily   Yes Historical Provider, MD   acetaminophen (TYLENOL) 500 MG tablet Take 500 mg by mouth every 6 hours as needed for Pain   Yes Historical Provider, MD   docusate sodium (COLACE, DULCOLAX) 100 MG CAPS Take 100 mg by mouth daily 8/22/19  Yes Marcela Cedillo   pravastatin (PRAVACHOL) 40 MG tablet take 1 tablet by mouth once daily 10/2/18  Yes DAISY Correa CNP   levothyroxine (SYNTHROID) 125 MCG tablet Take 1 tablet by mouth Daily 7/19/18  Yes DAISY Correa CNP   losartan (COZAAR) 50 MG tablet take 1 tablet by mouth once daily 5/21/18  Yes DAISY Correa CNP   loratadine (CLARITIN) 10 MG tablet Take 1 tablet by mouth daily 10/16/17  Yes DAISY Correa CNP       Allergies:  Patient has no known allergies. Social History:   reports that she has never smoked. She has never used smokeless tobacco. She reports that she does not drink alcohol and does not use drugs. Review of Systems:  Review of Systems   Constitutional: Negative for chills, fatigue and fever. HENT: Negative for congestion and dental problem. Eyes: Negative for discharge and itching. Respiratory: Negative for apnea, cough, shortness of breath and wheezing. Cardiovascular: Negative for chest pain. Endocrine: Negative for cold intolerance and heat intolerance. Musculoskeletal: Negative for arthralgias and back pain. Neurological: Negative for dizziness, tremors and syncope. Psychiatric/Behavioral: Negative for agitation and behavioral problems. Physical Exam:  BP (!) 129/59   Pulse 68   Ht 5' 7\" (1.702 m)   Wt 180 lb (81.6 kg)   BMI 28.19 kg/m²    Physical Exam  Constitutional:       Appearance: Normal appearance. HENT:      Head: Normocephalic and atraumatic. Cardiovascular:      Rate and Rhythm: Normal rate and regular rhythm. Pulses: Normal pulses. Heart sounds: Normal heart sounds. No murmur heard. Pulmonary:      Effort: Pulmonary effort is normal. No respiratory distress. Breath sounds: Normal breath sounds. Abdominal:      General: There is no distension. Palpations: There is no mass. Musculoskeletal:         General: No swelling or tenderness. Skin:     Capillary Refill: Capillary refill takes less than 2 seconds. Coloration: Skin is not jaundiced or pale. Neurological:      General: No focal deficit present. Mental Status: She is alert and oriented to person, place, and time. Psychiatric:         Mood and Affect: Mood normal.         Behavior: Behavior normal.         Cardiac Data:  EKG:     Labs:     CBC: No results for input(s): WBC, HGB, HCT, PLT in the last 72 hours. BMP:No results for input(s): NA, K, CO2, BUN, CREATININE, LABGLOM, GLUCOSE in the last 72 hours. PT/INR: No results for input(s): PROTIME, INR in the last 72 hours. FASTING LIPID PANEL:  Lab Results   Component Value Date    HDL 54 06/01/2020    LDLCALC 76 06/01/2020    TRIG 134 06/01/2020     LIVER PROFILE:No results for input(s): AST, ALT, LABALBU in the last 72 hours.     IMPRESSION:    Patient Active Problem List   Diagnosis    Osteoarthritis  Hyperlipidemia    Hypertension    Mcdaniel esophagus    GERD (gastroesophageal reflux disease)    PUD (peptic ulcer disease)    Hiatal hernia    Chronic insomnia    IBS (irritable bowel syndrome)    Age-related osteoporosis with current pathological fracture with routine healing    Vitamin D deficiency    Thyroid cancer (HCC)    [facility]cythemia rubra vera ([facility]    SSS (sick sinus syndrome) ([facility]    Cardiac pacemaker in situ    TIA (transient ischemic attack)    Hypothyroidism    Closed right hip fracture, with routine healing, subsequent encounter    Early onset Alzheimer's disease with behavioral disturbance ([facility]    Major depressive disorder with single episode, in remission ([facility]    Anxiety    Elevated fasting glucose    CAD (coronary artery disease)    COVID-19    Pneumonia due to COVID-19 virus       Assessment/Plan:  1. SSS s/p PPM insertion. Checked in office 6/2022, normal function. Will monitor. Continue pacer checks every 6 months.     2. CAD. Coronary angiography in 2008 showed mild CAD. Stress test in 2011 showed no ischemia. Echo on 8/18/19, EF 60%, mild LVH, grade II DD, no significant valvular disease seen. Continue current medications.     3. HTN. Continue current medications. Well controlled.      4.  HPL. On Pravachol. Followed by PCP.            Rachell Seo MD, MD [facility]           [phone]

## 2022-06-29 ENCOUNTER — OUTSIDE SERVICES (OUTPATIENT)
Dept: INTERNAL MEDICINE | Age: 87
End: 2022-06-29
Payer: MEDICARE

## 2022-06-29 DIAGNOSIS — K58.9 IRRITABLE BOWEL SYNDROME, UNSPECIFIED TYPE: ICD-10-CM

## 2022-06-29 DIAGNOSIS — M80.00XD AGE-RELATED OSTEOPOROSIS WITH CURRENT PATHOLOGICAL FRACTURE WITH ROUTINE HEALING: ICD-10-CM

## 2022-06-29 DIAGNOSIS — F41.9 ANXIETY: ICD-10-CM

## 2022-06-29 DIAGNOSIS — F32.5 MAJOR DEPRESSIVE DISORDER WITH SINGLE EPISODE, IN REMISSION (HCC): ICD-10-CM

## 2022-06-29 DIAGNOSIS — I49.5 SSS (SICK SINUS SYNDROME) (HCC): ICD-10-CM

## 2022-06-29 DIAGNOSIS — C73 THYROID CANCER (HCC): ICD-10-CM

## 2022-06-29 DIAGNOSIS — K44.9 HIATAL HERNIA: ICD-10-CM

## 2022-06-29 DIAGNOSIS — E66.3 OVERWEIGHT: ICD-10-CM

## 2022-06-29 DIAGNOSIS — D45 POLYCYTHEMIA RUBRA VERA (HCC): ICD-10-CM

## 2022-06-29 DIAGNOSIS — Z95.0 CARDIAC PACEMAKER IN SITU: ICD-10-CM

## 2022-06-29 DIAGNOSIS — I10 ESSENTIAL HYPERTENSION: ICD-10-CM

## 2022-06-29 DIAGNOSIS — I21.9 MYOCARDIAL INFARCTION, UNSPECIFIED MI TYPE, UNSPECIFIED ARTERY (HCC): ICD-10-CM

## 2022-06-29 DIAGNOSIS — E89.0 POSTOPERATIVE HYPOTHYROIDISM: ICD-10-CM

## 2022-06-29 DIAGNOSIS — K22.70 BARRETT'S ESOPHAGUS WITHOUT DYSPLASIA: ICD-10-CM

## 2022-06-29 DIAGNOSIS — I25.10 CORONARY ARTERY DISEASE INVOLVING NATIVE CORONARY ARTERY OF NATIVE HEART, UNSPECIFIED WHETHER ANGINA PRESENT: ICD-10-CM

## 2022-06-29 DIAGNOSIS — F02.818 EARLY ONSET ALZHEIMER'S DISEASE WITH BEHAVIORAL DISTURBANCE (HCC): ICD-10-CM

## 2022-06-29 DIAGNOSIS — G30.0 EARLY ONSET ALZHEIMER'S DISEASE WITH BEHAVIORAL DISTURBANCE (HCC): ICD-10-CM

## 2022-06-29 DIAGNOSIS — E78.2 MIXED HYPERLIPIDEMIA: ICD-10-CM

## 2022-06-29 DIAGNOSIS — K21.9 GASTROESOPHAGEAL REFLUX DISEASE WITHOUT ESOPHAGITIS: ICD-10-CM

## 2022-06-29 DIAGNOSIS — R73.01 ELEVATED FASTING GLUCOSE: Primary | ICD-10-CM

## 2022-06-29 NOTE — PROGRESS NOTES
DR. Fabi Rhoades - [facility] VISIT     DATE OF SERVICE: 6/21/22    NURSING HOME:  [facility]     CHIEF COMPLAINT/HISTORY OF CHIEF COMPLAINT: This patient is being seen for ongoing evaluation and management of her Alzheimer's disease, elevated fasting glucose, hypertension, hyperlipidemia, history of thyroid cancer with postoperative hypothyroidism, coronary artery disease, sick sinus syndrome, gastroesophageal reflux disease, Mcdaniel's esophagus, irritable bowel syndrome, polycythemia, depression, and being overweight. Her elevated fasting glucose is controlled with diet and exercise. There are no new complaints at this time. ALLERGIES: No Known Allergies    MEDICATIONS: As noted on the [facility], referenced and incorporated herein. PAST MEDICAL HISTORY:   Past Medical History:   Diagnosis Date    Age-related osteoporosis with current pathological fracture with routine healing     Anxiety 9/6/2019    Mcdaniel esophagus     CAD (coronary artery disease)     nonobstructive    Cardiac pacemaker in situ 4/24/2014    Put in for severe bradycardia.  Dual chamber     Chronic insomnia     Closed right hip fracture, with routine healing, subsequent encounter 8/18/2019    Early onset Alzheimer's disease with behavioral disturbance ([address] 9/6/2019    Elevated fasting glucose 9/6/2019    GERD (gastroesophageal reflux disease)     Hiatal hernia     Hyperlipidemia     Hypertension     Hypothyroidism s/p surgical removal of thyroid for thyroid carcinoma 7/29/2015    IBS (irritable bowel syndrome)     Major depressive disorder with single episode, in remission ([address] 9/6/2019    Mycosis fungoides ([address]     Osteoarthritis     Osteoporosis     Patient in clinical research study 08/13/2020    Enrolled in convalescent plasma for COVID 19 Date of completion 08/16/20    Polycythemia rubra vera [facility])     sees oncology    PUD (peptic ulcer disease)     RV Pacemaker lead malfunction - Revision 3/24/14-Dr. ray 3/24/2014    SSS (sick sinus syndrome) ([facility] 4/24/2014    Thyroid cancer ([facility] about 5 years ago    thyroid /following with oncology    TIA (transient ischemic attack) 7/23/2014    Confusion--word searching    Vitamin D deficiency        PAST SURGICAL HISTORY:   Past Surgical History:   Procedure Laterality Date    BONE MARROW BIOPSY  September 2010    polycythemia vera    BREAST BIOPSY Right     benign    CARDIAC CATHETERIZATION  2008    nonobstructive CAD    CHOLECYSTECTOMY  1995    COLONOSCOPY  2002    COLONOSCOPY  2/29/2016    colon polyp, diverticulosis - Dr Ming Khanna    after miscarriage    ENDOSCOPY, COLON, DIAGNOSTIC  2002    normal  external hemorrhoids    EYE SURGERY Right 11/07/06    Cataract extraction with lens implant    HEMIARTHROPLASTY HIP Right 8/20/2019    RIGHT HIP HEMIARTHROPLASTY performed by Yaakov Martinez MD at [address], THYROID Left     thyroid cancer    PACEMAKER INSERTION  6/2013    [facility], THE SKIN BIOPSY  2003    mycosis fungoides    TAE AND BSO (CERVIX REMOVED)      TAE/BSO at age 54 with anterior repair for benign reasons.     TONSILLECTOMY AND ADENOIDECTOMY      age 27   Zannie Cowden TUBAL LIGATION      UPPER GASTROINTESTINAL ENDOSCOPY  2002    UPPER GASTROINTESTINAL ENDOSCOPY  08/28/2003    UPPER GASTROINTESTINAL ENDOSCOPY  05/12/2003    UPPER GASTROINTESTINAL ENDOSCOPY  03/20/2001    UPPER GASTROINTESTINAL ENDOSCOPY  02/29/2016    Barretts tissue, body polyp - Dr Soha Jefferson       SOCIAL HISTORY:     Tobacco:   Social History     Tobacco Use   Smoking Status Never Smoker   Smokeless Tobacco Never Used   Tobacco Comment    never smoker     Alcohol:   Social History     Substance and Sexual Activity   Alcohol Use No    Alcohol/week: 0.0 standard drinks     Drugs:   Social History     Substance and Sexual Activity   Drug Use No       FAMILY HISTORY: family history includes Cancer (age of onset: 47) in her mother; Depression in her son; Drug Abuse in her son; Emphysema in her sister; Mental Illness in her son; Other in her brother and son; Stroke (age of onset: 76) in her father; Sudden Death (age of onset: 78) in her sister. REVIEW OF SYSTEMS:     Please see history of chief complaint above; otherwise no new problems with respect to General, HEENT, Cardiovascular, Respiratory, Gastrointestinal, Genitourinary, Endocrinologic, Musculoskeletal, or Neuropsychiatric complaints. PHYSICAL EXAMINATION:    Vitals: Temp: 97.7 deg F. Pulse: 70. Resp: 18. BP: 122/64. General: A 80 y.o.  female. Alert and oriented to person and place and questionably oriented to time. She does not appear to be in any acute distress. Skin: Skin color, texture, turgor normal. No rashes or lesions. HEENT/Neck: Essentially unremarkable  Lungs: Normal - CTA without rales, rhonchi, or wheezing. Heart: regular rate and rhythm, S1, S2 normal, no murmur, click, rub or gallop No S3 or S4. Abdomen: Non-obese soft, non-distended, non-tender, normal active bowel sounds, no masses palpated and no hepatosplenomegaly  Extremities: No clubbing, cyanosis, or edema in any of the extremities. Neurologic: cranial nerves II-XII are grossly intact    ASSESSMENT:     Diagnosis Orders   1. Elevated fasting glucose     2. Essential hypertension     3. Mixed hyperlipidemia     4. Coronary artery disease involving native coronary artery of native heart, unspecified whether angina present     5. SSS (sick sinus syndrome) (HCC)     6. Cardiac pacemaker in situ     7. Myocardial infarction, unspecified MI type, unspecified artery ([address]     8. Thyroid cancer ([facility]     9. Postoperative hypothyroidism     10. Gastroesophageal reflux disease without esophagitis     11. Hiatal hernia     12. 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 Irritable bowel syndrome, unspecified type     14.  Polycythemia rubra zhanna ([facility]     15. Age-related osteoporosis with current pathological fracture with routine healing     16. Early onset Alzheimer's disease with behavioral disturbance ([facility]     17. Major depressive disorder with single episode, in remission ([facility]     18. Anxiety     19. Overweight           PLAN:    1. Continue current treatment  2. Nursing home record reviewed and updates summarized and entered into electronic record  3. See nursing home orders and MAR.         Electronically signed by Apple Rivera DO on 6/29/2022 at 12:14 AM  Internal Medicine

## 2022-07-27 ENCOUNTER — TELEPHONE (OUTPATIENT)
Dept: INTERNAL MEDICINE | Age: 87
End: 2022-07-27

## 2022-07-29 ENCOUNTER — TELEPHONE (OUTPATIENT)
Dept: INTERNAL MEDICINE | Age: 87
End: 2022-07-29

## 2022-08-09 ENCOUNTER — TELEPHONE (OUTPATIENT)
Dept: INTERNAL MEDICINE | Age: 87
End: 2022-08-09

## 2022-08-18 ENCOUNTER — OUTSIDE SERVICES (OUTPATIENT)
Dept: INTERNAL MEDICINE | Age: 87
End: 2022-08-18

## 2022-08-18 DIAGNOSIS — B02.9 HERPES ZOSTER WITHOUT COMPLICATION: Primary | ICD-10-CM

## 2022-08-18 NOTE — PROGRESS NOTES
08/18/22  Joe Smith  1934    Patient Resident of [facility]    Chief Complaint:    1. Herpes zoster without complication        HPI:  42-year-old patient being seen at the request of the nursing staff and patient with a cluster of red spots to the right flank. States itches occasionally does have mild burning. Just noticed over the last 48 hours. No new environmental exposures no new medication. No current treatment. Touching seems to make the pain worse      No Known Allergies    Past Medical History:   Diagnosis Date    Age-related osteoporosis with current pathological fracture with routine healing     Anxiety 9/6/2019    Mcdaniel esophagus     CAD (coronary artery disease)     nonobstructive    Cardiac pacemaker in situ 4/24/2014    Put in for severe bradycardia.  Dual chamber     Chronic insomnia     Closed right hip fracture, with routine healing, subsequent encounter 8/18/2019    Early onset Alzheimer's disease with behavioral disturbance ([address] 9/6/2019    Elevated fasting glucose 9/6/2019    GERD (gastroesophageal reflux disease)     Hiatal hernia     Hyperlipidemia     Hypertension     Hypothyroidism s/p surgical removal of thyroid for thyroid carcinoma 7/29/2015    IBS (irritable bowel syndrome)     Major depressive disorder with single episode, in remission ([address] 9/6/2019    Mycosis fungoides ([address]     Osteoarthritis     Osteoporosis     Patient in clinical research study 08/13/2020    Enrolled in convalescent plasma for COVID 19 Date of completion 08/16/20    Polycythemia rubra vera [facility])     sees oncology    PUD (peptic ulcer disease)     RV Pacemaker lead malfunction - Revision 3/24/14-Dr. Preet Arenas 3/24/2014    SSS (sick sinus syndrome) ([address] 4/24/2014    Thyroid cancer ([address] about 5 years ago    thyroid /following with oncology    TIA (transient ischemic attack) 7/23/2014    Confusion--word searching    Vitamin D deficiency        Past Surgical History:   Procedure Laterality Date    BONE MARROW BIOPSY  September 2010    polycythemia vera    BREAST BIOPSY Right     benign    CARDIAC CATHETERIZATION  2008    nonobstructive CAD    CHOLECYSTECTOMY  1995    COLONOSCOPY  2002    COLONOSCOPY  2/29/2016    colon polyp, diverticulosis - Dr Samy Hair    after miscarriage    ENDOSCOPY, COLON, DIAGNOSTIC  2002    normal  external hemorrhoids    EYE SURGERY Right 11/07/06    Cataract extraction with lens implant    HEMIARTHROPLASTY HIP Right 8/20/2019    RIGHT HIP HEMIARTHROPLASTY performed by Huber Mcgregor MD at [address] [facility] Left     thyroid cancer    PACEMAKER INSERTION  6/2013    [facility] [place]    SKIN BIOPSY  2003    mycosis fungoides    TAE AND BSO (CERVIX REMOVED)      TAE/BSO at age 54 with anterior repair for benign reasons. TONSILLECTOMY AND ADENOIDECTOMY      age 27    TUBAL LIGATION      UPPER GASTROINTESTINAL ENDOSCOPY  2002    UPPER GASTROINTESTINAL ENDOSCOPY  08/28/2003    UPPER GASTROINTESTINAL ENDOSCOPY  05/12/2003    UPPER GASTROINTESTINAL ENDOSCOPY  03/20/2001    UPPER GASTROINTESTINAL ENDOSCOPY  02/29/2016    Barretts tissue, body polyp - Dr Meri Carrasco       Medications as per [facility] Care Chart Kiera Tavera     Social History     Socioeconomic History    Marital status:       Spouse name: Not on file    Number of children: Not on file    Years of education: Not on file    Highest education level: Not on file   Occupational History    Occupation: retired     Employer: [employer]    Tobacco Use    Smoking status: Never    Smokeless tobacco: Never    Tobacco comments:     never smoker   Substance and Sexual Activity    Alcohol use: No     Alcohol/week: 0.0 standard drinks    Drug use: No    Sexual activity: Not Currently   Other Topics Concern    Not on file   Social History Narrative    Not on file     Social Determinants of Health     Financial Resource Strain: Not on file   Food Insecurity: Not on file   Transportation Needs: Not on file   Physical Activity: Not on file   Stress: Not on file   Social Connections: Not on file   Intimate Partner Violence: Not on file   Housing Stability: Not on file       Review of Systems   Skin:  Positive for rash. All other systems reviewed and are negative. Physical Exam  Vitals and nursing note reviewed. Constitutional:       General: She is not in acute distress. Appearance: She is well-developed. She is not diaphoretic. HENT:      Head: Normocephalic and atraumatic. Right Ear: External ear normal.      Left Ear: External ear normal.   Eyes:      General:         Right eye: No discharge. Left eye: No discharge. Neck:      Trachea: No tracheal deviation. Cardiovascular:      Rate and Rhythm: Normal rate and regular rhythm. Pulses: Normal pulses. Heart sounds: Normal heart sounds. No murmur heard. Pulmonary:      Effort: Pulmonary effort is normal. No respiratory distress. Breath sounds: Normal breath sounds. No stridor. No wheezing, rhonchi or rales. Chest:      Chest wall: No tenderness. Musculoskeletal:      Right lower leg: No edema. Left lower leg: No edema. Skin:     General: Skin is warm and dry. Coloration: Skin is not pale. Findings: Rash (Mildly erythemic vesicular linear rash to right lateral rib cage consistent with shingles) present. Neurological:      General: No focal deficit present. Mental Status: She is alert and oriented to person, place, and time. Mental status is at baseline. Cranial Nerves: No cranial nerve deficit. Coordination: Coordination normal.   Psychiatric:         Behavior: Behavior normal.         Thought Content: Thought content normal.         Judgment: Judgment normal.       Vital Signs: Temperature afebrile, vital signs reviewed in point click. ,  Stable    Assessment:  1.  Herpes zoster without complication  Famvir 643 mg p.o. 3 times daily for 7 days.  Isolation per facility's protocol      Plan:  As noted above. Follow up for routine visit. Call sooner with concerns prior.     Electronically signed by DAISY Cobb CNP on 8/18/2022 at 4:21 PM

## 2022-08-30 PROCEDURE — 99308 SBSQ NF CARE LOW MDM 20: CPT | Performed by: INTERNAL MEDICINE

## 2022-09-20 PROCEDURE — 99307 SBSQ NF CARE SF MDM 10: CPT | Performed by: INTERNAL MEDICINE

## 2022-10-18 PROCEDURE — 99307 SBSQ NF CARE SF MDM 10: CPT | Performed by: INTERNAL MEDICINE

## 2022-10-31 ENCOUNTER — OUTSIDE SERVICES (OUTPATIENT)
Dept: INTERNAL MEDICINE | Age: 87
End: 2022-10-31
Payer: MEDICARE

## 2022-10-31 DIAGNOSIS — D45 POLYCYTHEMIA RUBRA VERA (HCC): ICD-10-CM

## 2022-10-31 DIAGNOSIS — E89.0 POSTOPERATIVE HYPOTHYROIDISM: ICD-10-CM

## 2022-10-31 DIAGNOSIS — R73.01 ELEVATED FASTING GLUCOSE: ICD-10-CM

## 2022-10-31 DIAGNOSIS — K21.9 GASTROESOPHAGEAL REFLUX DISEASE WITHOUT ESOPHAGITIS: ICD-10-CM

## 2022-10-31 DIAGNOSIS — F02.818 EARLY ONSET ALZHEIMER'S DISEASE WITH BEHAVIORAL DISTURBANCE (HCC): ICD-10-CM

## 2022-10-31 DIAGNOSIS — K22.70 BARRETT'S ESOPHAGUS WITHOUT DYSPLASIA: ICD-10-CM

## 2022-10-31 DIAGNOSIS — F32.5 MAJOR DEPRESSIVE DISORDER WITH SINGLE EPISODE, IN REMISSION (HCC): ICD-10-CM

## 2022-10-31 DIAGNOSIS — I49.5 SSS (SICK SINUS SYNDROME) (HCC): ICD-10-CM

## 2022-10-31 DIAGNOSIS — Z95.0 CARDIAC PACEMAKER IN SITU: ICD-10-CM

## 2022-10-31 DIAGNOSIS — E78.2 MIXED HYPERLIPIDEMIA: ICD-10-CM

## 2022-10-31 DIAGNOSIS — K58.9 IRRITABLE BOWEL SYNDROME, UNSPECIFIED TYPE: ICD-10-CM

## 2022-10-31 DIAGNOSIS — K44.9 HIATAL HERNIA: ICD-10-CM

## 2022-10-31 DIAGNOSIS — C73 THYROID CANCER (HCC): ICD-10-CM

## 2022-10-31 DIAGNOSIS — I25.10 CORONARY ARTERY DISEASE INVOLVING NATIVE CORONARY ARTERY OF NATIVE HEART, UNSPECIFIED WHETHER ANGINA PRESENT: ICD-10-CM

## 2022-10-31 DIAGNOSIS — U07.1 COVID-19: Primary | ICD-10-CM

## 2022-10-31 DIAGNOSIS — G30.0 EARLY ONSET ALZHEIMER'S DISEASE WITH BEHAVIORAL DISTURBANCE (HCC): ICD-10-CM

## 2022-10-31 DIAGNOSIS — I10 ESSENTIAL HYPERTENSION: ICD-10-CM

## 2022-10-31 DIAGNOSIS — F41.9 ANXIETY: ICD-10-CM

## 2022-10-31 DIAGNOSIS — M80.00XD AGE-RELATED OSTEOPOROSIS WITH CURRENT PATHOLOGICAL FRACTURE WITH ROUTINE HEALING: ICD-10-CM

## 2022-10-31 DIAGNOSIS — E66.3 OVERWEIGHT: ICD-10-CM

## 2022-10-31 DIAGNOSIS — I21.9 MYOCARDIAL INFARCTION, UNSPECIFIED MI TYPE, UNSPECIFIED ARTERY (HCC): ICD-10-CM

## 2022-10-31 NOTE — PROGRESS NOTES
DR. Horacio Paz - [facility] VISIT     DATE OF SERVICE: 8/30/22    [facility]SING [facility]     CHIEF COMPLAINT/HISTORY OF CHIEF COMPLAINT: This patient is being seen for ongoing evaluation and management of her Alzheimer's disease, elevated fasting glucose, hypertension, hyperlipidemia, history of thyroid cancer with postoperative hypothyroidism, coronary artery disease, sick sinus syndrome, gastroesophageal reflux disease, Mcdaniel's esophagus, irritable bowel syndrome, polycythemia, depression, and being overweight. Her elevated fasting glucose is controlled with diet and exercise. She tested positive for COVID-19 on July 29, 2022. She is doing better since then. There are no other complaints. ALLERGIES: No Known Allergies    MEDICATIONS: As noted on the [facility], referenced and incorporated herein. PAST MEDICAL HISTORY:   Past Medical History:   Diagnosis Date    Age-related osteoporosis with current pathological fracture with routine healing     Anxiety 9/6/2019    Mcdaniel esophagus     CAD (coronary artery disease)     nonobstructive    Cardiac pacemaker in situ 4/24/2014    Put in for severe bradycardia.  Dual chamber     Chronic insomnia     Closed right hip fracture, with routine healing, subsequent encounter 8/18/2019    Early onset Alzheimer's disease with behavioral disturbance ([address] 9/6/2019    Elevated fasting glucose 9/6/2019    GERD (gastroesophageal reflux disease)     Hiatal hernia     Hyperlipidemia     Hypertension     Hypothyroidism s/p surgical removal of thyroid for thyroid carcinoma 7/29/2015    IBS (irritable bowel syndrome)     Major depressive disorder with single episode, in remission ([address] 9/6/2019    Mycosis fungoides ([address]     Osteoarthritis     Osteoporosis     Patient in clinical research study 08/13/2020    Enrolled in convalescent plasma for COVID 19 Date of completion 08/16/20    Polycythemia rubra vera ([address]     sees oncology    PUD (peptic ulcer disease)     RV Pacemaker lead malfunction - Revision 3/24/14-Dr. ray 3/24/2014    SSS (sick sinus syndrome) ([facility] 4/24/2014    Thyroid cancer ([facility] about 5 years ago    thyroid /following with oncology    TIA (transient ischemic attack) 7/23/2014    Confusion--word searching    Vitamin D deficiency        PAST SURGICAL HISTORY:   Past Surgical History:   Procedure Laterality Date    BONE MARROW BIOPSY  September 2010    polycythemia vera    BREAST BIOPSY Right     benign    CARDIAC CATHETERIZATION  2008    nonobstructive CAD    CHOLECYSTECTOMY  1995    COLONOSCOPY  2002    COLONOSCOPY  2/29/2016    colon polyp, diverticulosis - Dr Cindi Dillon    after miscarriage    ENDOSCOPY, COLON, DIAGNOSTIC  2002    normal  external hemorrhoids    EYE SURGERY Right 11/07/06    Cataract extraction with lens implant    HEMIARTHROPLASTY HIP Right 8/20/2019    RIGHT HIP HEMIARTHROPLASTY performed by Leni Smith MD at [address] [facility] THYROID Left     thyroid cancer    PACEMAKER INSERTION  6/2013    [facility]    SKIN BIOPSY  2003    mycosis fungoides    TAE AND BSO (CERVIX REMOVED)      TAE/BSO at age 54 with anterior repair for benign reasons.     TONSILLECTOMY AND ADENOIDECTOMY      age 27    TUBAL LIGATION      UPPER GASTROINTESTINAL ENDOSCOPY  2002    UPPER GASTROINTESTINAL ENDOSCOPY  08/28/2003    UPPER GASTROINTESTINAL ENDOSCOPY  05/12/2003    UPPER GASTROINTESTINAL ENDOSCOPY  03/20/2001    UPPER GASTROINTESTINAL ENDOSCOPY  02/29/2016    Barretts tissue, body polyp - Dr Jennifer Robledo       SOCIAL HISTORY:     Tobacco:   Social History     Tobacco Use   Smoking Status Never   Smokeless Tobacco Never   Tobacco Comments    never smoker     Alcohol:   Social History     Substance and Sexual Activity   Alcohol Use No    Alcohol/week: 0.0 standard drinks     Drugs:   Social History     Substance and Sexual Activity   Drug Use No       FAMILY HISTORY: family history includes Cancer (age of onset: 47) in her mother; Depression in her son; Drug Abuse in her son; Emphysema in her sister; Mental Illness in her son; Other in her brother and son; Stroke (age of onset: 76) in her father; Sudden Death (age of onset: 78) in her sister. REVIEW OF SYSTEMS:     Please see history of chief complaint above; otherwise no new problems with respect to General, HEENT, Cardiovascular, Respiratory, Gastrointestinal, Genitourinary, Endocrinologic, Musculoskeletal, or Neuropsychiatric complaints. PHYSICAL EXAMINATION:    Vitals: Temp: 97.1 deg F. Pulse: 66. Resp: 17. BP: 132/69. General: A 80 y.o.  female. Alert and oriented to person and place and questionably oriented to time. She does not appear to be in any acute distress. Skin: Skin color, texture, turgor normal. No rashes or lesions. HEENT/Neck: Essentially unremarkable  Lungs: Normal - CTA without rales, rhonchi, or wheezing. Heart: regular rate and rhythm, S1, S2 normal, no murmur, click, rub or gallop No S3 or S4. Abdomen: Non-obese soft, non-distended, non-tender, normal active bowel sounds, no masses palpated and no hepatosplenomegaly  Extremities: No clubbing, cyanosis, or edema in any of the extremities. Neurologic: cranial nerves II-XII are grossly intact    ASSESSMENT:    1. COVID-19    2. Elevated fasting glucose    3. Essential hypertension    4. Mixed hyperlipidemia    5. Coronary artery disease involving native coronary artery of native heart, unspecified whether angina present    6. SSS (sick sinus syndrome) (HCC)    7. Cardiac pacemaker in situ    8. Myocardial infarction, unspecified MI type, unspecified artery ([facility]    9. [facility] ([facility]    10. Postoperative hypothyroidism    11. Gastroesophageal reflux disease without esophagitis    12. Hiatal hernia    13. 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 Irritable bowel syndrome, unspecified type    15.  Polycythemia rubra vera ([facility] 16. Age-related osteoporosis with current pathological fracture with routine healing    17. Early onset Alzheimer's disease with behavioral disturbance ([facility]    18. Major depressive disorder with single episode, in remission ([facility]    19. Anxiety    20. Overweight          PLAN:    1. Continue current treatment  2. Nursing home record reviewed and updates summarized and entered into electronic record  3. See nursing home orders and MAR.         Electronically signed by Loretta Dupont DO on 10/31/2022 at 1:59 AM  Internal Medicine

## 2022-11-22 ENCOUNTER — OUTSIDE SERVICES (OUTPATIENT)
Dept: INTERNAL MEDICINE | Age: 87
End: 2022-11-22
Payer: MEDICARE

## 2022-11-22 DIAGNOSIS — I21.9 MYOCARDIAL INFARCTION, UNSPECIFIED MI TYPE, UNSPECIFIED ARTERY (HCC): ICD-10-CM

## 2022-11-22 DIAGNOSIS — I49.5 SSS (SICK SINUS SYNDROME) (HCC): ICD-10-CM

## 2022-11-22 DIAGNOSIS — F32.5 MAJOR DEPRESSIVE DISORDER WITH SINGLE EPISODE, IN REMISSION (HCC): ICD-10-CM

## 2022-11-22 DIAGNOSIS — K44.9 HIATAL HERNIA: ICD-10-CM

## 2022-11-22 DIAGNOSIS — I25.10 CORONARY ARTERY DISEASE INVOLVING NATIVE CORONARY ARTERY OF NATIVE HEART, UNSPECIFIED WHETHER ANGINA PRESENT: ICD-10-CM

## 2022-11-22 DIAGNOSIS — K58.9 IRRITABLE BOWEL SYNDROME, UNSPECIFIED TYPE: ICD-10-CM

## 2022-11-22 DIAGNOSIS — M80.00XD AGE-RELATED OSTEOPOROSIS WITH CURRENT PATHOLOGICAL FRACTURE WITH ROUTINE HEALING: ICD-10-CM

## 2022-11-22 DIAGNOSIS — E66.3 OVERWEIGHT: ICD-10-CM

## 2022-11-22 DIAGNOSIS — I10 ESSENTIAL HYPERTENSION: ICD-10-CM

## 2022-11-22 DIAGNOSIS — K22.70 BARRETT'S ESOPHAGUS WITHOUT DYSPLASIA: ICD-10-CM

## 2022-11-22 DIAGNOSIS — Z95.0 CARDIAC PACEMAKER IN SITU: ICD-10-CM

## 2022-11-22 DIAGNOSIS — K21.9 GASTROESOPHAGEAL REFLUX DISEASE WITHOUT ESOPHAGITIS: ICD-10-CM

## 2022-11-22 DIAGNOSIS — R73.01 ELEVATED FASTING GLUCOSE: Primary | ICD-10-CM

## 2022-11-22 DIAGNOSIS — F02.818 EARLY ONSET ALZHEIMER'S DISEASE WITH BEHAVIORAL DISTURBANCE (HCC): ICD-10-CM

## 2022-11-22 DIAGNOSIS — G30.0 EARLY ONSET ALZHEIMER'S DISEASE WITH BEHAVIORAL DISTURBANCE (HCC): ICD-10-CM

## 2022-11-22 DIAGNOSIS — C73 THYROID CANCER (HCC): ICD-10-CM

## 2022-11-22 DIAGNOSIS — D45 POLYCYTHEMIA RUBRA VERA (HCC): ICD-10-CM

## 2022-11-22 DIAGNOSIS — E89.0 POSTOPERATIVE HYPOTHYROIDISM: ICD-10-CM

## 2022-11-22 DIAGNOSIS — E78.2 MIXED HYPERLIPIDEMIA: ICD-10-CM

## 2022-11-22 DIAGNOSIS — F41.9 ANXIETY: ICD-10-CM

## 2022-11-22 PROCEDURE — 99307 SBSQ NF CARE SF MDM 10: CPT | Performed by: INTERNAL MEDICINE

## 2022-11-22 NOTE — PROGRESS NOTES
DR. Lauren Bass - [facility] VISIT     DATE OF SERVICE: 9/20/22    NURSING HOME: [facility]     CHIEF COMPLAINT/HISTORY OF CHIEF COMPLAINT: This patient is being seen for ongoing evaluation and management of her Alzheimer's disease, elevated fasting glucose, hypertension, hyperlipidemia, history of thyroid cancer with postoperative hypothyroidism, coronary artery disease, sick sinus syndrome, gastroesophageal reflux disease, Mcdaniel's esophagus, irritable bowel syndrome, polycythemia, depression, and being overweight. Her elevated fasting glucose is controlled with diet and exercise. There are no new complaints at this time. ALLERGIES: No Known Allergies    MEDICATIONS: As noted on the [facility], referenced and incorporated herein. PAST MEDICAL HISTORY:   Past Medical History:   Diagnosis Date    Age-related osteoporosis with current pathological fracture with routine healing     Anxiety 9/6/2019    Mcdaniel esophagus     CAD (coronary artery disease)     nonobstructive    Cardiac pacemaker in situ 4/24/2014    Put in for severe bradycardia.  Dual chamber     Chronic insomnia     Closed right hip fracture, with routine healing, subsequent encounter 8/18/2019    Early onset Alzheimer's disease with behavioral disturbance ([address] 9/6/2019    Elevated fasting glucose 9/6/2019    GERD (gastroesophageal reflux disease)     Hiatal hernia     Hyperlipidemia     Hypertension     Hypothyroidism s/p surgical removal of thyroid for thyroid carcinoma 7/29/2015    IBS (irritable bowel syndrome)     Major depressive disorder with single episode, in remission ([address] 9/6/2019    Mycosis fungoides ([address]     Osteoarthritis     Osteoporosis     Patient in clinical research study 08/13/2020    Enrolled in convalescent plasma for COVID 19 Date of completion 08/16/20    Polycythemia rubra vera [facility])     sees oncology    PUD (peptic ulcer disease)     RV Pacemaker lead malfunction - Revision 3/24/14-Dr. ray 3/24/2014    SSS (sick sinus syndrome) ([facility] 4/24/2014    Thyroid cancer ([facility] about 5 years ago    thyroid /following with oncology    TIA (transient ischemic attack) 7/23/2014    Confusion--word searching    Vitamin D deficiency        PAST SURGICAL HISTORY:   Past Surgical History:   Procedure Laterality Date    BONE MARROW BIOPSY  September 2010    polycythemia vera    BREAST BIOPSY Right     benign    CARDIAC CATHETERIZATION  2008    nonobstructive CAD    CHOLECYSTECTOMY  1995    COLONOSCOPY  2002    COLONOSCOPY  2/29/2016    colon polyp, diverticulosis - Dr Cristal Grigsby    after miscarriage    ENDOSCOPY, COLON, DIAGNOSTIC  2002    normal  external hemorrhoids    EYE SURGERY Right 11/07/06    Cataract extraction with lens implant    HEMIARTHROPLASTY HIP Right 8/20/2019    RIGHT HIP HEMIARTHROPLASTY performed by Julito Jung MD at [address] [place] [address], THYROID Left     thyroid cancer    PACEMAKER INSERTION  6/2013    [facility] [place]    SKIN BIOPSY  2003    mycosis fungoides    TAE AND BSO (CERVIX REMOVED)      TAE/BSO at age 54 with anterior repair for benign reasons.     TONSILLECTOMY AND ADENOIDECTOMY      age 27    TUBAL LIGATION      UPPER GASTROINTESTINAL ENDOSCOPY  2002    UPPER GASTROINTESTINAL ENDOSCOPY  08/28/2003    UPPER GASTROINTESTINAL ENDOSCOPY  05/12/2003    UPPER GASTROINTESTINAL ENDOSCOPY  03/20/2001    UPPER GASTROINTESTINAL ENDOSCOPY  02/29/2016    Barretts tissue, body polyp - Dr Dinora Renee       SOCIAL HISTORY:     Tobacco:   Social History     Tobacco Use   Smoking Status Never   Smokeless Tobacco Never   Tobacco Comments    never smoker     Alcohol:   Social History     Substance and Sexual Activity   Alcohol Use No    Alcohol/week: 0.0 standard drinks     Drugs:   Social History     Substance and Sexual Activity   Drug Use No       FAMILY HISTORY: family history includes Cancer (age of onset: 47) in her mother; Depression in her son; Drug Abuse in her son; Emphysema in her sister; Mental Illness in her son; Other in her brother and son; Stroke (age of onset: 76) in her father; Sudden Death (age of onset: 78) in her sister. REVIEW OF SYSTEMS:     Please see history of chief complaint above; otherwise no new problems with respect to General, HEENT, Cardiovascular, Respiratory, Gastrointestinal, Genitourinary, Endocrinologic, Musculoskeletal, or Neuropsychiatric complaints. PHYSICAL EXAMINATION:    Vitals: Temp: 97.6 deg F. Pulse: 72. Resp: 16. BP: 118/72. General: A 80 y.o.  female. Alert and oriented to person and place and questionably oriented to time. She does not appear to be in any acute distress. Skin: Skin color, texture, turgor normal. No rashes or lesions. HEENT/Neck: Essentially unremarkable  Lungs: Normal - CTA without rales, rhonchi, or wheezing. Heart: regular rate and rhythm, S1, S2 normal, no murmur, click, rub or gallop No S3 or S4. Abdomen: Non-obese soft, non-distended, non-tender, normal active bowel sounds, no masses palpated and no hepatosplenomegaly  Extremities: No clubbing, cyanosis, or edema in any of the extremities. Neurologic: cranial nerves II-XII are grossly intact    ASSESSMENT:    1. Elevated fasting glucose    2. Essential hypertension    3. Mixed hyperlipidemia    4. Coronary artery disease involving native coronary artery of native heart, unspecified whether angina present    5. SSS (sick sinus syndrome) (HCC)    6. Cardiac pacemaker in situ    7. Myocardial infarction, unspecified MI type, unspecified artery ([address]    8. Thyroid cancer ([address]    9. Postoperative hypothyroidism    10. Gastroesophageal reflux disease without esophagitis    11. Hiatal hernia    12. 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 Irritable bowel syndrome, unspecified type    14. Polycythemia rubra vera ([address]    15. Age-related osteoporosis with current pathological fracture with routine healing    16.

## 2022-11-28 ENCOUNTER — OUTSIDE SERVICES (OUTPATIENT)
Dept: INTERNAL MEDICINE | Age: 87
End: 2022-11-28
Payer: MEDICARE

## 2022-11-28 DIAGNOSIS — K44.9 HIATAL HERNIA: ICD-10-CM

## 2022-11-28 DIAGNOSIS — F02.818 EARLY ONSET ALZHEIMER'S DISEASE WITH BEHAVIORAL DISTURBANCE (HCC): ICD-10-CM

## 2022-11-28 DIAGNOSIS — F32.5 MAJOR DEPRESSIVE DISORDER WITH SINGLE EPISODE, IN REMISSION (HCC): ICD-10-CM

## 2022-11-28 DIAGNOSIS — F41.9 ANXIETY: ICD-10-CM

## 2022-11-28 DIAGNOSIS — E89.0 POSTOPERATIVE HYPOTHYROIDISM: ICD-10-CM

## 2022-11-28 DIAGNOSIS — I21.9 MYOCARDIAL INFARCTION, UNSPECIFIED MI TYPE, UNSPECIFIED ARTERY (HCC): ICD-10-CM

## 2022-11-28 DIAGNOSIS — I25.10 CORONARY ARTERY DISEASE INVOLVING NATIVE CORONARY ARTERY OF NATIVE HEART, UNSPECIFIED WHETHER ANGINA PRESENT: ICD-10-CM

## 2022-11-28 DIAGNOSIS — C73 THYROID CANCER (HCC): ICD-10-CM

## 2022-11-28 DIAGNOSIS — E78.2 MIXED HYPERLIPIDEMIA: ICD-10-CM

## 2022-11-28 DIAGNOSIS — I10 ESSENTIAL HYPERTENSION: ICD-10-CM

## 2022-11-28 DIAGNOSIS — Z95.0 CARDIAC PACEMAKER IN SITU: ICD-10-CM

## 2022-11-28 DIAGNOSIS — D45 POLYCYTHEMIA RUBRA VERA (HCC): ICD-10-CM

## 2022-11-28 DIAGNOSIS — G30.0 EARLY ONSET ALZHEIMER'S DISEASE WITH BEHAVIORAL DISTURBANCE (HCC): ICD-10-CM

## 2022-11-28 DIAGNOSIS — R73.01 ELEVATED FASTING GLUCOSE: Primary | ICD-10-CM

## 2022-11-28 DIAGNOSIS — I49.5 SSS (SICK SINUS SYNDROME) (HCC): ICD-10-CM

## 2022-11-28 DIAGNOSIS — E66.3 OVERWEIGHT: ICD-10-CM

## 2022-11-28 DIAGNOSIS — K21.9 GASTROESOPHAGEAL REFLUX DISEASE WITHOUT ESOPHAGITIS: ICD-10-CM

## 2022-11-28 DIAGNOSIS — K58.9 IRRITABLE BOWEL SYNDROME, UNSPECIFIED TYPE: ICD-10-CM

## 2022-11-28 DIAGNOSIS — K22.70 BARRETT'S ESOPHAGUS WITHOUT DYSPLASIA: ICD-10-CM

## 2022-11-28 DIAGNOSIS — M80.00XD AGE-RELATED OSTEOPOROSIS WITH CURRENT PATHOLOGICAL FRACTURE WITH ROUTINE HEALING: ICD-10-CM

## 2022-11-29 NOTE — PROGRESS NOTES
DR. Jacobo [facility] VISIT     DATE OF SERVICE: 10/18/22    NURSING HOME: [facility]     CHIEF COMPLAINT/HISTORY OF CHIEF COMPLAINT: This patient is being seen for ongoing evaluation and management of her Alzheimer's disease, elevated fasting glucose, hypertension, hyperlipidemia, history of thyroid cancer with postoperative hypothyroidism, coronary artery disease, sick sinus syndrome, gastroesophageal reflux disease, Mcdaniel's esophagus, irritable bowel syndrome, polycythemia, depression, and being overweight. She was recently transferred to [address] after the [facility] facility closed. She seems to be settling in to the new facility without difficulty. Her elevated fasting glucose is controlled with diet and exercise. There are no new complaints. ALLERGIES: No Known Allergies    MEDICATIONS: As noted on the [facility], referenced and incorporated herein. PAST MEDICAL HISTORY:   Past Medical History:   Diagnosis Date    Age-related osteoporosis with current pathological fracture with routine healing     Anxiety 9/6/2019    Mcdaniel esophagus     CAD (coronary artery disease)     nonobstructive    Cardiac pacemaker in situ 4/24/2014    Put in for severe bradycardia.  Dual chamber     Chronic insomnia     Closed right hip fracture, with routine healing, subsequent encounter 8/18/2019    Early onset Alzheimer's disease with behavioral disturbance ([address] 9/6/2019    Elevated fasting glucose 9/6/2019    GERD (gastroesophageal reflux disease)     Hiatal hernia     Hyperlipidemia     Hypertension     Hypothyroidism s/p surgical removal of thyroid for thyroid carcinoma 7/29/2015    IBS (irritable bowel syndrome)     Major depressive disorder with single episode, in remission ([address] 9/6/2019    Mycosis fungoides ([address]     Osteoarthritis     Osteoporosis     Patient in clinical research study 08/13/2020    Enrolled in convalescent plasma for COVID 19 Date of completion 08/16/20    Polycythemia rubra vera [facility])     sees oncology    PUD (peptic ulcer disease)     RV Pacemaker lead malfunction - Revision 3/24/14-Dr. ray 3/24/2014    SSS (sick sinus syndrome) ([facility] 4/24/2014    Thyroid cancer ([facility] about 5 years ago    thyroid /following with oncology    TIA (transient ischemic attack) 7/23/2014    Confusion--word searching    Vitamin D deficiency        PAST SURGICAL HISTORY:   Past Surgical History:   Procedure Laterality Date    BONE MARROW BIOPSY  September 2010    polycythemia vera    BREAST BIOPSY Right     benign    CARDIAC CATHETERIZATION  2008    nonobstructive CAD    CHOLECYSTECTOMY  1995    COLONOSCOPY  2002    COLONOSCOPY  2/29/2016    colon polyp, diverticulosis - Dr Leroy Liner    after miscarriage    ENDOSCOPY, COLON, DIAGNOSTIC  2002    normal  external hemorrhoids    EYE SURGERY Right 11/07/06    Cataract extraction with lens implant    HEMIARTHROPLASTY HIP Right 8/20/2019    RIGHT HIP HEMIARTHROPLASTY performed by Rajendra Amos MD at [address] [place] [address], [facility] Left     thyroid cancer    PACEMAKER INSERTION  6/2013    [facility]    SKIN BIOPSY  2003    mycosis fungoides    TAE AND BSO (CERVIX REMOVED)      TAE/BSO at age 54 with anterior repair for benign reasons.     TONSILLECTOMY AND ADENOIDECTOMY      age 27    TUBAL LIGATION      UPPER GASTROINTESTINAL ENDOSCOPY  2002    UPPER GASTROINTESTINAL ENDOSCOPY  08/28/2003    UPPER GASTROINTESTINAL ENDOSCOPY  05/12/2003    UPPER GASTROINTESTINAL ENDOSCOPY  03/20/2001    UPPER GASTROINTESTINAL ENDOSCOPY  02/29/2016    Barretts tissue, body polyp - Dr Babs Bumpers       SOCIAL HISTORY:     Tobacco:   Social History     Tobacco Use   Smoking Status Never   Smokeless Tobacco Never   Tobacco Comments    never smoker     Alcohol:   Social History     Substance and Sexual Activity   Alcohol Use No    Alcohol/week: 0.0 standard drinks Drugs:   Social History     Substance and Sexual Activity   Drug Use No       FAMILY HISTORY: family history includes Cancer (age of onset: 47) in her mother; Depression in her son; Drug Abuse in her son; Emphysema in her sister; Mental Illness in her son; Other in her brother and son; Stroke (age of onset: 76) in her father; Sudden Death (age of onset: 78) in her sister. REVIEW OF SYSTEMS:     Please see history of chief complaint above; otherwise no new problems with respect to General, HEENT, Cardiovascular, Respiratory, Gastrointestinal, Genitourinary, Endocrinologic, Musculoskeletal, or Neuropsychiatric complaints. PHYSICAL EXAMINATION:    Vitals: Temp: 97.8 deg F. Pulse: 81. Resp: 16. BP: 122/68. General: A 80 y.o.  female. Alert and oriented to person and place and questionably oriented to time. She does not appear to be in any acute distress. Skin: Skin color, texture, turgor normal. No rashes or lesions. HEENT/Neck: Essentially unremarkable  Lungs: Normal - CTA without rales, rhonchi, or wheezing. Heart: regular rate and rhythm, S1, S2 normal, no murmur, click, rub or gallop No S3 or S4. Abdomen: Non-obese soft, non-distended, non-tender, normal active bowel sounds, no masses palpated and no hepatosplenomegaly  Extremities: No clubbing, cyanosis, or edema in any of the extremities. Neurologic: cranial nerves II-XII are grossly intact    ASSESSMENT:    1. Elevated fasting glucose    2. Essential hypertension    3. Mixed hyperlipidemia    4. Coronary artery disease involving native coronary artery of native heart, unspecified whether angina present    5. SSS (sick sinus syndrome) (HCC)    6. Cardiac pacemaker in situ    7. Myocardial infarction, unspecified MI type, unspecified artery ([address]    8. Thyroid cancer ([facility]    9. Postoperative hypothyroidism    10. Gastroesophageal reflux disease without esophagitis    11. Hiatal hernia    12. 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 Irritable bowel syndrome, unspecified type    14. Polycythemia rubra vera ([facility]    15. Age-related osteoporosis with current pathological fracture with routine healing    16. Early onset Alzheimer's disease with behavioral disturbance ([facility]    17. Major depressive disorder with single episode, in remission ([facility]    18. Anxiety    19. Overweight        PLAN:    1. Continue current treatment  2. Nursing home record reviewed and updates summarized and entered into electronic record  3. See nursing home orders and MAR.         Electronically signed by Adalid Duque DO on 11/28/2022 at 11:49 PM  Internal Medicine

## 2022-12-05 ENCOUNTER — OUTSIDE SERVICES (OUTPATIENT)
Dept: INTERNAL MEDICINE | Age: 87
End: 2022-12-05
Payer: MEDICARE

## 2022-12-05 DIAGNOSIS — K58.9 IRRITABLE BOWEL SYNDROME, UNSPECIFIED TYPE: ICD-10-CM

## 2022-12-05 DIAGNOSIS — Z95.0 CARDIAC PACEMAKER IN SITU: ICD-10-CM

## 2022-12-05 DIAGNOSIS — K44.9 HIATAL HERNIA: ICD-10-CM

## 2022-12-05 DIAGNOSIS — E78.2 MIXED HYPERLIPIDEMIA: ICD-10-CM

## 2022-12-05 DIAGNOSIS — D45 POLYCYTHEMIA RUBRA VERA (HCC): ICD-10-CM

## 2022-12-05 DIAGNOSIS — G30.0 EARLY ONSET ALZHEIMER'S DISEASE WITH BEHAVIORAL DISTURBANCE (HCC): ICD-10-CM

## 2022-12-05 DIAGNOSIS — F02.818 EARLY ONSET ALZHEIMER'S DISEASE WITH BEHAVIORAL DISTURBANCE (HCC): ICD-10-CM

## 2022-12-05 DIAGNOSIS — I25.10 CORONARY ARTERY DISEASE INVOLVING NATIVE CORONARY ARTERY OF NATIVE HEART, UNSPECIFIED WHETHER ANGINA PRESENT: ICD-10-CM

## 2022-12-05 DIAGNOSIS — R73.01 ELEVATED FASTING GLUCOSE: Primary | ICD-10-CM

## 2022-12-05 DIAGNOSIS — E66.3 OVERWEIGHT: ICD-10-CM

## 2022-12-05 DIAGNOSIS — E89.0 POSTOPERATIVE HYPOTHYROIDISM: ICD-10-CM

## 2022-12-05 DIAGNOSIS — F41.9 ANXIETY: ICD-10-CM

## 2022-12-05 DIAGNOSIS — I21.9 MYOCARDIAL INFARCTION, UNSPECIFIED MI TYPE, UNSPECIFIED ARTERY (HCC): ICD-10-CM

## 2022-12-05 DIAGNOSIS — I10 ESSENTIAL HYPERTENSION: ICD-10-CM

## 2022-12-05 DIAGNOSIS — K22.70 BARRETT'S ESOPHAGUS WITHOUT DYSPLASIA: ICD-10-CM

## 2022-12-05 DIAGNOSIS — K21.9 GASTROESOPHAGEAL REFLUX DISEASE WITHOUT ESOPHAGITIS: ICD-10-CM

## 2022-12-05 DIAGNOSIS — C73 THYROID CANCER (HCC): ICD-10-CM

## 2022-12-05 DIAGNOSIS — M80.00XD AGE-RELATED OSTEOPOROSIS WITH CURRENT PATHOLOGICAL FRACTURE WITH ROUTINE HEALING: ICD-10-CM

## 2022-12-05 DIAGNOSIS — I49.5 SSS (SICK SINUS SYNDROME) (HCC): ICD-10-CM

## 2022-12-05 DIAGNOSIS — F32.5 MAJOR DEPRESSIVE DISORDER WITH SINGLE EPISODE, IN REMISSION (HCC): ICD-10-CM

## 2022-12-06 NOTE — PROGRESS NOTES
DR. Richard Parry - [facility] VISIT     DATE OF SERVICE: 11/22/22    NURSING HOME: [place] [address]     CHIEF COMPLAINT/HISTORY OF CHIEF COMPLAINT: This patient is being seen for ongoing evaluation and management of her Alzheimer's disease, elevated fasting glucose, hypertension, hyperlipidemia, history of thyroid cancer with postoperative hypothyroidism, coronary artery disease, sick sinus syndrome, gastroesophageal reflux disease, Mcdaniel's esophagus, irritable bowel syndrome, polycythemia, depression, and being overweight. Her elevated fasting glucose is controlled with diet and exercise. There are no new complaints at this time. ALLERGIES: No Known Allergies    MEDICATIONS: As noted on the [facility], referenced and incorporated herein. PAST MEDICAL HISTORY:   Past Medical History:   Diagnosis Date    Age-related osteoporosis with current pathological fracture with routine healing     Anxiety 9/6/2019    Mcdaniel esophagus     CAD (coronary artery disease)     nonobstructive    Cardiac pacemaker in situ 4/24/2014    Put in for severe bradycardia.  Dual chamber     Chronic insomnia     Closed right hip fracture, with routine healing, subsequent encounter 8/18/2019    Early onset Alzheimer's disease with behavioral disturbance ([address] 9/6/2019    Elevated fasting glucose 9/6/2019    GERD (gastroesophageal reflux disease)     Hiatal hernia     Hyperlipidemia     Hypertension     Hypothyroidism s/p surgical removal of thyroid for thyroid carcinoma 7/29/2015    IBS (irritable bowel syndrome)     Major depressive disorder with single episode, in remission ([address] 9/6/2019    Mycosis fungoides ([address]     Osteoarthritis     Osteoporosis     Patient in clinical research study 08/13/2020    Enrolled in convalescent plasma for COVID 19 Date of completion 08/16/20    Polycythemia rubra vera ([address]     sees oncology    PUD (peptic ulcer disease)     RV Pacemaker lead malfunction - Revision 3/24/14-Dr. ray 3/24/2014    SSS (sick sinus syndrome) ([facility] 4/24/2014    Thyroid cancer ([facility] about 5 years ago    thyroid /following with oncology    TIA (transient ischemic attack) 7/23/2014    Confusion--word searching    Vitamin D deficiency        PAST SURGICAL HISTORY:   Past Surgical History:   Procedure Laterality Date    BONE MARROW BIOPSY  September 2010    polycythemia vera    BREAST BIOPSY Right     benign    CARDIAC CATHETERIZATION  2008    nonobstructive CAD    CHOLECYSTECTOMY  1995    COLONOSCOPY  2002    COLONOSCOPY  2/29/2016    colon polyp, diverticulosis - Dr Cindy Prasad    after miscarriage    ENDOSCOPY, COLON, DIAGNOSTIC  2002    normal  external hemorrhoids    EYE SURGERY Right 11/07/06    Cataract extraction with lens implant    HEMIARTHROPLASTY HIP Right 8/20/2019    RIGHT HIP HEMIARTHROPLASTY performed by Riky Terry MD at [address] [place] [address], THYROID Left     thyroid cancer    PACEMAKER INSERTION  6/2013    [place] [place]    SKIN BIOPSY  2003    mycosis fungoides    TAE AND BSO (CERVIX REMOVED)      TAE/BSO at age 54 with anterior repair for benign reasons.     TONSILLECTOMY AND ADENOIDECTOMY      age 27    TUBAL LIGATION      UPPER GASTROINTESTINAL ENDOSCOPY  2002    UPPER GASTROINTESTINAL ENDOSCOPY  08/28/2003    UPPER GASTROINTESTINAL ENDOSCOPY  05/12/2003    UPPER GASTROINTESTINAL ENDOSCOPY  03/20/2001    UPPER GASTROINTESTINAL ENDOSCOPY  02/29/2016    Barretts tissue, body polyp - Dr Abbe Augustin       SOCIAL HISTORY:     Tobacco:   Social History     Tobacco Use   Smoking Status Never   Smokeless Tobacco Never   Tobacco Comments    never smoker     Alcohol:   Social History     Substance and Sexual Activity   Alcohol Use No    Alcohol/week: 0.0 standard drinks     Drugs:   Social History     Substance and Sexual Activity   Drug Use No       FAMILY HISTORY: family history includes Cancer (age of onset: 47) in her mother; Depression in her son; Drug Abuse in her son; Emphysema in her sister; Mental Illness in her son; Other in her brother and son; Stroke (age of onset: 76) in her father; Sudden Death (age of onset: 78) in her sister. REVIEW OF SYSTEMS:     Please see history of chief complaint above; otherwise no new problems with respect to General, HEENT, Cardiovascular, Respiratory, Gastrointestinal, Genitourinary, Endocrinologic, Musculoskeletal, or Neuropsychiatric complaints. PHYSICAL EXAMINATION:    Vitals: Temp: 98.3 deg F. Pulse: 79. Resp: 16. BP: 124/72. General: A 80 y.o.  female. Alert and oriented to person and place and questionably oriented to time. She does not appear to be in any acute distress. Skin: Skin color, texture, turgor normal. No rashes or lesions. HEENT/Neck: Essentially unremarkable  Lungs: Normal - CTA without rales, rhonchi, or wheezing. Heart: regular rate and rhythm, S1, S2 normal, no murmur, click, rub or gallop No S3 or S4. Abdomen: Non-obese soft, non-distended, non-tender, normal active bowel sounds, no masses palpated and no hepatosplenomegaly  Extremities: No clubbing, cyanosis, or edema in any of the extremities. Neurologic: cranial nerves II-XII are grossly intact    ASSESSMENT:    1. Elevated fasting glucose    2. Essential hypertension    3. Mixed hyperlipidemia    4. Coronary artery disease involving native coronary artery of native heart, unspecified whether angina present    5. SSS (sick sinus syndrome) (HCC)    6. Cardiac pacemaker in situ    7. Myocardial infarction, unspecified MI type, unspecified artery ([address]    8. Thyroid cancer ([address]    9. Postoperative hypothyroidism    10. Gastroesophageal reflux disease without esophagitis    11. Hiatal hernia    12. 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 Irritable bowel syndrome, unspecified type    14. Polycythemia rubra vera ([address]    15. Age-related osteoporosis with current pathological fracture with routine healing    16. Early onset Alzheimer's disease with behavioral disturbance ([facility]    17. Major depressive disorder with single episode, in remission ([facility]    18. Anxiety    19. Overweight        PLAN:    1. Continue current treatment  2. Nursing home record reviewed and updates summarized and entered into electronic record  3. See nursing home orders and MAR.         Electronically signed by Preston Das DO on 12/5/2022 at 7:46 PM  Internal Medicine

## 2022-12-16 ENCOUNTER — PROCEDURE VISIT (OUTPATIENT)
Dept: CARDIOLOGY | Age: 87
End: 2022-12-16
Payer: MEDICARE

## 2022-12-16 ENCOUNTER — OFFICE VISIT (OUTPATIENT)
Dept: CARDIOLOGY | Age: 87
End: 2022-12-16
Payer: MEDICARE

## 2022-12-16 VITALS
OXYGEN SATURATION: 97 % | DIASTOLIC BLOOD PRESSURE: 78 MMHG | HEART RATE: 63 BPM | RESPIRATION RATE: 18 BRPM | WEIGHT: 180 LBS | BODY MASS INDEX: 28.19 KG/M2 | SYSTOLIC BLOOD PRESSURE: 128 MMHG

## 2022-12-16 DIAGNOSIS — Z95.0 CARDIAC PACEMAKER IN SITU: ICD-10-CM

## 2022-12-16 DIAGNOSIS — I25.10 CORONARY ARTERY DISEASE INVOLVING NATIVE CORONARY ARTERY OF NATIVE HEART, UNSPECIFIED WHETHER ANGINA PRESENT: ICD-10-CM

## 2022-12-16 DIAGNOSIS — I49.5 SSS (SICK SINUS SYNDROME) (HCC): Primary | ICD-10-CM

## 2022-12-16 DIAGNOSIS — I10 PRIMARY HYPERTENSION: Primary | ICD-10-CM

## 2022-12-16 DIAGNOSIS — G45.9 TIA (TRANSIENT ISCHEMIC ATTACK): ICD-10-CM

## 2022-12-16 PROCEDURE — 93005 ELECTROCARDIOGRAM TRACING: CPT | Performed by: INTERNAL MEDICINE

## 2022-12-16 PROCEDURE — 99213 OFFICE O/P EST LOW 20 MIN: CPT | Performed by: INTERNAL MEDICINE

## 2022-12-16 PROCEDURE — 93288 INTERROG EVL PM/LDLS PM IP: CPT | Performed by: INTERNAL MEDICINE

## 2022-12-16 RX ORDER — IPRATROPIUM BROMIDE AND ALBUTEROL SULFATE 2.5; .5 MG/3ML; MG/3ML
SOLUTION RESPIRATORY (INHALATION)
COMMUNITY
Start: 2022-10-11

## 2022-12-16 RX ORDER — BENZONATATE 100 MG/1
CAPSULE ORAL
COMMUNITY
Start: 2022-10-11

## 2022-12-16 ASSESSMENT — ENCOUNTER SYMPTOMS
CHEST TIGHTNESS: 0
APNEA: 0
WHEEZING: 0
ABDOMINAL PAIN: 0
BACK PAIN: 0
NAUSEA: 0
STRIDOR: 0
VOMITING: 0

## 2022-12-16 NOTE — PROGRESS NOTES
gastrointestinal endoscopy (02/29/2016); Colonoscopy (2002); Colonoscopy (2/29/2016); and HEMIARTHROPLASTY HIP (Right, 8/20/2019). Home Medications:  Prior to Admission medications    Medication Sig Start Date End Date Taking? Authorizing Provider   benzonatate (TESSALON) 100 MG capsule  10/11/22  Yes Historical Provider, MD   ipratropium-albuterol (Kathi Gaines) 0.5-2.5 (3) MG/3ML SOLN nebulizer solution  10/11/22  Yes Historical Provider, MD   Handicap Placard MISC by Does not apply route Expected Use:  Lifetime 6/25/21  Yes Abbie Ziegler MD   guaiFENesin (ROBITUSSIN) 100 MG/5ML syrup Take 200 mg by mouth every 6 hours as needed for Cough   Yes Historical Provider, MD   Handicap Placard MISC by Does not apply route Diagnosis: sick sinus syndrome. Expires: 2/28/2023 2/17/20  Yes Rahul Krishna DO   donepezil (ARICEPT) 5 MG tablet Take 5 mg by mouth nightly    Yes Historical Provider, MD   Multiple Vitamins-Minerals (THERAPEUTIC MULTIVITAMIN-MINERALS) tablet Take 1 tablet by mouth daily   Yes Historical Provider, MD   acetaminophen (TYLENOL) 500 MG tablet Take 500 mg by mouth every 6 hours as needed for Pain   Yes Historical Provider, MD   docusate sodium (COLACE, DULCOLAX) 100 MG CAPS Take 100 mg by mouth daily 8/22/19  Yes Yue Dunn MD   pravastatin (PRAVACHOL) 40 MG tablet take 1 tablet by mouth once daily 10/2/18  Yes DAISY Chin CNP   levothyroxine (SYNTHROID) 125 MCG tablet Take 1 tablet by mouth Daily 7/19/18  Yes DAISY Correa CNP   losartan (COZAAR) 50 MG tablet take 1 tablet by mouth once daily 5/21/18  Yes DAISY Correa CNP   loratadine (CLARITIN) 10 MG tablet Take 1 tablet by mouth daily 10/16/17  Yes DAISY Correa CNP       Allergies:  Patient has no known allergies. Social History:   reports that she has never smoked.  She has never used smokeless tobacco. She reports that she does not drink alcohol and does not use drugs.    Review of Systems:  Review of Systems   Constitutional:  Negative for chills, fatigue and fever. HENT:  Negative for congestion and dental problem. Respiratory:  Negative for apnea, chest tightness, wheezing and stridor. Cardiovascular:  Negative for chest pain and palpitations. Gastrointestinal:  Negative for abdominal pain, nausea and vomiting. Endocrine: Negative for cold intolerance and heat intolerance. Musculoskeletal:  Negative for arthralgias and back pain. Neurological:  Negative for dizziness and facial asymmetry. Hematological:  Negative for adenopathy. Psychiatric/Behavioral:  Negative for agitation and behavioral problems. Physical Exam:  /78 (Site: Left Upper Arm, Position: Sitting, Cuff Size: Large Adult)   Pulse 63   Resp 18   Wt 180 lb (81.6 kg)   SpO2 97%   BMI 28.19 kg/m²    Physical Exam  Constitutional:       Appearance: Normal appearance. HENT:      Head: Normocephalic and atraumatic. Cardiovascular:      Rate and Rhythm: Normal rate and regular rhythm. Pulses: Normal pulses. Heart sounds: Normal heart sounds. No murmur heard. Pulmonary:      Effort: Pulmonary effort is normal. No respiratory distress. Breath sounds: Normal breath sounds. No stridor. Abdominal:      General: Abdomen is flat. Palpations: Abdomen is soft. Musculoskeletal:         General: No swelling or tenderness. Skin:     Coloration: Skin is not jaundiced or pale. Neurological:      General: No focal deficit present. Mental Status: She is alert and oriented to person, place, and time. Cranial Nerves: No cranial nerve deficit. Sensory: No sensory deficit. Cardiac Data:  EKG:     Labs:     CBC: No results for input(s): WBC, HGB, HCT, PLT in the last 72 hours. BMP:No results for input(s): NA, K, CO2, BUN, CREATININE, LABGLOM, GLUCOSE in the last 72 hours.   PT/INR: No results for input(s): PROTIME, INR in the last 72 hours.  FASTING LIPID PANEL:  Lab Results   Component Value Date/Time    HDL 54 06/01/2020 12:00 AM    LDLCALC 76 06/01/2020 12:00 AM    TRIG 134 06/01/2020 12:00 AM     LIVER PROFILE:No results for input(s): AST, ALT, LABALBU in the last 72 hours. IMPRESSION:    Patient Active Problem List   Diagnosis    Osteoarthritis    Hyperlipidemia    Hypertension    Mcdaniel esophagus    GERD (gastroesophageal reflux disease)    PUD (peptic ulcer disease)    Hiatal hernia    Chronic insomnia    IBS (irritable bowel syndrome)    Age-related osteoporosis with current pathological fracture with routine healing    Vitamin D deficiency    Thyroid cancer ([facility]    Polycythemia rubra vera (HCC)    SSS (sick sinus syndrome) (HCC)    Cardiac pacemaker in situ    TIA (transient ischemic attack)    Hypothyroidism    Closed right hip fracture, with routine healing, subsequent encounter    Early onset Alzheimer's disease with behavioral disturbance ([facility]    Major depressive disorder with single episode, in remission ([facility] [facility]    Anxiety    Elevated fasting glucose    CAD (coronary artery disease)    COVID-19    Pneumonia due to COVID-19 virus       Assessment/Plan:  1. SSS s/p PPM insertion. Checked in office 12/2022, normal function. Will monitor. Continue pacer checks every 6 months. 2. CAD. Coronary angiography in 2008 showed mild CAD. Stress test in 2011 showed no ischemia. Echo on 8/18/19, EF 60%, mild LVH, grade II DD, no significant valvular disease seen. Continue current medications. 3. HTN. Continue current medications. Well controlled. 4. HPL. On Pravachol. Followed by PCP.          Jace Gilbert MD, MD  [facility]           [phone]

## 2022-12-20 PROCEDURE — 99308 SBSQ NF CARE LOW MDM 20: CPT | Performed by: INTERNAL MEDICINE

## 2022-12-28 ENCOUNTER — OUTSIDE SERVICES (OUTPATIENT)
Dept: INTERNAL MEDICINE | Age: 87
End: 2022-12-28
Payer: MEDICARE

## 2022-12-28 DIAGNOSIS — M80.00XD AGE-RELATED OSTEOPOROSIS WITH CURRENT PATHOLOGICAL FRACTURE WITH ROUTINE HEALING: ICD-10-CM

## 2022-12-28 DIAGNOSIS — R73.01 ELEVATED FASTING GLUCOSE: Primary | ICD-10-CM

## 2022-12-28 DIAGNOSIS — I49.5 SSS (SICK SINUS SYNDROME) (HCC): ICD-10-CM

## 2022-12-28 DIAGNOSIS — I21.9 MYOCARDIAL INFARCTION, UNSPECIFIED MI TYPE, UNSPECIFIED ARTERY (HCC): ICD-10-CM

## 2022-12-28 DIAGNOSIS — Z95.0 CARDIAC PACEMAKER IN SITU: ICD-10-CM

## 2022-12-28 DIAGNOSIS — I10 ESSENTIAL HYPERTENSION: ICD-10-CM

## 2022-12-28 DIAGNOSIS — E78.2 MIXED HYPERLIPIDEMIA: ICD-10-CM

## 2022-12-28 DIAGNOSIS — D45 POLYCYTHEMIA RUBRA VERA (HCC): ICD-10-CM

## 2022-12-28 DIAGNOSIS — K22.70 BARRETT'S ESOPHAGUS WITHOUT DYSPLASIA: ICD-10-CM

## 2022-12-28 DIAGNOSIS — K21.9 GASTROESOPHAGEAL REFLUX DISEASE WITHOUT ESOPHAGITIS: ICD-10-CM

## 2022-12-28 DIAGNOSIS — G30.0 EARLY ONSET ALZHEIMER'S DISEASE WITH BEHAVIORAL DISTURBANCE (HCC): ICD-10-CM

## 2022-12-28 DIAGNOSIS — E89.0 POSTOPERATIVE HYPOTHYROIDISM: ICD-10-CM

## 2022-12-28 DIAGNOSIS — I25.10 CORONARY ARTERY DISEASE INVOLVING NATIVE CORONARY ARTERY OF NATIVE HEART, UNSPECIFIED WHETHER ANGINA PRESENT: ICD-10-CM

## 2022-12-28 DIAGNOSIS — F32.5 MAJOR DEPRESSIVE DISORDER WITH SINGLE EPISODE, IN REMISSION (HCC): ICD-10-CM

## 2022-12-28 DIAGNOSIS — K58.9 IRRITABLE BOWEL SYNDROME, UNSPECIFIED TYPE: ICD-10-CM

## 2022-12-28 DIAGNOSIS — F41.9 ANXIETY: ICD-10-CM

## 2022-12-28 DIAGNOSIS — F02.818 EARLY ONSET ALZHEIMER'S DISEASE WITH BEHAVIORAL DISTURBANCE (HCC): ICD-10-CM

## 2022-12-28 DIAGNOSIS — K44.9 HIATAL HERNIA: ICD-10-CM

## 2022-12-28 DIAGNOSIS — E66.3 OVERWEIGHT: ICD-10-CM

## 2022-12-28 DIAGNOSIS — C73 THYROID CANCER (HCC): ICD-10-CM

## 2022-12-28 NOTE — PROGRESS NOTES
DR. Hawkins De Leon - [facility] VISIT     DATE OF SERVICE: 11/22/22    NURSING HOME: [facility]     CHIEF COMPLAINT/HISTORY OF CHIEF COMPLAINT: This patient is being seen for ongoing evaluation and management of her Alzheimer's disease, elevated fasting glucose, hypertension, hyperlipidemia, history of thyroid cancer with postoperative hypothyroidism, coronary artery disease, sick sinus syndrome, gastroesophageal reflux disease, Mcdaniel's esophagus, irritable bowel syndrome, polycythemia, depression, and being overweight. Her elevated fasting glucose is controlled with diet and exercise. She saw the cardiologists here on 12/16/22. There are no new complaints. ALLERGIES: No Known Allergies    MEDICATIONS: As noted on the [facility] [place], referenced and incorporated herein. PAST MEDICAL HISTORY:   Past Medical History:   Diagnosis Date    Age-related osteoporosis with current pathological fracture with routine healing     Anxiety 9/6/2019    Mcdaniel esophagus     CAD (coronary artery disease)     nonobstructive    Cardiac pacemaker in situ 4/24/2014    Put in for severe bradycardia.  Dual chamber     Chronic insomnia     Closed right hip fracture, with routine healing, subsequent encounter 8/18/2019    Early onset Alzheimer's disease with behavioral disturbance ([address] 9/6/2019    Elevated fasting glucose 9/6/2019    GERD (gastroesophageal reflux disease)     Hiatal hernia     Hyperlipidemia     Hypertension     Hypothyroidism s/p surgical removal of thyroid for thyroid carcinoma 7/29/2015    IBS (irritable bowel syndrome)     Major depressive disorder with single episode, in remission ([address] 9/6/2019    Mycosis fungoides ([address]     Osteoarthritis     Osteoporosis     Patient in clinical research study 08/13/2020    Enrolled in convalescent plasma for COVID 19 Date of completion 08/16/20    Polycythemia rubra vera [facility])     sees oncology    PUD (peptic ulcer disease)     RV Pacemaker lead malfunction - Revision 3/24/14-Dr. ray 3/24/2014    SSS (sick sinus syndrome) ([facility] 4/24/2014    Thyroid cancer ([facility] [address] about 5 years ago    thyroid /following with oncology    TIA (transient ischemic attack) 7/23/2014    Confusion--word searching    Vitamin D deficiency        PAST SURGICAL HISTORY:   Past Surgical History:   Procedure Laterality Date    BONE MARROW BIOPSY  September 2010    polycythemia vera    BREAST BIOPSY Right     benign    CARDIAC CATHETERIZATION  2008    nonobstructive CAD    CHOLECYSTECTOMY  1995    COLONOSCOPY  2002    COLONOSCOPY  2/29/2016    colon polyp, diverticulosis - Dr Lenka Desai    after miscarriage    ENDOSCOPY, COLON, DIAGNOSTIC  2002    normal  external hemorrhoids    EYE SURGERY Right 11/07/06    Cataract extraction with lens implant    HEMIARTHROPLASTY HIP Right 8/20/2019    RIGHT HIP HEMIARTHROPLASTY performed by Génesis Otero MD at [address] [place] [address], THYROID Left     thyroid cancer    PACEMAKER INSERTION  6/2013    [facility] [place]    SKIN BIOPSY  2003    mycosis fungoides    TAE AND BSO (CERVIX REMOVED)      TAE/BSO at age 54 with anterior repair for benign reasons.     TONSILLECTOMY AND ADENOIDECTOMY      age 27    TUBAL LIGATION      UPPER GASTROINTESTINAL ENDOSCOPY  2002    UPPER GASTROINTESTINAL ENDOSCOPY  08/28/2003    UPPER GASTROINTESTINAL ENDOSCOPY  05/12/2003    UPPER GASTROINTESTINAL ENDOSCOPY  03/20/2001    UPPER GASTROINTESTINAL ENDOSCOPY  02/29/2016    Barretts tissue, body polyp - Dr Pasquale Araujo       SOCIAL HISTORY:     Tobacco:   Social History     Tobacco Use   Smoking Status Never   Smokeless Tobacco Never   Tobacco Comments    never smoker     Alcohol:   Social History     Substance and Sexual Activity   Alcohol Use No    Alcohol/week: 0.0 standard drinks     Drugs:   Social History     Substance and Sexual Activity   Drug Use No       FAMILY HISTORY: family history includes Cancer (age of onset: 47) in her mother; Depression in her son; Drug Abuse in her son; Emphysema in her sister; Mental Illness in her son; Other in her brother and son; Stroke (age of onset: 76) in her father; Sudden Death (age of onset: 78) in her sister. REVIEW OF SYSTEMS:     Please see history of chief complaint above; otherwise no new problems with respect to General, HEENT, Cardiovascular, Respiratory, Gastrointestinal, Genitourinary, Endocrinologic, Musculoskeletal, or Neuropsychiatric complaints. PHYSICAL EXAMINATION:    Vitals: Temp: 97.3 deg F. Pulse: 64. Resp: 17. BP: 145/65. General: A 80 y.o.  female. Alert and oriented to person and place and questionably oriented to time. She does not appear to be in any acute distress. Skin: Skin color, texture, turgor normal. No rashes or lesions. HEENT/Neck: Essentially unremarkable  Lungs: Normal - CTA without rales, rhonchi, or wheezing. Heart: regular rate and rhythm, S1, S2 normal, no murmur, click, rub or gallop No S3 or S4. Abdomen: Non-obese soft, non-distended, non-tender, normal active bowel sounds, no masses palpated and no hepatosplenomegaly  Extremities: No clubbing, cyanosis, or edema in any of the extremities. Neurologic: cranial nerves II-XII are grossly intact    ASSESSMENT:    1. Elevated fasting glucose    2. Essential hypertension    3. Mixed hyperlipidemia    4. Coronary artery disease involving native coronary artery of native heart, unspecified whether angina present    5. SSS (sick sinus syndrome) (HCC)    6. Cardiac pacemaker in situ    7. Myocardial infarction, unspecified MI type, unspecified artery ([address]    8. Thyroid cancer ([address]    9. Postoperative hypothyroidism    10. Gastroesophageal reflux disease without esophagitis    11. Hiatal hernia    12. 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 Irritable bowel syndrome, unspecified type    14. Polycythemia rubra vera ([address]    15.  Age-related osteoporosis with current pathological fracture with routine healing    16. Early onset Alzheimer's disease with behavioral disturbance ([facility] [address]    17. Major depressive disorder with single episode, in remission ([facility] [address]    18. Anxiety    19. Overweight        PLAN:    1. Continue current treatment  2. Nursing home record reviewed and updates summarized and entered into electronic record  3. We reviewed the cardiologists' most recent visit note   4. See nursing home orders and MAR.         Electronically signed by Carole Fernandez DO on 12/28/2022 at 2:47 AM  Internal Medicine

## 2023-03-13 ENCOUNTER — HOSPITAL ENCOUNTER (OUTPATIENT)
Age: 88
Discharge: HOME | End: 2023-03-13
Payer: MEDICARE

## 2023-03-13 ENCOUNTER — OFFICE VISIT (OUTPATIENT)
Dept: ONCOLOGY | Age: 88
End: 2023-03-13
Payer: MEDICARE

## 2023-03-13 VITALS
BODY MASS INDEX: 27.31 KG/M2 | TEMPERATURE: 97.4 F | SYSTOLIC BLOOD PRESSURE: 122 MMHG | RESPIRATION RATE: 16 BRPM | WEIGHT: 174 LBS | OXYGEN SATURATION: 96 % | HEART RATE: 84 BPM | DIASTOLIC BLOOD PRESSURE: 72 MMHG | HEIGHT: 67 IN

## 2023-03-13 DIAGNOSIS — D45 POLYCYTHEMIA RUBRA VERA (HCC): ICD-10-CM

## 2023-03-13 DIAGNOSIS — D75.1 POLYCYTHEMIA: Primary | ICD-10-CM

## 2023-03-13 DIAGNOSIS — D75.1 POLYCYTHEMIA: ICD-10-CM

## 2023-03-13 DIAGNOSIS — C73 THYROID CANCER (HCC): ICD-10-CM

## 2023-03-13 LAB
ABSOLUTE EOS #: 0.25 K/UL (ref 0–0.44)
ABSOLUTE IMMATURE GRANULOCYTE: <0.03 K/UL (ref 0–0.3)
ABSOLUTE LYMPH #: 1.31 K/UL (ref 1.1–3.7)
ABSOLUTE MONO #: 0.62 K/UL (ref 0.1–1.2)
BASOPHILS # BLD: 1 % (ref 0–2)
BASOPHILS ABSOLUTE: 0.07 K/UL (ref 0–0.2)
EOSINOPHILS RELATIVE PERCENT: 4 % (ref 1–4)
HCT VFR BLD AUTO: 50 % (ref 36.3–47.1)
HGB BLD-MCNC: 16.5 G/DL (ref 11.9–15.1)
IMMATURE GRANULOCYTES: 0 %
LYMPHOCYTES # BLD: 20 % (ref 24–43)
MCH RBC QN AUTO: 27.9 PG (ref 25.2–33.5)
MCHC RBC AUTO-ENTMCNC: 33 G/DL (ref 25.2–33.5)
MCV RBC AUTO: 84.5 FL (ref 82.6–102.9)
MONOCYTES # BLD: 10 % (ref 3–12)
NRBC AUTOMATED: 0 PER 100 WBC
PDW BLD-RTO: 14.5 % (ref 11.8–14.4)
PLATELET # BLD AUTO: 199 K/UL (ref 138–453)
PMV BLD AUTO: 9.8 FL (ref 8.1–13.5)
RBC # BLD: 5.92 M/UL (ref 3.95–5.11)
RBC # BLD: ABNORMAL 10*6/UL
SEG NEUTROPHILS: 65 % (ref 36–65)
SEGMENTED NEUTROPHILS ABSOLUTE COUNT: 4.19 K/UL (ref 1.5–8.1)
WBC # BLD AUTO: 6.5 K/UL (ref 3.5–11.3)

## 2023-03-13 PROCEDURE — 36415 COLL VENOUS BLD VENIPUNCTURE: CPT

## 2023-03-13 PROCEDURE — G8417 CALC BMI ABV UP PARAM F/U: HCPCS | Performed by: INTERNAL MEDICINE

## 2023-03-13 PROCEDURE — 99214 OFFICE O/P EST MOD 30 MIN: CPT | Performed by: INTERNAL MEDICINE

## 2023-03-13 PROCEDURE — 85025 COMPLETE CBC W/AUTO DIFF WBC: CPT

## 2023-03-13 PROCEDURE — G8484 FLU IMMUNIZE NO ADMIN: HCPCS | Performed by: INTERNAL MEDICINE

## 2023-03-13 PROCEDURE — 1123F ACP DISCUSS/DSCN MKR DOCD: CPT | Performed by: INTERNAL MEDICINE

## 2023-03-13 PROCEDURE — 1090F PRES/ABSN URINE INCON ASSESS: CPT | Performed by: INTERNAL MEDICINE

## 2023-03-13 PROCEDURE — 99213 OFFICE O/P EST LOW 20 MIN: CPT | Performed by: INTERNAL MEDICINE

## 2023-03-13 PROCEDURE — G8427 DOCREV CUR MEDS BY ELIG CLIN: HCPCS | Performed by: INTERNAL MEDICINE

## 2023-03-13 PROCEDURE — 1036F TOBACCO NON-USER: CPT | Performed by: INTERNAL MEDICINE

## 2023-03-13 NOTE — PROGRESS NOTES
Patient ID: Kae Grewal, 11/20/1934, [id number], 80 y.o. 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 Estefanía Viramontes until recently for polycythemia. She was diagnosed with polycythemia in September 2010. Her GÓMEZ 2 mutation mutation was negative. She had a bone marrow biopsy done in September 2010 which showed normal cellular marrow with no erythroid hyperplasia. No evidence of malignancy or dysplasia noted. She was treated with intermittent phlebotomies since September 2010. Interval history:  Patient is return for follow-up visit and to discuss lab results and further recommendations. She denies any chest pain shortness of breath. She is eating and drinking well. Denies any fatigue tiredness. Her recent lab work showed stable hematocrit at 50. During this visit patient's allergy, social, medical, surgical history and medications were reviewed and updated. No Known Allergies    Current Outpatient Medications   Medication Sig Dispense Refill    benzonatate (TESSALON) 100 MG capsule       ipratropium-albuterol (DUONEB) 0.5-2.5 (3) MG/3ML SOLN nebulizer solution       Handicap Placard MISC by Does not apply route Expected Use:  Lifetime 1 each 0    guaiFENesin (ROBITUSSIN) 100 MG/5ML syrup Take 200 mg by mouth every 6 hours as needed for Cough      Handicap Placard MISC by Does not apply route Diagnosis: sick sinus syndrome.  Expires: 2/28/2023 1 each 0    donepezil (ARICEPT) 5 MG tablet Take 5 mg by mouth nightly       Multiple Vitamins-Minerals (THERAPEUTIC MULTIVITAMIN-MINERALS) tablet Take 1 tablet by mouth daily      acetaminophen (TYLENOL) 500 MG tablet Take 500 mg by mouth every 6 hours as needed for Pain      docusate sodium (COLACE, DULCOLAX) 100 MG CAPS Take 100 mg by mouth daily 30 capsule 0    pravastatin (PRAVACHOL) 40 MG tablet take 1 tablet by mouth once daily 90 tablet 1    levothyroxine (SYNTHROID) 125 MCG tablet Take 1 tablet by mouth Daily 30 tablet 5    losartan (COZAAR) 50 MG tablet take 1 tablet by mouth once daily 30 tablet 5    loratadine (CLARITIN) 10 MG tablet Take 1 tablet by mouth daily 30 tablet 11     Current Facility-Administered Medications   Medication Dose Route Frequency Provider Last Rate Last Admin    denosumab (PROLIA) SC injection 60 mg  60 mg SubCUTAneous Q6 Months Wanda June MD         Social History     Socioeconomic History    Marital status:      Spouse name: Not on file    Number of children: Not on file    Years of education: Not on file    Highest education level: Not on file   Occupational History    Occupation: retired     Employer: [employer]   Tobacco Use    Smoking status: Never    Smokeless tobacco: Never    Tobacco comments:     never smoker   Substance and Sexual Activity    Alcohol use: No     Alcohol/week: 0.0 standard drinks    Drug use: No    Sexual activity: Not Currently   Other Topics Concern    Not on file   Social History Narrative    Not on file     Social Determinants of Health     Financial Resource Strain: Not on file   Food Insecurity: Not on file   Transportation Needs: Not on file   Physical Activity: Not on file   Stress: Not on file   Social Connections: Not on file   Intimate Partner Violence: Not on file   Housing Stability: Not on file       Family History   Problem Relation Age of Onset    Cancer Mother 47        throat    Stroke Father 76         of CVA    Emphysema Sister     Other Brother          in train accident    Drug Abuse Son     Depression Son     Other Son         suicide    Mental Illness Son     Sudden Death Sister 78        smoker        REVIEW OF SYSTEM:     Constitutional: No fever or chills.  No night sweats, no weight loss Eyes: No eye discharge, double vision, or eye pain   HEENT: negative for sore mouth, sore throat, hoarseness and voice change   Respiratory: negative for cough , sputum, dyspnea, wheezing, hemoptysis, chest pain   Cardiovascular: negative for chest pain, dyspnea, palpitations, orthopnea, PND   Gastrointestinal: negative for nausea, vomiting, diarrhea, constipation, abdominal pain, Dysphagia, hematemesis and hematochezia   Genitourinary: negative for frequency, dysuria, nocturia, urinary incontinence, and hematuria   Integument: negative for rash, skin lesions, bruises.    Hematologic/Lymphatic: negative for easy bruising, bleeding, lymphadenopathy, petechiae and swelling/edema   Endocrine: negative for heat or cold intolerance, tremor, weight changes, change in bowel habits and hair loss   Musculoskeletal: negative for myalgias, arthralgias, pain, joint swelling,and bone pain   Neurological: negative for headaches, dizziness, seizures, weakness, numbness       OBJECTIVE:         Vitals:    03/13/23 1328   BP: 122/72   Pulse: 84   Resp: 16   Temp: 97.4 °F (36.3 °C)   SpO2: 96%       PHYSICAL EXAM:   General appearance - well appearing, no in pain or distress   Mental status - alert and cooperative   Eyes - pupils equal and reactive, extraocular eye movements intact   Ears - bilateral TM's and external ear canals normal   Mouth - mucous membranes moist, pharynx normal without lesions   Neck - supple, no significant adenopathy   Lymphatics - no palpable lymphadenopathy, no hepatosplenomegaly   Chest - clear to auscultation, no wheezes, rales or rhonchi, symmetric air entry   Heart - normal rate, regular rhythm, normal S1, S2, no murmurs, rubs, clicks or gallops   Abdomen - soft, nontender, nondistended, no masses or organomegaly   Neurological - alert, oriented, normal speech, no focal findings or movement disorder noted   Musculoskeletal - no joint tenderness, deformity or swelling   Extremities - peripheral pulses normal, no pedal edema, no clubbing or cyanosis   Skin - normal coloration and turgor, no rashes, no suspicious skin lesions noted ,      LABORATORY DATA:     Lab Results   Component Value Date    WBC 6.5 03/13/2023    HGB 16.5 (H) 03/13/2023    HCT 50.0 (H) 03/13/2023    MCV 84.5 03/13/2023     03/13/2023    LYMPHOPCT 20 (L) 03/13/2023    RBC 5.92 (H) 03/13/2023    MCH 27.9 03/13/2023    MCHC 33.0 03/13/2023    RDW 14.5 (H) 03/13/2023    MONOPCT 10 03/13/2023    BASOPCT 1 03/13/2023    NEUTROABS 4.19 03/13/2023    LYMPHSABS 1.31 03/13/2023    MONOSABS 0.62 03/13/2023    EOSABS 0.25 03/13/2023    BASOSABS 0.07 03/13/2023         Chemistry        Component Value Date/Time     01/28/2021 1022    K 4.3 01/28/2021 1022     01/28/2021 1022    CO2 28 01/28/2021 1022    BUN 17 01/28/2021 1022    CREATININE 0.75 01/28/2021 1022        Component Value Date/Time    CALCIUM 9.4 01/28/2021 1022    ALKPHOS 78 01/28/2021 1215    AST 18 01/28/2021 1215    ALT 12 01/28/2021 1215    BILITOT 0.41 01/28/2021 1215        PATHOLOGY DATA:   V617F Mutation, Qnt   JAK2 Gene Mutation Quant   Collected: 02/23/17 0933   Resulting lab: [facility]   Reference range: %   Value: 0.0   Comment: (NOTE)   There is no evidence of the JAK2 V617F point mutation by real-time   PCR analysis. IMAGING DATA:    REviewed  ASSESSMENT:    This is a 80 y.o.  female with history of secondary polycythemia, GÓMEZ 2 mutation negative and negative bone marrow biopsy. Patient has been treated with intermittent phlebotomies in the past but her last phlebotomy was several months ago. Her most recent hemoglobin in July has been stable. She does not have any symptoms of hyperviscosity. She is very independent activities of daily living and denied any chest pain shortness of breath. We'll continue to monitor every 12 months.     PLAN:   I reviewed her recent lab work, discussed diagnosis and treatment recommendations   Recent hematocrit is stable at 50   No indication for phlebotomy   Return to clinic in 1 year with labs prior         Santhosh Anna MD  Hematologist/Medical Oncologist      On this date 3/13/23  I have spent 40 minutes reviewing previous notes, test results and face to face with the patient discussing the diagnosis and importance of compliance with the treatment plan. Greater than 50% of that time was spent face-to-face with the patient in counseling and coordinating her care. This note is created with the assistance of a speech recognition program.  While intending to generate a document that actually reflects the content of the visit, the document can still have some errors including those of syntax and sound a like substitutions which may escape proof reading. It such instances, actual meaning can be extrapolated by contextual diversion.

## 2023-03-22 ENCOUNTER — OUTSIDE SERVICES (OUTPATIENT)
Dept: INTERNAL MEDICINE | Age: 88
End: 2023-03-22

## 2023-03-22 DIAGNOSIS — R73.01 ELEVATED FASTING GLUCOSE: Primary | ICD-10-CM

## 2023-03-22 DIAGNOSIS — C73 THYROID CANCER (HCC): ICD-10-CM

## 2023-03-22 DIAGNOSIS — I21.9 MYOCARDIAL INFARCTION, UNSPECIFIED MI TYPE, UNSPECIFIED ARTERY (HCC): ICD-10-CM

## 2023-03-22 DIAGNOSIS — K22.70 BARRETT'S ESOPHAGUS WITHOUT DYSPLASIA: ICD-10-CM

## 2023-03-22 DIAGNOSIS — I49.5 SSS (SICK SINUS SYNDROME) (HCC): ICD-10-CM

## 2023-03-22 DIAGNOSIS — M80.00XD AGE-RELATED OSTEOPOROSIS WITH CURRENT PATHOLOGICAL FRACTURE WITH ROUTINE HEALING: ICD-10-CM

## 2023-03-22 DIAGNOSIS — K21.9 GASTROESOPHAGEAL REFLUX DISEASE WITHOUT ESOPHAGITIS: ICD-10-CM

## 2023-03-22 DIAGNOSIS — E78.2 MIXED HYPERLIPIDEMIA: ICD-10-CM

## 2023-03-22 DIAGNOSIS — I25.10 CORONARY ARTERY DISEASE INVOLVING NATIVE CORONARY ARTERY OF NATIVE HEART, UNSPECIFIED WHETHER ANGINA PRESENT: ICD-10-CM

## 2023-03-22 DIAGNOSIS — F41.9 ANXIETY: ICD-10-CM

## 2023-03-22 DIAGNOSIS — Z95.0 CARDIAC PACEMAKER IN SITU: ICD-10-CM

## 2023-03-22 DIAGNOSIS — I10 ESSENTIAL HYPERTENSION: ICD-10-CM

## 2023-03-22 DIAGNOSIS — F32.5 MAJOR DEPRESSIVE DISORDER WITH SINGLE EPISODE, IN REMISSION (HCC): ICD-10-CM

## 2023-03-22 DIAGNOSIS — E89.0 POSTOPERATIVE HYPOTHYROIDISM: ICD-10-CM

## 2023-03-22 DIAGNOSIS — K44.9 HIATAL HERNIA: ICD-10-CM

## 2023-03-22 DIAGNOSIS — E66.3 OVERWEIGHT: ICD-10-CM

## 2023-03-22 DIAGNOSIS — D45 POLYCYTHEMIA RUBRA VERA (HCC): ICD-10-CM

## 2023-03-22 DIAGNOSIS — G30.0 EARLY ONSET ALZHEIMER'S DISEASE WITH BEHAVIORAL DISTURBANCE (HCC): ICD-10-CM

## 2023-03-22 DIAGNOSIS — K58.9 IRRITABLE BOWEL SYNDROME, UNSPECIFIED TYPE: ICD-10-CM

## 2023-03-22 DIAGNOSIS — F02.818 EARLY ONSET ALZHEIMER'S DISEASE WITH BEHAVIORAL DISTURBANCE (HCC): ICD-10-CM

## 2023-03-23 NOTE — PROGRESS NOTES
DR. Zarina Rodrigues - [facility] VISIT     DATE OF SERVICE: 1/17/23    NURSING HOME: [place] [id number]     CHIEF COMPLAINT/HISTORY OF CHIEF COMPLAINT: This patient is being seen for ongoing evaluation and management of her Alzheimer's disease, elevated fasting glucose, hypertension, hyperlipidemia, history of thyroid cancer with postoperative hypothyroidism, coronary artery disease, sick sinus syndrome, gastroesophageal reflux disease, Mcdaniel's esophagus, irritable bowel syndrome, polycythemia, depression, and being overweight. Her elevated fasting glucose is controlled with diet and exercise. There are no new complaints at this time. ALLERGIES: No Known Allergies    MEDICATIONS: As noted on the [facility], referenced and incorporated herein. PAST MEDICAL HISTORY:   Past Medical History:   Diagnosis Date    Age-related osteoporosis with current pathological fracture with routine healing     Anxiety 9/6/2019    Mcdaniel esophagus     CAD (coronary artery disease)     nonobstructive    Cardiac pacemaker in situ 4/24/2014    Put in for severe bradycardia.  Dual chamber     Chronic insomnia     Closed right hip fracture, with routine healing, subsequent encounter 8/18/2019    Early onset Alzheimer's disease with behavioral disturbance ([address] 9/6/2019    Elevated fasting glucose 9/6/2019    GERD (gastroesophageal reflux disease)     Hiatal hernia     Hyperlipidemia     Hypertension     Hypothyroidism s/p surgical removal of thyroid for thyroid carcinoma 7/29/2015    IBS (irritable bowel syndrome)     Major depressive disorder with single episode, in remission ([address] 9/6/2019    Mycosis fungoides ([address]     Osteoarthritis     Osteoporosis     Patient in clinical research study 08/13/2020    Enrolled in convalescent plasma for COVID 19 Date of completion 08/16/20    Polycythemia rubra vera ([address]     sees oncology    PUD (peptic ulcer disease)     RV Pacemaker lead malfunction -

## 2023-03-23 NOTE — ADDENDUM NOTE
Addended by: Alis Suazo on: 3/22/2023 11:30 PM     Modules accepted: Orders, Level of Service, SmartSet

## 2023-04-27 ENCOUNTER — OUTSIDE SERVICES (OUTPATIENT)
Dept: INTERNAL MEDICINE | Age: 88
End: 2023-04-27

## 2023-04-27 DIAGNOSIS — K44.9 HIATAL HERNIA: ICD-10-CM

## 2023-04-27 DIAGNOSIS — I49.5 SSS (SICK SINUS SYNDROME) (HCC): ICD-10-CM

## 2023-04-27 DIAGNOSIS — F41.9 ANXIETY: ICD-10-CM

## 2023-04-27 DIAGNOSIS — F02.818 EARLY ONSET ALZHEIMER'S DISEASE WITH BEHAVIORAL DISTURBANCE (HCC): ICD-10-CM

## 2023-04-27 DIAGNOSIS — I25.10 CORONARY ARTERY DISEASE INVOLVING NATIVE CORONARY ARTERY OF NATIVE HEART, UNSPECIFIED WHETHER ANGINA PRESENT: ICD-10-CM

## 2023-04-27 DIAGNOSIS — M80.00XD AGE-RELATED OSTEOPOROSIS WITH CURRENT PATHOLOGICAL FRACTURE WITH ROUTINE HEALING: ICD-10-CM

## 2023-04-27 DIAGNOSIS — I21.9 MYOCARDIAL INFARCTION, UNSPECIFIED MI TYPE, UNSPECIFIED ARTERY (HCC): ICD-10-CM

## 2023-04-27 DIAGNOSIS — E78.2 MIXED HYPERLIPIDEMIA: ICD-10-CM

## 2023-04-27 DIAGNOSIS — D45 POLYCYTHEMIA RUBRA VERA (HCC): ICD-10-CM

## 2023-04-27 DIAGNOSIS — E66.3 OVERWEIGHT: ICD-10-CM

## 2023-04-27 DIAGNOSIS — K22.70 BARRETT'S ESOPHAGUS WITHOUT DYSPLASIA: ICD-10-CM

## 2023-04-27 DIAGNOSIS — G30.0 EARLY ONSET ALZHEIMER'S DISEASE WITH BEHAVIORAL DISTURBANCE (HCC): ICD-10-CM

## 2023-04-27 DIAGNOSIS — C73 THYROID CANCER (HCC): ICD-10-CM

## 2023-04-27 DIAGNOSIS — K21.9 GASTROESOPHAGEAL REFLUX DISEASE WITHOUT ESOPHAGITIS: ICD-10-CM

## 2023-04-27 DIAGNOSIS — R73.01 ELEVATED FASTING GLUCOSE: Primary | ICD-10-CM

## 2023-04-27 DIAGNOSIS — K58.9 IRRITABLE BOWEL SYNDROME, UNSPECIFIED TYPE: ICD-10-CM

## 2023-04-27 DIAGNOSIS — E89.0 POSTOPERATIVE HYPOTHYROIDISM: ICD-10-CM

## 2023-04-27 DIAGNOSIS — I10 ESSENTIAL HYPERTENSION: ICD-10-CM

## 2023-04-27 DIAGNOSIS — F32.5 MAJOR DEPRESSIVE DISORDER WITH SINGLE EPISODE, IN REMISSION (HCC): ICD-10-CM

## 2023-04-27 DIAGNOSIS — Z95.0 CARDIAC PACEMAKER IN SITU: ICD-10-CM

## 2023-04-27 NOTE — PROGRESS NOTES
DR. Clarence Tamayo - [facility] VISIT     DATE OF SERVICE: 2/26/23    NURSING HOME: [place] [address]     CHIEF COMPLAINT/HISTORY OF CHIEF COMPLAINT: This patient is being seen for ongoing evaluation and management of her Alzheimer's disease, elevated fasting glucose, hypertension, hyperlipidemia, history of thyroid cancer with postoperative hypothyroidism, coronary artery disease, sick sinus syndrome, gastroesophageal reflux disease, Mcdaniel's esophagus, irritable bowel syndrome, polycythemia, depression, and being overweight. Her elevated fasting glucose is controlled with diet and exercise. There are no new complaints. ALLERGIES: No Known Allergies    MEDICATIONS: As noted on the [facility], referenced and incorporated herein. PAST MEDICAL HISTORY:   Past Medical History:   Diagnosis Date    Age-related osteoporosis with current pathological fracture with routine healing     Anxiety 9/6/2019    Mcdaniel esophagus     CAD (coronary artery disease)     nonobstructive    Cardiac pacemaker in situ 4/24/2014    Put in for severe bradycardia.  Dual chamber     Chronic insomnia     Closed right hip fracture, with routine healing, subsequent encounter 8/18/2019    Early onset Alzheimer's disease with behavioral disturbance ([address] 9/6/2019    Elevated fasting glucose 9/6/2019    GERD (gastroesophageal reflux disease)     Hiatal hernia     Hyperlipidemia     Hypertension     Hypothyroidism s/p surgical removal of thyroid for thyroid carcinoma 7/29/2015    IBS (irritable bowel syndrome)     Major depressive disorder with single episode, in remission ([address] 9/6/2019    Mycosis fungoides ([address]     Osteoarthritis     Osteoporosis     Patient in clinical research study 08/13/2020    Enrolled in convalescent plasma for COVID 19 Date of completion 08/16/20    Polycythemia rubra vera [facility])     sees oncology    PUD (peptic ulcer disease)     RV Pacemaker lead malfunction - Revision

## 2023-04-30 PROCEDURE — 99309 SBSQ NF CARE MODERATE MDM 30: CPT | Performed by: INTERNAL MEDICINE

## 2023-06-07 ENCOUNTER — OUTSIDE SERVICES (OUTPATIENT)
Dept: INTERNAL MEDICINE | Age: 88
End: 2023-06-07

## 2023-06-07 DIAGNOSIS — I49.5 SSS (SICK SINUS SYNDROME) (HCC): ICD-10-CM

## 2023-06-07 DIAGNOSIS — I21.9 MYOCARDIAL INFARCTION, UNSPECIFIED MI TYPE, UNSPECIFIED ARTERY (HCC): ICD-10-CM

## 2023-06-07 DIAGNOSIS — C73 THYROID CANCER (HCC): ICD-10-CM

## 2023-06-07 DIAGNOSIS — K58.9 IRRITABLE BOWEL SYNDROME, UNSPECIFIED TYPE: ICD-10-CM

## 2023-06-07 DIAGNOSIS — F32.5 MAJOR DEPRESSIVE DISORDER WITH SINGLE EPISODE, IN REMISSION (HCC): ICD-10-CM

## 2023-06-07 DIAGNOSIS — K21.9 GASTROESOPHAGEAL REFLUX DISEASE WITHOUT ESOPHAGITIS: ICD-10-CM

## 2023-06-07 DIAGNOSIS — K44.9 HIATAL HERNIA: ICD-10-CM

## 2023-06-07 DIAGNOSIS — R73.01 ELEVATED FASTING GLUCOSE: Primary | ICD-10-CM

## 2023-06-07 DIAGNOSIS — Z95.0 CARDIAC PACEMAKER IN SITU: ICD-10-CM

## 2023-06-07 DIAGNOSIS — K22.70 BARRETT'S ESOPHAGUS WITHOUT DYSPLASIA: ICD-10-CM

## 2023-06-07 DIAGNOSIS — D45 POLYCYTHEMIA RUBRA VERA (HCC): ICD-10-CM

## 2023-06-07 DIAGNOSIS — I25.10 CORONARY ARTERY DISEASE INVOLVING NATIVE CORONARY ARTERY OF NATIVE HEART, UNSPECIFIED WHETHER ANGINA PRESENT: ICD-10-CM

## 2023-06-07 DIAGNOSIS — F02.818 EARLY ONSET ALZHEIMER'S DISEASE WITH BEHAVIORAL DISTURBANCE (HCC): ICD-10-CM

## 2023-06-07 DIAGNOSIS — E89.0 POSTOPERATIVE HYPOTHYROIDISM: ICD-10-CM

## 2023-06-07 DIAGNOSIS — F41.9 ANXIETY: ICD-10-CM

## 2023-06-07 DIAGNOSIS — I10 ESSENTIAL HYPERTENSION: ICD-10-CM

## 2023-06-07 DIAGNOSIS — E66.3 OVERWEIGHT: ICD-10-CM

## 2023-06-07 DIAGNOSIS — G30.0 EARLY ONSET ALZHEIMER'S DISEASE WITH BEHAVIORAL DISTURBANCE (HCC): ICD-10-CM

## 2023-06-07 DIAGNOSIS — E78.2 MIXED HYPERLIPIDEMIA: ICD-10-CM

## 2023-06-07 DIAGNOSIS — M80.00XD AGE-RELATED OSTEOPOROSIS WITH CURRENT PATHOLOGICAL FRACTURE WITH ROUTINE HEALING: ICD-10-CM

## 2023-06-08 NOTE — PROGRESS NOTES
history includes Cancer (age of onset: 47) in her mother; Depression in her son; Drug Abuse in her son; Emphysema in her sister; Mental Illness in her son; Other in her brother and son; Stroke (age of onset: 76) in her father; Sudden Death (age of onset: 78) in her sister. REVIEW OF SYSTEMS:     Please see history of chief complaint above; otherwise no new problems with respect to General, HEENT, Cardiovascular, Respiratory, Gastrointestinal, Genitourinary, Endocrinologic, Musculoskeletal, or Neuropsychiatric complaints. PHYSICAL EXAMINATION:    Vitals: Temp: 97.7 deg F. Pulse: 70. Resp: 17. BP: 142/60. General: A 80 y.o.  female. Alert and oriented to person and place and questionably oriented to time. She does not appear to be in any acute distress. Skin: Skin color, texture, turgor normal. No rashes or lesions. HEENT/Neck: Essentially unremarkable  Lungs: Normal - CTA without rales, rhonchi, or wheezing. Heart: regular rate and rhythm, S1, S2 normal, no murmur, click, rub or gallop No S3 or S4. Abdomen: Non-obese soft, non-distended, non-tender, normal active bowel sounds, no masses palpated and no hepatosplenomegaly  Extremities: No clubbing, cyanosis, or edema in any of the extremities. Neurologic: cranial nerves II-XII are grossly intact      ASSESSMENT/PLAN:    1. Elevated fasting glucose, stable  - She will continue to watch her diet and exercise to keep her elevated fasting glucose under control.   - We will continue to monitor for the development of diabetes. 2. Essential hypertension, controlled  - She will continue to take her antihypertensive medication     3. Mixed hyperlipidemia, controlled  - She will continue to take pravastatin    4. Coronary artery disease involving native coronary artery of native heart, unspecified whether angina present, stable  5. SSS (sick sinus syndrome) ([address] stable  6. Cardiac pacemaker in situ  7.  Myocardial infarction, unspecified MI type,

## 2023-06-23 ENCOUNTER — NURSE ONLY (OUTPATIENT)
Dept: CARDIOLOGY | Age: 88
End: 2023-06-23
Payer: MEDICARE

## 2023-06-23 ENCOUNTER — OFFICE VISIT (OUTPATIENT)
Dept: CARDIOLOGY | Age: 88
End: 2023-06-23
Payer: MEDICARE

## 2023-06-23 VITALS
HEART RATE: 62 BPM | RESPIRATION RATE: 17 BRPM | DIASTOLIC BLOOD PRESSURE: 60 MMHG | WEIGHT: 175 LBS | SYSTOLIC BLOOD PRESSURE: 124 MMHG | BODY MASS INDEX: 27.47 KG/M2 | HEIGHT: 67 IN | OXYGEN SATURATION: 97 %

## 2023-06-23 DIAGNOSIS — I49.5 SSS (SICK SINUS SYNDROME) (HCC): ICD-10-CM

## 2023-06-23 DIAGNOSIS — I25.10 CAD IN NATIVE ARTERY: ICD-10-CM

## 2023-06-23 DIAGNOSIS — Z95.0 PACEMAKER: ICD-10-CM

## 2023-06-23 DIAGNOSIS — I25.10 CORONARY ARTERY DISEASE INVOLVING NATIVE CORONARY ARTERY OF NATIVE HEART, UNSPECIFIED WHETHER ANGINA PRESENT: Primary | ICD-10-CM

## 2023-06-23 DIAGNOSIS — I49.5 SSS (SICK SINUS SYNDROME) (HCC): Primary | ICD-10-CM

## 2023-06-23 DIAGNOSIS — Z95.0 CARDIAC PACEMAKER IN SITU: ICD-10-CM

## 2023-06-23 PROCEDURE — 93005 ELECTROCARDIOGRAM TRACING: CPT | Performed by: INTERNAL MEDICINE

## 2023-06-23 PROCEDURE — 99214 OFFICE O/P EST MOD 30 MIN: CPT | Performed by: INTERNAL MEDICINE

## 2023-06-23 PROCEDURE — 93288 INTERROG EVL PM/LDLS PM IP: CPT | Performed by: INTERNAL MEDICINE

## 2023-06-23 RX ORDER — FERROUS SULFATE 325(65) MG
1 TABLET ORAL
COMMUNITY
Start: 2016-08-12

## 2023-06-23 NOTE — PROGRESS NOTES
Date:   6/23/2023  Patient name:  Chris Aguilar  MRN:   [id number]  YOB: 1934  PCP:    Shaun Trevino DO    Reason for visit: had concerns including Hypertension. Subjective:       Clinical Changes / Abnormalities:  Denies exertional chest pain or SOB, no dizziness or syncope and no palpitations. ROS:   General: No dizziness or syncope and no fever or chills, no fatigue. Chest: No cough or phlegm, no SOB  Heart: No chest pain, no SOB, no palpitations. GI: No abdominal pain, no nausea or vomiting. No diarrhea or constipation.    Lower extremity: No claudication, no myalgia      Medications:     Current Outpatient Medications   Medication Sig Dispense Refill    benzonatate (TESSALON) 100 MG capsule       ipratropium-albuterol (DUONEB) 0.5-2.5 (3) MG/3ML SOLN nebulizer solution       Handicap Placard MISC by Does not apply route Expected Use:  Lifetime 1 each 0    donepezil (ARICEPT) 5 MG tablet Take 1 tablet by mouth nightly      Multiple Vitamins-Minerals (THERAPEUTIC MULTIVITAMIN-MINERALS) tablet Take 1 tablet by mouth daily      acetaminophen (TYLENOL) 500 MG tablet Take 1 tablet by mouth every 6 hours as needed for Pain      docusate sodium (COLACE, DULCOLAX) 100 MG CAPS Take 100 mg by mouth daily 30 capsule 0    pravastatin (PRAVACHOL) 40 MG tablet take 1 tablet by mouth once daily 90 tablet 1    levothyroxine (SYNTHROID) 125 MCG tablet Take 1 tablet by mouth Daily 30 tablet 5    losartan (COZAAR) 50 MG tablet take 1 tablet by mouth once daily 30 tablet 5    loratadine (CLARITIN) 10 MG tablet Take 1 tablet by mouth daily 30 tablet 11    ferrous sulfate (IRON 325) 325 (65 Fe) MG tablet Take 1 tablet by mouth daily (with breakfast) (Patient not taking: Reported on 6/23/2023)      vitamin D (CHOLECALCIFEROL) 86854 UNIT CAPS Take 1 capsule by mouth once a week       Current Facility-Administered Medications   Medication Dose Route Frequency Provider Last Rate

## 2023-06-27 PROCEDURE — 99309 SBSQ NF CARE MODERATE MDM 30: CPT | Performed by: INTERNAL MEDICINE

## 2023-07-05 ENCOUNTER — OUTSIDE SERVICES (OUTPATIENT)
Dept: INTERNAL MEDICINE | Age: 88
End: 2023-07-05
Payer: MEDICARE

## 2023-07-05 DIAGNOSIS — K44.9 HIATAL HERNIA: ICD-10-CM

## 2023-07-05 DIAGNOSIS — F02.818 EARLY ONSET ALZHEIMER'S DISEASE WITH BEHAVIORAL DISTURBANCE (HCC): ICD-10-CM

## 2023-07-05 DIAGNOSIS — K21.9 GASTROESOPHAGEAL REFLUX DISEASE WITHOUT ESOPHAGITIS: ICD-10-CM

## 2023-07-05 DIAGNOSIS — Z95.0 CARDIAC PACEMAKER IN SITU: ICD-10-CM

## 2023-07-05 DIAGNOSIS — G30.0 EARLY ONSET ALZHEIMER'S DISEASE WITH BEHAVIORAL DISTURBANCE (HCC): ICD-10-CM

## 2023-07-05 DIAGNOSIS — F32.5 MAJOR DEPRESSIVE DISORDER WITH SINGLE EPISODE, IN REMISSION (HCC): ICD-10-CM

## 2023-07-05 DIAGNOSIS — E66.3 OVERWEIGHT: ICD-10-CM

## 2023-07-05 DIAGNOSIS — C73 THYROID CANCER (HCC): ICD-10-CM

## 2023-07-05 DIAGNOSIS — I21.9 MYOCARDIAL INFARCTION, UNSPECIFIED MI TYPE, UNSPECIFIED ARTERY (HCC): ICD-10-CM

## 2023-07-05 DIAGNOSIS — F41.9 ANXIETY: ICD-10-CM

## 2023-07-05 DIAGNOSIS — I49.5 SSS (SICK SINUS SYNDROME) (HCC): ICD-10-CM

## 2023-07-05 DIAGNOSIS — K22.70 BARRETT'S ESOPHAGUS WITHOUT DYSPLASIA: ICD-10-CM

## 2023-07-05 DIAGNOSIS — I10 ESSENTIAL HYPERTENSION: ICD-10-CM

## 2023-07-05 DIAGNOSIS — K58.9 IRRITABLE BOWEL SYNDROME, UNSPECIFIED TYPE: ICD-10-CM

## 2023-07-05 DIAGNOSIS — E89.0 POSTOPERATIVE HYPOTHYROIDISM: ICD-10-CM

## 2023-07-05 DIAGNOSIS — U07.1 COVID-19: Primary | ICD-10-CM

## 2023-07-05 DIAGNOSIS — D45 POLYCYTHEMIA RUBRA VERA (HCC): ICD-10-CM

## 2023-07-05 DIAGNOSIS — I25.10 CORONARY ARTERY DISEASE INVOLVING NATIVE CORONARY ARTERY OF NATIVE HEART, UNSPECIFIED WHETHER ANGINA PRESENT: ICD-10-CM

## 2023-07-05 DIAGNOSIS — M80.00XD AGE-RELATED OSTEOPOROSIS WITH CURRENT PATHOLOGICAL FRACTURE WITH ROUTINE HEALING: ICD-10-CM

## 2023-07-05 DIAGNOSIS — R73.01 ELEVATED FASTING GLUCOSE: ICD-10-CM

## 2023-07-05 DIAGNOSIS — E78.2 MIXED HYPERLIPIDEMIA: ICD-10-CM

## 2023-07-06 NOTE — PROGRESS NOTES
pravastatin    5. Coronary artery disease involving native coronary artery of native heart, unspecified whether angina present, stable  6. SSS (sick sinus syndrome) ([address]), stable  7. Cardiac pacemaker in situ  8. Myocardial infarction, unspecified MI type, unspecified artery ([address]), stable  - She will continue to see the cardiologists here    9. Thyroid cancer ([address]), stable  10. Postoperative hypothyroidism, stable  - She will continue to take Synthroid  - She will continue to see Dr. Berto Tate    11. Gastroesophageal reflux disease without esophagitis, stable  12. Hiatal hernia, stable  13. 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 Irritable bowel syndrome, unspecified type, stable  - We will continue to monitor     15. Polycythemia rubra vera ([address]), stable  - We will continue to monitor  - She will continue to see Dr. Berto Tate    16. Age-related osteoporosis with current pathological fracture with routine healing, stable  - We will continue to monitor     17. Early onset Alzheimer's disease with behavioral disturbance ([address]), stable  - She will continue to take Aricept    18. Major depressive disorder with single episode, in remission ([address]), stable  19. Anxiety, stable  - We will continue to monitor     20. Overweight, stable  - We discussed weight loss  - She will continue to watch her diet and exercise       Continue current treatment. Nursing home record reviewed and updates summarized and entered into electronic record. See nursing home orders and MAR.         Electronically signed by Raymond Sutherland DO on 7/5/2023 at 8:50 PM  Internal Medicine

## 2023-08-22 PROCEDURE — 99309 SBSQ NF CARE MODERATE MDM 30: CPT | Performed by: INTERNAL MEDICINE

## 2023-08-27 ENCOUNTER — OUTSIDE SERVICES (OUTPATIENT)
Dept: INTERNAL MEDICINE | Age: 88
End: 2023-08-27

## 2023-08-27 DIAGNOSIS — K58.9 IRRITABLE BOWEL SYNDROME, UNSPECIFIED TYPE: ICD-10-CM

## 2023-08-27 DIAGNOSIS — Z95.0 CARDIAC PACEMAKER IN SITU: ICD-10-CM

## 2023-08-27 DIAGNOSIS — E78.2 MIXED HYPERLIPIDEMIA: ICD-10-CM

## 2023-08-27 DIAGNOSIS — F32.5 MAJOR DEPRESSIVE DISORDER WITH SINGLE EPISODE, IN REMISSION (HCC): ICD-10-CM

## 2023-08-27 DIAGNOSIS — C73 THYROID CANCER (HCC): ICD-10-CM

## 2023-08-27 DIAGNOSIS — G30.0 EARLY ONSET ALZHEIMER'S DISEASE WITH BEHAVIORAL DISTURBANCE (HCC): ICD-10-CM

## 2023-08-27 DIAGNOSIS — I21.9 MYOCARDIAL INFARCTION, UNSPECIFIED MI TYPE, UNSPECIFIED ARTERY (HCC): ICD-10-CM

## 2023-08-27 DIAGNOSIS — I10 ESSENTIAL HYPERTENSION: ICD-10-CM

## 2023-08-27 DIAGNOSIS — I25.10 CORONARY ARTERY DISEASE INVOLVING NATIVE CORONARY ARTERY OF NATIVE HEART, UNSPECIFIED WHETHER ANGINA PRESENT: ICD-10-CM

## 2023-08-27 DIAGNOSIS — E89.0 POSTOPERATIVE HYPOTHYROIDISM: ICD-10-CM

## 2023-08-27 DIAGNOSIS — K44.9 HIATAL HERNIA: ICD-10-CM

## 2023-08-27 DIAGNOSIS — M80.00XD AGE-RELATED OSTEOPOROSIS WITH CURRENT PATHOLOGICAL FRACTURE WITH ROUTINE HEALING: ICD-10-CM

## 2023-08-27 DIAGNOSIS — D45 POLYCYTHEMIA RUBRA VERA (HCC): ICD-10-CM

## 2023-08-27 DIAGNOSIS — F41.9 ANXIETY: ICD-10-CM

## 2023-08-27 DIAGNOSIS — K21.9 GASTROESOPHAGEAL REFLUX DISEASE WITHOUT ESOPHAGITIS: ICD-10-CM

## 2023-08-27 DIAGNOSIS — E66.3 OVERWEIGHT: ICD-10-CM

## 2023-08-27 DIAGNOSIS — R73.01 ELEVATED FASTING GLUCOSE: Primary | ICD-10-CM

## 2023-08-27 DIAGNOSIS — K22.70 BARRETT'S ESOPHAGUS WITHOUT DYSPLASIA: ICD-10-CM

## 2023-08-27 DIAGNOSIS — F02.818 EARLY ONSET ALZHEIMER'S DISEASE WITH BEHAVIORAL DISTURBANCE (HCC): ICD-10-CM

## 2023-08-27 DIAGNOSIS — I49.5 SSS (SICK SINUS SYNDROME) (HCC): ICD-10-CM

## 2023-09-20 ENCOUNTER — OUTSIDE SERVICES (OUTPATIENT)
Dept: INTERNAL MEDICINE | Age: 88
End: 2023-09-20
Payer: MEDICARE

## 2023-09-20 DIAGNOSIS — K22.70 BARRETT'S ESOPHAGUS WITHOUT DYSPLASIA: ICD-10-CM

## 2023-09-20 DIAGNOSIS — C73 THYROID CANCER (HCC): ICD-10-CM

## 2023-09-20 DIAGNOSIS — I49.5 SSS (SICK SINUS SYNDROME) (HCC): ICD-10-CM

## 2023-09-20 DIAGNOSIS — F41.9 ANXIETY: ICD-10-CM

## 2023-09-20 DIAGNOSIS — M80.00XD AGE-RELATED OSTEOPOROSIS WITH CURRENT PATHOLOGICAL FRACTURE WITH ROUTINE HEALING: ICD-10-CM

## 2023-09-20 DIAGNOSIS — Z95.0 CARDIAC PACEMAKER IN SITU: ICD-10-CM

## 2023-09-20 DIAGNOSIS — K44.9 HIATAL HERNIA: ICD-10-CM

## 2023-09-20 DIAGNOSIS — E89.0 POSTOPERATIVE HYPOTHYROIDISM: ICD-10-CM

## 2023-09-20 DIAGNOSIS — R73.01 ELEVATED FASTING GLUCOSE: Primary | ICD-10-CM

## 2023-09-20 DIAGNOSIS — G30.0 EARLY ONSET ALZHEIMER'S DISEASE WITH BEHAVIORAL DISTURBANCE (HCC): ICD-10-CM

## 2023-09-20 DIAGNOSIS — I21.9 MYOCARDIAL INFARCTION, UNSPECIFIED MI TYPE, UNSPECIFIED ARTERY (HCC): ICD-10-CM

## 2023-09-20 DIAGNOSIS — I10 ESSENTIAL HYPERTENSION: ICD-10-CM

## 2023-09-20 DIAGNOSIS — F02.818 EARLY ONSET ALZHEIMER'S DISEASE WITH BEHAVIORAL DISTURBANCE (HCC): ICD-10-CM

## 2023-09-20 DIAGNOSIS — F32.5 MAJOR DEPRESSIVE DISORDER WITH SINGLE EPISODE, IN REMISSION (HCC): ICD-10-CM

## 2023-09-20 DIAGNOSIS — I25.10 CORONARY ARTERY DISEASE INVOLVING NATIVE CORONARY ARTERY OF NATIVE HEART, UNSPECIFIED WHETHER ANGINA PRESENT: ICD-10-CM

## 2023-09-20 DIAGNOSIS — K58.9 IRRITABLE BOWEL SYNDROME, UNSPECIFIED TYPE: ICD-10-CM

## 2023-09-20 DIAGNOSIS — D45 POLYCYTHEMIA RUBRA VERA (HCC): ICD-10-CM

## 2023-09-20 DIAGNOSIS — E66.3 OVERWEIGHT: ICD-10-CM

## 2023-09-20 DIAGNOSIS — K21.9 GASTROESOPHAGEAL REFLUX DISEASE WITHOUT ESOPHAGITIS: ICD-10-CM

## 2023-09-20 DIAGNOSIS — E78.2 MIXED HYPERLIPIDEMIA: ICD-10-CM

## 2023-09-21 NOTE — PROGRESS NOTES
DR. Antonino Carrion - [facility] VISIT     DATE OF SERVICE: 6/27/23    NURSING HOME: [facility]     CHIEF COMPLAINT/HISTORY OF CHIEF COMPLAINT: This patient is being seen for ongoing evaluation and management of her Alzheimer's disease, elevated fasting glucose, hypertension, hyperlipidemia, history of thyroid cancer with postoperative hypothyroidism, coronary artery disease, sick sinus syndrome, gastroesophageal reflux disease, Mcdaniel's esophagus, irritable bowel syndrome, polycythemia, depression, and being overweight. Her elevated fasting glucose is controlled with diet and exercise. She sees the cardiologists here for her coronary artery disease and sick sinus syndrome. Her last visit with them was on 6/23/23. She sees Dr. Venessa Bal for her polycythemia and history of thyroid cancer. There are no new complaints at this time. ALLERGIES: No Known Allergies    MEDICATIONS: As noted on the [facility], referenced and incorporated herein. PAST MEDICAL HISTORY:   Past Medical History:   Diagnosis Date    Age-related osteoporosis with current pathological fracture with routine healing     Anxiety 9/6/2019    Mcdaniel esophagus     CAD (coronary artery disease)     nonobstructive    Cardiac pacemaker in situ 4/24/2014    Put in for severe bradycardia.  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

## 2023-10-25 ENCOUNTER — OUTSIDE SERVICES (OUTPATIENT)
Dept: INTERNAL MEDICINE | Age: 88
End: 2023-10-25

## 2023-10-25 DIAGNOSIS — F32.5 MAJOR DEPRESSIVE DISORDER WITH SINGLE EPISODE, IN REMISSION (HCC): ICD-10-CM

## 2023-10-25 DIAGNOSIS — E78.2 MIXED HYPERLIPIDEMIA: ICD-10-CM

## 2023-10-25 DIAGNOSIS — D45 POLYCYTHEMIA RUBRA VERA (HCC): ICD-10-CM

## 2023-10-25 DIAGNOSIS — I10 ESSENTIAL HYPERTENSION: ICD-10-CM

## 2023-10-25 DIAGNOSIS — C73 THYROID CANCER (HCC): ICD-10-CM

## 2023-10-25 DIAGNOSIS — K21.9 GASTROESOPHAGEAL REFLUX DISEASE WITHOUT ESOPHAGITIS: ICD-10-CM

## 2023-10-25 DIAGNOSIS — Z95.0 CARDIAC PACEMAKER IN SITU: ICD-10-CM

## 2023-10-25 DIAGNOSIS — K22.70 BARRETT'S ESOPHAGUS WITHOUT DYSPLASIA: ICD-10-CM

## 2023-10-25 DIAGNOSIS — R73.01 ELEVATED FASTING GLUCOSE: Primary | ICD-10-CM

## 2023-10-25 DIAGNOSIS — F02.818 EARLY ONSET ALZHEIMER'S DISEASE WITH BEHAVIORAL DISTURBANCE (HCC): ICD-10-CM

## 2023-10-25 DIAGNOSIS — K44.9 HIATAL HERNIA: ICD-10-CM

## 2023-10-25 DIAGNOSIS — E66.3 OVERWEIGHT: ICD-10-CM

## 2023-10-25 DIAGNOSIS — I25.10 CORONARY ARTERY DISEASE INVOLVING NATIVE CORONARY ARTERY OF NATIVE HEART, UNSPECIFIED WHETHER ANGINA PRESENT: ICD-10-CM

## 2023-10-25 DIAGNOSIS — E89.0 POSTOPERATIVE HYPOTHYROIDISM: ICD-10-CM

## 2023-10-25 DIAGNOSIS — K58.9 IRRITABLE BOWEL SYNDROME, UNSPECIFIED TYPE: ICD-10-CM

## 2023-10-25 DIAGNOSIS — F41.9 ANXIETY: ICD-10-CM

## 2023-10-25 DIAGNOSIS — I21.9 MYOCARDIAL INFARCTION, UNSPECIFIED MI TYPE, UNSPECIFIED ARTERY (HCC): ICD-10-CM

## 2023-10-25 DIAGNOSIS — M80.00XD AGE-RELATED OSTEOPOROSIS WITH CURRENT PATHOLOGICAL FRACTURE WITH ROUTINE HEALING: ICD-10-CM

## 2023-10-25 DIAGNOSIS — G30.0 EARLY ONSET ALZHEIMER'S DISEASE WITH BEHAVIORAL DISTURBANCE (HCC): ICD-10-CM

## 2023-10-25 DIAGNOSIS — I49.5 SSS (SICK SINUS SYNDROME) (HCC): ICD-10-CM

## 2023-10-25 NOTE — PROGRESS NOTES
DR. Leopoldo Calico - [facility] VISIT     DATE OF SERVICE: 23    NURSING HOME: [address] [facility]     CHIEF COMPLAINT/HISTORY OF CHIEF COMPLAINT: This patient is being seen for ongoing evaluation and management of her Alzheimer's disease, elevated fasting glucose, hypertension, hyperlipidemia, history of thyroid cancer with postoperative hypothyroidism, coronary artery disease, sick sinus syndrome, gastroesophageal reflux disease, Mcdaniel's esophagus, irritable bowel syndrome, polycythemia, depression, and being overweight. She seems to be doing fairly well lately. Her son  last month, and nurses report that she seems to be doing okay with that. Her elevated fasting glucose is controlled with diet and exercise. She sees the cardiologists here for her coronary artery disease and sick sinus syndrome. She sees Dr. Flaco Palencia for her polycythemia and history of thyroid cancer. There are no new complaints. ALLERGIES: No Known Allergies    MEDICATIONS: As noted on the [facility] [place], referenced and incorporated herein. PAST MEDICAL HISTORY:   Past Medical History:   Diagnosis Date    Age-related osteoporosis with current pathological fracture with routine healing     Anxiety 2019    Mcdaniel esophagus     CAD (coronary artery disease)     nonobstructive    Cardiac pacemaker in situ 2014    Put in for severe bradycardia.  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

## 2023-10-31 PROCEDURE — 99309 SBSQ NF CARE MODERATE MDM 30: CPT | Performed by: INTERNAL MEDICINE

## 2023-11-13 ENCOUNTER — OUTSIDE SERVICES (OUTPATIENT)
Dept: INTERNAL MEDICINE | Age: 88
End: 2023-11-13
Payer: MEDICARE

## 2023-11-13 DIAGNOSIS — F41.9 ANXIETY: ICD-10-CM

## 2023-11-13 DIAGNOSIS — E89.0 POSTOPERATIVE HYPOTHYROIDISM: ICD-10-CM

## 2023-11-13 DIAGNOSIS — D45 POLYCYTHEMIA RUBRA VERA (HCC): ICD-10-CM

## 2023-11-13 DIAGNOSIS — M80.00XD AGE-RELATED OSTEOPOROSIS WITH CURRENT PATHOLOGICAL FRACTURE WITH ROUTINE HEALING: ICD-10-CM

## 2023-11-13 DIAGNOSIS — K44.9 HIATAL HERNIA: ICD-10-CM

## 2023-11-13 DIAGNOSIS — K58.9 IRRITABLE BOWEL SYNDROME, UNSPECIFIED TYPE: ICD-10-CM

## 2023-11-13 DIAGNOSIS — C73 THYROID CANCER (HCC): ICD-10-CM

## 2023-11-13 DIAGNOSIS — I21.9 MYOCARDIAL INFARCTION, UNSPECIFIED MI TYPE, UNSPECIFIED ARTERY (HCC): ICD-10-CM

## 2023-11-13 DIAGNOSIS — K21.9 GASTROESOPHAGEAL REFLUX DISEASE WITHOUT ESOPHAGITIS: ICD-10-CM

## 2023-11-13 DIAGNOSIS — G30.0 EARLY ONSET ALZHEIMER'S DISEASE WITH BEHAVIORAL DISTURBANCE (HCC): ICD-10-CM

## 2023-11-13 DIAGNOSIS — K22.70 BARRETT'S ESOPHAGUS WITHOUT DYSPLASIA: ICD-10-CM

## 2023-11-13 DIAGNOSIS — R73.01 ELEVATED FASTING GLUCOSE: Primary | ICD-10-CM

## 2023-11-13 DIAGNOSIS — E66.3 OVERWEIGHT: ICD-10-CM

## 2023-11-13 DIAGNOSIS — I49.5 SSS (SICK SINUS SYNDROME) (HCC): ICD-10-CM

## 2023-11-13 DIAGNOSIS — F32.5 MAJOR DEPRESSIVE DISORDER WITH SINGLE EPISODE, IN REMISSION (HCC): ICD-10-CM

## 2023-11-13 DIAGNOSIS — F02.818 EARLY ONSET ALZHEIMER'S DISEASE WITH BEHAVIORAL DISTURBANCE (HCC): ICD-10-CM

## 2023-11-13 DIAGNOSIS — Z95.0 CARDIAC PACEMAKER IN SITU: ICD-10-CM

## 2023-11-13 DIAGNOSIS — I10 ESSENTIAL HYPERTENSION: ICD-10-CM

## 2023-11-13 DIAGNOSIS — I25.10 CORONARY ARTERY DISEASE INVOLVING NATIVE CORONARY ARTERY OF NATIVE HEART, UNSPECIFIED WHETHER ANGINA PRESENT: ICD-10-CM

## 2023-11-13 DIAGNOSIS — E78.2 MIXED HYPERLIPIDEMIA: ICD-10-CM

## 2023-11-14 NOTE — PROGRESS NOTES
DR. Mallika Gould - [facility] VISIT     DATE OF SERVICE: 8/22/23    NURSING HOME: [address] [facility]     CHIEF COMPLAINT/HISTORY OF CHIEF COMPLAINT: This patient is being seen for ongoing evaluation and management of her Alzheimer's disease, elevated fasting glucose, hypertension, hyperlipidemia, history of thyroid cancer with postoperative hypothyroidism, coronary artery disease, sick sinus syndrome, gastroesophageal reflux disease, Mcdaniel's esophagus, irritable bowel syndrome, polycythemia, depression, and being overweight. She seems to be doing fairly well lately. Her elevated fasting glucose is controlled with diet and exercise. She sees the cardiologists here for her coronary artery disease and sick sinus syndrome. She sees Dr. Dan Burkitt for her polycythemia and history of thyroid cancer. There are no new complaints at this time. ALLERGIES: No Known Allergies    MEDICATIONS: As noted on the [facility] [place], referenced and incorporated herein. PAST MEDICAL HISTORY:   Past Medical History:   Diagnosis Date    Age-related osteoporosis with current pathological fracture with routine healing     Anxiety 9/6/2019    Mcdaniel esophagus     CAD (coronary artery disease)     nonobstructive    Cardiac pacemaker in situ 4/24/2014    Put in for severe bradycardia.  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

## 2023-11-16 ENCOUNTER — TELEPHONE (OUTPATIENT)
Dept: INTERNAL MEDICINE | Age: 88
End: 2023-11-16

## 2023-11-16 NOTE — TELEPHONE ENCOUNTER
Patient was seen in the walk in there in [facility]. She picked up Elderberry infused honey they are requesting order to be able to take once daily.

## 2023-11-28 ENCOUNTER — TELEPHONE (OUTPATIENT)
Dept: FAMILY MEDICINE CLINIC | Age: 88
End: 2023-11-28

## 2023-11-28 NOTE — TELEPHONE ENCOUNTER
Bennett Sweet calling to say [facility] will be sending a fax.  Joe does not like the Elderberry honey, so Bennett Sweet got her some Immune Booster Vitamins, so they will send over to see if that is ok to give instead

## 2023-11-29 NOTE — TELEPHONE ENCOUNTER
Ok, fine. She may take the immune booster according to the package directions. I will say that on the fax when it arrives.

## 2023-12-01 ENCOUNTER — TELEPHONE (OUTPATIENT)
Dept: INTERNAL MEDICINE | Age: 88
End: 2023-12-01

## 2023-12-01 RX ORDER — DOXYCYCLINE HYCLATE 100 MG/1
CAPSULE ORAL
COMMUNITY
Start: 2023-11-22

## 2023-12-01 NOTE — TELEPHONE ENCOUNTER
Patient was out with her Daughter and had a fall daughter took her to the [facility] and they states that they questioned whether she should be on Doxycycline. The facility will fax her discharge summary.

## 2023-12-01 NOTE — TELEPHONE ENCOUNTER
I presume she was started on doxycycline because they thought they saw an infiltrate in her lungs. Maybe early pneumonia? Continue doxycycline and get a repeat chest x-ray (may be portable and done at the nursing home) in one week.

## 2023-12-15 ENCOUNTER — OFFICE VISIT (OUTPATIENT)
Dept: CARDIOLOGY | Age: 88
End: 2023-12-15
Payer: MEDICARE

## 2023-12-15 ENCOUNTER — PROCEDURE VISIT (OUTPATIENT)
Dept: CARDIOLOGY | Age: 88
End: 2023-12-15
Payer: MEDICARE

## 2023-12-15 VITALS
WEIGHT: 180 LBS | SYSTOLIC BLOOD PRESSURE: 102 MMHG | BODY MASS INDEX: 28.25 KG/M2 | HEART RATE: 69 BPM | DIASTOLIC BLOOD PRESSURE: 60 MMHG | HEIGHT: 67 IN

## 2023-12-15 DIAGNOSIS — I49.5 SSS (SICK SINUS SYNDROME) (HCC): ICD-10-CM

## 2023-12-15 DIAGNOSIS — I10 PRIMARY HYPERTENSION: ICD-10-CM

## 2023-12-15 DIAGNOSIS — I25.10 CORONARY ARTERY DISEASE INVOLVING NATIVE CORONARY ARTERY OF NATIVE HEART, UNSPECIFIED WHETHER ANGINA PRESENT: Primary | ICD-10-CM

## 2023-12-15 DIAGNOSIS — I49.5 SSS (SICK SINUS SYNDROME) (HCC): Primary | ICD-10-CM

## 2023-12-15 DIAGNOSIS — Z95.0 CARDIAC PACEMAKER IN SITU: ICD-10-CM

## 2023-12-15 PROCEDURE — 93288 INTERROG EVL PM/LDLS PM IP: CPT | Performed by: INTERNAL MEDICINE

## 2023-12-15 PROCEDURE — 1123F ACP DISCUSS/DSCN MKR DOCD: CPT | Performed by: INTERNAL MEDICINE

## 2023-12-15 PROCEDURE — 1090F PRES/ABSN URINE INCON ASSESS: CPT | Performed by: INTERNAL MEDICINE

## 2023-12-15 PROCEDURE — G8484 FLU IMMUNIZE NO ADMIN: HCPCS | Performed by: INTERNAL MEDICINE

## 2023-12-15 PROCEDURE — 1036F TOBACCO NON-USER: CPT | Performed by: INTERNAL MEDICINE

## 2023-12-15 PROCEDURE — 99213 OFFICE O/P EST LOW 20 MIN: CPT | Performed by: INTERNAL MEDICINE

## 2023-12-15 PROCEDURE — 93010 ELECTROCARDIOGRAM REPORT: CPT | Performed by: INTERNAL MEDICINE

## 2023-12-15 PROCEDURE — 99214 OFFICE O/P EST MOD 30 MIN: CPT | Performed by: INTERNAL MEDICINE

## 2023-12-15 PROCEDURE — G8427 DOCREV CUR MEDS BY ELIG CLIN: HCPCS | Performed by: INTERNAL MEDICINE

## 2023-12-15 PROCEDURE — G8417 CALC BMI ABV UP PARAM F/U: HCPCS | Performed by: INTERNAL MEDICINE

## 2023-12-15 PROCEDURE — 93005 ELECTROCARDIOGRAM TRACING: CPT | Performed by: INTERNAL MEDICINE

## 2023-12-29 ENCOUNTER — OUTSIDE SERVICES (OUTPATIENT)
Dept: INTERNAL MEDICINE | Age: 88
End: 2023-12-29

## 2023-12-29 DIAGNOSIS — K21.9 GASTROESOPHAGEAL REFLUX DISEASE WITHOUT ESOPHAGITIS: ICD-10-CM

## 2023-12-29 DIAGNOSIS — G30.0 EARLY ONSET ALZHEIMER'S DISEASE WITH BEHAVIORAL DISTURBANCE (HCC): ICD-10-CM

## 2023-12-29 DIAGNOSIS — R73.01 ELEVATED FASTING GLUCOSE: Primary | ICD-10-CM

## 2023-12-29 DIAGNOSIS — K44.9 HIATAL HERNIA: ICD-10-CM

## 2023-12-29 DIAGNOSIS — I25.10 CORONARY ARTERY DISEASE INVOLVING NATIVE CORONARY ARTERY OF NATIVE HEART, UNSPECIFIED WHETHER ANGINA PRESENT: ICD-10-CM

## 2023-12-29 DIAGNOSIS — K58.9 IRRITABLE BOWEL SYNDROME, UNSPECIFIED TYPE: ICD-10-CM

## 2023-12-29 DIAGNOSIS — I49.5 SSS (SICK SINUS SYNDROME) (HCC): ICD-10-CM

## 2023-12-29 DIAGNOSIS — E66.3 OVERWEIGHT: ICD-10-CM

## 2023-12-29 DIAGNOSIS — F02.818 EARLY ONSET ALZHEIMER'S DISEASE WITH BEHAVIORAL DISTURBANCE (HCC): ICD-10-CM

## 2023-12-29 DIAGNOSIS — D45 POLYCYTHEMIA RUBRA VERA (HCC): ICD-10-CM

## 2023-12-29 DIAGNOSIS — M80.00XD AGE-RELATED OSTEOPOROSIS WITH CURRENT PATHOLOGICAL FRACTURE WITH ROUTINE HEALING: ICD-10-CM

## 2023-12-29 DIAGNOSIS — E78.2 MIXED HYPERLIPIDEMIA: ICD-10-CM

## 2023-12-29 DIAGNOSIS — I21.9 MYOCARDIAL INFARCTION, UNSPECIFIED MI TYPE, UNSPECIFIED ARTERY (HCC): ICD-10-CM

## 2023-12-29 DIAGNOSIS — E89.0 POSTOPERATIVE HYPOTHYROIDISM: ICD-10-CM

## 2023-12-29 DIAGNOSIS — I10 ESSENTIAL HYPERTENSION: ICD-10-CM

## 2023-12-29 DIAGNOSIS — F41.9 ANXIETY: ICD-10-CM

## 2023-12-29 DIAGNOSIS — F32.5 MAJOR DEPRESSIVE DISORDER WITH SINGLE EPISODE, IN REMISSION (HCC): ICD-10-CM

## 2023-12-29 DIAGNOSIS — K22.70 BARRETT'S ESOPHAGUS WITHOUT DYSPLASIA: ICD-10-CM

## 2023-12-29 DIAGNOSIS — Z95.0 CARDIAC PACEMAKER IN SITU: ICD-10-CM

## 2023-12-29 DIAGNOSIS — C73 THYROID CANCER (HCC): ICD-10-CM

## 2023-12-29 NOTE — PROGRESS NOTES
DR. Adames [facility] VISIT     DATE OF SERVICE: 9/25/23    NURSING HOME: [address] [facility]     CHIEF COMPLAINT/HISTORY OF CHIEF COMPLAINT: This patient is being seen for ongoing evaluation and management of her Alzheimer's disease, elevated fasting glucose, hypertension, hyperlipidemia, history of thyroid cancer with postoperative hypothyroidism, coronary artery disease, sick sinus syndrome, gastroesophageal reflux disease, Mcdaniel's esophagus, irritable bowel syndrome, polycythemia, depression, and being overweight. She seems to be doing fairly well lately. Her elevated fasting glucose is controlled with diet and exercise. She sees the cardiologists here for her coronary artery disease and sick sinus syndrome. She sees Dr. Eboni Wise for her polycythemia and history of thyroid cancer. There are no new complaints. ALLERGIES: No Known Allergies    MEDICATIONS: As noted on the [facility] [place], referenced and incorporated herein. PAST MEDICAL HISTORY:   Past Medical History:   Diagnosis Date    Age-related osteoporosis with current pathological fracture with routine healing     Anxiety 9/6/2019    Mcdaniel esophagus     CAD (coronary artery disease)     nonobstructive    Cardiac pacemaker in situ 4/24/2014    Put in for severe bradycardia.  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

## 2023-12-30 PROCEDURE — 99309 SBSQ NF CARE MODERATE MDM 30: CPT | Performed by: INTERNAL MEDICINE

## 2024-01-24 ENCOUNTER — OUTSIDE SERVICES (OUTPATIENT)
Dept: INTERNAL MEDICINE | Age: 89
End: 2024-01-24
Payer: MEDICARE

## 2024-01-24 DIAGNOSIS — R73.01 ELEVATED FASTING GLUCOSE: Primary | ICD-10-CM

## 2024-01-24 DIAGNOSIS — I21.9 MYOCARDIAL INFARCTION, UNSPECIFIED MI TYPE, UNSPECIFIED ARTERY (HCC): ICD-10-CM

## 2024-01-24 DIAGNOSIS — D45 POLYCYTHEMIA RUBRA VERA (HCC): ICD-10-CM

## 2024-01-24 DIAGNOSIS — E66.3 OVERWEIGHT: ICD-10-CM

## 2024-01-24 DIAGNOSIS — F02.818 EARLY ONSET ALZHEIMER'S DISEASE WITH BEHAVIORAL DISTURBANCE (HCC): ICD-10-CM

## 2024-01-24 DIAGNOSIS — E89.0 POSTOPERATIVE HYPOTHYROIDISM: ICD-10-CM

## 2024-01-24 DIAGNOSIS — I49.5 SSS (SICK SINUS SYNDROME) (HCC): ICD-10-CM

## 2024-01-24 DIAGNOSIS — G30.0 EARLY ONSET ALZHEIMER'S DISEASE WITH BEHAVIORAL DISTURBANCE (HCC): ICD-10-CM

## 2024-01-24 DIAGNOSIS — K22.70 BARRETT'S ESOPHAGUS WITHOUT DYSPLASIA: ICD-10-CM

## 2024-01-24 DIAGNOSIS — K21.9 GASTROESOPHAGEAL REFLUX DISEASE WITHOUT ESOPHAGITIS: ICD-10-CM

## 2024-01-24 DIAGNOSIS — K58.9 IRRITABLE BOWEL SYNDROME, UNSPECIFIED TYPE: ICD-10-CM

## 2024-01-24 DIAGNOSIS — Z95.0 CARDIAC PACEMAKER IN SITU: ICD-10-CM

## 2024-01-24 DIAGNOSIS — C73 THYROID CANCER (HCC): ICD-10-CM

## 2024-01-24 DIAGNOSIS — F41.9 ANXIETY: ICD-10-CM

## 2024-01-24 DIAGNOSIS — I25.10 CORONARY ARTERY DISEASE INVOLVING NATIVE CORONARY ARTERY OF NATIVE HEART, UNSPECIFIED WHETHER ANGINA PRESENT: ICD-10-CM

## 2024-01-24 DIAGNOSIS — E78.2 MIXED HYPERLIPIDEMIA: ICD-10-CM

## 2024-01-24 DIAGNOSIS — M80.00XD AGE-RELATED OSTEOPOROSIS WITH CURRENT PATHOLOGICAL FRACTURE WITH ROUTINE HEALING: ICD-10-CM

## 2024-01-24 DIAGNOSIS — I10 ESSENTIAL HYPERTENSION: ICD-10-CM

## 2024-01-24 DIAGNOSIS — F32.5 MAJOR DEPRESSIVE DISORDER WITH SINGLE EPISODE, IN REMISSION (HCC): ICD-10-CM

## 2024-01-24 DIAGNOSIS — K44.9 HIATAL HERNIA: ICD-10-CM

## 2024-01-25 NOTE — PROGRESS NOTES
DR. DILLON - NURSING HOME VISIT     DATE OF SERVICE: 10/31/23    NURSING HOME: [facility]     CHIEF COMPLAINT/HISTORY OF CHIEF COMPLAINT: This patient is being seen for ongoing evaluation and management of her Alzheimer's disease, elevated fasting glucose, hypertension, hyperlipidemia, history of thyroid cancer with postoperative hypothyroidism, coronary artery disease, sick sinus syndrome, gastroesophageal reflux disease, Mcdaniel's esophagus, irritable bowel syndrome, polycythemia, depression, and being overweight. She seems to be doing fairly well lately. Her elevated fasting glucose is controlled with diet and exercise. She sees the cardiologists here for her coronary artery disease and sick sinus syndrome. She sees Dr. Mann for her polycythemia and history of thyroid cancer. There are no new complaints at this time.      ALLERGIES: No Known Allergies    MEDICATIONS: As noted on the [facility] MAR, referenced and incorporated herein.    PAST MEDICAL HISTORY:   Past Medical History:   Diagnosis Date    Age-related osteoporosis with current pathological fracture with routine healing     Anxiety 9/6/2019    Mcdaniel esophagus     CAD (coronary artery disease)     nonobstructive    Cardiac pacemaker in situ 4/24/2014    Put in for severe bradycardia. Dual chamber     Chronic insomnia     Closed right hip fracture, with routine healing, subsequent encounter 8/18/2019    Early onset Alzheimer's disease with behavioral disturbance ([facility]) 9/6/2019    Elevated fasting glucose 9/6/2019    GERD (gastroesophageal reflux disease)     Hiatal hernia     Hyperlipidemia     Hypertension     Hypothyroidism s/p surgical removal of thyroid for thyroid carcinoma 7/29/2015    IBS (irritable bowel syndrome)     Major depressive disorder with single episode, in remission (H[facility]) 9/6/2019    Mycosis fungoides (HCC)     Osteoarthritis     Osteoporosis     Patient in clinical research study

## 2024-01-31 PROCEDURE — 99309 SBSQ NF CARE MODERATE MDM 30: CPT | Performed by: INTERNAL MEDICINE

## 2024-03-03 ENCOUNTER — OUTSIDE SERVICES (OUTPATIENT)
Dept: INTERNAL MEDICINE | Age: 89
End: 2024-03-03

## 2024-03-03 DIAGNOSIS — C73 THYROID CANCER (HCC): ICD-10-CM

## 2024-03-03 DIAGNOSIS — I10 ESSENTIAL HYPERTENSION: ICD-10-CM

## 2024-03-03 DIAGNOSIS — K58.9 IRRITABLE BOWEL SYNDROME, UNSPECIFIED TYPE: ICD-10-CM

## 2024-03-03 DIAGNOSIS — K22.70 BARRETT'S ESOPHAGUS WITHOUT DYSPLASIA: ICD-10-CM

## 2024-03-03 DIAGNOSIS — R73.01 ELEVATED FASTING GLUCOSE: Primary | ICD-10-CM

## 2024-03-03 DIAGNOSIS — K44.9 HIATAL HERNIA: ICD-10-CM

## 2024-03-03 DIAGNOSIS — D45 POLYCYTHEMIA RUBRA VERA (HCC): ICD-10-CM

## 2024-03-03 DIAGNOSIS — F32.5 MAJOR DEPRESSIVE DISORDER WITH SINGLE EPISODE, IN REMISSION (HCC): ICD-10-CM

## 2024-03-03 DIAGNOSIS — M80.00XD AGE-RELATED OSTEOPOROSIS WITH CURRENT PATHOLOGICAL FRACTURE WITH ROUTINE HEALING: ICD-10-CM

## 2024-03-03 DIAGNOSIS — I21.9 MYOCARDIAL INFARCTION, UNSPECIFIED MI TYPE, UNSPECIFIED ARTERY (HCC): ICD-10-CM

## 2024-03-03 DIAGNOSIS — I49.5 SSS (SICK SINUS SYNDROME) (HCC): ICD-10-CM

## 2024-03-03 DIAGNOSIS — E66.3 OVERWEIGHT: ICD-10-CM

## 2024-03-03 DIAGNOSIS — K21.9 GASTROESOPHAGEAL REFLUX DISEASE WITHOUT ESOPHAGITIS: ICD-10-CM

## 2024-03-03 DIAGNOSIS — E89.0 POSTOPERATIVE HYPOTHYROIDISM: ICD-10-CM

## 2024-03-03 DIAGNOSIS — F41.9 ANXIETY: ICD-10-CM

## 2024-03-03 DIAGNOSIS — G30.0 EARLY ONSET ALZHEIMER'S DISEASE WITH BEHAVIORAL DISTURBANCE (HCC): ICD-10-CM

## 2024-03-03 DIAGNOSIS — Z95.0 CARDIAC PACEMAKER IN SITU: ICD-10-CM

## 2024-03-03 DIAGNOSIS — F02.818 EARLY ONSET ALZHEIMER'S DISEASE WITH BEHAVIORAL DISTURBANCE (HCC): ICD-10-CM

## 2024-03-03 DIAGNOSIS — E78.2 MIXED HYPERLIPIDEMIA: ICD-10-CM

## 2024-03-03 DIAGNOSIS — I25.10 CORONARY ARTERY DISEASE INVOLVING NATIVE CORONARY ARTERY OF NATIVE HEART, UNSPECIFIED WHETHER ANGINA PRESENT: ICD-10-CM

## 2024-03-11 ENCOUNTER — OUTSIDE SERVICES (OUTPATIENT)
Dept: INTERNAL MEDICINE | Age: 89
End: 2024-03-11
Payer: MEDICARE

## 2024-03-11 DIAGNOSIS — Z95.0 CARDIAC PACEMAKER IN SITU: ICD-10-CM

## 2024-03-11 DIAGNOSIS — K44.9 HIATAL HERNIA: ICD-10-CM

## 2024-03-11 DIAGNOSIS — F41.9 ANXIETY: ICD-10-CM

## 2024-03-11 DIAGNOSIS — I10 ESSENTIAL HYPERTENSION: ICD-10-CM

## 2024-03-11 DIAGNOSIS — I25.10 CORONARY ARTERY DISEASE INVOLVING NATIVE CORONARY ARTERY OF NATIVE HEART, UNSPECIFIED WHETHER ANGINA PRESENT: ICD-10-CM

## 2024-03-11 DIAGNOSIS — J21.0 RSV BRONCHIOLITIS: Primary | ICD-10-CM

## 2024-03-11 DIAGNOSIS — F32.5 MAJOR DEPRESSIVE DISORDER WITH SINGLE EPISODE, IN REMISSION (HCC): ICD-10-CM

## 2024-03-11 DIAGNOSIS — R73.01 ELEVATED FASTING GLUCOSE: ICD-10-CM

## 2024-03-11 DIAGNOSIS — I49.5 SSS (SICK SINUS SYNDROME) (HCC): ICD-10-CM

## 2024-03-11 DIAGNOSIS — E66.3 OVERWEIGHT: ICD-10-CM

## 2024-03-11 DIAGNOSIS — K21.9 GASTROESOPHAGEAL REFLUX DISEASE WITHOUT ESOPHAGITIS: ICD-10-CM

## 2024-03-11 DIAGNOSIS — E78.2 MIXED HYPERLIPIDEMIA: ICD-10-CM

## 2024-03-11 DIAGNOSIS — R73.01 ELEVATED FASTING GLUCOSE: Primary | ICD-10-CM

## 2024-03-11 DIAGNOSIS — I21.9 MYOCARDIAL INFARCTION, UNSPECIFIED MI TYPE, UNSPECIFIED ARTERY (HCC): ICD-10-CM

## 2024-03-11 DIAGNOSIS — D45 POLYCYTHEMIA RUBRA VERA (HCC): ICD-10-CM

## 2024-03-11 DIAGNOSIS — C73 THYROID CANCER (HCC): ICD-10-CM

## 2024-03-11 DIAGNOSIS — F02.818 EARLY ONSET ALZHEIMER'S DISEASE WITH BEHAVIORAL DISTURBANCE (HCC): ICD-10-CM

## 2024-03-11 DIAGNOSIS — K58.9 IRRITABLE BOWEL SYNDROME, UNSPECIFIED TYPE: ICD-10-CM

## 2024-03-11 DIAGNOSIS — K22.70 BARRETT'S ESOPHAGUS WITHOUT DYSPLASIA: ICD-10-CM

## 2024-03-11 DIAGNOSIS — M80.00XD AGE-RELATED OSTEOPOROSIS WITH CURRENT PATHOLOGICAL FRACTURE WITH ROUTINE HEALING: ICD-10-CM

## 2024-03-11 DIAGNOSIS — H90.3 ASYMMETRICAL SENSORINEURAL HEARING LOSS: ICD-10-CM

## 2024-03-11 DIAGNOSIS — E89.0 POSTOPERATIVE HYPOTHYROIDISM: ICD-10-CM

## 2024-03-11 DIAGNOSIS — G30.0 EARLY ONSET ALZHEIMER'S DISEASE WITH BEHAVIORAL DISTURBANCE (HCC): ICD-10-CM

## 2024-03-11 NOTE — PROGRESS NOTES
diabetes.     3. Essential hypertension, controlled  - She will continue to take her antihypertensive medication     4. Mixed hyperlipidemia, controlled  - She will continue to take pravastatin    5. Coronary artery disease involving native coronary artery of native heart, unspecified whether angina present, stable  6. SSS (sick sinus syndrome) ([facility]), stable  7. Cardiac pacemaker in situ  8. Myocardial infarction, unspecified MI type, unspecified artery ([facility]), stable  - She will continue to see the cardiologists here  - She will continue to take her cardiac medications    9. Thyroid cancer ([facility]), stable  10. Postoperative hypothyroidism, stable  - She will continue to take Synthroid  - She will continue to see Dr. Mann    11. Gastroesophageal reflux disease without esophagitis, stable  12. Hiatal hernia, stable  13. 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 Irritable bowel syndrome, unspecified type, stable  - We will continue to monitor     15. Polycythemia rubra vera ([facility]), stable  - We will continue to monitor  - She will continue to see Dr. Mann    16. Age-related osteoporosis with current pathological fracture with routine healing, stable  - We will continue to monitor     17. Early onset Alzheimer's disease with behavioral disturbance ([facility]), stable  - She will continue to take Aricept    18. Major depressive disorder with single episode, in remission ([facility]), stable  19. Anxiety, stable  - We will continue to monitor     20. Overweight, stable  - We discussed weight loss  - She will continue to watch her diet and exercise       Continue current treatment. Nursing home record reviewed and updates summarized and entered into electronic record. See nursing home orders and MAR.        Electronically signed by STEPHANIE DILLON DO on 3/11/2024 at 12:48 AM  Internal Medicine

## 2024-03-11 NOTE — PROGRESS NOTES
hyperlipidemia, controlled  - She will continue to take pravastatin    4. Coronary artery disease involving native coronary artery of native heart, unspecified whether angina present, stable  5. SSS (sick sinus syndrome) ([facility]), stable  6. Cardiac pacemaker in situ  7. Myocardial infarction, unspecified MI type, unspecified artery ([facility]), stable  - She will continue to see the cardiologists here  - We reviewed their most recent visit note   - She will continue to take her cardiac medications    8. Thyroid cancer ([facility]), stable  9. Postoperative hypothyroidism, stable  - She will continue to take Synthroid  - She will continue to see Dr. Mann    10. Gastroesophageal reflux disease without esophagitis, stable  11. Hiatal hernia, stable  12. 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 Irritable bowel syndrome, unspecified type, stable  - We will continue to monitor     14. Polycythemia rubra vera ([facility]), stable  - We will continue to monitor  - She will continue to see Dr. Mann    15. Age-related osteoporosis with current pathological fracture with routine healing, stable  - We will continue to monitor     16. Early onset Alzheimer's disease with behavioral disturbance ([facility]), stable  - She will continue to take Aricept    17. Major depressive disorder with single episode, in remission ([facility]), stable  18. Anxiety, stable  - We will continue to monitor     19. Asymmetrical sensorineural hearing loss, new  - We reviewed Dr. Saldana's most recent visit report    20. Overweight, stable  - We discussed weight loss  - She will continue to watch her diet and exercise       Continue current treatment. Nursing home record reviewed and updates summarized and entered into electronic record. See nursing home orders and MAR.        Electronically signed by STEPHANIE DILLON DO on 3/11/2024 at 12:01 AM  Internal Medicine

## 2024-04-08 ENCOUNTER — OFFICE VISIT (OUTPATIENT)
Dept: ONCOLOGY | Age: 89
End: 2024-04-08
Payer: MEDICARE

## 2024-04-08 VITALS
SYSTOLIC BLOOD PRESSURE: 116 MMHG | OXYGEN SATURATION: 95 % | HEIGHT: 67 IN | WEIGHT: 174.8 LBS | HEART RATE: 85 BPM | RESPIRATION RATE: 16 BRPM | DIASTOLIC BLOOD PRESSURE: 72 MMHG | BODY MASS INDEX: 27.44 KG/M2 | TEMPERATURE: 97.8 F

## 2024-04-08 DIAGNOSIS — D75.1 POLYCYTHEMIA: Primary | ICD-10-CM

## 2024-04-08 PROCEDURE — 1090F PRES/ABSN URINE INCON ASSESS: CPT | Performed by: INTERNAL MEDICINE

## 2024-04-08 PROCEDURE — 99213 OFFICE O/P EST LOW 20 MIN: CPT | Performed by: INTERNAL MEDICINE

## 2024-04-08 PROCEDURE — 1036F TOBACCO NON-USER: CPT | Performed by: INTERNAL MEDICINE

## 2024-04-08 PROCEDURE — G8427 DOCREV CUR MEDS BY ELIG CLIN: HCPCS | Performed by: INTERNAL MEDICINE

## 2024-04-08 PROCEDURE — G8417 CALC BMI ABV UP PARAM F/U: HCPCS | Performed by: INTERNAL MEDICINE

## 2024-04-08 PROCEDURE — 99214 OFFICE O/P EST MOD 30 MIN: CPT | Performed by: INTERNAL MEDICINE

## 2024-04-08 PROCEDURE — 1123F ACP DISCUSS/DSCN MKR DOCD: CPT | Performed by: INTERNAL MEDICINE

## 2024-04-08 NOTE — PROGRESS NOTES
Patient ID: Joe Smith, 11/20/1934, [id number], 89 y.o.    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 Hinojosa until recently for polycythemia.  She was diagnosed with polycythemia in September 2010.  Her GÓMEZ 2 mutation mutation was negative.  She had a bone marrow biopsy done in September 2010 which showed normal cellular marrow with no erythroid hyperplasia.  No evidence of malignancy or dysplasia noted.  She was treated with intermittent phlebotomies since September 2010.    Interval history:  Patient is return for follow visit and to discuss lab results and further recommendations.  Recent hematocrit on recent labs showed 42 clinical she is doing well and denies any abdominal pain nausea matting, no chest pain shortness of breath.  Recent CT chest was on the lower side so her levothyroxine dose has been adjusted recently by her PCP.    During this visit patient's allergy, social, medical, surgical history and medications were reviewed and updated.    No Known Allergies    Current Outpatient Medications   Medication Sig Dispense Refill    doxycycline hyclate (VIBRAMYCIN) 100 MG capsule       ferrous sulfate (IRON 325) 325 (65 Fe) MG tablet Take 1 tablet by mouth daily (with breakfast)      vitamin D (CHOLECALCIFEROL) 30493 UNIT CAPS Take 1 capsule by mouth once a week      benzonatate (TESSALON) 100 MG capsule       ipratropium-albuterol (DUONEB) 0.5-2.5 (3) MG/3ML SOLN nebulizer solution       Handicap Placard MISC by Does not apply route Expected Use:  Lifetime 1 each 0    donepezil (ARICEPT) 5 MG tablet Take 1 tablet by mouth nightly      Multiple Vitamins-Minerals (THERAPEUTIC MULTIVITAMIN-MINERALS) tablet Take 1 tablet by mouth daily

## 2024-09-27 ENCOUNTER — PROCEDURE VISIT (OUTPATIENT)
Dept: CARDIOLOGY | Age: 89
End: 2024-09-27
Payer: MEDICARE

## 2024-09-27 ENCOUNTER — OFFICE VISIT (OUTPATIENT)
Dept: CARDIOLOGY | Age: 89
End: 2024-09-27
Payer: MEDICARE

## 2024-09-27 VITALS
WEIGHT: 174 LBS | DIASTOLIC BLOOD PRESSURE: 86 MMHG | HEIGHT: 67 IN | SYSTOLIC BLOOD PRESSURE: 142 MMHG | BODY MASS INDEX: 27.31 KG/M2

## 2024-09-27 DIAGNOSIS — Z45.010 PACEMAKER AT END OF BATTERY LIFE: ICD-10-CM

## 2024-09-27 DIAGNOSIS — I10 PRIMARY HYPERTENSION: ICD-10-CM

## 2024-09-27 DIAGNOSIS — I49.5 SSS (SICK SINUS SYNDROME) (HCC): Primary | ICD-10-CM

## 2024-09-27 DIAGNOSIS — I25.10 CORONARY ARTERY DISEASE INVOLVING NATIVE CORONARY ARTERY OF NATIVE HEART, UNSPECIFIED WHETHER ANGINA PRESENT: Primary | ICD-10-CM

## 2024-09-27 DIAGNOSIS — I49.5 SSS (SICK SINUS SYNDROME) (HCC): ICD-10-CM

## 2024-09-27 DIAGNOSIS — Z95.0 PACEMAKER: ICD-10-CM

## 2024-09-27 PROCEDURE — 99214 OFFICE O/P EST MOD 30 MIN: CPT | Performed by: INTERNAL MEDICINE

## 2024-09-27 PROCEDURE — 93288 INTERROG EVL PM/LDLS PM IP: CPT | Performed by: INTERNAL MEDICINE

## 2024-09-27 PROCEDURE — 93005 ELECTROCARDIOGRAM TRACING: CPT | Performed by: INTERNAL MEDICINE

## 2024-10-14 ENCOUNTER — HOSPITAL ENCOUNTER (OUTPATIENT)
Age: 89
Setting detail: OUTPATIENT SURGERY
Discharge: NURSING, REHAB OR LONG-TERM CARE | End: 2024-10-14
Attending: INTERNAL MEDICINE | Admitting: INTERNAL MEDICINE
Payer: MEDICARE

## 2024-10-14 VITALS
HEART RATE: 69 BPM | HEIGHT: 67 IN | TEMPERATURE: 0.9 F | DIASTOLIC BLOOD PRESSURE: 45 MMHG | SYSTOLIC BLOOD PRESSURE: 136 MMHG | BODY MASS INDEX: 26.84 KG/M2 | WEIGHT: 171 LBS | OXYGEN SATURATION: 96 %

## 2024-10-14 DIAGNOSIS — Z45.010 PACEMAKER AT END OF BATTERY LIFE: ICD-10-CM

## 2024-10-14 LAB
BUN BLD-MCNC: 10 MG/DL (ref 8–26)
CHLORIDE BLD-SCNC: 104 MMOL/L (ref 98–107)
ECHO BSA: 1.91 M2
EGFR, POC: 70 ML/MIN/1.73M2
GLUCOSE BLD-MCNC: 103 MG/DL (ref 74–100)
HCT VFR BLD AUTO: 53 % (ref 36–46)
POC CREATININE: 0.8 MG/DL (ref 0.51–1.19)
POC HEMOGLOBIN (CALC): 17.9 G/DL (ref 12–16)
POTASSIUM BLD-SCNC: 3.4 MMOL/L (ref 3.5–4.5)
SODIUM BLD-SCNC: 153 MMOL/L (ref 138–146)

## 2024-10-14 PROCEDURE — 6360000002 HC RX W HCPCS: Performed by: INTERNAL MEDICINE

## 2024-10-14 PROCEDURE — 99153 MOD SED SAME PHYS/QHP EA: CPT | Performed by: INTERNAL MEDICINE

## 2024-10-14 PROCEDURE — 99223 1ST HOSP IP/OBS HIGH 75: CPT | Performed by: INTERNAL MEDICINE

## 2024-10-14 PROCEDURE — 2720000010 HC SURG SUPPLY STERILE: Performed by: INTERNAL MEDICINE

## 2024-10-14 PROCEDURE — C1889 IMPLANT/INSERT DEVICE, NOC: HCPCS | Performed by: INTERNAL MEDICINE

## 2024-10-14 PROCEDURE — 84520 ASSAY OF UREA NITROGEN: CPT

## 2024-10-14 PROCEDURE — 82565 ASSAY OF CREATININE: CPT

## 2024-10-14 PROCEDURE — 85014 HEMATOCRIT: CPT

## 2024-10-14 PROCEDURE — 84132 ASSAY OF SERUM POTASSIUM: CPT

## 2024-10-14 PROCEDURE — C1785 PMKR, DUAL, RATE-RESP: HCPCS | Performed by: INTERNAL MEDICINE

## 2024-10-14 PROCEDURE — 2580000003 HC RX 258: Performed by: INTERNAL MEDICINE

## 2024-10-14 PROCEDURE — 82947 ASSAY GLUCOSE BLOOD QUANT: CPT

## 2024-10-14 PROCEDURE — 2500000003 HC RX 250 WO HCPCS: Performed by: INTERNAL MEDICINE

## 2024-10-14 PROCEDURE — 99152 MOD SED SAME PHYS/QHP 5/>YRS: CPT | Performed by: INTERNAL MEDICINE

## 2024-10-14 PROCEDURE — 2709999900 HC NON-CHARGEABLE SUPPLY: Performed by: INTERNAL MEDICINE

## 2024-10-14 PROCEDURE — C1892 INTRO/SHEATH,FIXED,PEEL-AWAY: HCPCS | Performed by: INTERNAL MEDICINE

## 2024-10-14 PROCEDURE — 7100000010 HC PHASE II RECOVERY - FIRST 15 MIN: Performed by: INTERNAL MEDICINE

## 2024-10-14 PROCEDURE — 82435 ASSAY OF BLOOD CHLORIDE: CPT

## 2024-10-14 PROCEDURE — 7100000011 HC PHASE II RECOVERY - ADDTL 15 MIN: Performed by: INTERNAL MEDICINE

## 2024-10-14 PROCEDURE — 33213 INSERT PULSE GEN DUAL LEADS: CPT | Performed by: INTERNAL MEDICINE

## 2024-10-14 PROCEDURE — 84295 ASSAY OF SERUM SODIUM: CPT

## 2024-10-14 DEVICE — IPG W1DR01 AZURE XT DR MRI USA
Type: IMPLANTABLE DEVICE | Site: CHEST | Status: FUNCTIONAL
Brand: AZURE™ XT DR MRI SURESCAN™

## 2024-10-14 DEVICE — ENVELOPE CMRM6122 ABSORB MED MR
Type: IMPLANTABLE DEVICE | Site: CHEST | Status: FUNCTIONAL
Brand: TYRX™

## 2024-10-14 RX ORDER — DOXYCYCLINE HYCLATE 100 MG
100 TABLET ORAL EVERY 12 HOURS SCHEDULED
Status: DISCONTINUED | OUTPATIENT
Start: 2024-10-14 | End: 2024-10-14 | Stop reason: HOSPADM

## 2024-10-14 RX ORDER — SODIUM CHLORIDE 9 MG/ML
INJECTION, SOLUTION INTRAVENOUS PRN
Status: CANCELLED | OUTPATIENT
Start: 2024-10-14

## 2024-10-14 RX ORDER — FENTANYL CITRATE 50 UG/ML
INJECTION, SOLUTION INTRAMUSCULAR; INTRAVENOUS PRN
Status: DISCONTINUED | OUTPATIENT
Start: 2024-10-14 | End: 2024-10-14 | Stop reason: HOSPADM

## 2024-10-14 RX ORDER — MIDAZOLAM HYDROCHLORIDE 1 MG/ML
INJECTION INTRAMUSCULAR; INTRAVENOUS PRN
Status: DISCONTINUED | OUTPATIENT
Start: 2024-10-14 | End: 2024-10-14 | Stop reason: HOSPADM

## 2024-10-14 RX ORDER — SODIUM CHLORIDE 0.9 % (FLUSH) 0.9 %
5-40 SYRINGE (ML) INJECTION EVERY 12 HOURS SCHEDULED
Status: CANCELLED | OUTPATIENT
Start: 2024-10-14

## 2024-10-14 RX ORDER — LIDOCAINE HYDROCHLORIDE AND EPINEPHRINE 10; 10 MG/ML; UG/ML
INJECTION, SOLUTION INFILTRATION; PERINEURAL PRN
Status: DISCONTINUED | OUTPATIENT
Start: 2024-10-14 | End: 2024-10-14 | Stop reason: HOSPADM

## 2024-10-14 RX ORDER — SODIUM CHLORIDE 9 MG/ML
INJECTION, SOLUTION INTRAVENOUS CONTINUOUS
Status: DISCONTINUED | OUTPATIENT
Start: 2024-10-14 | End: 2024-10-14 | Stop reason: HOSPADM

## 2024-10-14 RX ORDER — SODIUM CHLORIDE 0.9 % (FLUSH) 0.9 %
5-40 SYRINGE (ML) INJECTION PRN
Status: CANCELLED | OUTPATIENT
Start: 2024-10-14

## 2024-10-14 RX ORDER — DOXYCYCLINE HYCLATE 100 MG
100 TABLET ORAL EVERY 12 HOURS SCHEDULED
Qty: 20 TABLET | Refills: 0 | Status: SHIPPED | OUTPATIENT
Start: 2024-10-14 | End: 2024-10-24

## 2024-10-14 RX ADMIN — SODIUM CHLORIDE: 9 INJECTION, SOLUTION INTRAVENOUS at 13:46

## 2024-10-14 NOTE — DISCHARGE INSTRUCTIONS
DISCHARGE INSTRUCTIONS FOR ICD/PACEMAKER REPLACEMENTS/DR GUTIERREZ      -REMOVE DRESSING THE FOLLOWING DAY AND LEAVE CLEAN, DRY AND OPEN TO AIR     -IF STERI-STRIPS USED DO NOT NOT REMOVE THEM THEY WILL FALL OFF THEMSELVES    -IF STAPLES USED WEAR LOOSE FITTING CLOTHING / NOTHING THAT WILL CATCH ON STAPLES.  STAPLES TO BE REMOVED IN ONE WEEK AND NEED TO CALL FOR APPOINTMENT    -MAY SHOWER IN 24 HOURS BUT KEEP INCISION COVER WHILE SHOWERING AND DO NOT LET WATER HIT SITE FOR ANY LENGTH OF TIME    -NO SWIMMING OR HOT TUBS UNTIL AFTER SEEN BY MD    -NO DRIVING FOR 24 HOURS AFTER SEDATION     -NO HEAVY LIFTING WITH AFFECTED ARM FOR 2 - 3 DAYS     -AVOID ACTIVITIES THAT MAY RESULT IN HIGH IMPACT OR STRESS AT INCISIONAL SITE     -AVOID ANYTHING THAT WILL RUB AGAINST THE INCISION     -WATCH FOR SIGNS OF INFECTION WHICH INCLUDE: REDNESS / WARMTH / DRAINAGE FROM INCISION / INCREASE SWELLING / TEMPERATURE BY MOUTH 101 OR GREATER     -IF YOU ARE ON BLOOD THINNER CHECK WITH YOUR DOCTOR WHEN TO RESUME MEDICATION     -CARRY DEVICE ID AND HOME MEDICATION LIST WITH YOU AT ALL TIMES.     -ANY QUESTIONS OR CONCERNS PLEASE CONTACT YOUR DOCTOR AND/OR SEEK HELP     -WOUND CHECK IN ONE WEEK AND FOLLOW UP ONE MONTH

## 2024-10-14 NOTE — PROGRESS NOTES
All discharge instructions reviewed with niece  and patient, questions answered.  Patient discharged per w/c with Krissy and belongings.Transported by [facility]

## 2024-10-14 NOTE — H&P
disorder with single episode, in remission (HCC)    Anxiety    Elevated fasting glucose    CAD (coronary artery disease)    COVID-19    Pneumonia due to COVID-19 virus         LABS:    Lab Results   Component Value Date    CHOL 157 06/01/2020    TRIG 134 06/01/2020    HDL 54 06/01/2020    VLDL 27 06/01/2020    CHOLHDLRATIO 1.4 06/01/2020     Lab Results   Component Value Date    CKTOTAL 47 07/23/2014    CKMB 2.1 07/23/2014    TROPONINI NOT REPORTED 07/24/2014    TROPHS 7 01/28/2021     Lab Results   Component Value Date/Time     01/28/2021 10:22 AM    K 4.3 01/28/2021 10:22 AM     01/28/2021 10:22 AM    CO2 28 01/28/2021 10:22 AM    BUN 17 01/28/2021 10:22 AM    CREATININE 0.8 10/14/2024 01:35 PM    CREATININE 0.75 01/28/2021 10:22 AM    GLUCOSE 130 01/28/2021 10:22 AM    CALCIUM 9.4 01/28/2021 10:22 AM    LABGLOM >60 01/28/2021 10:22 AM        EKG:   ECG reviewed and compared to serial priors. No acute ischemia.      Cardiac Testing:  Last Echo:  No results found for this or any previous visit.      Last Cath:  No results found for this or any previous visit.      Last Stress:  No results found for this or any previous visit.            IMPRESSION & Recommendations:      Pacemaker for SSS placed in 2013 currently at [facility].   Former [facility] patient. There are no cardiologists there at this time  CAD. Prior cath showed CAD  SSS- has pacer for this      Discussed with patient, family, and Nurse.    Electronically signed by Xiomara Larsen DO on 10/14/2024 at 1:59 PM        Thank you for allowing me to participate in the care of this patient, please do not hesitate to call if you have any questions.    Xiomara Larsen DO, FACC  Board Certified Cardiologist  Fellow of the American College of Cardiology    Obesity & Weight Loss Medicine   of Medicine [facility]   of Medicine [facility]

## 2024-10-14 NOTE — PROGRESS NOTES
Patient admitted, consent signed and questions answered. Patient ready for procedure. Call light to reach with side rails up 2 of 2. present.  neice at bedside with patient.  History and physical completed..2% Chlorhexidine cloths used to prep site.Left arm.

## 2024-10-14 NOTE — PROGRESS NOTES
Received post procedure to [facility] to room 11. Assessment obtained. Restrictions reviewed with patient. Post procedure pathway initiated.  Left upper chest dressing  site soft,  dry and intact.  No hematoma noted.  Family at side.  Patient without complaints. [employer] pamphlet given to patients niece.

## 2024-10-25 ENCOUNTER — OFFICE VISIT (OUTPATIENT)
Dept: CARDIOLOGY | Age: 89
End: 2024-10-25
Payer: MEDICARE

## 2024-10-25 VITALS
BODY MASS INDEX: 27.31 KG/M2 | HEART RATE: 67 BPM | SYSTOLIC BLOOD PRESSURE: 136 MMHG | DIASTOLIC BLOOD PRESSURE: 80 MMHG | WEIGHT: 174 LBS | HEIGHT: 67 IN

## 2024-10-25 DIAGNOSIS — I49.5 SSS (SICK SINUS SYNDROME) (HCC): Primary | ICD-10-CM

## 2024-10-25 DIAGNOSIS — Z95.0 PACEMAKER: ICD-10-CM

## 2024-10-25 DIAGNOSIS — I10 PRIMARY HYPERTENSION: ICD-10-CM

## 2024-10-25 DIAGNOSIS — I25.10 CORONARY ARTERY DISEASE INVOLVING NATIVE CORONARY ARTERY OF NATIVE HEART, UNSPECIFIED WHETHER ANGINA PRESENT: ICD-10-CM

## 2024-10-25 PROCEDURE — 99213 OFFICE O/P EST LOW 20 MIN: CPT | Performed by: INTERNAL MEDICINE

## 2024-10-25 PROCEDURE — 93005 ELECTROCARDIOGRAM TRACING: CPT | Performed by: INTERNAL MEDICINE

## 2024-10-25 RX ORDER — LEVOTHYROXINE SODIUM 88 UG/1
88 TABLET ORAL DAILY
COMMUNITY
Start: 2024-10-13

## 2024-10-25 NOTE — PATIENT INSTRUCTIONS
1.  May take shower at 1 week post.  Start with back to water.  Let water flow over shoulder, down chest.  Don't scrub.    2. Let steri-strips fall off on their own.    3. Do not drive until your follow up visit with the doctor.    4. Do not lift more than 5 pounds for one month at least.  Then may increase weight slowly.    5. Do not raise arm above head for 4-6 weeks.    6. No swimming or golfing for 4-6 weeks.    7. 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

## 2024-10-25 NOTE — PROGRESS NOTES
Staples removed from L upper chest incision.  No swelling or redness, no bleeding or drainage.  Clean and dry.  Steri strips applied to site.  Instructions given in writing to Domitila (family member with pt in room) and on the consult page from [facility].  Domitila states understanding and agreement.  Pt pleasant and cooperative.  No complaints.

## 2024-10-25 NOTE — PROGRESS NOTES
Date:   10/25/2024  Patient name:  Joe Smith  MRN:   [id number]  YOB: 1934  PCP:    Rahul Krishna DO    Reason for visit: had concerns including Coronary Artery Disease and Pacemaker Problem (Hx SSS-- New implant due to end of life).    Subjective:       Clinical Changes / Abnormalities:  Denies exertional chest pain or SOB, no dizziness or syncope and no palpitations.     ROS:   General: No dizziness or syncope and no fever or chills, no fatigue.   Chest: No cough or phlegm, no SOB  Heart: No chest pain, no SOB, no palpitations.   GI: No abdominal pain, no nausea or vomiting. No diarrhea or constipation.   Lower extremity: No claudication, no myalgia      Medications:     Current Outpatient Medications   Medication Sig Dispense Refill    levothyroxine (SYNTHROID) 88 MCG tablet Take 1 tablet by mouth Daily      donepezil (ARICEPT) 5 MG tablet Take 1 tablet by mouth nightly      pravastatin (PRAVACHOL) 40 MG tablet take 1 tablet by mouth once daily 90 tablet 1    losartan (COZAAR) 50 MG tablet take 1 tablet by mouth once daily 30 tablet 5    ferrous sulfate (IRON 325) 325 (65 Fe) MG tablet Take 1 tablet by mouth daily (with breakfast)      vitamin D (CHOLECALCIFEROL) 86964 UNIT CAPS Take 1 capsule by mouth once a week      Handicap Placard MISC by Does not apply route Expected Use:  Lifetime 1 each 0    Multiple Vitamins-Minerals (THERAPEUTIC MULTIVITAMIN-MINERALS) tablet Take 1 tablet by mouth daily      acetaminophen (TYLENOL) 500 MG tablet Take 1 tablet by mouth every 6 hours as needed for Pain (Patient not taking: Reported on 10/25/2024)      docusate sodium (COLACE, DULCOLAX) 100 MG CAPS Take 100 mg by mouth daily 30 capsule 0    loratadine (CLARITIN) 10 MG tablet Take 1 tablet by mouth daily 30 tablet 11     Current Facility-Administered Medications   Medication Dose Route Frequency Provider Last Rate Last Admin    denosumab (PROLIA) SC injection 60 mg  60 mg

## 2025-03-28 ENCOUNTER — PROCEDURE VISIT (OUTPATIENT)
Dept: CARDIOLOGY | Age: 89
End: 2025-03-28

## 2025-03-28 ENCOUNTER — OFFICE VISIT (OUTPATIENT)
Dept: CARDIOLOGY | Age: 89
End: 2025-03-28
Payer: MEDICARE

## 2025-03-28 VITALS
SYSTOLIC BLOOD PRESSURE: 116 MMHG | DIASTOLIC BLOOD PRESSURE: 62 MMHG | BODY MASS INDEX: 28.25 KG/M2 | HEART RATE: 73 BPM | WEIGHT: 180 LBS | HEIGHT: 67 IN

## 2025-03-28 DIAGNOSIS — I25.10 CORONARY ARTERY DISEASE INVOLVING NATIVE CORONARY ARTERY OF NATIVE HEART, UNSPECIFIED WHETHER ANGINA PRESENT: Primary | ICD-10-CM

## 2025-03-28 DIAGNOSIS — Z95.0 CARDIAC PACEMAKER IN SITU: ICD-10-CM

## 2025-03-28 DIAGNOSIS — I10 HYPERTENSION, UNSPECIFIED TYPE: ICD-10-CM

## 2025-03-28 DIAGNOSIS — I49.5 SSS (SICK SINUS SYNDROME) (HCC): Primary | ICD-10-CM

## 2025-03-28 DIAGNOSIS — Z87.891 FORMER SMOKER: ICD-10-CM

## 2025-03-28 DIAGNOSIS — I48.0 PAROXYSMAL ATRIAL FIBRILLATION (HCC): ICD-10-CM

## 2025-03-28 DIAGNOSIS — E78.2 MIXED HYPERLIPIDEMIA: ICD-10-CM

## 2025-03-28 PROCEDURE — 93005 ELECTROCARDIOGRAM TRACING: CPT | Performed by: INTERNAL MEDICINE

## 2025-03-28 PROCEDURE — 99212 OFFICE O/P EST SF 10 MIN: CPT | Performed by: INTERNAL MEDICINE

## 2025-03-28 RX ORDER — BENZONATATE 100 MG/1
100 CAPSULE ORAL 3 TIMES DAILY PRN
COMMUNITY

## 2025-03-28 RX ORDER — LEVOTHYROXINE SODIUM 100 UG/1
TABLET ORAL
COMMUNITY
Start: 2025-03-12

## 2025-03-28 NOTE — PROGRESS NOTES
Date:   3/28/2025  Patient name:  Joe Smith  MRN:   [id number]  YOB: 1934  PCP:    Rahul Krishna DO    Reason for visit: had concerns including Coronary Artery Disease, Device Check, Hypertension, and Hyperlipidemia.    Subjective:       Clinical Changes / Abnormalities:  Denies exertional chest pain or SOB, no dizziness or syncope and no palpitations.     ROS:   General: No dizziness or syncope and no fever or chills, no fatigue.   Chest: No cough or phlegm, no SOB  Heart: No chest pain, no SOB, no palpitations.   GI: No abdominal pain, no nausea or vomiting. No diarrhea or constipation.   Lower extremity: No claudication, no myalgia      Medications:     Current Outpatient Medications   Medication Sig Dispense Refill    acetaminophen (TYLENOL) 500 MG tablet Take 1 tablet by mouth every 6 hours as needed for Pain      levothyroxine (SYNTHROID) 88 MCG tablet Take 1 tablet by mouth Daily      ferrous sulfate (IRON 325) 325 (65 Fe) MG tablet Take 1 tablet by mouth daily (with breakfast)      vitamin D (CHOLECALCIFEROL) 43260 UNIT CAPS Take 1 capsule by mouth once a week      Handicap Placard MISC by Does not apply route Expected Use:  Lifetime 1 each 0    donepezil (ARICEPT) 5 MG tablet Take 1 tablet by mouth nightly      Multiple Vitamins-Minerals (THERAPEUTIC MULTIVITAMIN-MINERALS) tablet Take 1 tablet by mouth daily      docusate sodium (COLACE, DULCOLAX) 100 MG CAPS Take 100 mg by mouth daily 30 capsule 0    pravastatin (PRAVACHOL) 40 MG tablet take 1 tablet by mouth once daily 90 tablet 1    losartan (COZAAR) 50 MG tablet take 1 tablet by mouth once daily 30 tablet 5    loratadine (CLARITIN) 10 MG tablet Take 1 tablet by mouth daily 30 tablet 11     Current Facility-Administered Medications   Medication Dose Route Frequency Provider Last Rate Last Admin    denosumab (PROLIA) SC injection 60 mg  60 mg SubCUTAneous Q6 Months Hanna Hinojosa MD

## 2025-05-01 DIAGNOSIS — D75.1 POLYCYTHEMIA: Primary | ICD-10-CM

## 2025-05-05 ENCOUNTER — HOSPITAL ENCOUNTER (OUTPATIENT)
Age: 89
Discharge: HOME | End: 2025-05-05
Payer: MEDICARE

## 2025-05-05 ENCOUNTER — OFFICE VISIT (OUTPATIENT)
Dept: ONCOLOGY | Age: 89
End: 2025-05-05
Payer: MEDICARE

## 2025-05-05 VITALS
SYSTOLIC BLOOD PRESSURE: 114 MMHG | HEART RATE: 50 BPM | HEIGHT: 67 IN | OXYGEN SATURATION: 92 % | WEIGHT: 178.6 LBS | DIASTOLIC BLOOD PRESSURE: 64 MMHG | BODY MASS INDEX: 28.03 KG/M2

## 2025-05-05 DIAGNOSIS — D75.1 POLYCYTHEMIA: ICD-10-CM

## 2025-05-05 DIAGNOSIS — D75.1 POLYCYTHEMIA: Primary | ICD-10-CM

## 2025-05-05 LAB
BASOPHILS # BLD: 0.17 K/UL (ref 0–0.2)
BASOPHILS NFR BLD: 2 % (ref 0–2)
EOSINOPHIL # BLD: 0.29 K/UL (ref 0–0.44)
EOSINOPHILS RELATIVE PERCENT: 3 % (ref 1–4)
ERYTHROCYTE [DISTWIDTH] IN BLOOD BY AUTOMATED COUNT: 16.7 % (ref 11.8–14.4)
HCT VFR BLD AUTO: 55.8 % (ref 36.3–47.1)
HGB BLD-MCNC: 18.6 G/DL (ref 11.9–15.1)
IMM GRANULOCYTES # BLD AUTO: 0.04 K/UL (ref 0–0.3)
IMM GRANULOCYTES NFR BLD: 0 %
LYMPHOCYTES NFR BLD: 1.6 K/UL (ref 1.1–3.7)
LYMPHOCYTES RELATIVE PERCENT: 17 % (ref 24–43)
MCH RBC QN AUTO: 28.7 PG (ref 25.2–33.5)
MCHC RBC AUTO-ENTMCNC: 33.3 G/DL (ref 25.2–33.5)
MCV RBC AUTO: 86 FL (ref 82.6–102.9)
MONOCYTES NFR BLD: 1.1 K/UL (ref 0.1–1.2)
MONOCYTES NFR BLD: 11 % (ref 3–12)
NEUTROPHILS NFR BLD: 67 % (ref 36–65)
NEUTS SEG NFR BLD: 6.45 K/UL (ref 1.5–8.1)
NRBC BLD-RTO: 0 PER 100 WBC
PLATELET # BLD AUTO: 190 K/UL (ref 138–453)
PMV BLD AUTO: 9.8 FL (ref 8.1–13.5)
RBC # BLD AUTO: 6.49 M/UL (ref 3.95–5.11)
RBC # BLD: ABNORMAL 10*6/UL
WBC OTHER # BLD: 9.7 K/UL (ref 3.5–11.3)

## 2025-05-05 PROCEDURE — 1123F ACP DISCUSS/DSCN MKR DOCD: CPT | Performed by: INTERNAL MEDICINE

## 2025-05-05 PROCEDURE — 99213 OFFICE O/P EST LOW 20 MIN: CPT | Performed by: INTERNAL MEDICINE

## 2025-05-05 PROCEDURE — G8417 CALC BMI ABV UP PARAM F/U: HCPCS | Performed by: INTERNAL MEDICINE

## 2025-05-05 PROCEDURE — 85025 COMPLETE CBC W/AUTO DIFF WBC: CPT

## 2025-05-05 PROCEDURE — G8427 DOCREV CUR MEDS BY ELIG CLIN: HCPCS | Performed by: INTERNAL MEDICINE

## 2025-05-05 PROCEDURE — 1036F TOBACCO NON-USER: CPT | Performed by: INTERNAL MEDICINE

## 2025-05-05 PROCEDURE — 99204 OFFICE O/P NEW MOD 45 MIN: CPT | Performed by: INTERNAL MEDICINE

## 2025-05-05 PROCEDURE — 36415 COLL VENOUS BLD VENIPUNCTURE: CPT

## 2025-05-05 PROCEDURE — 1090F PRES/ABSN URINE INCON ASSESS: CPT | Performed by: INTERNAL MEDICINE

## 2025-05-05 PROCEDURE — 1159F MED LIST DOCD IN RCRD: CPT | Performed by: INTERNAL MEDICINE

## 2025-05-05 RX ORDER — 0.9 % SODIUM CHLORIDE 0.9 %
250 INTRAVENOUS SOLUTION INTRAVENOUS ONCE
OUTPATIENT
Start: 2025-05-05 | End: 2025-05-05

## 2025-05-05 NOTE — PROGRESS NOTES
Patient ID: Joe Smith, 11/20/1934, [id number], 89 y.o.    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 Hinojosa until recently for polycythemia.  She was diagnosed with polycythemia in September 2010.  Her GÓMEZ 2 mutation mutation was negative.  She had a bone marrow biopsy done in September 2010 which showed normal cellular marrow with no erythroid hyperplasia.  No evidence of malignancy or dysplasia noted.  She was treated with intermittent phlebotomies since September 2010.    Interval history:  Patient is returning for follow-up visit and to discuss lab results and further recommendations.  She denied any chest pain shortness breath, dizziness or chest tightness.  Her recent lab work showed elevated hematocrit at 55.8.  She is accompanied by her family member who reported that patient has not been drinking enough water.  Denied any diarrhea abdominal pain nausea vomiting.        During this visit patient's allergy, social, medical, surgical history and medications were reviewed and updated.    No Known Allergies    Current Outpatient Medications   Medication Sig Dispense Refill    Albuterol-Budesonide 90-80 MCG/ACT AERO Inhale into the lungs      levothyroxine (SYNTHROID) 100 MCG tablet       melatonin 3 MG TABS tablet Take 1 tablet by mouth daily      benzonatate (TESSALON) 100 MG capsule Take 1 capsule by mouth 3 times daily as needed for Cough      Handicap Placard MISC by Does not apply route Expected Use:  Lifetime 1 each 0    donepezil (ARICEPT) 5 MG tablet Take 1 tablet by mouth nightly      Multiple Vitamins-Minerals (THERAPEUTIC MULTIVITAMIN-MINERALS) tablet Take 1 tablet by mouth daily      acetaminophen (TYLENOL) 500 MG tablet Take 1 tablet

## (undated) DEVICE — GAUZE,SPONGE,4"X4",12PLY,STERILE,LF,2'S: Brand: MEDLINE

## (undated) DEVICE — 3M™ TEGADERM™ TRANSPARENT FILM DRESSING FRAME STYLE, 1627, 4 IN X 10 IN (10 CM X 25 CM), 20/CT 4CT/CASE: Brand: 3M™ TEGADERM™

## (undated) DEVICE — 9165 UNIVERSAL PATIENT PLATE: Brand: 3M™

## (undated) DEVICE — SUTURE VICRYL 2-0 L27IN ABSRB CT BRAID COAT UD J275H

## (undated) DEVICE — TOWEL,OR,DSP,ST,BLUE,STD,4/PK,20PK/CS: Brand: MEDLINE

## (undated) DEVICE — STRYKER PERFORMANCE SERIES SAGITTAL BLADE: Brand: STRYKER PERFORMANCE SERIES

## (undated) DEVICE — SUTURE ETHBND EXCEL SZ 5 L30IN NONABSORBABLE GRN L48MM CCS MB47G

## (undated) DEVICE — GOWN,AURORA,NON-REINFORCED,2XL: Brand: MEDLINE

## (undated) DEVICE — SPONGE LAP W18XL18IN WHT COT 4 PLY FLD STRUNG RADPQ DISP ST 2 PER PACK

## (undated) DEVICE — COVER LT HNDL BLU PLAS

## (undated) DEVICE — SUTURE STRATAFIX SPRL SZ 2-0 L9IN ABSRB VLT MH L36MM 1/2 SXPD2B408

## (undated) DEVICE — BLADE ES L6IN ELASTOMERIC COAT EXT DURABLE BEND UPTO 90DEG

## (undated) DEVICE — Z DISCONTINUED USE 2859063 SUTURE VICRYL 3-0 L27IN ABSRB UD PSL L30MM 1/2 CIR TAPERPOINT J502H

## (undated) DEVICE — 450 ML BOTTLE OF 0.05% CHLORHEXIDINE GLUCONATE IN 99.95% STERILE WATER FOR IRRIGATION, USP AND APPLICATOR.: Brand: IRRISEPT ANTIMICROBIAL WOUND LAVAGE

## (undated) DEVICE — MEDI-TRACE CADENCE ADULT, DEFIBRILLATION ELECTRODE -RTS  (10 PR/PK) - PHYSIO-CONTROL: Brand: MEDI-TRACE CADENCE

## (undated) DEVICE — Device

## (undated) DEVICE — GLOVE SURG SZ 9 THK91MIL LTX FREE SYN POLYISOPRENE ANTI

## (undated) DEVICE — 3000CC GUARDIAN II: Brand: GUARDIAN

## (undated) DEVICE — GLOVE SURG SZ 85 L12IN FNGR THK13MIL WHT ISOLEX POLYISOPRENE

## (undated) DEVICE — INTENDED FOR TISSUE SEPARATION, AND OTHER PROCEDURES THAT REQUIRE A SHARP SURGICAL BLADE TO PUNCTURE OR CUT.: Brand: BARD-PARKER ® CARBON RIB-BACK BLADES

## (undated) DEVICE — SUTURE VCRL SZ 2-0 L27IN ABSRB UD L36MM CP-1 1/2 CIR REV J266H

## (undated) DEVICE — SKIN AFFIX SURG ADHESIVE 72/CS 0.55ML: Brand: MEDLINE

## (undated) DEVICE — ELECTRODE PT RET AD L9FT HI MOIST COND ADH HYDRGEL CORDED

## (undated) DEVICE — YANKAUER,FLEXIBLE HANDLE,FINE CAPACITY: Brand: MEDLINE

## (undated) DEVICE — KIT MICRO INTRO 4FR STIFF 40CM NIGHTENALL TUNGSTEN 7SMT

## (undated) DEVICE — SYRINGE, LUER LOCK, 60ML: Brand: MEDLINE

## (undated) DEVICE — STRAP ARMBRD W1.5XL32IN FOAM STR YET SFT W/ HK AND LOOP

## (undated) DEVICE — DRAPE,U/ SHT,SPLIT,PLAS,STERIL: Brand: MEDLINE

## (undated) DEVICE — CYSTO/BLADDER IRRIGATION SET, REGULATING CLAMP

## (undated) DEVICE — STRIP,CLOSURE,WOUND,MEDI-STRIP,1/2X4: Brand: MEDLINE

## (undated) DEVICE — NEEDLE SPNL L3.5IN PNK HUB S STL REG WALL FIT STYL W/ QNCKE

## (undated) DEVICE — SYRINGE, LUER LOCK, 10ML: Brand: MEDLINE

## (undated) DEVICE — GAUZE,SPONGE,4"X4",16PLY,XRAY,STRL,LF: Brand: MEDLINE

## (undated) DEVICE — PACK SURG PROC REMINDER N WVN DISPOSABLE BEAC TIME OUT

## (undated) DEVICE — 3M™ COBAN™ NL STERILE NON-LATEX SELF-ADHERENT WRAP, 2084S, 4 IN X 5 YD (10 CM X 4,5 M), 18 ROLLS/CASE: Brand: 3M™ COBAN™

## (undated) DEVICE — SUTURE VCRL SZ 1 L36IN ABSRB UD L48MM CTXB 1/2 CIR BLNT PNT JB977H

## (undated) DEVICE — SOLUTION IV 1000ML 0.9% SOD CHL PH 5 INJ USP VIAFLX PLAS

## (undated) DEVICE — THE PHOTONBLADE IS AN RF DEVICE COUPLED WITH ILLUMINATION THAT IS INDICATED FOR CUTTING AND COAGULATION OF SOFT TISSUE DURING SURGICAL PROCEDURES. IT IS INTENDED TO BE USED WITH A VARIETY OF STANDARD COMMERCIALLY AVAILABLE ELECTROSURGICAL UNITS.: Brand: PHOTONBLADE

## (undated) DEVICE — STAPLER 35 WIDE: Brand: MEDLINE INDUSTRIES, INC.

## (undated) DEVICE — CHLORAPREP 26ML ORANGE

## (undated) DEVICE — SUTURE STRATAFIX SPRL SZ 1 L14IN ABSRB VLT L48CM CTX 1/2 SXPD2B405

## (undated) DEVICE — DRAPE,ORTHOMAX ,HIP,W/POUCHES: Brand: MEDLINE

## (undated) DEVICE — SCRUB DRY SURG EZ SCRUB BRUSH PREOPERATIVE GRN

## (undated) DEVICE — LINER GLOVEXL RED CUT RESIST STRL REPEL LT